# Patient Record
Sex: MALE | Race: WHITE | NOT HISPANIC OR LATINO | Employment: OTHER | URBAN - METROPOLITAN AREA
[De-identification: names, ages, dates, MRNs, and addresses within clinical notes are randomized per-mention and may not be internally consistent; named-entity substitution may affect disease eponyms.]

---

## 2017-11-06 ENCOUNTER — APPOINTMENT (OUTPATIENT)
Dept: GENERAL RADIOLOGY | Facility: HOSPITAL | Age: 67
End: 2017-11-06

## 2017-11-06 ENCOUNTER — HOSPITAL ENCOUNTER (INPATIENT)
Facility: HOSPITAL | Age: 67
LOS: 10 days | Discharge: NURSING, REHAB OR LONG-TERM CARE | End: 2017-11-16
Attending: EMERGENCY MEDICINE | Admitting: HOSPITALIST

## 2017-11-06 ENCOUNTER — APPOINTMENT (OUTPATIENT)
Dept: ULTRASOUND IMAGING | Facility: HOSPITAL | Age: 67
End: 2017-11-06

## 2017-11-06 ENCOUNTER — APPOINTMENT (OUTPATIENT)
Dept: CT IMAGING | Facility: HOSPITAL | Age: 67
End: 2017-11-06

## 2017-11-06 DIAGNOSIS — R79.89 ELEVATED LFTS: ICD-10-CM

## 2017-11-06 DIAGNOSIS — N17.9 ACUTE KIDNEY INJURY (NONTRAUMATIC) (HCC): Primary | ICD-10-CM

## 2017-11-06 DIAGNOSIS — E87.6 HYPOKALEMIA: ICD-10-CM

## 2017-11-06 DIAGNOSIS — E86.0 DEHYDRATION: ICD-10-CM

## 2017-11-06 DIAGNOSIS — R53.1 WEAKNESS: ICD-10-CM

## 2017-11-06 LAB
ALBUMIN SERPL-MCNC: 3.2 G/DL (ref 3.5–5.2)
ALBUMIN/GLOB SERPL: 0.8 G/DL
ALP SERPL-CCNC: 317 U/L (ref 39–117)
ALT SERPL W P-5'-P-CCNC: 152 U/L (ref 1–41)
AMPHET+METHAMPHET UR QL: NEGATIVE
ANION GAP SERPL CALCULATED.3IONS-SCNC: 20.1 MMOL/L
AST SERPL-CCNC: 689 U/L (ref 1–40)
BACTERIA UR QL AUTO: ABNORMAL /HPF
BARBITURATES UR QL SCN: NEGATIVE
BASOPHILS # BLD AUTO: 0.04 10*3/MM3 (ref 0–0.2)
BASOPHILS NFR BLD AUTO: 0.3 % (ref 0–1.5)
BENZODIAZ UR QL SCN: NEGATIVE
BH CV VAS MEAS BASILIC ANTECUBITAL FOSSA LEFT: 0.45 CM
BH CV VAS MEAS BASILIC ANTECUBITAL FOSSA RIGHT: 0.39 CM
BH CV VAS MEAS BASILIC FOREARM LEFT - DIST: 0.12 CM
BH CV VAS MEAS BASILIC FOREARM LEFT - MID: 0.09 CM
BH CV VAS MEAS BASILIC FOREARM LEFT - PROX: 0.24 CM
BH CV VAS MEAS BASILIC FOREARM RIGHT - DIST: 0.19 CM
BH CV VAS MEAS BASILIC FOREARM RIGHT - MID: 0.14 CM
BH CV VAS MEAS BASILIC FOREARM RIGHT - PROX: 0.19 CM
BH CV VAS MEAS BASILIC UPPER ARM LEFT - DIST: 0.3 CM
BH CV VAS MEAS BASILIC UPPER ARM LEFT - MID: 0.36 CM
BH CV VAS MEAS BASILIC UPPER ARM LEFT - PROX: 0.39 CM
BH CV VAS MEAS BASILIC UPPER ARM RIGHT - DIST: 0.36 CM
BH CV VAS MEAS BASILIC UPPER ARM RIGHT - MID: 0.43 CM
BH CV VAS MEAS BASILIC UPPER ARM RIGHT - PROX: 0.62 CM
BH CV VAS MEAS CEPHALIC ANTECUBITAL FOSSA LEFT: 0.54 CM
BH CV VAS MEAS CEPHALIC ANTECUBITAL FOSSA RIGHT: 0.85 CM
BH CV VAS MEAS CEPHALIC FOREARM LEFT - DIST: 0.19 CM
BH CV VAS MEAS CEPHALIC FOREARM LEFT - MID: 0.21 CM
BH CV VAS MEAS CEPHALIC FOREARM LEFT - PROX: 0.27 CM
BH CV VAS MEAS CEPHALIC FOREARM RIGHT - DIST: 0.28 CM
BH CV VAS MEAS CEPHALIC FOREARM RIGHT - MID: 0.33 CM
BH CV VAS MEAS CEPHALIC FOREARM RIGHT - PROX: 0.38 CM
BH CV VAS MEAS CEPHALIC UPPER ARM LEFT - DIST: 0.33 CM
BH CV VAS MEAS CEPHALIC UPPER ARM LEFT - MID: 0.28 CM
BH CV VAS MEAS CEPHALIC UPPER ARM LEFT - PROX: 0.41 CM
BH CV VAS MEAS CEPHALIC UPPER ARM RIGHT - DIST: 0.58 CM
BH CV VAS MEAS CEPHALIC UPPER ARM RIGHT - MID: 0.54 CM
BH CV VAS MEAS CEPHALIC UPPER ARM RIGHT - PROX: 0.55 CM
BH CV VAS MEAS RADIAL UPPER ARM LEFT - DIST: 0.23 CM
BH CV VAS MEAS RADIAL UPPER ARM LEFT - MID: 0.25 CM
BH CV VAS MEAS RADIAL UPPER ARM LEFT - PROX: 0.25 CM
BH CV VAS MEAS RADIAL UPPER ARM RIGHT - DIST: 0.26 CM
BH CV VAS MEAS RADIAL UPPER ARM RIGHT - MID: 0.27 CM
BH CV VAS MEAS RADIAL UPPER ARM RIGHT - PROX: 0.21 CM
BILIRUB SERPL-MCNC: 2.5 MG/DL (ref 0.1–1.2)
BILIRUB UR QL STRIP: NEGATIVE
BUN BLD-MCNC: 51 MG/DL (ref 8–23)
BUN/CREAT SERPL: 6.9 (ref 7–25)
CALCIUM SPEC-SCNC: 8.9 MG/DL (ref 8.6–10.5)
CANNABINOIDS SERPL QL: NEGATIVE
CHLORIDE SERPL-SCNC: 96 MMOL/L (ref 98–107)
CLARITY UR: ABNORMAL
CO2 SERPL-SCNC: 22.9 MMOL/L (ref 22–29)
COCAINE UR QL: NEGATIVE
COLOR UR: ABNORMAL
CREAT BLD-MCNC: 7.39 MG/DL (ref 0.76–1.27)
DEPRECATED RDW RBC AUTO: 52 FL (ref 37–54)
EOSINOPHIL # BLD AUTO: 0.08 10*3/MM3 (ref 0–0.7)
EOSINOPHIL NFR BLD AUTO: 0.7 % (ref 0.3–6.2)
ERYTHROCYTE [DISTWIDTH] IN BLOOD BY AUTOMATED COUNT: 15.2 % (ref 11.5–14.5)
GFR SERPL CREATININE-BSD FRML MDRD: 7 ML/MIN/1.73
GLOBULIN UR ELPH-MCNC: 4.1 GM/DL
GLUCOSE BLD-MCNC: 114 MG/DL (ref 65–99)
GLUCOSE BLDC GLUCOMTR-MCNC: 109 MG/DL (ref 70–130)
GLUCOSE UR STRIP-MCNC: NEGATIVE MG/DL
HCT VFR BLD AUTO: 43.6 % (ref 40.4–52.2)
HGB BLD-MCNC: 15.3 G/DL (ref 13.7–17.6)
HGB UR QL STRIP.AUTO: ABNORMAL
HYALINE CASTS UR QL AUTO: ABNORMAL /LPF
IMM GRANULOCYTES # BLD: 0.02 10*3/MM3 (ref 0–0.03)
IMM GRANULOCYTES NFR BLD: 0.2 % (ref 0–0.5)
KETONES UR QL STRIP: ABNORMAL
LEUKOCYTE ESTERASE UR QL STRIP.AUTO: ABNORMAL
LYMPHOCYTES # BLD AUTO: 2.21 10*3/MM3 (ref 0.9–4.8)
LYMPHOCYTES NFR BLD AUTO: 18.1 % (ref 19.6–45.3)
MCH RBC QN AUTO: 33 PG (ref 27–32.7)
MCHC RBC AUTO-ENTMCNC: 35.1 G/DL (ref 32.6–36.4)
MCV RBC AUTO: 94.2 FL (ref 79.8–96.2)
METHADONE UR QL SCN: NEGATIVE
MONOCYTES # BLD AUTO: 1.47 10*3/MM3 (ref 0.2–1.2)
MONOCYTES NFR BLD AUTO: 12.1 % (ref 5–12)
MUCOUS THREADS URNS QL MICRO: ABNORMAL /HPF
NEUTROPHILS # BLD AUTO: 8.36 10*3/MM3 (ref 1.9–8.1)
NEUTROPHILS NFR BLD AUTO: 68.6 % (ref 42.7–76)
NITRITE UR QL STRIP: NEGATIVE
OPIATES UR QL: NEGATIVE
OXYCODONE UR QL SCN: NEGATIVE
PH UR STRIP.AUTO: 5.5 [PH] (ref 5–8)
PLATELET # BLD AUTO: 153 10*3/MM3 (ref 140–500)
PMV BLD AUTO: 12 FL (ref 6–12)
POTASSIUM BLD-SCNC: 2.8 MMOL/L (ref 3.5–5.2)
PROT SERPL-MCNC: 7.3 G/DL (ref 6–8.5)
PROT UR QL STRIP: ABNORMAL
RBC # BLD AUTO: 4.63 10*6/MM3 (ref 4.6–6)
RBC # UR: ABNORMAL /HPF
REF LAB TEST METHOD: ABNORMAL
SODIUM BLD-SCNC: 139 MMOL/L (ref 136–145)
SP GR UR STRIP: 1.02 (ref 1–1.03)
SQUAMOUS #/AREA URNS HPF: ABNORMAL /HPF
UPPER ARTERIAL LEFT ARM BRACHIAL LENGTH: 0.45 CM
UPPER ARTERIAL RIGHT ARM BRACHIAL LENGTH: 0.47 CM
UROBILINOGEN UR QL STRIP: ABNORMAL
WBC NRBC COR # BLD: 12.18 10*3/MM3 (ref 4.5–10.7)
WBC UR QL AUTO: ABNORMAL /HPF

## 2017-11-06 PROCEDURE — 86038 ANTINUCLEAR ANTIBODIES: CPT | Performed by: INTERNAL MEDICINE

## 2017-11-06 PROCEDURE — 85025 COMPLETE CBC W/AUTO DIFF WBC: CPT | Performed by: EMERGENCY MEDICINE

## 2017-11-06 PROCEDURE — 80053 COMPREHEN METABOLIC PANEL: CPT | Performed by: EMERGENCY MEDICINE

## 2017-11-06 PROCEDURE — 82962 GLUCOSE BLOOD TEST: CPT

## 2017-11-06 PROCEDURE — 80307 DRUG TEST PRSMV CHEM ANLYZR: CPT | Performed by: EMERGENCY MEDICINE

## 2017-11-06 PROCEDURE — 86592 SYPHILIS TEST NON-TREP QUAL: CPT | Performed by: INTERNAL MEDICINE

## 2017-11-06 PROCEDURE — 86256 FLUORESCENT ANTIBODY TITER: CPT | Performed by: INTERNAL MEDICINE

## 2017-11-06 PROCEDURE — 87899 AGENT NOS ASSAY W/OPTIC: CPT | Performed by: INTERNAL MEDICINE

## 2017-11-06 PROCEDURE — 83970 ASSAY OF PARATHORMONE: CPT | Performed by: INTERNAL MEDICINE

## 2017-11-06 PROCEDURE — 51798 US URINE CAPACITY MEASURE: CPT

## 2017-11-06 PROCEDURE — 83883 ASSAY NEPHELOMETRY NOT SPEC: CPT | Performed by: INTERNAL MEDICINE

## 2017-11-06 PROCEDURE — G0432 EIA HIV-1/HIV-2 SCREEN: HCPCS | Performed by: INTERNAL MEDICINE

## 2017-11-06 PROCEDURE — 74176 CT ABD & PELVIS W/O CONTRAST: CPT

## 2017-11-06 PROCEDURE — 83520 IMMUNOASSAY QUANT NOS NONAB: CPT | Performed by: INTERNAL MEDICINE

## 2017-11-06 PROCEDURE — 80074 ACUTE HEPATITIS PANEL: CPT | Performed by: INTERNAL MEDICINE

## 2017-11-06 PROCEDURE — 93005 ELECTROCARDIOGRAM TRACING: CPT | Performed by: EMERGENCY MEDICINE

## 2017-11-06 PROCEDURE — 25810000003 SODIUM CHLORIDE 0.9 % WITH KCL 40 MEQ/L 40-0.9 MEQ/L-% SOLUTION: Performed by: HOSPITALIST

## 2017-11-06 PROCEDURE — 71020 HC CHEST PA AND LATERAL: CPT

## 2017-11-06 PROCEDURE — 25010000003 POTASSIUM CHLORIDE 10 MEQ/100ML SOLUTION: Performed by: EMERGENCY MEDICINE

## 2017-11-06 PROCEDURE — 81001 URINALYSIS AUTO W/SCOPE: CPT | Performed by: EMERGENCY MEDICINE

## 2017-11-06 PROCEDURE — 86160 COMPLEMENT ANTIGEN: CPT | Performed by: INTERNAL MEDICINE

## 2017-11-06 PROCEDURE — 99285 EMERGENCY DEPT VISIT HI MDM: CPT

## 2017-11-06 PROCEDURE — 76705 ECHO EXAM OF ABDOMEN: CPT

## 2017-11-06 PROCEDURE — 93010 ELECTROCARDIOGRAM REPORT: CPT | Performed by: INTERNAL MEDICINE

## 2017-11-06 PROCEDURE — 76775 US EXAM ABDO BACK WALL LIM: CPT

## 2017-11-06 PROCEDURE — 86334 IMMUNOFIX E-PHORESIS SERUM: CPT | Performed by: INTERNAL MEDICINE

## 2017-11-06 PROCEDURE — 83516 IMMUNOASSAY NONANTIBODY: CPT | Performed by: INTERNAL MEDICINE

## 2017-11-06 PROCEDURE — 87086 URINE CULTURE/COLONY COUNT: CPT | Performed by: EMERGENCY MEDICINE

## 2017-11-06 RX ORDER — BISOPROLOL FUMARATE AND HYDROCHLOROTHIAZIDE 5; 6.25 MG/1; MG/1
1 TABLET ORAL DAILY
Status: ON HOLD | COMMUNITY
End: 2017-11-07

## 2017-11-06 RX ORDER — SODIUM CHLORIDE 9 MG/ML
125 INJECTION, SOLUTION INTRAVENOUS CONTINUOUS
Status: DISCONTINUED | OUTPATIENT
Start: 2017-11-06 | End: 2017-11-06

## 2017-11-06 RX ORDER — SODIUM CHLORIDE AND POTASSIUM CHLORIDE 300; 900 MG/100ML; MG/100ML
125 INJECTION, SOLUTION INTRAVENOUS CONTINUOUS
Status: DISCONTINUED | OUTPATIENT
Start: 2017-11-06 | End: 2017-11-07

## 2017-11-06 RX ORDER — POTASSIUM CHLORIDE 7.45 MG/ML
10 INJECTION INTRAVENOUS ONCE
Status: COMPLETED | OUTPATIENT
Start: 2017-11-06 | End: 2017-11-06

## 2017-11-06 RX ORDER — SODIUM CHLORIDE 0.9 % (FLUSH) 0.9 %
10 SYRINGE (ML) INJECTION AS NEEDED
Status: DISCONTINUED | OUTPATIENT
Start: 2017-11-06 | End: 2017-11-16 | Stop reason: HOSPADM

## 2017-11-06 RX ADMIN — POTASSIUM CHLORIDE AND SODIUM CHLORIDE 125 ML/HR: 900; 300 INJECTION, SOLUTION INTRAVENOUS at 22:53

## 2017-11-06 RX ADMIN — POTASSIUM CHLORIDE 10 MEQ: 10 INJECTION, SOLUTION INTRAVENOUS at 19:28

## 2017-11-06 RX ADMIN — SODIUM CHLORIDE 500 ML: 9 INJECTION, SOLUTION INTRAVENOUS at 17:23

## 2017-11-06 RX ADMIN — SODIUM CHLORIDE 125 ML/HR: 9 INJECTION, SOLUTION INTRAVENOUS at 20:35

## 2017-11-06 NOTE — ED PROVIDER NOTES
EMERGENCY DEPARTMENT ENCOUNTER    CHIEF COMPLAINT  Chief Complaint: Generalized weakness  History given by: Patient and family  History limited by: Nothing  Room Number: 13/13  PMD: Aldo Guajardo MD      HPI:  Pt is a 67 y.o. male who presents complaining of generalized weakness that began 5 days ago.  Patient reports that his legs feel heavy, increased urinary frequency, increased thirst, jittery feeling.  Pt had blood work done outpatient at his family physician wand was told that he is acute renal failure.  Pt denies abdominal pain and fever.    Duration:  5 days  Onset: Gradual  Timing: Constant  Intensity/Severity: Moderate  Progression: Unchanged  Associated Symptoms:  Patient reports that his legs feel heavy, increased urinary frequency, increased thirst, jittery feeling.  Aggravating Factors: None  Alleviating Factors: None  Previous Episodes: None  Treatment before arrival: None    PAST MEDICAL HISTORY  Active Ambulatory Problems     Diagnosis Date Noted   • Arteriosclerotic vascular disease 12/02/2016   • Hyperlipidemia 12/02/2016   • Hypertension 12/02/2016     Resolved Ambulatory Problems     Diagnosis Date Noted   • No Resolved Ambulatory Problems     Past Medical History:   Diagnosis Date   • Arteriosclerotic cardiovascular disease    • Hyperlipidemia    • Hypertension        PAST SURGICAL HISTORY  History reviewed. No pertinent surgical history.    FAMILY HISTORY  Family History   Problem Relation Age of Onset   • Cancer Father      laryngeal cancer       SOCIAL HISTORY  Social History     Social History   • Marital status:      Spouse name: N/A   • Number of children: N/A   • Years of education: N/A     Occupational History   • Not on file.     Social History Main Topics   • Smoking status: Current Every Day Smoker   • Smokeless tobacco: Not on file      Comment: caffeine use   • Alcohol use Yes      Comment: social drinker   • Drug use: Not on file   • Sexual activity: Not on file      Other Topics Concern   • Not on file     Social History Narrative       ALLERGIES  Review of patient's allergies indicates no known allergies.    REVIEW OF SYSTEMS  Review of Systems   Constitutional: Negative for activity change, appetite change and fever.   HENT: Negative for congestion and sore throat.    Eyes: Negative.    Respiratory: Negative for cough and shortness of breath.    Cardiovascular: Negative for chest pain and leg swelling.   Gastrointestinal: Negative for abdominal pain, diarrhea and vomiting.   Endocrine: Negative.    Genitourinary: Positive for frequency. Negative for decreased urine volume and dysuria.        Increased thirst   Musculoskeletal: Negative for neck pain.   Skin: Negative for rash and wound.   Allergic/Immunologic: Negative.    Neurological: Positive for weakness. Negative for numbness and headaches.   Hematological: Negative.    Psychiatric/Behavioral: Negative.    All other systems reviewed and are negative.      PHYSICAL EXAM  ED Triage Vitals   Temp Heart Rate Resp BP SpO2   11/06/17 1643 11/06/17 1643 11/06/17 1643 11/06/17 1656 11/06/17 1643   97.3 °F (36.3 °C) 52 16 134/62 96 %      Temp src Heart Rate Source Patient Position BP Location FiO2 (%)   11/06/17 1643 11/06/17 1643 11/06/17 1656 11/06/17 1656 --   Tympanic Monitor Lying Right arm        Physical Exam   Constitutional: He is oriented to person, place, and time and well-developed, well-nourished, and in no distress.   HENT:   Head: Normocephalic and atraumatic.   Mouth/Throat: Mucous membranes are dry.   Eyes: EOM are normal. Pupils are equal, round, and reactive to light.   Neck: Normal range of motion. Neck supple.   Cardiovascular: Regular rhythm and normal heart sounds.  Bradycardia present.    No murmur heard.  Pulmonary/Chest: Effort normal and breath sounds normal. No respiratory distress.   Abdominal: Soft. He exhibits no distension. There is no tenderness. There is no rebound and no guarding.    Musculoskeletal: Normal range of motion. He exhibits no edema.   Neurological: He is alert and oriented to person, place, and time. He has normal sensation and normal strength.   No focal weakness or facial droop. Slow mentation.   Skin: Skin is warm and dry.   Turgor is normal, cap refill 1-2 seconds.     Psychiatric: Mood and affect normal.   Nursing note and vitals reviewed.      LAB RESULTS  Lab Results (last 24 hours)     Procedure Component Value Units Date/Time    CBC & Differential [[id number]] Collected:  11/06/17 1722    Specimen:  Blood Updated:  11/06/17 1740    Narrative:       The following orders were created for panel order CBC & Differential.  Procedure                               Abnormality         Status                     ---------                               -----------         ------                     CBC Auto Differential[[id number]]         Abnormal            Final result                 Please view results for these tests on the individual orders.    Comprehensive Metabolic Panel [[id number]]  (Abnormal) Collected:  11/06/17 1722    Specimen:  Blood Updated:  11/06/17 1800     Glucose 114 (H) mg/dL      BUN 51 (H) mg/dL      Creatinine 7.39 (H) mg/dL      Sodium 139 mmol/L      Potassium 2.8 (L) mmol/L      Chloride 96 (L) mmol/L      CO2 22.9 mmol/L      Calcium 8.9 mg/dL      Total Protein 7.3 g/dL      Albumin 3.20 (L) g/dL      ALT (SGPT) 152 (H) U/L      AST (SGOT) 689 (H) U/L      Alkaline Phosphatase 317 (H) U/L      Total Bilirubin 2.5 (H) mg/dL      eGFR Non African Amer 7 (L) mL/min/1.73      Globulin 4.1 gm/dL      A/G Ratio 0.8 g/dL      BUN/Creatinine Ratio 6.9 (L)     Anion Gap 20.1 mmol/L     CBC Auto Differential [[id number]]  (Abnormal) Collected:  11/06/17 1722    Specimen:  Blood Updated:  11/06/17 1740     WBC 12.18 (H) 10*3/mm3      RBC 4.63 10*6/mm3      Hemoglobin 15.3 g/dL      Hematocrit 43.6 %      MCV 94.2 fL      MCH 33.0 (H) pg      MCHC 35.1 g/dL      RDW  15.2 (H) %      RDW-SD 52.0 fl      MPV 12.0 fL      Platelets 153 10*3/mm3      Neutrophil % 68.6 %      Lymphocyte % 18.1 (L) %      Monocyte % 12.1 (H) %      Eosinophil % 0.7 %      Basophil % 0.3 %      Immature Grans % 0.2 %      Neutrophils, Absolute 8.36 (H) 10*3/mm3      Lymphocytes, Absolute 2.21 10*3/mm3      Monocytes, Absolute 1.47 (H) 10*3/mm3      Eosinophils, Absolute 0.08 10*3/mm3      Basophils, Absolute 0.04 10*3/mm3      Immature Grans, Absolute 0.02 10*3/mm3     POC Glucose Fingerstick [[id number]]  (Normal) Collected:  11/06/17 1734    Specimen:  Blood Updated:  11/06/17 1740     Glucose 109 mg/dL     Narrative:       Meter: [id number] : [id number] Jeffery Robertson    Urinalysis With / Culture If Indicated - Urine, Clean Catch [[id number]]  (Abnormal) Collected:  11/06/17 1814    Specimen:  Urine from Urine, Clean Catch Updated:  11/06/17 1835     Color, UA Dark Yellow (A)     Appearance, UA Cloudy (A)     pH, UA 5.5     Specific Gravity, UA 1.016     Glucose, UA Negative     Ketones, UA Trace (A)     Bilirubin, UA Negative     Blood, UA Large (3+) (A)     Protein, UA >=300 mg/dL (3+) (A)     Leuk Esterase, UA Small (1+) (A)     Nitrite, UA Negative     Urobilinogen, UA 1.0 E.U./dL    Urinalysis, Microscopic Only - Urine, Clean Catch [[id number]]  (Abnormal) Collected:  11/06/17 1814    Specimen:  Urine from Urine, Clean Catch Updated:  11/06/17 1855     RBC, UA 0-2 /HPF      WBC, UA 6-12 (A) /HPF      Bacteria, UA 1+ (A) /HPF      Squamous Epithelial Cells, UA 0-2 /HPF      Hyaline Casts, UA 3-6 /LPF      Mucus, UA Moderate/2+ (A) /HPF      Methodology Manual Light Microscopy    Urine Culture - Urine, Urine, Clean Catch [[id number]] Collected:  11/06/17 1814    Specimen:  Urine from Urine, Clean Catch Updated:  11/06/17 1831    Urine Drug Screen - Urine, Clean Catch [[id number]] Collected:  11/06/17 1814    Specimen:  Urine from Urine, Clean Catch Updated:  11/06/17 1950          I ordered the above  labs and reviewed the results    RADIOLOGY  US Gallbladder   Final Result       Sludge in the gallbladder. No evidence for acute cholecystitis. No   discrete liver lesion or biliary ductal dilatation identified. With   persistent clinical indication, enhanced liver imaging could be   considered for further evaluation.       This report was finalized on 11/6/2017 7:33 PM by Dr. Gokul Real MD.          CT Abdomen Pelvis Without Contrast    (Results Pending)   XR Chest 2 View    (Results Pending)        I ordered the above noted radiological studies.  Reviewed by me in PACS.       PROCEDURES  Procedures      EKG           EKG time: 1743  Rhythm/Rate: Sinus bradycardia 48  P waves and DC: Normal  QRS, axis: IVCD   ST and T waves: Biphasic T-wave and Inverted T-waves inferiorly      Interpreted Contemporaneously by me, independently viewed  PROGRESS AND CONSULTS  ED Course     1720  Ordered sodium chloride bolus.    1737  Ordered bladder scan for further evaluation.     1835  Ordered US gallbladder for further evaluation.    1922  Ordered CT abdomen pelvis without contrast for further evaluation. Ordered LIPPS consult.      1936  Requested consult with  General     1939  Consulted with Dr. Varela who agrees to admit to telemetry bed.    1948  Ordered Drug screen for further evaluation.    1951  Discussed with Dr. Harper who will see the patient in consult.     2000  Rechecked the patient and he is resting comfortably. Discussed pertinent lab and imaging results. Discussed the plan to admit to treat kidney function. Patient agrees with plan and all questions were addressed.       MEDICAL DECISION MAKING  Results were reviewed/discussed with the patient and they were also made aware of online access. Pt also made aware that some labs, such as cultures, will not be resulted during ER visit and follow up with PMD is necessary.     MDM  Number of Diagnoses or Management Options     Amount and/or Complexity of Data  Reviewed  Clinical lab tests: ordered and reviewed  Tests in the radiology section of CPT®: ordered and reviewed (US gallbladder shows sludge in gallbladder. )  Tests in the medicine section of CPT®: ordered and reviewed (Bladder scan shows 26cc)           DIAGNOSIS  Final diagnoses:   Acute kidney injury (nontraumatic)   Elevated LFTs   Hypokalemia   Dehydration       DISPOSITION  ADMISSION    Discussed treatment plan and reason for admission with pt/family and admitting physician.  Pt/family voiced understanding of the plan for admission for further testing/treatment as needed.         Latest Documented Vital Signs:  As of 7:54 PM  BP- 147/97 HR- 55 Temp- 97.3 °F (36.3 °C) (Tympanic) O2 sat- 95%    --  Documentation assistance provided by wilson Sainz for Dr. Simmons.  Information recorded by the scribe was done at my direction and has been verified and validated by me.            Anabelle Sainz  11/06/17 2006       Adan Simmons MD  11/06/17 6915

## 2017-11-07 ENCOUNTER — APPOINTMENT (OUTPATIENT)
Dept: ULTRASOUND IMAGING | Facility: HOSPITAL | Age: 67
End: 2017-11-07

## 2017-11-07 ENCOUNTER — APPOINTMENT (OUTPATIENT)
Dept: CARDIOLOGY | Facility: HOSPITAL | Age: 67
End: 2017-11-07
Attending: HOSPITALIST

## 2017-11-07 LAB
ALBUMIN SERPL-MCNC: 2.8 G/DL (ref 3.5–5.2)
ALP SERPL-CCNC: 259 U/L (ref 39–117)
ALT SERPL W P-5'-P-CCNC: 149 U/L (ref 1–41)
ANION GAP SERPL CALCULATED.3IONS-SCNC: 18.8 MMOL/L
AST SERPL-CCNC: 829 U/L (ref 1–40)
BH CV ECHO MEAS - ACS: 1.7 CM
BH CV ECHO MEAS - AO MAX PG: 11 MMHG
BH CV ECHO MEAS - AO MEAN PG (FULL): 2 MMHG
BH CV ECHO MEAS - AO MEAN PG: 5 MMHG
BH CV ECHO MEAS - AO ROOT AREA (BSA CORRECTED): 1.7
BH CV ECHO MEAS - AO ROOT AREA: 11.3 CM^2
BH CV ECHO MEAS - AO ROOT DIAM: 3.8 CM
BH CV ECHO MEAS - AO V2 MAX: 165 CM/SEC
BH CV ECHO MEAS - AO V2 MEAN: 103 CM/SEC
BH CV ECHO MEAS - AO V2 VTI: 40.6 CM
BH CV ECHO MEAS - AVA(I,A): 2.8 CM^2
BH CV ECHO MEAS - AVA(I,D): 2.8 CM^2
BH CV ECHO MEAS - BSA(HAYCOCK): 2.3 M^2
BH CV ECHO MEAS - BSA: 2.2 M^2
BH CV ECHO MEAS - BZI_BMI: 28.9 KILOGRAMS/M^2
BH CV ECHO MEAS - BZI_METRIC_HEIGHT: 185.4 CM
BH CV ECHO MEAS - BZI_METRIC_WEIGHT: 99.3 KG
BH CV ECHO MEAS - CONTRAST EF (2CH): 59.9 ML/M^2
BH CV ECHO MEAS - CONTRAST EF 4CH: 57.5 ML/M^2
BH CV ECHO MEAS - EDV(CUBED): 274.6 ML
BH CV ECHO MEAS - EDV(MOD-SP2): 142 ML
BH CV ECHO MEAS - EDV(MOD-SP4): 146 ML
BH CV ECHO MEAS - EDV(TEICH): 216 ML
BH CV ECHO MEAS - EF(CUBED): 71 %
BH CV ECHO MEAS - EF(MOD-SP2): 59.9 %
BH CV ECHO MEAS - EF(MOD-SP4): 57.5 %
BH CV ECHO MEAS - EF(TEICH): 61.5 %
BH CV ECHO MEAS - ESV(CUBED): 79.5 ML
BH CV ECHO MEAS - ESV(MOD-SP2): 57 ML
BH CV ECHO MEAS - ESV(MOD-SP4): 62 ML
BH CV ECHO MEAS - ESV(TEICH): 83.1 ML
BH CV ECHO MEAS - FS: 33.8 %
BH CV ECHO MEAS - IVS/LVPW: 1
BH CV ECHO MEAS - IVSD: 0.8 CM
BH CV ECHO MEAS - LA DIMENSION: 4.2 CM
BH CV ECHO MEAS - LA/AO: 1.1
BH CV ECHO MEAS - LAT PEAK E' VEL: 11 CM/SEC
BH CV ECHO MEAS - LV DIASTOLIC VOL/BSA (35-75): 65.3 ML/M^2
BH CV ECHO MEAS - LV MASS(C)D: 214.3 GRAMS
BH CV ECHO MEAS - LV MASS(C)DI: 95.8 GRAMS/M^2
BH CV ECHO MEAS - LV MEAN PG: 3 MMHG
BH CV ECHO MEAS - LV SYSTOLIC VOL/BSA (12-30): 27.7 ML/M^2
BH CV ECHO MEAS - LV V1 MAX: 114 CM/SEC
BH CV ECHO MEAS - LV V1 MEAN: 74.2 CM/SEC
BH CV ECHO MEAS - LV V1 VTI: 27.8 CM
BH CV ECHO MEAS - LVIDD: 6.5 CM
BH CV ECHO MEAS - LVIDS: 4.3 CM
BH CV ECHO MEAS - LVLD AP2: 9.1 CM
BH CV ECHO MEAS - LVLD AP4: 8.5 CM
BH CV ECHO MEAS - LVLS AP2: 7.5 CM
BH CV ECHO MEAS - LVLS AP4: 7.3 CM
BH CV ECHO MEAS - LVOT AREA (M): 4.2 CM^2
BH CV ECHO MEAS - LVOT AREA: 4.2 CM^2
BH CV ECHO MEAS - LVOT DIAM: 2.3 CM
BH CV ECHO MEAS - LVPWD: 0.8 CM
BH CV ECHO MEAS - MED PEAK E' VEL: 9 CM/SEC
BH CV ECHO MEAS - MV A DUR: 0.21 SEC
BH CV ECHO MEAS - MV A MAX VEL: 66.8 CM/SEC
BH CV ECHO MEAS - MV DEC SLOPE: 162 CM/SEC^2
BH CV ECHO MEAS - MV DEC TIME: 0.24 SEC
BH CV ECHO MEAS - MV E MAX VEL: 77.3 CM/SEC
BH CV ECHO MEAS - MV E/A: 1.2
BH CV ECHO MEAS - MV MEAN PG: 1 MMHG
BH CV ECHO MEAS - MV P1/2T MAX VEL: 75.8 CM/SEC
BH CV ECHO MEAS - MV P1/2T: 137 MSEC
BH CV ECHO MEAS - MV V2 MEAN: 42.9 CM/SEC
BH CV ECHO MEAS - MV V2 VTI: 36.1 CM
BH CV ECHO MEAS - MVA P1/2T LCG: 2.9 CM^2
BH CV ECHO MEAS - MVA(P1/2T): 1.6 CM^2
BH CV ECHO MEAS - MVA(VTI): 3.2 CM^2
BH CV ECHO MEAS - PA ACC SLOPE: 745 CM/SEC^2
BH CV ECHO MEAS - PA ACC TIME: 0.15 SEC
BH CV ECHO MEAS - PA MAX PG: 5.3 MMHG
BH CV ECHO MEAS - PA PR(ACCEL): 12.4 MMHG
BH CV ECHO MEAS - PA V2 MAX: 115 CM/SEC
BH CV ECHO MEAS - PULM A REVS DUR: 0.18 SEC
BH CV ECHO MEAS - PULM A REVS VEL: 29.5 CM/SEC
BH CV ECHO MEAS - PULM DIAS VEL: 54.2 CM/SEC
BH CV ECHO MEAS - PULM S/D: 1.1
BH CV ECHO MEAS - PULM SYS VEL: 58.3 CM/SEC
BH CV ECHO MEAS - QP/QS: 0.89
BH CV ECHO MEAS - RAP SYSTOLE: 3 MMHG
BH CV ECHO MEAS - RV MEAN PG: 1 MMHG
BH CV ECHO MEAS - RV V1 MEAN: 48.6 CM/SEC
BH CV ECHO MEAS - RV V1 VTI: 19.4 CM
BH CV ECHO MEAS - RVOT AREA: 5.3 CM^2
BH CV ECHO MEAS - RVOT DIAM: 2.6 CM
BH CV ECHO MEAS - RVSP: 26.6 MMHG
BH CV ECHO MEAS - SI(AO): 205.9 ML/M^2
BH CV ECHO MEAS - SI(CUBED): 87.2 ML/M^2
BH CV ECHO MEAS - SI(LVOT): 51.6 ML/M^2
BH CV ECHO MEAS - SI(MOD-SP2): 38 ML/M^2
BH CV ECHO MEAS - SI(MOD-SP4): 37.6 ML/M^2
BH CV ECHO MEAS - SI(TEICH): 59.4 ML/M^2
BH CV ECHO MEAS - SV(AO): 460.5 ML
BH CV ECHO MEAS - SV(CUBED): 195.1 ML
BH CV ECHO MEAS - SV(LVOT): 115.5 ML
BH CV ECHO MEAS - SV(MOD-SP2): 85 ML
BH CV ECHO MEAS - SV(MOD-SP4): 84 ML
BH CV ECHO MEAS - SV(RVOT): 103 ML
BH CV ECHO MEAS - SV(TEICH): 132.9 ML
BH CV ECHO MEAS - TAPSE (>1.6): 2.6 CM2
BH CV ECHO MEAS - TR MAX VEL: 243 CM/SEC
BH CV VAS BP RIGHT ARM: NORMAL MMHG
BH CV XLRA - RV BASE: 4.2 CM
BH CV XLRA - RV LENGTH: 8.1 CM
BH CV XLRA - RV MID: 2.7 CM
BH CV XLRA - TDI S': 19 CM/SEC
BILIRUB CONJ SERPL-MCNC: 1 MG/DL (ref 0–0.3)
BILIRUB INDIRECT SERPL-MCNC: 1 MG/DL
BILIRUB SERPL-MCNC: 2 MG/DL (ref 0.1–1.2)
BUN BLD-MCNC: 57 MG/DL (ref 8–23)
BUN/CREAT SERPL: 7.5 (ref 7–25)
CALCIUM SPEC-SCNC: 8 MG/DL (ref 8.6–10.5)
CHLORIDE SERPL-SCNC: 101 MMOL/L (ref 98–107)
CK SERPL-CCNC: ABNORMAL U/L (ref 20–200)
CO2 SERPL-SCNC: 21.2 MMOL/L (ref 22–29)
CREAT BLD-MCNC: 7.63 MG/DL (ref 0.76–1.27)
CREAT UR-MCNC: 70.4 MG/DL
DEPRECATED RDW RBC AUTO: 52.5 FL (ref 37–54)
E/E' RATIO: 8
ERYTHROCYTE [DISTWIDTH] IN BLOOD BY AUTOMATED COUNT: 15.3 % (ref 11.5–14.5)
GFR SERPL CREATININE-BSD FRML MDRD: 7 ML/MIN/1.73
GLUCOSE BLD-MCNC: 106 MG/DL (ref 65–99)
HAV IGM SERPL QL IA: NORMAL
HAV IGM SERPL QL IA: NORMAL
HBV CORE IGM SERPL QL IA: NORMAL
HBV CORE IGM SERPL QL IA: NORMAL
HBV SURFACE AG SERPL QL IA: NORMAL
HBV SURFACE AG SERPL QL IA: NORMAL
HCT VFR BLD AUTO: 40.6 % (ref 40.4–52.2)
HCV AB SER DONR QL: NORMAL
HCV AB SER DONR QL: NORMAL
HGB BLD-MCNC: 14.1 G/DL (ref 13.7–17.6)
HIV1 P24 AG SER QL: NORMAL
HIV1+2 AB SER QL: NORMAL
LEFT ATRIUM VOLUME INDEX: 36 ML/M2
LEFT ATRIUM VOLUME: 73 CM3
LV EF 2D ECHO EST: 58 %
MAXIMAL PREDICTED HEART RATE: 151 BPM
MCH RBC QN AUTO: 33.1 PG (ref 27–32.7)
MCHC RBC AUTO-ENTMCNC: 34.7 G/DL (ref 32.6–36.4)
MCV RBC AUTO: 95.3 FL (ref 79.8–96.2)
PLATELET # BLD AUTO: 140 10*3/MM3 (ref 140–500)
PMV BLD AUTO: 11.5 FL (ref 6–12)
POTASSIUM BLD-SCNC: 3.1 MMOL/L (ref 3.5–5.2)
PROT SERPL-MCNC: 6.2 G/DL (ref 6–8.5)
PTH-INTACT SERPL-MCNC: 209.3 PG/ML (ref 15–65)
RBC # BLD AUTO: 4.26 10*6/MM3 (ref 4.6–6)
RPR SER QL: NORMAL
SODIUM BLD-SCNC: 141 MMOL/L (ref 136–145)
STRESS TARGET HR: 128 BPM
WBC NRBC COR # BLD: 12 10*3/MM3 (ref 4.5–10.7)

## 2017-11-07 PROCEDURE — 86665 EPSTEIN-BARR CAPSID VCA: CPT | Performed by: INTERNAL MEDICINE

## 2017-11-07 PROCEDURE — 84155 ASSAY OF PROTEIN SERUM: CPT | Performed by: INTERNAL MEDICINE

## 2017-11-07 PROCEDURE — 93306 TTE W/DOPPLER COMPLETE: CPT

## 2017-11-07 PROCEDURE — 93306 TTE W/DOPPLER COMPLETE: CPT | Performed by: INTERNAL MEDICINE

## 2017-11-07 PROCEDURE — 83516 IMMUNOASSAY NONANTIBODY: CPT | Performed by: INTERNAL MEDICINE

## 2017-11-07 PROCEDURE — 80048 BASIC METABOLIC PNL TOTAL CA: CPT | Performed by: HOSPITALIST

## 2017-11-07 PROCEDURE — 80076 HEPATIC FUNCTION PANEL: CPT | Performed by: NURSE PRACTITIONER

## 2017-11-07 PROCEDURE — 84165 PROTEIN E-PHORESIS SERUM: CPT | Performed by: INTERNAL MEDICINE

## 2017-11-07 PROCEDURE — 25810000003 SODIUM CHLORIDE 0.9 % WITH KCL 40 MEQ/L 40-0.9 MEQ/L-% SOLUTION: Performed by: HOSPITALIST

## 2017-11-07 PROCEDURE — 82550 ASSAY OF CK (CPK): CPT | Performed by: INTERNAL MEDICINE

## 2017-11-07 PROCEDURE — 99221 1ST HOSP IP/OBS SF/LOW 40: CPT | Performed by: INTERNAL MEDICINE

## 2017-11-07 PROCEDURE — 85027 COMPLETE CBC AUTOMATED: CPT | Performed by: HOSPITALIST

## 2017-11-07 PROCEDURE — 86063 ANTISTREPTOLYSIN O SCREEN: CPT | Performed by: INTERNAL MEDICINE

## 2017-11-07 PROCEDURE — 82570 ASSAY OF URINE CREATININE: CPT | Performed by: INTERNAL MEDICINE

## 2017-11-07 PROCEDURE — 84165 PROTEIN E-PHORESIS SERUM: CPT | Performed by: NURSE PRACTITIONER

## 2017-11-07 PROCEDURE — 80074 ACUTE HEPATITIS PANEL: CPT | Performed by: INTERNAL MEDICINE

## 2017-11-07 PROCEDURE — 84155 ASSAY OF PROTEIN SERUM: CPT | Performed by: NURSE PRACTITIONER

## 2017-11-07 RX ORDER — ROSUVASTATIN CALCIUM 10 MG/1
10 TABLET, COATED ORAL DAILY
Status: DISCONTINUED | OUTPATIENT
Start: 2017-11-07 | End: 2017-11-07

## 2017-11-07 RX ORDER — BISOPROLOL FUMARATE 5 MG/1
2.5 TABLET, FILM COATED ORAL
Status: DISCONTINUED | OUTPATIENT
Start: 2017-11-07 | End: 2017-11-10

## 2017-11-07 RX ORDER — BISOPROLOL FUMARATE 5 MG/1
10 TABLET, FILM COATED ORAL
Status: DISCONTINUED | OUTPATIENT
Start: 2017-11-07 | End: 2017-11-07

## 2017-11-07 RX ORDER — ASPIRIN 325 MG
325 TABLET ORAL DAILY
Status: DISCONTINUED | OUTPATIENT
Start: 2017-11-07 | End: 2017-11-07

## 2017-11-07 RX ORDER — POTASSIUM CHLORIDE 750 MG/1
40 CAPSULE, EXTENDED RELEASE ORAL ONCE
Status: COMPLETED | OUTPATIENT
Start: 2017-11-07 | End: 2017-11-07

## 2017-11-07 RX ORDER — SODIUM CHLORIDE 9 MG/ML
100 INJECTION, SOLUTION INTRAVENOUS CONTINUOUS
Status: DISCONTINUED | OUTPATIENT
Start: 2017-11-07 | End: 2017-11-08

## 2017-11-07 RX ORDER — POTASSIUM CHLORIDE 7.45 MG/ML
10 INJECTION INTRAVENOUS ONCE
Status: DISCONTINUED | OUTPATIENT
Start: 2017-11-07 | End: 2017-11-07

## 2017-11-07 RX ORDER — PANTOPRAZOLE SODIUM 40 MG/1
40 TABLET, DELAYED RELEASE ORAL
Status: DISCONTINUED | OUTPATIENT
Start: 2017-11-08 | End: 2017-11-11

## 2017-11-07 RX ADMIN — SODIUM CHLORIDE 100 ML/HR: 9 INJECTION, SOLUTION INTRAVENOUS at 16:41

## 2017-11-07 RX ADMIN — POTASSIUM CHLORIDE 40 MEQ: 750 CAPSULE, EXTENDED RELEASE ORAL at 16:42

## 2017-11-07 RX ADMIN — POTASSIUM CHLORIDE AND SODIUM CHLORIDE 125 ML/HR: 900; 300 INJECTION, SOLUTION INTRAVENOUS at 07:05

## 2017-11-07 RX ADMIN — POTASSIUM CHLORIDE AND SODIUM CHLORIDE 125 ML/HR: 900; 300 INJECTION, SOLUTION INTRAVENOUS at 14:43

## 2017-11-07 RX ADMIN — BISOPROLOL FUMARATE 2.5 MG: 5 TABLET, COATED ORAL at 16:42

## 2017-11-07 NOTE — CONSULTS
"[facility]  Initial Inpatient Consult Note    Referring Provider: Dr. Varela    Reason for Consultation: Elevated LFT's    Subjective     History of present illness:    67 y.o. male previously unknown to our practice, admitted through the ER on November 6 when he presented with complaints of generalized weakness and his legs felt heavy for 5 days prior to admission.  He had noticed increased thirst, decreased urine output but frequent need to void. He has had no appetite x 10 days.  He denied any associated abdominal pain, nausea, vomiting,diarrhea, fever or chills. He did fall at home this past Saturday. Patient was evaluated by his PCP, Dr. Guajardo, and out pt labs were obtained during his office visit.  Patient was notified by his PCP office of findings of acute renal insufficiency and directed to go to the ER for further evaluation.  Upon arrival to the ER patient had stable vital signs, BUN of 51, creatinine of 7.3, white count 12.1, hemoglobin of 15.3, ALT of 152, AST of 689, alkaline phosphatase of 317 and elevated total bilirubin at 2.5.  An ultrasound was obtained that showed sludge in the gallbladder but no evidence of acute cholecystitis, ductal dilation and no liver lesions.  CT of the abd/pelvis w/o contrast suggestive of cirrhosis.  No mention of ascites. Hepatitis panel has been obtained which is negative.  Patient was subsequently admitted for further evaluation.    Past medical history is pertinent for hyperlipidemia and hypertension.  He reports that in December of last year he was evaluated by his PCP and told that his liver numbers were \"slightly elevated\".  An ultrasound was done per the patient's report that was \"normal\" and he was supposed to follow-up in June of this year for repeat labs.  Unfortunately, he did not follow-up as directed and did not see his PCP again until the day prior to this hospital admission. Per chart review prior US gallbladder in 12/2015 with no " liver abnormality documented per radiology report.    GI has been consulted due to elevated liver tests.  Patient with no known liver disease.  He denies any alcohol use.  There is no family history of any liver disease or GI malignancies.  He denies any yellowing of the skin, his wife did notice within the last couple days some mild scleral icterus.  His urine has been dark but he has had no filemon-colored stools.  In general he has had a decreased appetite and feeling of fatigue but denies any pain, nausea, vomiting or diarrhea. No rectal bleeding or melena. No diarrhea or constipation.  He reports a prior c-scope approximately 7 years ago per Dr. Samuel for CRC screening that was negative and was told to f/u in 10 years.    At the time of the consult pt denies any abdominal pain, nausea or vomiting.  He has been ordered a regular diet but has not eaten much. He lives at home with his wife and one daughter. He has 4 kids. He has been a nonsmoker x one year but prior to that he smoked for 50 yrs. He has been on a cholesterol medicine for 10-15 yrs. He has never had any kidney troubles.    Past Medical History:  Past Medical History:   Diagnosis Date   • Arteriosclerotic cardiovascular disease    • History of transfusion    • Hyperlipidemia    • Hypertension      Past Surgical History:  History reviewed. No pertinent surgical history.   Social History:   Social History   Substance Use Topics   • Smoking status: Former Smoker     Quit date: 11/6/2016   • Smokeless tobacco: Not on file      Comment: caffeine use   • Alcohol use 0.6 oz/week     1 Cans of beer per week      Comment: social drinker      Family History:  Family History   Problem Relation Age of Onset   • Cancer Father      laryngeal cancer       Home Meds:  Prescriptions Prior to Admission   Medication Sig Dispense Refill Last Dose   • aspirin 325 MG tablet Take 1 tablet by mouth Daily.   11/6/2017 at Unknown time   • rosuvastatin (CRESTOR) 40 MG tablet  Take 1 tablet by mouth Daily.   11/6/2017 at Unknown time     Current Meds:     aspirin 325 mg Oral Daily   bisoprolol 2.5 mg Oral Q24H   [START ON 11/8/2017] pantoprazole 40 mg Oral Q AM     Allergies:  No Known Allergies  Review of Systems  The following systems were reviewed and negative;  respiratory, cardiovascular, musculoskeletal and neurological     Objective     Vital Signs  Temp:  [97.3 °F (36.3 °C)-98.6 °F (37 °C)] 98 °F (36.7 °C)  Heart Rate:  [46-82] 54  Resp:  [16-18] 16  BP: (134-150)/(62-97) 137/74  Physical Exam:  General Appearance:    Alert, cooperative, in no acute distress   Head:    Normocephalic, without obvious abnormality, atraumatic   Eyes:            Lids and lashes normal, conjunctivae and sclerae normal, no   icterus   Throat:   No oral lesions, no thrush, oral mucosa moist   Neck:   No adenopathy, supple, trachea midline, no thyromegaly, no   carotid bruit, no JVD   Lungs:     Clear to auscultation,respirations regular, even and                   unlabored    Heart:    Regular rhythm and normal rate, normal S1 and S2, no            murmur, no gallop, no rub, no click   Chest Wall:    No abnormalities observed   Abdomen:     Normal bowel sounds, no masses, no organomegaly, soft        non-tender, non-distended, no guarding, no rebound                 tenderness   Rectal:     Deferred   Extremities:   no edema, no cyanosis, no redness   Skin:   No bleeding, bruising or rash   Lymph nodes:   No palpable adenopathy   Psychiatric:  Judgement and insight: normal   Orientation to person place and time: normal   Mood and affect: normal   Results Review:   I reviewed the patient's new clinical results.      Results from last 7 days  Lab Units 11/07/17  0709 11/06/17  1722   WBC 10*3/mm3 12.00* 12.18*   HEMOGLOBIN g/dL 14.1 15.3   HEMATOCRIT % 40.6 43.6   PLATELETS 10*3/mm3 140 153       Results from last 7 days  Lab Units 11/07/17  0709 11/06/17  1722   SODIUM mmol/L 141 139   POTASSIUM mmol/L  3.1* 2.8*   CHLORIDE mmol/L 101 96*   CO2 mmol/L 21.2* 22.9   BUN mg/dL 57* 51*   CREATININE mg/dL 7.63* 7.39*   CALCIUM mg/dL 8.0* 8.9   BILIRUBIN mg/dL 2.0* 2.5*   ALK PHOS U/L 259* 317*   ALT (SGPT) U/L 149* 152*   AST (SGOT) U/L 829* 689*   GLUCOSE mg/dL 106* 114*         No results found for: LIPASE    Radiology:  US Renal Bilateral   Preliminary Result   No acute findings involving both kidneys.           XR Chest 2 View   Final Result   No evidence for acute pulmonary process. Follow-up as   clinical indications persist.       This report was finalized on 11/6/2017 8:43 PM by Dr. Gokul Real MD.          CT Abdomen Pelvis Without Contrast   Final Result   1. Sludge within the gallbladder without other evidence for   cholecystitis. Mild undulating hepatic margins consistent with hepatic   cirrhosis.   2. Atherosclerotic disease. 2.4 cm infrarenal abdominal aorta.       Radiation dose reduction techniques were utilized, including automated   exposure control and exposure modulation based on body size.       This report was finalized on 11/6/2017 8:14 PM by Dr. Marcos Sainz MD.          US Gallbladder   Final Result       Sludge in the gallbladder. No evidence for acute cholecystitis. No   discrete liver lesion or biliary ductal dilatation identified. With   persistent clinical indication, enhanced liver imaging could be   considered for further evaluation.       This report was finalized on 11/6/2017 7:33 PM by Dr. Gokul Real MD.              Assessment/Plan   Patient Active Problem List   Diagnosis   • Arteriosclerotic vascular disease   • Hyperlipidemia   • Hypertension   • Acute kidney injury (nontraumatic)       I discussed the patients findings and my recommendations with patient and family.    ssessment:  1) Elevated LFT's- prior hx of elevation per pt in 12/2016 per out pt PCP labs (not available for review/comparison).  Admission US with gallbladder sludge, no liver abnormality  reported. CT abd/pelvis with cirrhotic appearing liver. Hep panel negative. Crestor could cause hepatotoxicity and renal toxicity though he has been on Crestor for many years.  2) STEVEN- nephrology consult pending.  Diuretics held  3) Hyperlipidemia- by hx, on Crestor as out pt, currently on hold  4) HTN- by hx, out pt diuretics currently on hold    - add hepatic function labs to blood drawn earlier today to trend LFT's  - follow LFT's daily  - additional liver serology with am labs for elevated LFT's, cirrhotic appearing liver on CT  - consider EGD for variceal screening with cirrhotic liver on CT  - I will order a Doppler US of the hepatic vessels to r/o portal vein thrombosis or hepatic vein thrombosis?    Raymond Zabala MD

## 2017-11-07 NOTE — PLAN OF CARE
Problem: Patient Care Overview (Adult)  Goal: Plan of Care Review  Outcome: Ongoing (interventions implemented as appropriate)    11/07/17 8697   Coping/Psychosocial Response Interventions   Plan Of Care Reviewed With patient   Patient Care Overview   Progress no change   Outcome Evaluation   Outcome Summary/Follow up Plan VSS, consulted GI & nephro, 24h urine in progress started at 0600, ECHO today, plan for biopsy of kidney tmrw, duplex potral hepatic to RO clots tmrw as well, continue to monitor kidney fx may need HD       Goal: Adult Individualization and Mutuality  Outcome: Ongoing (interventions implemented as appropriate)  Goal: Discharge Needs Assessment  Outcome: Ongoing (interventions implemented as appropriate)    Problem: Fall Risk (Adult)  Goal: Identify Related Risk Factors and Signs and Symptoms  Outcome: Ongoing (interventions implemented as appropriate)  Goal: Absence of Falls  Outcome: Ongoing (interventions implemented as appropriate)    Problem: Renal Failure/Kidney Injury, Acute (Adult)  Goal: Signs and Symptoms of Listed Potential Problems Will be Absent or Manageable (Renal Failure/Kidney Injury, Acute)  Outcome: Ongoing (interventions implemented as appropriate)    Problem: Fluid Volume Deficit (Adult)  Goal: Identify Related Risk Factors and Signs and Symptoms  Outcome: Ongoing (interventions implemented as appropriate)  Goal: Fluid/Electrolyte Balance  Outcome: Ongoing (interventions implemented as appropriate)  Goal: Comfort/Well Being  Outcome: Ongoing (interventions implemented as appropriate)

## 2017-11-07 NOTE — CONSULTS
"Adult Nutrition  Assessment/PES    Patient Name:  Hu Burgess  YOB: 1950  MRN: [id number]  Admit Date:  11/6/2017    Assessment Date:  11/7/2017    Comments:  Consulted d/t MST score of 4 per nurse admission screen.  Patient with generalized weakness and poor appetite over the past week or so.  Patient reports possible 5-6# weight loss during this time period d/t not being able to eat very much of anything.  Reports he was able to eat some oranges this am and drink some orange juice.  No PO data available in chart at this time.  Will continue to follow for PO intake.          Reason for Assessment       11/07/17 1330    Reason for Assessment    Reason For Assessment/Visit admission assessment;identified at risk by screening criteria;nurse/nurse practitioner consult    Identified At Risk By Screening Criteria MST SCORE 2+    Diagnosis Diagnosis   generalized weakness over the past week, STEVEN    Cardiac HTN    Renal STEVEN              Nutrition/Diet History       11/07/17 1331    Nutrition/Diet History    Typical Food/Fluid Intake reports poor appetite over the past week or so            Anthropometrics       11/07/17 1331    Anthropometrics    Height 185.4 cm (72.99\")    Weight 99.2 kg (218 lb 11.1 oz)    RD Documented Current Weight  99.2 kg (218 lb 11.1 oz)    Ideal Body Weight (IBW)    Ideal Body Weight (IBW), Male (kg) 84.84    % Ideal Body Weight 117.17    Usual Body Weight (UBW)    Weight Loss Time Frame reports possible 5-6# weight loss over past week or so    Body Mass Index (BMI)    BMI (kg/m2) 28.92    BMI Grade 25 - 29.9 - overweight            Labs/Tests/Procedures/Meds       11/07/17 1332    Labs/Tests/Procedures/Meds    Diagnostic Test/Procedure Review reviewed, pertinent    Labs/Tests Review Reviewed;Glucose;K+;CO2;Creat;BUN;GFR    Medication Review Reviewed, pertinent;Antacid    Significant Vitals reviewed, pertinent            Physical Findings       11/07/17 1334    Physical " Findings/Assessment    Additional Documentation Physical Appearance (Group)    Physical Appearance    Overall Physical Appearance overweight    Skin --   B=20, intact              Nutrition Prescription Ordered       11/07/17 1334    Nutrition Prescription PO    Current PO Diet Regular            Evaluation of Received Nutrient/Fluid Intake       11/07/17 1334    PO Evaluation    Number of Days PO Intake Evaluated Insufficient Data            Problem/Interventions:        Problem 1       11/07/17 1334    Nutrition Diagnoses Problem 1    Problem 1 Predicted Suboptimal Intake    Etiology (related to) Medical Diagnosis;Factors Affecting Nutrition    Renal STEVEN    Appetite Poor    Signs/Symptoms (evidenced by) Report/Observation    Reported/Observed By Patient                    Intervention Goal       11/07/17 1335    Intervention Goal    General Maintain nutrition;Disease management/therapy;Reduce/improve symptoms    PO Establish PO;PO intake (%)    PO Intake % 75 %    Weight No significant weight loss            Nutrition Intervention       11/07/17 1335    Nutrition Intervention    RD/Tech Action Follow Tx progress;Care plan reviewd;Encourage intake              Education/Evaluation       11/07/17 1335    Education    Education Will Instruct as appropriate    Monitor/Evaluation    Monitor Per protocol;PO intake;Pertinent labs;Weight;Symptoms        Electronically signed by:  Brittany Morel RD  11/07/17 1:36 PM

## 2017-11-07 NOTE — PROGRESS NOTES
Discharge Planning Assessment  [facility]     Patient Name: Hu Burgess  MRN: [id number]  Today's Date: 11/7/2017    Admit Date: 11/6/2017          Discharge Needs Assessment       11/07/17 1437    Living Environment    Lives With child(brenda), dependent;spouse    Living Arrangements house    Home Accessibility no concerns    Transportation Available car;family or friend will provide    Living Environment    Quality Of Family Relationships supportive;helpful;involved    Able to Return to Prior Living Arrangements yes    Discharge Needs Assessment    Concerns To Be Addressed basic needs concerns    Readmission Within The Last 30 Days no previous admission in last 30 days    Equipment Currently Used at Home none    Equipment Needed After Discharge none            Discharge Plan       11/07/17 1437    Case Management/Social Work Plan    Plan Home with spouse     Patient/Family In Agreement With Plan yes    Additional Comments Spoke with pt at bedside. Facesheet info verified. Pharmacy verified. Role of CCP explained. CCP consult noted. Pt has never used HH or been to [facility]. Pt plans home at discharge and denies any discharge planning needs at this time. CCP to follow.      Madhuri Batres RN

## 2017-11-07 NOTE — CONSULTS
[facility]                                                                                             Nephrology Initial Consult Note    Patient Identification:  Name: Hu Burgess MRN: [id number]  Age: 67 y.o. : 1950  Sex: male  Date:2017    Requesting Physician: As per consult order.  Reason for Consultation: Acute renal failure  Information from:patient/ family/ chart      History of Present Illness: This is a 67 y.o. year old male  with no prior known history of chronic kidney disease or renal failure, no prior history of abnormal urine sediment.  He states he's been feeling out of the ordinary for the last 2 weeks with increasing generalized weakness, heaviness in his legs but no lower extremity edema.  No rashes or any skin breakdown.  He's noticed decreased urine output, poor appetite but no nausea, vomiting, abdominal pain.  He does not take any NSAIDs, also antibiotic in September but can't remember which drug otherwise for most part in am good overall health.  He had some labs done in his primary care physician's office and was told to go the emergency department, finding significant for severe renal failure with a creatinine of 7, BUN of 51 and elevated liver function tests that were also new for him.  Ultrasound of the gallbladder showed no cholecystitis.  Renal ultrasound showed no obvious lesions.  CT of the abdomen showed no sign of cirrhosis and hepatitis profile was negative.  He does not take any diuretics, NSAIDs.  Only regular home medications include Crestor for hyperlipidemia and aspirin for cardiovascular prophylaxis.  No recent hypotensive episodes or IV contrast.      The following medical history and medications personally reviewed by me:    Problem List:   Patient Active Problem List    Diagnosis   • Acute kidney injury (nontraumatic) [N17.9]   • Arteriosclerotic  vascular disease [I70.90]   • Hyperlipidemia [E78.5]   • Hypertension [I10]       Past Medical History:  Past Medical History:   Diagnosis Date   • Arteriosclerotic cardiovascular disease    • History of transfusion    • Hyperlipidemia    • Hypertension        Past Surgical History:  History reviewed. No pertinent surgical history.     Home Meds:   Prescriptions Prior to Admission   Medication Sig Dispense Refill Last Dose   • aspirin 325 MG tablet Take 1 tablet by mouth Daily.   11/6/2017 at Unknown time   • rosuvastatin (CRESTOR) 40 MG tablet Take 1 tablet by mouth Daily.   11/6/2017 at Unknown time       Current Meds:   Current Facility-Administered Medications   Medication Dose Route Frequency Provider Last Rate Last Dose   • bisoprolol (ZEBeta) tablet 2.5 mg  2.5 mg Oral Q24H Memo Varela MD       • [START ON 11/8/2017] pantoprazole (PROTONIX) EC tablet 40 mg  40 mg Oral Q AM Memo Varela MD       • potassium chloride (MICRO-K) CR capsule 40 mEq  40 mEq Oral Once Franky Moreau MD       • sodium chloride 0.9 % flush 10 mL  10 mL Intravenous PRN Adan Simmons MD       • sodium chloride 0.9 % infusion  100 mL/hr Intravenous Continuous Franky Moreau MD           Allergies:  No Known Allergies    Social History:   Social History     Social History   • Marital status:      Spouse name: N/A   • Number of children: N/A   • Years of education: N/A     Social History Main Topics   • Smoking status: Former Smoker     Quit date: 11/6/2016   • Smokeless tobacco: None      Comment: caffeine use   • Alcohol use 0.6 oz/week     1 Cans of beer per week      Comment: social drinker   • Drug use: No   • Sexual activity: Defer     Other Topics Concern   • None     Social History Narrative        Family History:  Family History   Problem Relation Age of Onset   • Cancer Father      laryngeal cancer        Review of Systems: as per HPI, in addition:    General:       + weakness / fatigue,                       " No fevers / chills                       no weight loss  HEENT:       no dysphagia / odynophagia  Neck:           normal range of motion, no swelling  Respiratory: no cough / congestion                      No shortness of air                       No wheezing  CV:              No chest pain                       No palpitations  Abdomen/GI: + nausea / no vomiting                      No diarrhea / constipation                      No abdominal pain  :             no dysuria / urinary frequency                       No urgency, normal output  Endocrine:   no polyuria / polydipsia,                      No heat or cold intolerance  Skin:           no rashes or skin breakdown   Vascular:   No edema                     No claudication  Psych:        no depression/ anxiety  Neuro:        no focal weakness, no seizures  Musculoskeletal: no joint pain or deformities      Physical Exam:  Vitals:   Temp (24hrs), Av.1 °F (36.7 °C), Min:97.3 °F (36.3 °C), Max:98.6 °F (37 °C)    /74 (BP Location: Left arm, Patient Position: Lying)  Pulse 54  Temp 98 °F (36.7 °C) (Oral)   Resp 16  Ht 72.99\" (185.4 cm)  Wt 218 lb 11.1 oz (99.2 kg)  SpO2 92%  BMI 28.86 kg/m2  Intake/Output:     Intake/Output Summary (Last 24 hours) at 17 1553  Last data filed at 17 1443   Gross per 24 hour   Intake             2811 ml   Output               60 ml   Net             2751 ml        Wt Readings from Last 1 Encounters:   17 1331 218 lb 11.1 oz (99.2 kg)   17 2118 218 lb 12.8 oz (99.2 kg)   17 1643 215 lb (97.5 kg)     Exam:    General Appearance:  Awake, alert, oriented x3, no acute distress  Well-appearing   Head and Face:  Normocephalic, atraumatic, mucus membranes moist, oropharynx clear   Eyes:  No icterus, pupils equal round and reactive to light, extraocular movements intact    ENMT: Moist mucosa, tongue symmetric    Neck: Supple  no jugular venous distention  no thyromegaly   Pulmonary:  " Respiratory effort: Normal  Auscultation of lungs: Clear bilaterally  No wheezes  No rhonchi  Good air movement, good expansion   Chest wall:  No tenderness or deformity   Cardiovascular:  Auscultation of the heart: Normal rhythm, no murmurs  No edema of extremities    Abdomen:  Abdomen: soft, non-tender, normal bowel sounds all four quadrants, no masses   Liver and spleen: no hepatosplenomegaly   Musculoskeletal: Digits and nails: normal  Normal range of motion  No joint swelling or gross deformities    Skin: Skin inspection: color normal, no visible rashes or lesions  Skin palpation: texture, turgor normal, no palpable lesions   Lymphatic:  no cervical lymphadenopathy    Psychiatric: Judgement and insight: normal  Orientation to person place and time: normal  Mood and affect: normal       DATA:  Radiology and Labs:  I have personally reviewed pertinent old records, labs, meds and pertinent data from the patient chart.  We discussed the risks associated with kidney biopsy, he agrees to proceed  The following labs personally reviewed by me  Old record request sent    Labs:   Recent Results (from the past 24 hour(s))   Comprehensive Metabolic Panel    Collection Time: 11/06/17  5:22 PM   Result Value Ref Range    Glucose 114 (H) 65 - 99 mg/dL    BUN 51 (H) 8 - 23 mg/dL    Creatinine 7.39 (H) 0.76 - 1.27 mg/dL    Sodium 139 136 - 145 mmol/L    Potassium 2.8 (L) 3.5 - 5.2 mmol/L    Chloride 96 (L) 98 - 107 mmol/L    CO2 22.9 22.0 - 29.0 mmol/L    Calcium 8.9 8.6 - 10.5 mg/dL    Total Protein 7.3 6.0 - 8.5 g/dL    Albumin 3.20 (L) 3.50 - 5.20 g/dL    ALT (SGPT) 152 (H) 1 - 41 U/L    AST (SGOT) 689 (H) 1 - 40 U/L    Alkaline Phosphatase 317 (H) 39 - 117 U/L    Total Bilirubin 2.5 (H) 0.1 - 1.2 mg/dL    eGFR Non African Amer 7 (L) >60 mL/min/1.73    Globulin 4.1 gm/dL    A/G Ratio 0.8 g/dL    BUN/Creatinine Ratio 6.9 (L) 7.0 - 25.0    Anion Gap 20.1 mmol/L   CBC Auto Differential    Collection Time: 11/06/17  5:22 PM    Result Value Ref Range    WBC 12.18 (H) 4.50 - 10.70 10*3/mm3    RBC 4.63 4.60 - 6.00 10*6/mm3    Hemoglobin 15.3 13.7 - 17.6 g/dL    Hematocrit 43.6 40.4 - 52.2 %    MCV 94.2 79.8 - 96.2 fL    MCH 33.0 (H) 27.0 - 32.7 pg    MCHC 35.1 32.6 - 36.4 g/dL    RDW 15.2 (H) 11.5 - 14.5 %    RDW-SD 52.0 37.0 - 54.0 fl    MPV 12.0 6.0 - 12.0 fL    Platelets 153 140 - 500 10*3/mm3    Neutrophil % 68.6 42.7 - 76.0 %    Lymphocyte % 18.1 (L) 19.6 - 45.3 %    Monocyte % 12.1 (H) 5.0 - 12.0 %    Eosinophil % 0.7 0.3 - 6.2 %    Basophil % 0.3 0.0 - 1.5 %    Immature Grans % 0.2 0.0 - 0.5 %    Neutrophils, Absolute 8.36 (H) 1.90 - 8.10 10*3/mm3    Lymphocytes, Absolute 2.21 0.90 - 4.80 10*3/mm3    Monocytes, Absolute 1.47 (H) 0.20 - 1.20 10*3/mm3    Eosinophils, Absolute 0.08 0.00 - 0.70 10*3/mm3    Basophils, Absolute 0.04 0.00 - 0.20 10*3/mm3    Immature Grans, Absolute 0.02 0.00 - 0.03 10*3/mm3   POC Glucose Fingerstick    Collection Time: 11/06/17  5:34 PM   Result Value Ref Range    Glucose 109 70 - 130 mg/dL   Urinalysis With / Culture If Indicated - Urine, Clean Catch    Collection Time: 11/06/17  6:14 PM   Result Value Ref Range    Color, UA Dark Yellow (A) Yellow, Straw    Appearance, UA Cloudy (A) Clear    pH, UA 5.5 5.0 - 8.0    Specific Gravity, UA 1.016 1.005 - 1.030    Glucose, UA Negative Negative    Ketones, UA Trace (A) Negative    Bilirubin, UA Negative Negative    Blood, UA Large (3+) (A) Negative    Protein, UA >=300 mg/dL (3+) (A) Negative    Leuk Esterase, UA Small (1+) (A) Negative    Nitrite, UA Negative Negative    Urobilinogen, UA 1.0 E.U./dL 0.2 - 1.0 E.U./dL   Urinalysis, Microscopic Only - Urine, Clean Catch    Collection Time: 11/06/17  6:14 PM   Result Value Ref Range    RBC, UA 0-2 None Seen, 0-2 /HPF    WBC, UA 6-12 (A) None Seen, 0-2 /HPF    Bacteria, UA 1+ (A) None Seen /HPF    Squamous Epithelial Cells, UA 0-2 None Seen, 0-2 /HPF    Hyaline Casts, UA 3-6 None Seen /LPF    Mucus, UA Moderate/2+  (A) None Seen, Trace /HPF    Methodology Manual Light Microscopy    Urine Culture - Urine, Urine, Clean Catch    Collection Time: 11/06/17  6:14 PM   Result Value Ref Range    Urine Culture Culture in progress    Urine Drug Screen - Urine, Clean Catch    Collection Time: 11/06/17  6:14 PM   Result Value Ref Range    Amphet/Methamphet, Screen Negative Negative    Barbiturates Screen, Urine Negative Negative    Benzodiazepine Screen, Urine Negative Negative    Cocaine Screen, Urine Negative Negative    Opiate Screen Negative Negative    THC, Screen, Urine Negative Negative    Methadone Screen, Urine Negative Negative    Oxycodone Screen, Urine Negative Negative   Hepatitis Panel, Acute    Collection Time: 11/06/17 11:13 PM   Result Value Ref Range    Hepatitis B Surface Ag Non-Reactive Non-Reactive    Hep A IgM Non-Reactive Non-Reactive    Hep B C IgM Non-Reactive Non-Reactive    Hepatitis C Ab Non-Reactive Non-Reactive   PTH, Intact    Collection Time: 11/06/17 11:13 PM   Result Value Ref Range    PTH, Intact 209.3 (H) 15.0 - 65.0 pg/mL   RPR    Collection Time: 11/06/17 11:13 PM   Result Value Ref Range    RPR Non-Reactive Non-Reactive   HIV-1 / O / 2 Ag / Antibody 4th Generation    Collection Time: 11/06/17 11:13 PM   Result Value Ref Range    HIV-1/ HIV-2 Non-Reactive Non-Reactive    HIV-1 P24 Ag Screen Non-Reactive Non-Reactive   CBC (No Diff)    Collection Time: 11/07/17  7:09 AM   Result Value Ref Range    WBC 12.00 (H) 4.50 - 10.70 10*3/mm3    RBC 4.26 (L) 4.60 - 6.00 10*6/mm3    Hemoglobin 14.1 13.7 - 17.6 g/dL    Hematocrit 40.6 40.4 - 52.2 %    MCV 95.3 79.8 - 96.2 fL    MCH 33.1 (H) 27.0 - 32.7 pg    MCHC 34.7 32.6 - 36.4 g/dL    RDW 15.3 (H) 11.5 - 14.5 %    RDW-SD 52.5 37.0 - 54.0 fl    MPV 11.5 6.0 - 12.0 fL    Platelets 140 140 - 500 10*3/mm3   Basic Metabolic Panel    Collection Time: 11/07/17  7:09 AM   Result Value Ref Range    Glucose 106 (H) 65 - 99 mg/dL    BUN 57 (H) 8 - 23 mg/dL    Creatinine  7.63 (H) 0.76 - 1.27 mg/dL    Sodium 141 136 - 145 mmol/L    Potassium 3.1 (L) 3.5 - 5.2 mmol/L    Chloride 101 98 - 107 mmol/L    CO2 21.2 (L) 22.0 - 29.0 mmol/L    Calcium 8.0 (L) 8.6 - 10.5 mg/dL    eGFR Non African Amer 7 (L) >60 mL/min/1.73    BUN/Creatinine Ratio 7.5 7.0 - 25.0    Anion Gap 18.8 mmol/L   Hepatic Function Panel    Collection Time: 11/07/17  7:09 AM   Result Value Ref Range    Total Protein 6.2 6.0 - 8.5 g/dL    Albumin 2.80 (L) 3.50 - 5.20 g/dL    ALT (SGPT) 149 (H) 1 - 41 U/L    AST (SGOT) 829 (H) 1 - 40 U/L    Alkaline Phosphatase 259 (H) 39 - 117 U/L    Total Bilirubin 2.0 (H) 0.1 - 1.2 mg/dL    Bilirubin, Direct 1.0 (H) 0.0 - 0.3 mg/dL    Bilirubin, Indirect 1.0 mg/dL   Creatinine, Urine, Random -    Collection Time: 11/07/17 11:32 AM   Result Value Ref Range    Creatinine, Urine 70.4 mg/dL   Adult Transthoracic Echo Complete W/ Cont if Necessary Per Protocol    Collection Time: 11/07/17  3:50 PM   Result Value Ref Range    BSA 2.2 m^2    IVSd 0.8 cm    LVIDd 6.5 cm    LVIDs 4.3 cm    LVPWd 0.8 cm    IVS/LVPW 1.0     FS 33.8 %    EDV(Teich) 216.0 ml    ESV(Teich) 83.1 ml    EF(Teich) 61.5 %    EDV(cubed) 274.6 ml    ESV(cubed) 79.5 ml    EF(cubed) 71.0 %    LV mass(C)d 214.3 grams    LV mass(C)dI 95.8 grams/m^2    SV(Teich) 132.9 ml    SI(Teich) 59.4 ml/m^2    SV(cubed) 195.1 ml    SI(cubed) 87.2 ml/m^2    Ao root diam 3.8 cm    Ao root area 11.3 cm^2    ACS 1.7 cm    LA dimension 4.2 cm    LA/Ao 1.1     LVOT diam 2.3 cm    LVOT area 4.2 cm^2    LVOT area(traced) 4.2 cm^2    RVOT diam 2.6 cm    RVOT area 5.3 cm^2    LVLd ap4 8.5 cm    EDV(MOD-sp4) 146.0 ml    LVLs ap4 7.3 cm    ESV(MOD-sp4) 62.0 ml    EF(MOD-sp4) 57.5 %    LVLd ap2 9.1 cm    EDV(MOD-sp2) 142.0 ml    LVLs ap2 7.5 cm    ESV(MOD-sp2) 57.0 ml    EF(MOD-sp2) 59.9 %    SV(MOD-sp4) 84.0 ml    SI(MOD-sp4) 37.6 ml/m^2    SV(MOD-sp2) 85.0 ml    SI(MOD-sp2) 38.0 ml/m^2    Ao root area (BSA corrected) 1.7     Ao root area (BSA  corrected) 59.9 ml/m^2    CONTRAST EF 4CH 57.5 ml/m^2    LV Diastolic corrected for BSA 65.3 ml/m^2    LV Systolic corrected for BSA 27.7 ml/m^2    MV A dur 0.21 sec    MV E max joel 77.3 cm/sec    MV A max joel 66.8 cm/sec    MV E/A 1.2     MV V2 mean 42.9 cm/sec    MV mean PG 1.0 mmHg    MV V2 VTI 36.1 cm    MVA(VTI) 3.2 cm^2    MV P1/2t max joel 75.8 cm/sec    MV P1/2t 137.0 msec    MVA(P1/2t) 1.6 cm^2    MV dec slope 162.0 cm/sec^2    MV dec time 0.24 sec    Ao V2 mean 103.0 cm/sec    Ao mean PG 5.0 mmHg    Ao mean PG (full) 2.0 mmHg    Ao V2 VTI 40.6 cm    DAWIT(I,A) 2.8 cm^2    DAWIT(I,D) 2.8 cm^2    LV V1 mean PG 3.0 mmHg    LV V1 mean 74.2 cm/sec    LV V1 VTI 27.8 cm    SV(Ao) 460.5 ml    SI(Ao) 205.9 ml/m^2    SV(LVOT) 115.5 ml    SV(RVOT) 103.0 ml    SI(LVOT) 51.6 ml/m^2    PA V2 max 115.0 cm/sec    PA max PG 5.3 mmHg    PA acc slope 745.0 cm/sec^2    PA acc time 0.15 sec    RV V1 mean PG 1.0 mmHg    RV V1 mean 48.6 cm/sec    RV V1 VTI 19.4 cm    TR max joel 243.0 cm/sec    RVSP(TR) 26.6 mmHg    RAP systole 3.0 mmHg    PA pr(Accel) 12.4 mmHg    Pulm Sys Joel 58.3 cm/sec    Pulm Paredes Joel 54.2 cm/sec    Pulm S/D 1.1     Qp/Qs 0.89     Pulm A Revs Dur 0.18 sec    Pulm A Revs Joel 29.5 cm/sec    MVA P1/2T LCG 2.9 cm^2     CV ECHO MIGUELITO - BZI_BMI 28.9 kilograms/m^2    BH CV ECHO MIGUELITO - BSA(HAYCOCK) 2.3 m^2    BH CV ECHO MIGUELITO - BZI_METRIC_WEIGHT 99.3 kg     CV ECHO MIGUELITO - BZI_METRIC_HEIGHT 185.4 cm    Target HR (85%) 128 bpm    Max. Pred. HR (100%) 151 bpm     CV VAS BP RIGHT /74 mmHg    TDI S' 19.00 cm/sec    RV Base 4.20 cm    RV Length 8.10 cm    RV Mid 2.70 cm    LA volume 73.0 cm3    E/E' ratio 8.0     LA Volume Index 36.0 mL/m2    Ao pk joel 165.0 cm/sec    Lat Peak E' Joel 11.0 cm/sec    LV V1 max 114.0 cm/sec    Med Peak E' Joel 9.00 cm/sec    Ao max PG 11.0 mmHg    TAPSE (>1.6) 2.60 cm2       Radiology:  Imaging Results (last 24 hours)     Procedure Component Value Units Date/Time    US Gallbladder  [[id number]] Collected:  11/06/17 1931     Updated:  11/06/17 1937    Narrative:       US GALLBLADDER-        INDICATIONS: Elevated liver function tests     TECHNIQUE: Ultrasound of the right upper quadrant     COMPARISON: None available     FINDINGS:     The gallbladder is nontender, with sludge but no shadowing stones  demonstrated. No gallbladder wall thickening or pericholecystic fluid.     No intrahepatic or extrahepatic biliary ductal dilatation is  demonstrated. The common biliary duct caliber is measured at 4.0 mm.     No liver lesion is identified. Diffusely, the echogenicity of the liver  is otherwise in the range of normal relative to that of the right  kidney.     The pancreas is mostly obscured. The right kidney, 10.8 cm, and the  pancreas otherwise appear unremarkable.                Impression:          Sludge in the gallbladder. No evidence for acute cholecystitis. No  discrete liver lesion or biliary ductal dilatation identified. With  persistent clinical indication, enhanced liver imaging could be  considered for further evaluation.     This report was finalized on 11/6/2017 7:33 PM by Dr. Gokul Real MD.       CT Abdomen Pelvis Without Contrast [[id number]] Collected:  11/06/17 2007     Updated:  11/06/17 2017    Narrative:       CT ABDOMEN AND PELVIS WITHOUT CONTRAST     HISTORY: Elevated liver function test. Gallbladder sludge.      TECHNIQUE: CT abdomen and pelvis without IV or oral contrast.     COMPARISON: Right upper quadrant ultrasound 11/06/2017.     FINDINGS: Lung bases appear clear and there is no pleural effusion.  Liver exhibits slight undelayed margins consistent with hepatic  cirrhosis. There Is no evidence for intra or extrahepatic biliary duct  dilatation. There is density within the gallbladder consistent with  sludge as demonstrated sonographically. Adrenal glands, pancreas,  spleen, kidneys appear within normal limits. There is no hydronephrosis  or hydroureter. Urinary  bladder is mostly decompressed. There is no  bowel dilatation or evidence for bowel obstruction. No ben enlargement  is evident within the abdomen or pelvis. Atherosclerotic calcifications  are present involving the abdominal aorta and the abdominal aorta  measures up to 2.4 cm diameter. Mural calcifications are present.       Impression:       1. Sludge within the gallbladder without other evidence for  cholecystitis. Mild undulating hepatic margins consistent with hepatic  cirrhosis.  2. Atherosclerotic disease. 2.4 cm infrarenal abdominal aorta.     Radiation dose reduction techniques were utilized, including automated  exposure control and exposure modulation based on body size.     This report was finalized on 11/6/2017 8:14 PM by Dr. Marcos Sainz MD.       XR Chest 2 View [[id number]] Collected:  11/06/17 2041     Updated:  11/06/17 2046    Narrative:       XR CHEST 2 VW-     HISTORY: Male who is 67 years-old,  acute renal failure     TECHNIQUE: Frontal and lateral views of the chest     COMPARISON: None available     FINDINGS: Heart, mediastinum and pulmonary vasculature are unremarkable.  No focal pulmonary consolidation, pleural effusion, or pneumothorax. Old  granulomatous disease is apparent. No acute osseous process.       Impression:       No evidence for acute pulmonary process. Follow-up as  clinical indications persist.     This report was finalized on 11/6/2017 8:43 PM by Dr. Gokul Real MD.       US Renal Bilateral [[id number]] Collected:  11/07/17 0145     Updated:  11/07/17 0145    Narrative:       ULTRASOUND RENAL BILATERAL.     TECHNIQUE: Grayscale and color images of the kidneys were obtained.     HISTORY: Acute kidney injury.     COMPARISON: No prior studies for comparison.     FINDINGS:  Right kidney measures 11.6 x 4.8 x 5.4 cm. Left kidney measures 12.5 x  5.9 x 5.7 cm. There is no hydronephrosis, stone or mass.     Urinary bladder could not be well visualized. No calculus,  sludge or  mass.       Impression:       No acute findings involving both kidneys.                   Assessment:   Problem List:   New acute renal failure.  No obvious etiology.  Very active urine sediment highly suspicious for glomerulonephritis, will need kidney biopsy  New proteinuria, possibly nephrotic range  Nausea, likely some early uremic symptoms  Hypokalemia  Elevated LFTs  Mild metabolic acidosis  Hypoalbuminemia  Hyperparathyroidism, likely secondary to renal failure  Mild leukocytosis        Plan:   Agree with continued IV fluids  Given the severity of his renal failure and active urine sediment, he requires a kidney biopsy  We'll make nothing by mouth after midnight and plan CT-guided biopsy tomorrow  Check glomerulonephritis serologies  24 hour urine for protein and creatinine clearance in progress  Empiric IV steroids pending biopsy results  Likely will require hemodialysis if no improvement in renal function  Check CK level to rule out rhabdomyolysis given AST elevation and muscle weakness    Continue to monitor electrolytes and volume closely   Avoid IV contrast and nephrotoxic medications     Thank you very much for the referral.    I appreciate the opportunity to participate in this patient's care.  Please call with any questions or concerns.     Total time spent 45 minutes critical care time exclusive of any separate procedures/time        Franky Moreau M.D  [facility]  [phone]  11/7/2017        Dictation via Dragon dictation software

## 2017-11-07 NOTE — H&P
History and physical    Primary care physician  Dr. Pedro Guajardo    Chief complaint  Generalized weakness  Nausea  Not feeling well  Decreased urine output    History of present illness  67-year-old white male with history of atherosclerotic heart disease hypertension hyperlipidemia otherwise in good health and no history of kidney disease present it to [facility] emergency room for last 5-7 days weakness nausea not feeling well and decreased urine output.  Patient evaluated found to be in acute renal failure with low potassium admitted for management.  Patient denies any diarrhea fever cough chest pain shortness of breath.    PAST MEDICAL HISTORY    • Arteriosclerotic cardiovascular disease     • Hyperlipidemia     • Hypertension           PAST SURGICAL HISTORY   Surgical History    History reviewed. No pertinent surgical history.        FAMILY HISTORY         Family History   Problem Relation Age of Onset   • Cancer Father         laryngeal cancer         SOCIAL HISTORY   Social History    Social History            Social History   • Marital status:        Spouse name: N/A   • Number of children: N/A   • Years of education: N/A          Occupational History   • Not on file.              Social History Main Topics    • Smoking status: Current Every Day Smoker    • Smokeless tobacco: Not on file          Comment: caffeine use    • Alcohol use Yes         Comment: social drinker    • Drug use: Not on file    • Sexual activity: Not on file            Other Topics Concern   • Not on file      Social History Narrative            ALLERGIES  Review of patient's allergies indicates no known allergies.    Home medications reviewed     REVIEW OF SYSTEMS  Review of Systems   Constitutional: Negative for activity change, appetite change and fever.   HENT: Negative for congestion and sore throat.    Eyes: Negative.    Respiratory: Negative for cough and shortness of breath.    Cardiovascular: Negative for chest pain  "and leg swelling.   Gastrointestinal: Negative for abdominal pain, diarrhea and vomiting.   Endocrine: Negative.    Genitourinary: Positive for frequency. Negative for decreased urine volume and dysuria.        Increased thirst   Musculoskeletal: Negative for neck pain.   Skin: Negative for rash and wound.   Allergic/Immunologic: Negative.    Neurological: Positive for weakness. Negative for numbness and headaches.   Hematological: Negative.    Psychiatric/Behavioral: Negative.    All other systems reviewed and are negative.        PHYSICAL EXAM  Blood pressure 137/74, pulse 54, temperature 98 °F (36.7 °C), temperature source Oral, resp. rate 16, height 73\" (185.4 cm), weight 218 lb 12.8 oz (99.2 kg), SpO2 92 %.    Constitutional: He is oriented to person, place, and time and well-developed, well-nourished, and in no distress.   Head: Normocephalic and atraumatic.   Mouth/Throat: Mucous membranes are dry.   Eyes: EOM are normal. Pupils are equal, round, and reactive to light.   Neck: Normal range of motion. Neck supple.   Cardiovascular: Regular rhythm and normal heart sounds.  Bradycardia present.    No murmur heard.  Pulmonary/Chest: Effort normal and breath sounds normal. No respiratory distress.   Abdominal: Soft. He exhibits no distension. There is no tenderness. There is no rebound and no guarding.   Musculoskeletal: Normal range of motion. He exhibits no edema.   Neurological: He is alert and oriented to person, place, and time. He has normal sensation and normal strength.   No focal weakness or facial droop. Slow mentation.   Skin: Skin is warm and dry.   Turgor is normal, cap refill 1-2 seconds.     Psychiatric: Mood and affect normal.     LAB RESULTS  Lab Results (last 24 hours)     Procedure Component Value Units Date/Time    POC Glucose Fingerstick [[id number]]  (Normal) Collected:  11/06/17 1734    Specimen:  Blood Updated:  11/06/17 1740     Glucose 109 mg/dL     Narrative:       Meter: [id number] " : [id number] Jeffery Robertson    CBC & Differential [[id number]] Collected:  11/06/17 1722    Specimen:  Blood Updated:  11/06/17 1740    Narrative:       The following orders were created for panel order CBC & Differential.  Procedure                               Abnormality         Status                     ---------                               -----------         ------                     CBC Auto Differential[[id number]]         Abnormal            Final result                 Please view results for these tests on the individual orders.    CBC Auto Differential [[id number]]  (Abnormal) Collected:  11/06/17 1722    Specimen:  Blood Updated:  11/06/17 1740     WBC 12.18 (H) 10*3/mm3      RBC 4.63 10*6/mm3      Hemoglobin 15.3 g/dL      Hematocrit 43.6 %      MCV 94.2 fL      MCH 33.0 (H) pg      MCHC 35.1 g/dL      RDW 15.2 (H) %      RDW-SD 52.0 fl      MPV 12.0 fL      Platelets 153 10*3/mm3      Neutrophil % 68.6 %      Lymphocyte % 18.1 (L) %      Monocyte % 12.1 (H) %      Eosinophil % 0.7 %      Basophil % 0.3 %      Immature Grans % 0.2 %      Neutrophils, Absolute 8.36 (H) 10*3/mm3      Lymphocytes, Absolute 2.21 10*3/mm3      Monocytes, Absolute 1.47 (H) 10*3/mm3      Eosinophils, Absolute 0.08 10*3/mm3      Basophils, Absolute 0.04 10*3/mm3      Immature Grans, Absolute 0.02 10*3/mm3     Comprehensive Metabolic Panel [[id number]]  (Abnormal) Collected:  11/06/17 1722    Specimen:  Blood Updated:  11/06/17 1800     Glucose 114 (H) mg/dL      BUN 51 (H) mg/dL      Creatinine 7.39 (H) mg/dL      Sodium 139 mmol/L      Potassium 2.8 (L) mmol/L      Chloride 96 (L) mmol/L      CO2 22.9 mmol/L      Calcium 8.9 mg/dL      Total Protein 7.3 g/dL      Albumin 3.20 (L) g/dL      ALT (SGPT) 152 (H) U/L      AST (SGOT) 689 (H) U/L      Alkaline Phosphatase 317 (H) U/L      Total Bilirubin 2.5 (H) mg/dL      eGFR Non African Amer 7 (L) mL/min/1.73      Globulin 4.1 gm/dL      A/G Ratio 0.8 g/dL      BUN/Creatinine  Ratio 6.9 (L)     Anion Gap 20.1 mmol/L     Urinalysis With / Culture If Indicated - Urine, Clean Catch [[id number]]  (Abnormal) Collected:  11/06/17 1814    Specimen:  Urine from Urine, Clean Catch Updated:  11/06/17 1835     Color, UA Dark Yellow (A)     Appearance, UA Cloudy (A)     pH, UA 5.5     Specific Gravity, UA 1.016     Glucose, UA Negative     Ketones, UA Trace (A)     Bilirubin, UA Negative     Blood, UA Large (3+) (A)     Protein, UA >=300 mg/dL (3+) (A)     Leuk Esterase, UA Small (1+) (A)     Nitrite, UA Negative     Urobilinogen, UA 1.0 E.U./dL    Urinalysis, Microscopic Only - Urine, Clean Catch [[id number]]  (Abnormal) Collected:  11/06/17 1814    Specimen:  Urine from Urine, Clean Catch Updated:  11/06/17 1855     RBC, UA 0-2 /HPF      WBC, UA 6-12 (A) /HPF      Bacteria, UA 1+ (A) /HPF      Squamous Epithelial Cells, UA 0-2 /HPF      Hyaline Casts, UA 3-6 /LPF      Mucus, UA Moderate/2+ (A) /HPF      Methodology Manual Light Microscopy    Urine Drug Screen - Urine, Clean Catch [[id number]]  (Normal) Collected:  11/06/17 1814    Specimen:  Urine from Urine, Clean Catch Updated:  11/06/17 2033     Amphet/Methamphet, Screen Negative     Barbiturates Screen, Urine Negative     Benzodiazepine Screen, Urine Negative     Cocaine Screen, Urine Negative     Opiate Screen Negative     THC, Screen, Urine Negative     Methadone Screen, Urine Negative     Oxycodone Screen, Urine Negative    Narrative:       Negative Thresholds For Drugs Screened:     Amphetamines               500 ng/ml   Barbiturates               200 ng/ml   Benzodiazepines            100 ng/ml   Cocaine                    300 ng/ml   Methadone                  300 ng/ml   Opiates                    300 ng/ml   Oxycodone                  100 ng/ml   THC                        50 ng/ml    The Normal Value for all drugs tested is negative. This report includes final unconfirmed screening results to be used for medical treatment purposes only.  Unconfirmed results must not be used for non-medical purposes such as employment or legal testing. Clinical consideration should be applied to any drug of abuse test, particulary when unconfirmed results are used.    Antinuclear Antibody With Reflex Cascade [[id number]] Collected:  11/06/17 2313    Specimen:  Blood Updated:  11/06/17 2319    C4 Complement [[id number]] Collected:  11/06/17 2313    Specimen:  Blood Updated:  11/06/17 2319    Immunofixation, Serum [[id number]] Collected:  11/06/17 2313    Specimen:  Blood Updated:  11/06/17 2319    C3 Complement [[id number]] Collected:  11/06/17 2313    Specimen:  Blood Updated:  11/06/17 2320    Glomerular Basement Membrane Antibodies [[id number]] Collected:  11/06/17 2313    Specimen:  Blood Updated:  11/06/17 2320    ANCA Panel [[id number]] Collected:  11/06/17 2313    Specimen:  Blood Updated:  11/06/17 2320    Immunoglobulin Free LT Chains Blood [[id number]] Collected:  11/06/17 2313    Specimen:  Blood Updated:  11/06/17 2320    Hepatitis Panel, Acute [[id number]]  (Normal) Collected:  11/06/17 2313    Specimen:  Blood Updated:  11/07/17 0000     Hepatitis B Surface Ag Non-Reactive     Hep A IgM Non-Reactive     Hep B C IgM Non-Reactive     Hepatitis C Ab Non-Reactive    PTH, Intact [[id number]]  (Abnormal) Collected:  11/06/17 2313    Specimen:  Blood Updated:  11/07/17 0001     PTH, Intact 209.3 (H) pg/mL     HIV-1 & HIV-2 Antibodies [[id number]] Collected:  11/06/17 2313    Specimen:  Blood Updated:  11/07/17 0057    Narrative:       The following orders were created for panel order HIV-1 & HIV-2 Antibodies.  Procedure                               Abnormality         Status                     ---------                               -----------         ------                     HIV-1 / O / 2 Ag / Antib...[[id number]]  Normal              Final result                 Please view results for these tests on the individual orders.    HIV-1 / O / 2 Ag / Antibody 4th  Generation [[id number]]  (Normal) Collected:  11/06/17 2313    Specimen:  Blood Updated:  11/07/17 0057     HIV-1/ HIV-2 Non-Reactive     HIV-1 P24 Ag Screen Non-Reactive    Urine Culture - Urine, Urine, Clean Catch [[id number]]  (Normal) Collected:  11/06/17 1814    Specimen:  Urine from Urine, Clean Catch Updated:  11/07/17 0634     Urine Culture Culture in progress    CBC (No Diff) [[id number]]  (Abnormal) Collected:  11/07/17 0709    Specimen:  Blood Updated:  11/07/17 0721     WBC 12.00 (H) 10*3/mm3      RBC 4.26 (L) 10*6/mm3      Hemoglobin 14.1 g/dL      Hematocrit 40.6 %      MCV 95.3 fL      MCH 33.1 (H) pg      MCHC 34.7 g/dL      RDW 15.3 (H) %      RDW-SD 52.5 fl      MPV 11.5 fL      Platelets 140 10*3/mm3     Basic Metabolic Panel [[id number]]  (Abnormal) Collected:  11/07/17 0709    Specimen:  Blood Updated:  11/07/17 0743     Glucose 106 (H) mg/dL      BUN 57 (H) mg/dL      Creatinine 7.63 (H) mg/dL      Sodium 141 mmol/L      Potassium 3.1 (L) mmol/L      Chloride 101 mmol/L      CO2 21.2 (L) mmol/L      Calcium 8.0 (L) mg/dL      eGFR Non African Amer 7 (L) mL/min/1.73      BUN/Creatinine Ratio 7.5     Anion Gap 18.8 mmol/L     Narrative:       GFR Normal >60  Chronic Kidney Disease <60  Kidney Failure <15    RPR [[id number]]  (Normal) Collected:  11/06/17 2313    Specimen:  Blood Updated:  11/07/17 0923     RPR Non-Reactive    Creatinine, Urine, Random - [[id number]] Collected:  11/07/17 1132    Specimen:  Urine Updated:  11/07/17 1140        Imaging Results (last 72 hours)     Procedure Component Value Units Date/Time    US Gallbladder [[id number]] Collected:  11/06/17 1931     Updated:  11/06/17 1937    Narrative:       US GALLBLADDER-        INDICATIONS: Elevated liver function tests     TECHNIQUE: Ultrasound of the right upper quadrant     COMPARISON: None available     FINDINGS:     The gallbladder is nontender, with sludge but no shadowing stones  demonstrated. No gallbladder wall thickening or  pericholecystic fluid.     No intrahepatic or extrahepatic biliary ductal dilatation is  demonstrated. The common biliary duct caliber is measured at 4.0 mm.     No liver lesion is identified. Diffusely, the echogenicity of the liver  is otherwise in the range of normal relative to that of the right  kidney.     The pancreas is mostly obscured. The right kidney, 10.8 cm, and the  pancreas otherwise appear unremarkable.                Impression:          Sludge in the gallbladder. No evidence for acute cholecystitis. No  discrete liver lesion or biliary ductal dilatation identified. With  persistent clinical indication, enhanced liver imaging could be  considered for further evaluation.     This report was finalized on 11/6/2017 7:33 PM by Dr. Gokul Real MD.       CT Abdomen Pelvis Without Contrast [[id number]] Collected:  11/06/17 2007     Updated:  11/06/17 2017    Narrative:       CT ABDOMEN AND PELVIS WITHOUT CONTRAST     HISTORY: Elevated liver function test. Gallbladder sludge.      TECHNIQUE: CT abdomen and pelvis without IV or oral contrast.     COMPARISON: Right upper quadrant ultrasound 11/06/2017.     FINDINGS: Lung bases appear clear and there is no pleural effusion.  Liver exhibits slight undelayed margins consistent with hepatic  cirrhosis. There Is no evidence for intra or extrahepatic biliary duct  dilatation. There is density within the gallbladder consistent with  sludge as demonstrated sonographically. Adrenal glands, pancreas,  spleen, kidneys appear within normal limits. There is no hydronephrosis  or hydroureter. Urinary bladder is mostly decompressed. There is no  bowel dilatation or evidence for bowel obstruction. No ben enlargement  is evident within the abdomen or pelvis. Atherosclerotic calcifications  are present involving the abdominal aorta and the abdominal aorta  measures up to 2.4 cm diameter. Mural calcifications are present.       Impression:       1. Sludge within the  gallbladder without other evidence for  cholecystitis. Mild undulating hepatic margins consistent with hepatic  cirrhosis.  2. Atherosclerotic disease. 2.4 cm infrarenal abdominal aorta.     Radiation dose reduction techniques were utilized, including automated  exposure control and exposure modulation based on body size.     This report was finalized on 11/6/2017 8:14 PM by Dr. Marcos Sainz MD.       XR Chest 2 View [[id number]] Collected:  11/06/17 2041     Updated:  11/06/17 2046    Narrative:       XR CHEST 2 VW-     HISTORY: Male who is 67 years-old,  acute renal failure     TECHNIQUE: Frontal and lateral views of the chest     COMPARISON: None available     FINDINGS: Heart, mediastinum and pulmonary vasculature are unremarkable.  No focal pulmonary consolidation, pleural effusion, or pneumothorax. Old  granulomatous disease is apparent. No acute osseous process.       Impression:       No evidence for acute pulmonary process. Follow-up as  clinical indications persist.     This report was finalized on 11/6/2017 8:43 PM by Dr. Gokul Real MD.       US Renal Bilateral [[id number]] Collected:  11/07/17 0145     Updated:  11/07/17 0145    Narrative:       ULTRASOUND RENAL BILATERAL.     TECHNIQUE: Grayscale and color images of the kidneys were obtained.     HISTORY: Acute kidney injury.     COMPARISON: No prior studies for comparison.     FINDINGS:  Right kidney measures 11.6 x 4.8 x 5.4 cm. Left kidney measures 12.5 x  5.9 x 5.7 cm. There is no hydronephrosis, stone or mass.     Urinary bladder could not be well visualized. No calculus, sludge or  mass.       Impression:       No acute findings involving both kidneys.             EKG                                                  Rhythm/Rate: Sinus bradycardia 48  P waves and VT: Normal  QRS, axis: IVCD   ST and T waves: Biphasic T-wave and Inverted T-waves inferiorly        Current Facility-Administered Medications:   •  aspirin tablet 325 mg, 325  mg, Oral, Daily, Keith Varela MD  •  bisoprolol (ZEBeta) tablet 2.5 mg, 2.5 mg, Oral, Q24H, Keith Varela MD  •  [START ON 11/8/2017] pantoprazole (PROTONIX) EC tablet 40 mg, 40 mg, Oral, Q AM, Keith Varela MD  •  rosuvastatin (CRESTOR) tablet 10 mg, 10 mg, Oral, Daily, Keith Varela MD  •  Insert peripheral IV, , , Once **AND** sodium chloride 0.9 % flush 10 mL, 10 mL, Intravenous, PRN, Adan Simmons MD  •  sodium chloride 0.9 % with KCl 40 mEq/L infusion, 125 mL/hr, Intravenous, Continuous, Keith Varela MD, Last Rate: 125 mL/hr at 11/07/17 0705, 125 mL/hr at 11/07/17 0705     ASSESSMENT  Acute renal failure  Elevated liver enzymes  Hypertension  Hyperlipidemia  Arthroscopic heart disease    PLAN  Admit  IV fluid  Replace potassium  Nephrology consult  Gastroenterology consult  Check 2-D echo  Stop Crestor  Stop hydrochlorothiazide  Stress ulcer DVT prophylaxis  Follow closely further recommendation according to hospital course    KEITH VARELA MD     .

## 2017-11-07 NOTE — PLAN OF CARE
Problem: Patient Care Overview (Adult)  Goal: Plan of Care Review  Outcome: Ongoing (interventions implemented as appropriate)    11/07/17 0402   Coping/Psychosocial Response Interventions   Plan Of Care Reviewed With patient   Patient Care Overview   Progress no change   Outcome Evaluation   Outcome Summary/Follow up Plan Pt is alert and oriented but still shows signs of weekness in his speech and body movements, Iv fluids with potassium, nephrology consulted, altered labs called to Dr Varela upon admission to the unit, 24 hour urine in progress, Renal US completed,        Goal: Adult Individualization and Mutuality  Outcome: Ongoing (interventions implemented as appropriate)    Problem: Fall Risk (Adult)  Goal: Identify Related Risk Factors and Signs and Symptoms  Outcome: Ongoing (interventions implemented as appropriate)  Goal: Absence of Falls  Outcome: Ongoing (interventions implemented as appropriate)    Problem: Renal Failure/Kidney Injury, Acute (Adult)  Goal: Signs and Symptoms of Listed Potential Problems Will be Absent or Manageable (Renal Failure/Kidney Injury, Acute)  Outcome: Ongoing (interventions implemented as appropriate)    Problem: Fluid Volume Deficit (Adult)  Goal: Identify Related Risk Factors and Signs and Symptoms  Outcome: Ongoing (interventions implemented as appropriate)  Goal: Fluid/Electrolyte Balance  Outcome: Ongoing (interventions implemented as appropriate)  Goal: Comfort/Well Being  Outcome: Ongoing (interventions implemented as appropriate)

## 2017-11-08 ENCOUNTER — APPOINTMENT (OUTPATIENT)
Dept: CT IMAGING | Facility: HOSPITAL | Age: 67
End: 2017-11-08
Attending: INTERNAL MEDICINE

## 2017-11-08 ENCOUNTER — APPOINTMENT (OUTPATIENT)
Dept: CARDIOLOGY | Facility: HOSPITAL | Age: 67
End: 2017-11-08
Attending: INTERNAL MEDICINE

## 2017-11-08 ENCOUNTER — APPOINTMENT (OUTPATIENT)
Dept: GENERAL RADIOLOGY | Facility: HOSPITAL | Age: 67
End: 2017-11-08
Attending: SURGERY

## 2017-11-08 LAB
ALBUMIN SERPL-MCNC: 2.3 G/DL (ref 2.9–4.4)
ALBUMIN SERPL-MCNC: 2.5 G/DL (ref 3.5–5.2)
ALBUMIN/GLOB SERPL: 0.7 G/DL
ALBUMIN/GLOB SERPL: 0.7 {RATIO} (ref 0.7–1.7)
ALP SERPL-CCNC: 260 U/L (ref 39–117)
ALPHA1 GLOB FLD ELPH-MCNC: 0.4 G/DL (ref 0–0.4)
ALPHA2 GLOB SERPL ELPH-MCNC: 0.6 G/DL (ref 0.4–1)
ALT SERPL W P-5'-P-CCNC: 170 U/L (ref 1–41)
ANA SER QL: NEGATIVE
ANION GAP SERPL CALCULATED.3IONS-SCNC: 19.1 MMOL/L
ASO AB SERPL-ACNC: NEGATIVE [IU]/ML
AST SERPL-CCNC: 859 U/L (ref 1–40)
B-GLOBULIN SERPL ELPH-MCNC: 1 G/DL (ref 0.7–1.3)
BACTERIA SPEC AEROBE CULT: NO GROWTH
BASOPHILS # BLD AUTO: 0.04 10*3/MM3 (ref 0–0.2)
BASOPHILS NFR BLD AUTO: 0.3 % (ref 0–1.5)
BH CV VAS SMA AORTA PSV: 69 CM/S
BH CV VAS SMA HEPATIC EDV: 25 CM/S
BH CV VAS SMA HEPATIC PSV: 134 CM/S
BH CV VAS SMA SPLENIC EDV: 51 CM/S
BH CV VAS SMA SPLENIC PSV: 156 CM/S
BILIRUB SERPL-MCNC: 1.9 MG/DL (ref 0.1–1.2)
BUN BLD-MCNC: 74 MG/DL (ref 8–23)
BUN/CREAT SERPL: 7.1 (ref 7–25)
C3 SERPL-MCNC: 154 MG/DL (ref 82–167)
C4 SERPL-MCNC: 24 MG/DL (ref 14–44)
CALCIUM SPEC-SCNC: 8.1 MG/DL (ref 8.6–10.5)
CHLORIDE SERPL-SCNC: 104 MMOL/L (ref 98–107)
CHOLEST SERPL-MCNC: 93 MG/DL (ref 0–200)
CK SERPL-CCNC: ABNORMAL U/L (ref 20–200)
CO2 SERPL-SCNC: 17.9 MMOL/L (ref 22–29)
CREAT BLD-MCNC: 10.46 MG/DL (ref 0.76–1.27)
DEPRECATED RDW RBC AUTO: 55.5 FL (ref 37–54)
EOSINOPHIL # BLD AUTO: 0.24 10*3/MM3 (ref 0–0.7)
EOSINOPHIL NFR BLD AUTO: 2 % (ref 0.3–6.2)
ERYTHROCYTE [DISTWIDTH] IN BLOOD BY AUTOMATED COUNT: 15.5 % (ref 11.5–14.5)
FERRITIN SERPL-MCNC: 771.4 NG/ML (ref 30–400)
GAMMA GLOB SERPL ELPH-MCNC: 1.4 G/DL (ref 0.4–1.8)
GFR SERPL CREATININE-BSD FRML MDRD: 5 ML/MIN/1.73
GLOBULIN SER CALC-MCNC: 3.4 G/DL (ref 2.2–3.9)
GLOBULIN UR ELPH-MCNC: 3.8 GM/DL
GLUCOSE BLD-MCNC: 91 MG/DL (ref 65–99)
GLUCOSE BLDC GLUCOMTR-MCNC: 101 MG/DL (ref 70–130)
GLUCOSE BLDC GLUCOMTR-MCNC: 103 MG/DL (ref 70–130)
GLUCOSE BLDC GLUCOMTR-MCNC: 103 MG/DL (ref 70–130)
GLUCOSE BLDC GLUCOMTR-MCNC: 116 MG/DL (ref 70–130)
HBA1C MFR BLD: 5.66 % (ref 4.8–5.6)
HCT VFR BLD AUTO: 42.1 % (ref 40.4–52.2)
HDLC SERPL-MCNC: 9 MG/DL (ref 40–60)
HGB BLD-MCNC: 14.1 G/DL (ref 13.7–17.6)
IGA SERPL-MCNC: 229 MG/DL (ref 61–437)
IGG SERPL-MCNC: 1402 MG/DL (ref 700–1600)
IGM SERPL-MCNC: 183 MG/DL (ref 20–172)
IMM GRANULOCYTES # BLD: 0.05 10*3/MM3 (ref 0–0.03)
IMM GRANULOCYTES NFR BLD: 0.4 % (ref 0–0.5)
INR PPP: 1.22 (ref 0.9–1.1)
IRON 24H UR-MRATE: 91 MCG/DL (ref 59–158)
IRON SATN MFR SERPL: 44 % (ref 20–50)
KAPPA LC SERPL-MCNC: 180.2 MG/L (ref 3.3–19.4)
KAPPA LC/LAMBDA SER: 0.92 {RATIO} (ref 0.26–1.65)
LAMBDA LC FREE SERPL-MCNC: 196 MG/L (ref 5.7–26.3)
LDLC SERPL CALC-MCNC: 48 MG/DL (ref 0–100)
LDLC/HDLC SERPL: 5.36 {RATIO}
LYMPHOCYTES # BLD AUTO: 1.55 10*3/MM3 (ref 0.9–4.8)
LYMPHOCYTES NFR BLD AUTO: 13.2 % (ref 19.6–45.3)
Lab: ABNORMAL
Lab: NORMAL
M-SPIKE: ABNORMAL G/DL
MCH RBC QN AUTO: 33 PG (ref 27–32.7)
MCHC RBC AUTO-ENTMCNC: 33.5 G/DL (ref 32.6–36.4)
MCV RBC AUTO: 98.6 FL (ref 79.8–96.2)
MONOCYTES # BLD AUTO: 1.75 10*3/MM3 (ref 0.2–1.2)
MONOCYTES NFR BLD AUTO: 14.9 % (ref 5–12)
NEUTROPHILS # BLD AUTO: 8.09 10*3/MM3 (ref 1.9–8.1)
NEUTROPHILS NFR BLD AUTO: 69.2 % (ref 42.7–76)
NT-PROBNP SERPL-MCNC: 2642 PG/ML (ref 5–900)
PLATELET # BLD AUTO: 146 10*3/MM3 (ref 140–500)
PMV BLD AUTO: 11.9 FL (ref 6–12)
POTASSIUM BLD-SCNC: 3.8 MMOL/L (ref 3.5–5.2)
PROT PATTERN SERPL ELPH-IMP: ABNORMAL
PROT PATTERN SERPL IFE-IMP: ABNORMAL
PROT SERPL-MCNC: 5.7 G/DL (ref 6–8.5)
PROT SERPL-MCNC: 6.3 G/DL (ref 6–8.5)
PROTHROMBIN TIME: 14.9 SECONDS (ref 11.7–14.2)
RBC # BLD AUTO: 4.27 10*6/MM3 (ref 4.6–6)
SODIUM BLD-SCNC: 141 MMOL/L (ref 136–145)
TIBC SERPL-MCNC: 209 MCG/DL
TRANSFERRIN SERPL-MCNC: 140 MG/DL (ref 200–360)
TRIGL SERPL-MCNC: 179 MG/DL (ref 0–150)
TSH SERPL DL<=0.05 MIU/L-ACNC: 1.09 MIU/ML (ref 0.27–4.2)
URATE SERPL-MCNC: 10.3 MG/DL (ref 3.4–7)
VLDLC SERPL-MCNC: 35.8 MG/DL (ref 5–40)
WBC NRBC COR # BLD: 11.72 10*3/MM3 (ref 4.5–10.7)

## 2017-11-08 PROCEDURE — 86235 NUCLEAR ANTIGEN ANTIBODY: CPT | Performed by: NURSE PRACTITIONER

## 2017-11-08 PROCEDURE — 02PYX3Z REMOVAL OF INFUSION DEVICE FROM GREAT VESSEL, EXTERNAL APPROACH: ICD-10-PCS | Performed by: SURGERY

## 2017-11-08 PROCEDURE — 93975 VASCULAR STUDY: CPT

## 2017-11-08 PROCEDURE — 82105 ALPHA-FETOPROTEIN SERUM: CPT | Performed by: INTERNAL MEDICINE

## 2017-11-08 PROCEDURE — 25010000003 HYDROCORTISONE SOD SUCCINATE PF 250 MG RECONSTITUTED SOLUTION 1 EACH VIAL: Performed by: INTERNAL MEDICINE

## 2017-11-08 PROCEDURE — 83880 ASSAY OF NATRIURETIC PEPTIDE: CPT | Performed by: HOSPITALIST

## 2017-11-08 PROCEDURE — B244ZZZ ULTRASONOGRAPHY OF RIGHT HEART: ICD-10-PCS | Performed by: SURGERY

## 2017-11-08 PROCEDURE — 80061 LIPID PANEL: CPT | Performed by: HOSPITALIST

## 2017-11-08 PROCEDURE — 83540 ASSAY OF IRON: CPT | Performed by: INTERNAL MEDICINE

## 2017-11-08 PROCEDURE — 85610 PROTHROMBIN TIME: CPT | Performed by: NURSE PRACTITIONER

## 2017-11-08 PROCEDURE — 82962 GLUCOSE BLOOD TEST: CPT

## 2017-11-08 PROCEDURE — 71010 HC CHEST PA OR AP: CPT

## 2017-11-08 PROCEDURE — 82550 ASSAY OF CK (CPK): CPT | Performed by: INTERNAL MEDICINE

## 2017-11-08 PROCEDURE — 85025 COMPLETE CBC W/AUTO DIFF WBC: CPT | Performed by: HOSPITALIST

## 2017-11-08 PROCEDURE — 88300 SURGICAL PATH GROSS: CPT | Performed by: HOSPITALIST

## 2017-11-08 PROCEDURE — 5A1D70Z PERFORMANCE OF URINARY FILTRATION, INTERMITTENT, LESS THAN 6 HOURS PER DAY: ICD-10-PCS | Performed by: INTERNAL MEDICINE

## 2017-11-08 PROCEDURE — 84443 ASSAY THYROID STIM HORMONE: CPT | Performed by: HOSPITALIST

## 2017-11-08 PROCEDURE — 80053 COMPREHEN METABOLIC PANEL: CPT | Performed by: HOSPITALIST

## 2017-11-08 PROCEDURE — 86256 FLUORESCENT ANTIBODY TITER: CPT | Performed by: INTERNAL MEDICINE

## 2017-11-08 PROCEDURE — 84550 ASSAY OF BLOOD/URIC ACID: CPT | Performed by: INTERNAL MEDICINE

## 2017-11-08 PROCEDURE — 77012 CT SCAN FOR NEEDLE BIOPSY: CPT

## 2017-11-08 PROCEDURE — 83516 IMMUNOASSAY NONANTIBODY: CPT | Performed by: NURSE PRACTITIONER

## 2017-11-08 PROCEDURE — 82728 ASSAY OF FERRITIN: CPT | Performed by: INTERNAL MEDICINE

## 2017-11-08 PROCEDURE — 83036 HEMOGLOBIN GLYCOSYLATED A1C: CPT | Performed by: HOSPITALIST

## 2017-11-08 PROCEDURE — 02HV33Z INSERTION OF INFUSION DEVICE INTO SUPERIOR VENA CAVA, PERCUTANEOUS APPROACH: ICD-10-PCS | Performed by: SURGERY

## 2017-11-08 PROCEDURE — 86160 COMPLEMENT ANTIGEN: CPT | Performed by: INTERNAL MEDICINE

## 2017-11-08 PROCEDURE — 84466 ASSAY OF TRANSFERRIN: CPT | Performed by: INTERNAL MEDICINE

## 2017-11-08 PROCEDURE — 25010000002 HEPARIN (PORCINE) PER 1000 UNITS: Performed by: INTERNAL MEDICINE

## 2017-11-08 PROCEDURE — 86225 DNA ANTIBODY NATIVE: CPT | Performed by: NURSE PRACTITIONER

## 2017-11-08 PROCEDURE — 82103 ALPHA-1-ANTITRYPSIN TOTAL: CPT | Performed by: NURSE PRACTITIONER

## 2017-11-08 PROCEDURE — 82390 ASSAY OF CERULOPLASMIN: CPT | Performed by: INTERNAL MEDICINE

## 2017-11-08 PROCEDURE — 25010000002 HEPARIN (PORCINE) PER 1000 UNITS: Performed by: SURGERY

## 2017-11-08 PROCEDURE — 99232 SBSQ HOSP IP/OBS MODERATE 35: CPT | Performed by: INTERNAL MEDICINE

## 2017-11-08 PROCEDURE — 0TB03ZX EXCISION OF RIGHT KIDNEY, PERCUTANEOUS APPROACH, DIAGNOSTIC: ICD-10-PCS | Performed by: RADIOLOGY

## 2017-11-08 RX ORDER — SODIUM BICARBONATE 650 MG/1
1300 TABLET ORAL 3 TIMES DAILY
Status: DISCONTINUED | OUTPATIENT
Start: 2017-11-08 | End: 2017-11-13

## 2017-11-08 RX ORDER — POTASSIUM CHLORIDE 750 MG/1
40 CAPSULE, EXTENDED RELEASE ORAL ONCE
Status: DISCONTINUED | OUTPATIENT
Start: 2017-11-08 | End: 2017-11-08

## 2017-11-08 RX ORDER — ALPRAZOLAM 0.25 MG/1
0.25 TABLET ORAL 4 TIMES DAILY PRN
Status: DISCONTINUED | OUTPATIENT
Start: 2017-11-08 | End: 2017-11-16

## 2017-11-08 RX ORDER — HEPARIN SODIUM 1000 [USP'U]/ML
3000 INJECTION, SOLUTION INTRAVENOUS; SUBCUTANEOUS ONCE
Status: COMPLETED | OUTPATIENT
Start: 2017-11-08 | End: 2017-11-08

## 2017-11-08 RX ORDER — ALPRAZOLAM 0.5 MG/1
0.5 TABLET ORAL ONCE
Status: COMPLETED | OUTPATIENT
Start: 2017-11-08 | End: 2017-11-08

## 2017-11-08 RX ORDER — HEPARIN SODIUM 5000 [USP'U]/ML
3000 INJECTION, SOLUTION INTRAVENOUS; SUBCUTANEOUS ONCE
Status: COMPLETED | OUTPATIENT
Start: 2017-11-08 | End: 2017-11-08

## 2017-11-08 RX ORDER — SODIUM CHLORIDE 0.9 % (FLUSH) 0.9 %
1-10 SYRINGE (ML) INJECTION AS NEEDED
Status: DISCONTINUED | OUTPATIENT
Start: 2017-11-08 | End: 2017-11-14 | Stop reason: HOSPADM

## 2017-11-08 RX ORDER — LIDOCAINE HYDROCHLORIDE 10 MG/ML
20 INJECTION, SOLUTION INFILTRATION; PERINEURAL ONCE
Status: COMPLETED | OUTPATIENT
Start: 2017-11-08 | End: 2017-11-08

## 2017-11-08 RX ADMIN — SODIUM CHLORIDE 100 ML/HR: 9 INJECTION, SOLUTION INTRAVENOUS at 05:43

## 2017-11-08 RX ADMIN — SODIUM BICARBONATE 1300 MG: 650 TABLET ORAL at 21:53

## 2017-11-08 RX ADMIN — PANTOPRAZOLE SODIUM 40 MG: 40 TABLET, DELAYED RELEASE ORAL at 15:09

## 2017-11-08 RX ADMIN — ALPRAZOLAM 0.5 MG: 0.5 TABLET ORAL at 13:30

## 2017-11-08 RX ADMIN — SODIUM BICARBONATE 1300 MG: 650 TABLET ORAL at 15:09

## 2017-11-08 RX ADMIN — HEPARIN SODIUM 3000 UNITS: 5000 INJECTION, SOLUTION INTRAVENOUS; SUBCUTANEOUS at 16:05

## 2017-11-08 RX ADMIN — SODIUM CHLORIDE 250 MG: 9 INJECTION, SOLUTION INTRAVENOUS at 15:09

## 2017-11-08 RX ADMIN — LIDOCAINE HYDROCHLORIDE 20 ML: 10 INJECTION, SOLUTION INFILTRATION; PERINEURAL at 14:30

## 2017-11-08 RX ADMIN — BISOPROLOL FUMARATE 2.5 MG: 5 TABLET, COATED ORAL at 15:09

## 2017-11-08 RX ADMIN — HEPARIN SODIUM 3000 UNITS: 1000 INJECTION, SOLUTION INTRAVENOUS; SUBCUTANEOUS at 18:58

## 2017-11-08 RX ADMIN — SODIUM CHLORIDE 250 MG: 9 INJECTION, SOLUTION INTRAVENOUS at 21:53

## 2017-11-08 NOTE — PROGRESS NOTES
B[facility]/GI Progress Note   Chief Complaint:  Elevated LFT's    Subjective     Interval History: No abd pain, no n/v.  Just returned from US.  Plans for renal bx as well as placement of dialysis cath and initiation of dialysis.    History taken from: patient chart    Review of Systems:    The following systems were reviewed and negative;  gastrointestinal    Objective     Vital Signs  Temp:  [97.7 °F (36.5 °C)-98 °F (36.7 °C)] 98 °F (36.7 °C)  Heart Rate:  [50-55] 54  Resp:  [16] 16  BP: (123-147)/(64-81) 127/68  Body mass index is 28.86 kg/(m^2).    Intake/Output Summary (Last 24 hours) at 11/08/17 0953  Last data filed at 11/08/17 0600   Gross per 24 hour   Intake             2670 ml   Output              180 ml   Net             2490 ml          Physical Exam:   General: patient awake, alert and cooperative.  Just returned from radiology department, resting in bed, no distress   Eyes: Normal lids and lashes, scleral icterus   Neck: supple, normal ROM, no tracheal deviation   Skin: warm and dry, not jaundiced   Cardiovascular: regular rhythm and rate, no murmurs auscultated   Pulm: clear to auscultation bilaterally, regular and unlabored   Abdomen: soft, nontender, nondistended; normal bowel sounds   Rectal: deferred   Extremities: no rash or edema   Neurologic: Normal mood and behavior    All Medications Have Been Reviewed     Results Review:       Results from last 7 days  Lab Units 11/08/17  0541 11/07/17  0709 11/06/17  1722   WBC 10*3/mm3 11.72* 12.00* 12.18*   HEMOGLOBIN g/dL 14.1 14.1 15.3   HEMATOCRIT % 42.1 40.6 43.6   PLATELETS 10*3/mm3 146 140 153         Results from last 7 days  Lab Units 11/08/17  0541 11/07/17  0709 11/06/17  1722   SODIUM mmol/L 141 141 139   POTASSIUM mmol/L 3.8 3.1* 2.8*   CHLORIDE mmol/L 104 101 96*   CO2 mmol/L 17.9* 21.2* 22.9   BUN mg/dL 74* 57* 51*   CREATININE mg/dL 10.46* 7.63* 7.39*   CALCIUM mg/dL 8.1* 8.0* 8.9   BILIRUBIN mg/dL 1.9* 2.0* 2.5*   ALK PHOS U/L 260* 259*  317*   ALT (SGPT) U/L 170* 149* 152*   AST (SGOT) U/L 859* 829* 689*   GLUCOSE mg/dL 91 106* 114*         Results from last 7 days  Lab Units 11/08/17  0541   INR  1.22*       RADIOLOGY:    Imaging Results (last 72 hours)     Procedure Component Value Units Date/Time    US Gallbladder [[id number]] Collected:  11/06/17 1931     Updated:  11/06/17 1937    Narrative:       US GALLBLADDER-        INDICATIONS: Elevated liver function tests     TECHNIQUE: Ultrasound of the right upper quadrant     COMPARISON: None available     FINDINGS:     The gallbladder is nontender, with sludge but no shadowing stones  demonstrated. No gallbladder wall thickening or pericholecystic fluid.     No intrahepatic or extrahepatic biliary ductal dilatation is  demonstrated. The common biliary duct caliber is measured at 4.0 mm.     No liver lesion is identified. Diffusely, the echogenicity of the liver  is otherwise in the range of normal relative to that of the right  kidney.     The pancreas is mostly obscured. The right kidney, 10.8 cm, and the  pancreas otherwise appear unremarkable.                Impression:          Sludge in the gallbladder. No evidence for acute cholecystitis. No  discrete liver lesion or biliary ductal dilatation identified. With  persistent clinical indication, enhanced liver imaging could be  considered for further evaluation.     This report was finalized on 11/6/2017 7:33 PM by Dr. Gokul Real MD.       CT Abdomen Pelvis Without Contrast [[id number]] Collected:  11/06/17 2007     Updated:  11/06/17 2017    Narrative:       CT ABDOMEN AND PELVIS WITHOUT CONTRAST     HISTORY: Elevated liver function test. Gallbladder sludge.      TECHNIQUE: CT abdomen and pelvis without IV or oral contrast.     COMPARISON: Right upper quadrant ultrasound 11/06/2017.     FINDINGS: Lung bases appear clear and there is no pleural effusion.  Liver exhibits slight undelayed margins consistent with hepatic  cirrhosis. There Is no  evidence for intra or extrahepatic biliary duct  dilatation. There is density within the gallbladder consistent with  sludge as demonstrated sonographically. Adrenal glands, pancreas,  spleen, kidneys appear within normal limits. There is no hydronephrosis  or hydroureter. Urinary bladder is mostly decompressed. There is no  bowel dilatation or evidence for bowel obstruction. No ben enlargement  is evident within the abdomen or pelvis. Atherosclerotic calcifications  are present involving the abdominal aorta and the abdominal aorta  measures up to 2.4 cm diameter. Mural calcifications are present.       Impression:       1. Sludge within the gallbladder without other evidence for  cholecystitis. Mild undulating hepatic margins consistent with hepatic  cirrhosis.  2. Atherosclerotic disease. 2.4 cm infrarenal abdominal aorta.     Radiation dose reduction techniques were utilized, including automated  exposure control and exposure modulation based on body size.     This report was finalized on 11/6/2017 8:14 PM by Dr. Marcos Sainz MD.       XR Chest 2 View [[id number]] Collected:  11/06/17 2041     Updated:  11/06/17 2046    Narrative:       XR CHEST 2 VW-     HISTORY: Male who is 67 years-old,  acute renal failure     TECHNIQUE: Frontal and lateral views of the chest     COMPARISON: None available     FINDINGS: Heart, mediastinum and pulmonary vasculature are unremarkable.  No focal pulmonary consolidation, pleural effusion, or pneumothorax. Old  granulomatous disease is apparent. No acute osseous process.       Impression:       No evidence for acute pulmonary process. Follow-up as  clinical indications persist.     This report was finalized on 11/6/2017 8:43 PM by Dr. Gokul Real MD.       US Renal Bilateral [[id number]] Collected:  11/07/17 0145     Updated:  11/07/17 0145    Narrative:       ULTRASOUND RENAL BILATERAL.     TECHNIQUE: Grayscale and color images of the kidneys were obtained.      HISTORY: Acute kidney injury.     COMPARISON: No prior studies for comparison.     FINDINGS:  Right kidney measures 11.6 x 4.8 x 5.4 cm. Left kidney measures 12.5 x  5.9 x 5.7 cm. There is no hydronephrosis, stone or mass.     Urinary bladder could not be well visualized. No calculus, sludge or  mass.       Impression:       No acute findings involving both kidneys.              Assessment/Plan     Patient Active Problem List   Diagnosis Code   • Arteriosclerotic vascular disease I70.90   • Hyperlipidemia E78.5   • Hypertension I10   • Acute kidney injury (nontraumatic) N17.9     Dia FABRICIO Montana, APRN  11/08/17  9:53 AM    PE - VS seen  Lungs -  CTA  CV - RRR  ABD - NT, soft  EXT - No CCE    Assessment:  1) Elevated LFT's- continued rise in labs.  Prior hx of elevation per pt in 12/2016 per out pt PCP labs (not available for review/comparison).  Admission US with gallbladder sludge, no liver abnormality reported. CT abd/pelvis with cirrhotic appearing liver. Hep panel negative. Doppler US with no portal vein thrombosis. Could Crestor have caused hepatotoxicity, STEVEN and rhabdo? Case discussed the Dr. ELIZABETH Moreau.  2) STEVEN- renal function worse this am, plans for dialysis, renal bx, nephrology following  3) Hyperlipidemia- by hx, on Crestor as out pt, currently on hold  4) HTN- by hx, out pt diuretics currently on hold    - follow LFT's daily  - f/u liver serology   - consider EGD for variceal screening with cirrhotic liver on CT once acute issues resolved.    Raymond Zabala M.D.

## 2017-11-08 NOTE — PLAN OF CARE
Problem: Patient Care Overview (Adult)  Goal: Plan of Care Review  Outcome: Ongoing (interventions implemented as appropriate)    11/08/17 4684   Coping/Psychosocial Response Interventions   Plan Of Care Reviewed With patient   Patient Care Overview   Progress improving   Outcome Evaluation   Outcome Summary/Follow up Plan pt went for kidney biopsy on left side. incision looks dry and dressin is intact. no irritation. shiley palced at bedside. xray verified placement. pt currently down at dialysis.        Goal: Adult Individualization and Mutuality  Outcome: Ongoing (interventions implemented as appropriate)  Goal: Discharge Needs Assessment  Outcome: Ongoing (interventions implemented as appropriate)    Problem: Fall Risk (Adult)  Goal: Absence of Falls  Outcome: Ongoing (interventions implemented as appropriate)    Problem: Renal Failure/Kidney Injury, Acute (Adult)  Goal: Signs and Symptoms of Listed Potential Problems Will be Absent or Manageable (Renal Failure/Kidney Injury, Acute)  Outcome: Ongoing (interventions implemented as appropriate)    Problem: Fluid Volume Deficit (Adult)  Goal: Identify Related Risk Factors and Signs and Symptoms  Outcome: Outcome(s) achieved Date Met:  11/08/17  Goal: Fluid/Electrolyte Balance  Outcome: Ongoing (interventions implemented as appropriate)  Goal: Comfort/Well Being  Outcome: Ongoing (interventions implemented as appropriate)

## 2017-11-08 NOTE — PROGRESS NOTES
Vascular Lab    Hepato-Portal doppler completed    Preliminary exam is Negative for Portal vein thrombosis    Results to JEREMY Fernandez RVT

## 2017-11-08 NOTE — CONSULTS
Name: Hu Burgess ADMIT: 2017   : 1950  PCP: Aldo Guajardo MD    MRN: [id number] LOS: 2 days   AGE/SEX: 67 y.o. male  ROOM: 5/     Inpatient Consult to Vascular Surgery  Consult performed by: NIDHI KC  Consult ordered by: MILLA MCFARLANE MD     LOS: 2 days   Patient Care Team:  Aldo Guajardo MD as PCP - General  Aldo Guajardo MD as PCP - Family Medicine    Subjective     History of Present Illness  67 y.o. male asked to see for large for dialysis catheter placement urgently to begin dialysis.  Patient with acute renal failure associated with elevated liver enzymes and generalized nausea and fatique.  Patient also with cirrhotic liver on CT scan.  There was mild dilation of the aorta but no true aneurysm noted on CT scan.  Nephrology and gastroenterology as well as internal medicine are evaluating and treating the patient.  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  He has mild alcohol use by history.  He has never had any surgeries and only hyperlipidemia with use of statin treatment as a potential contributing factor as well per gastroenterology.  Patient understands risks of urgent temporary dialysis catheter placement and will proceed with that today.  Patient understands need for tunnel dialysis catheter and/or fistula forearm graft access and future if long-term dialysis team necessary by nephrology        Review of Systems   Constitutional: Positive for appetite change, fatigue and fever. Negative for activity change, chills, diaphoresis and unexpected weight change.   HENT: Negative.    Eyes: Negative.    Respiratory: Negative.    Cardiovascular: Negative.    Gastrointestinal: Positive for nausea. Negative for abdominal distention, abdominal pain, anal bleeding, blood in stool, constipation, diarrhea, rectal pain and vomiting.   Endocrine: Negative.    Genitourinary: Positive for decreased urine  volume. Negative for difficulty urinating, dysuria, enuresis, flank pain, frequency, hematuria and urgency.   Musculoskeletal: Negative.    Skin: Negative.    Neurological: Negative.    Hematological: Negative.    Psychiatric/Behavioral: Negative.        Past Medical History:   Diagnosis Date   • Arteriosclerotic cardiovascular disease    • History of transfusion    • Hyperlipidemia    • Hypertension        History reviewed. No pertinent surgical history.    Family History   Problem Relation Age of Onset   • Cancer Father      laryngeal cancer       Social History   Substance Use Topics   • Smoking status: Former Smoker     Quit date: 11/6/2016   • Smokeless tobacco: None      Comment: caffeine use   • Alcohol use 0.6 oz/week     1 Cans of beer per week      Comment: social drinker       Allergies: Review of patient's allergies indicates no known allergies.    Prescriptions Prior to Admission   Medication Sig Dispense Refill Last Dose   • aspirin 325 MG tablet Take 1 tablet by mouth Daily.   11/6/2017 at Unknown time   • rosuvastatin (CRESTOR) 40 MG tablet Take 1 tablet by mouth Daily.   11/6/2017 at Unknown time       bisoprolol 2.5 mg Oral Q24H   heparin (porcine) 3,000 Units Intravenous Once   hydrocortisone sodium succinate 250 mg Intravenous Q6H   pantoprazole 40 mg Oral Q AM   sodium bicarbonate 1,300 mg Oral TID        •  ALPRAZolam  •  sodium chloride  •  Insert peripheral IV **AND** sodium chloride      Objective     Physical Exam   Constitutional: He is oriented to person, place, and time. He appears well-developed and well-nourished.   HENT:   Head: Normocephalic and atraumatic.   Nose: Nose normal.   Eyes: Conjunctivae and EOM are normal. Pupils are equal, round, and reactive to light.   Neck: Normal range of motion. Neck supple.   Cardiovascular: Normal rate, normal heart sounds and intact distal pulses.    Pulmonary/Chest: Effort normal and breath sounds normal.   Abdominal: Soft. Bowel sounds are  normal.   Musculoskeletal: Normal range of motion.   Neurological: He is alert and oriented to person, place, and time. He has normal reflexes.   Skin: Skin is warm and dry.   Psychiatric: He has a normal mood and affect. His behavior is normal. Judgment and thought content normal.   Vitals reviewed.        Results from last 7 days  Lab Units 11/08/17  0541 11/07/17  0709 11/06/17  1722   WBC 10*3/mm3 11.72* 12.00* 12.18*   HEMOGLOBIN g/dL 14.1 14.1 15.3   PLATELETS 10*3/mm3 146 140 153     Results from last 7 days  Lab Units 11/08/17  0541 11/07/17  0709 11/06/17  1722   SODIUM mmol/L 141 141 139   POTASSIUM mmol/L 3.8 3.1* 2.8*   CHLORIDE mmol/L 104 101 96*   CO2 mmol/L 17.9* 21.2* 22.9   BUN mg/dL 74* 57* 51*   CREATININE mg/dL 10.46* 7.63* 7.39*   GLUCOSE mg/dL 91 106* 114*   Estimated Creatinine Clearance: 8.3 mL/min (by C-G formula based on Cr of 10.46).  Results from last 7 days  Lab Units 11/08/17  0541   PROTIME Seconds 14.9*   INR  1.22*       Imaging Studies:    Coding  Active Hospital Problems (** Indicates Principal Problem)    Diagnosis Date Noted   • Acute kidney injury (nontraumatic) [N17.9] 11/06/2017      Resolved Hospital Problems    Diagnosis Date Noted Date Resolved   No resolved problems to display.     Problem Points:  4:  Patient has a new problem, with additional work-up planned  Total problem points:4 or more    Data Points:  1:  I have reviewed or order clinical lab test  1:  I have reviewed or order radiology test (except heart catheterization or echo)  2:  I have reviewed and summation of old records and/or discussed the patients care with another health care provider  Total data points:4 or more    Risk Points:  High:  Any illness that poses threat to life or body funciton    MDM Prob point Data point Risk   SF 1 1 Minimal   Low 2 2 Low   Mod 3 3 Moderate   High 4 4 High     Code MDM History Exam Time   19188 SF/Low Detailed Detailed 30   19987 Mod Comprehensive Comprehensive 50   82430  High Comprehensive Comprehensive 70     Detailed history:  4 elements HPI or status of 3 chronic problems; 2-9 system ROS  Comprehensive:  4 elements HPI or status of 3 chronic problems;  10 system ROS    Detailed Exam:  12 findings from any organ system  Comprehensive Exam:  2 findings from each of 9 systems.     [place]    Assessment/Plan     Active Problems:    Acute kidney injury (nontraumatic)        67 y.o. male with elevated liver functions and cirrhotic liver with acute renal failure for initiation of dialysis with urgent placement temporary large-bore dialysis catheter.    I discussed the patients findings and my recommendations with patient and nursing staff.  Please call my office with any question: [phone]    Kishan Murray MD  17  4:04 PM

## 2017-11-08 NOTE — NURSING NOTE
Status post kidney biopsy on the right side, band aid dressing to right mid side of back dry and intact.

## 2017-11-08 NOTE — PROGRESS NOTES
"   LOS: 2 days   Patient Care Team:  Aldo Guajardo MD as PCP - General  Aldo Guajardo MD as PCP - Family Medicine    Chief Complaint/ Reason for encounter: Acute renal failure, kidney biopsy        Subjective     History of Present Illness    Subjective:  Symptoms:  Worsening.  He reports weakness.  No shortness of breath or chest pain.  (Increasing weakness, malaise, fatigue  No edema or shortness of breath).    Diet:  Poor intake.  No nausea.    Activity level: Impaired due to weakness.    Pain:  He reports no pain.          History taken from: Patient and chart    Objective     Vital Signs  Temp:  [97.7 °F (36.5 °C)-98 °F (36.7 °C)] 98 °F (36.7 °C)  Heart Rate:  [50-55] 54  Resp:  [16] 16  BP: (123-147)/(64-81) 127/68       Wt Readings from Last 1 Encounters:   11/07/17 1331 218 lb 11.1 oz (99.2 kg)   11/06/17 2118 218 lb 12.8 oz (99.2 kg)   11/06/17 1643 215 lb (97.5 kg)       Objective:  General Appearance:  Comfortable, ill-appearing, in no acute distress and not in pain.    Vital signs: (most recent): Blood pressure 127/68, pulse 54, temperature 98 °F (36.7 °C), temperature source Oral, resp. rate 16, height 72.99\" (185.4 cm), weight 218 lb 11.1 oz (99.2 kg), SpO2 94 %.  Vital signs are normal.  No fever.    Output: Minimal urine output.    HEENT: Normal HEENT exam.    Lungs:  Normal respiratory rate and normal effort.  He is not in respiratory distress.  Breath sounds clear to auscultation.  No wheezes or decreased breath sounds.    Heart: Normal rate.  Regular rhythm.  S1 normal.  No murmur.   Abdomen: Abdomen is soft and non-distended.  Bowel sounds are normal.   There is no epigastric area or no suprapubic area tenderness.  There is no rebound tenderness.     Extremities: Normal range of motion.  There is no deformity or dependent edema.    Pulses: Distal pulses are intact.    Neurological: Patient is alert and oriented to person, place and time.    Skin:  Warm and dry.  No rash or " cyanosis.             Results Review:    Past Medical History: reviewed and updated  Past Medical History:   Diagnosis Date   • Arteriosclerotic cardiovascular disease    • History of transfusion    • Hyperlipidemia    • Hypertension          Allergies:  No Known Allergies    Intake/Output:     Intake/Output Summary (Last 24 hours) at 11/08/17 0917  Last data filed at 11/08/17 0600   Gross per 24 hour   Intake             2670 ml   Output              180 ml   Net             2490 ml         DATA:  Interval chart, labs and notes reviewed.  The following labs independently reviewed by me  We discussed the risks and benefits of kidney biopsy, risks mainly include bleeding, damage to internal organs, hypotension.  We also discussed risk and benefits of dialysis catheter placement and initiation of hemodialysis.      Labs:   Recent Results (from the past 24 hour(s))   Creatinine, Urine, Random -    Collection Time: 11/07/17 11:32 AM   Result Value Ref Range    Creatinine, Urine 70.4 mg/dL   Adult Transthoracic Echo Complete W/ Cont if Necessary Per Protocol    Collection Time: 11/07/17  3:50 PM   Result Value Ref Range    BSA 2.2 m^2    IVSd 0.8 cm    LVIDd 6.5 cm    LVIDs 4.3 cm    LVPWd 0.8 cm    IVS/LVPW 1.0     FS 33.8 %    EDV(Teich) 216.0 ml    ESV(Teich) 83.1 ml    EF(Teich) 61.5 %    EDV(cubed) 274.6 ml    ESV(cubed) 79.5 ml    EF(cubed) 71.0 %    LV mass(C)d 214.3 grams    LV mass(C)dI 95.8 grams/m^2    SV(Teich) 132.9 ml    SI(Teich) 59.4 ml/m^2    SV(cubed) 195.1 ml    SI(cubed) 87.2 ml/m^2    Ao root diam 3.8 cm    Ao root area 11.3 cm^2    ACS 1.7 cm    LA dimension 4.2 cm    LA/Ao 1.1     LVOT diam 2.3 cm    LVOT area 4.2 cm^2    LVOT area(traced) 4.2 cm^2    RVOT diam 2.6 cm    RVOT area 5.3 cm^2    LVLd ap4 8.5 cm    EDV(MOD-sp4) 146.0 ml    LVLs ap4 7.3 cm    ESV(MOD-sp4) 62.0 ml    EF(MOD-sp4) 57.5 %    LVLd ap2 9.1 cm    EDV(MOD-sp2) 142.0 ml    LVLs ap2 7.5 cm    ESV(MOD-sp2) 57.0 ml    EF(MOD-sp2)  59.9 %    SV(MOD-sp4) 84.0 ml    SI(MOD-sp4) 37.6 ml/m^2    SV(MOD-sp2) 85.0 ml    SI(MOD-sp2) 38.0 ml/m^2    Ao root area (BSA corrected) 1.7     Ao root area (BSA corrected) 59.9 ml/m^2    CONTRAST EF 4CH 57.5 ml/m^2    LV Diastolic corrected for BSA 65.3 ml/m^2    LV Systolic corrected for BSA 27.7 ml/m^2    MV A dur 0.21 sec    MV E max joel 77.3 cm/sec    MV A max joel 66.8 cm/sec    MV E/A 1.2     MV V2 mean 42.9 cm/sec    MV mean PG 1.0 mmHg    MV V2 VTI 36.1 cm    MVA(VTI) 3.2 cm^2    MV P1/2t max joel 75.8 cm/sec    MV P1/2t 137.0 msec    MVA(P1/2t) 1.6 cm^2    MV dec slope 162.0 cm/sec^2    MV dec time 0.24 sec    Ao V2 mean 103.0 cm/sec    Ao mean PG 5.0 mmHg    Ao mean PG (full) 2.0 mmHg    Ao V2 VTI 40.6 cm    DAWIT(I,A) 2.8 cm^2    DAWIT(I,D) 2.8 cm^2    LV V1 mean PG 3.0 mmHg    LV V1 mean 74.2 cm/sec    LV V1 VTI 27.8 cm    SV(Ao) 460.5 ml    SI(Ao) 205.9 ml/m^2    SV(LVOT) 115.5 ml    SV(RVOT) 103.0 ml    SI(LVOT) 51.6 ml/m^2    PA V2 max 115.0 cm/sec    PA max PG 5.3 mmHg    PA acc slope 745.0 cm/sec^2    PA acc time 0.15 sec    RV V1 mean PG 1.0 mmHg    RV V1 mean 48.6 cm/sec    RV V1 VTI 19.4 cm    TR max joel 243.0 cm/sec    RVSP(TR) 26.6 mmHg    RAP systole 3.0 mmHg    PA pr(Accel) 12.4 mmHg    Pulm Sys Joel 58.3 cm/sec    Pulm Paredes Joel 54.2 cm/sec    Pulm S/D 1.1     Qp/Qs 0.89     Pulm A Revs Dur 0.18 sec    Pulm A Revs Joel 29.5 cm/sec    MVA P1/2T LCG 2.9 cm^2     CV ECHO MIGUELITO - BZI_BMI 28.9 kilograms/m^2     CV ECHO MIGUELITO - BSA(HAYCOCK) 2.3 m^2     CV ECHO MIGUELITO - BZI_METRIC_WEIGHT 99.3 kg     CV ECHO MIGUELITO - BZI_METRIC_HEIGHT 185.4 cm    Target HR (85%) 128 bpm    Max. Pred. HR (100%) 151 bpm     CV VAS BP RIGHT /74 mmHg    TDI S' 19.00 cm/sec    RV Base 4.20 cm    RV Length 8.10 cm    RV Mid 2.70 cm    LA volume 73.0 cm3    E/E' ratio 8.0     LA Volume Index 36.0 mL/m2    Ao pk joel 165.0 cm/sec    Lat Peak E' Joel 11.0 cm/sec    LV V1 max 114.0 cm/sec    Med Peak E' Joel 9.00 cm/sec     Ao max PG 11.0 mmHg    TAPSE (>1.6) 2.60 cm2    Echo EF Estimated 58 %   Antistreptolysin O Screen    Collection Time: 11/07/17  5:05 PM   Result Value Ref Range    ASO Negative Negative   Hepatitis Panel, Acute    Collection Time: 11/07/17  5:05 PM   Result Value Ref Range    Hepatitis B Surface Ag Non-Reactive Non-Reactive    Hep A IgM Non-Reactive Non-Reactive    Hep B C IgM Non-Reactive Non-Reactive    Hepatitis C Ab Non-Reactive Non-Reactive   CK    Collection Time: 11/07/17  5:05 PM   Result Value Ref Range    Creatine Kinase >75050 (H) 20 - 200 U/L   Comprehensive Metabolic Panel    Collection Time: 11/08/17  5:41 AM   Result Value Ref Range    Glucose 91 65 - 99 mg/dL    BUN 74 (H) 8 - 23 mg/dL    Creatinine 10.46 (H) 0.76 - 1.27 mg/dL    Sodium 141 136 - 145 mmol/L    Potassium 3.8 3.5 - 5.2 mmol/L    Chloride 104 98 - 107 mmol/L    CO2 17.9 (L) 22.0 - 29.0 mmol/L    Calcium 8.1 (L) 8.6 - 10.5 mg/dL    Total Protein 6.3 6.0 - 8.5 g/dL    Albumin 2.50 (L) 3.50 - 5.20 g/dL    ALT (SGPT) 170 (H) 1 - 41 U/L    AST (SGOT) 859 (H) 1 - 40 U/L    Alkaline Phosphatase 260 (H) 39 - 117 U/L    Total Bilirubin 1.9 (H) 0.1 - 1.2 mg/dL    eGFR Non African Amer 5 (L) >60 mL/min/1.73    Globulin 3.8 gm/dL    A/G Ratio 0.7 g/dL    BUN/Creatinine Ratio 7.1 7.0 - 25.0    Anion Gap 19.1 mmol/L   BNP    Collection Time: 11/08/17  5:41 AM   Result Value Ref Range    proBNP 2642.0 (H) 5.0 - 900.0 pg/mL   TSH    Collection Time: 11/08/17  5:41 AM   Result Value Ref Range    TSH 1.090 0.270 - 4.200 mIU/mL   Lipid Panel    Collection Time: 11/08/17  5:41 AM   Result Value Ref Range    Total Cholesterol 93 0 - 200 mg/dL    Triglycerides 179 (H) 0 - 150 mg/dL    HDL Cholesterol 9 (L) 40 - 60 mg/dL    LDL Cholesterol  48 0 - 100 mg/dL    VLDL Cholesterol 35.8 5 - 40 mg/dL    LDL/HDL Ratio 5.36    Hemoglobin A1c    Collection Time: 11/08/17  5:41 AM   Result Value Ref Range    Hemoglobin A1C 5.66 (H) 4.80 - 5.60 %   Protime-INR     Collection Time: 11/08/17  5:41 AM   Result Value Ref Range    Protime 14.9 (H) 11.7 - 14.2 Seconds    INR 1.22 (H) 0.90 - 1.10   Ferritin    Collection Time: 11/08/17  5:41 AM   Result Value Ref Range    Ferritin 771.40 (H) 30.00 - 400.00 ng/mL   Iron Profile    Collection Time: 11/08/17  5:41 AM   Result Value Ref Range    Iron 91 59 - 158 mcg/dL    Iron Saturation 44 20 - 50 %    Transferrin 140 (L) 200 - 360 mg/dL    TIBC 209 mcg/dL   CK    Collection Time: 11/08/17  5:41 AM   Result Value Ref Range    Creatine Kinase >30002 (H) 20 - 200 U/L   Uric Acid    Collection Time: 11/08/17  5:41 AM   Result Value Ref Range    Uric Acid 10.3 (H) 3.4 - 7.0 mg/dL   CBC Auto Differential    Collection Time: 11/08/17  5:41 AM   Result Value Ref Range    WBC 11.72 (H) 4.50 - 10.70 10*3/mm3    RBC 4.27 (L) 4.60 - 6.00 10*6/mm3    Hemoglobin 14.1 13.7 - 17.6 g/dL    Hematocrit 42.1 40.4 - 52.2 %    MCV 98.6 (H) 79.8 - 96.2 fL    MCH 33.0 (H) 27.0 - 32.7 pg    MCHC 33.5 32.6 - 36.4 g/dL    RDW 15.5 (H) 11.5 - 14.5 %    RDW-SD 55.5 (H) 37.0 - 54.0 fl    MPV 11.9 6.0 - 12.0 fL    Platelets 146 140 - 500 10*3/mm3    Neutrophil % 69.2 42.7 - 76.0 %    Lymphocyte % 13.2 (L) 19.6 - 45.3 %    Monocyte % 14.9 (H) 5.0 - 12.0 %    Eosinophil % 2.0 0.3 - 6.2 %    Basophil % 0.3 0.0 - 1.5 %    Immature Grans % 0.4 0.0 - 0.5 %    Neutrophils, Absolute 8.09 1.90 - 8.10 10*3/mm3    Lymphocytes, Absolute 1.55 0.90 - 4.80 10*3/mm3    Monocytes, Absolute 1.75 (H) 0.20 - 1.20 10*3/mm3    Eosinophils, Absolute 0.24 0.00 - 0.70 10*3/mm3    Basophils, Absolute 0.04 0.00 - 0.20 10*3/mm3    Immature Grans, Absolute 0.05 (H) 0.00 - 0.03 10*3/mm3   POC Glucose Fingerstick    Collection Time: 11/08/17  7:35 AM   Result Value Ref Range    Glucose 101 70 - 130 mg/dL   Duplex Portal Hepatic Complete CAR    Collection Time: 11/08/17  9:15 AM   Result Value Ref Range    SMA Aorta PSV 69 cm/s    SMA Hepatic  cm/s    SMA Hepatic EDV 25 cm/s    SMA Splenic   cm/s    SMA Splenic EDV 51 cm/s       Radiology:  Imaging Results (last 24 hours)     ** No results found for the last 24 hours. **             Medications have been reviewed:  Current Facility-Administered Medications   Medication Dose Route Frequency Provider Last Rate Last Dose   • bisoprolol (ZEBeta) tablet 2.5 mg  2.5 mg Oral Q24H Memo Varela MD   2.5 mg at 11/07/17 1642   • hydrocortisone sodium succinate (Solu-CORTEF) injection 250 mg  250 mg Intravenous Q6H Franky Moreau MD       • pantoprazole (PROTONIX) EC tablet 40 mg  40 mg Oral Q AM Memo Varela MD       • sodium chloride 0.9 % flush 10 mL  10 mL Intravenous PRN Adan Simmons MD           Assessment/Plan     Active Problems:    Acute kidney injury (nontraumatic)      Assessment:  (Acute renal failure.  No obvious etiology.  Very active urine sediment highly suspicious for glomerulonephritis, will need kidney biopsy today.  Renal failure worse again today  proteinuria, possibly nephrotic range  Nausea, likely some early uremic symptoms, but worse today  Hypokalemia, replaced  Elevated LFTs  Mild metabolic acidosis  Hypoalbuminemia  Hyperparathyroidism, likely secondary to renal failure  Mild leukocytosis   new rhabdomyolysis, ? From Crestor + dehydration combo, d/w Dr. Zabala        Plan:   Renal function continues to worsen, creatinine up to 10 today and seems to be a developing uremic symptoms  Renal biopsy scheduled for this morning, await glomerulonephritis serologies  Start empiric IV steroids for likely glomerulonephritis  Discontinue IV fluids   CT-guided kidney biopsy today  24 hour urine for protein and creatinine clearance in progress  Consult vascular for Shiley catheter  Dialysis to follow Shiley catheter placement  Serial CK levels, check urine myoglobin  Add po bicarb to help with urinary alkalinization with new rhabdo findings).             Continue to monitor renal function, electroytes and volume closely   Please call  me with any questions or concerns      Franky Moreau MD   [facility]   [phone]    11/08/17  9:17 AM      Dictation performed using Dragon dictation software

## 2017-11-08 NOTE — PROGRESS NOTES
"Daily progress note    Chief complaint  Doing same  No new complaints    History of present illness  67-year-old white male with history of atherosclerotic heart disease hypertension hyperlipidemia otherwise in good health and no history of kidney disease present it to [facility] emergency room for last 5-7 days weakness nausea not feeling well and decreased urine output.  Patient evaluated found to be in acute renal failure with low potassium admitted for management.  Patient denies any diarrhea fever cough chest pain shortness of breath.     REVIEW OF SYSTEMS  Review of Systems   Constitutional: Negative for activity change, appetite change and fever.   HENT: Negative for congestion and sore throat.    Eyes: Negative.    Respiratory: Negative for cough and shortness of breath.    Cardiovascular: Negative for chest pain and leg swelling.   Gastrointestinal: Negative for abdominal pain, diarrhea and vomiting.   Endocrine: Negative.    Genitourinary: Positive for frequency. Negative for decreased urine volume and dysuria.        Increased thirst   Musculoskeletal: Negative for neck pain.   Skin: Negative for rash and wound.   Allergic/Immunologic: Negative.    Neurological: Positive for weakness. Negative for numbness and headaches.   Hematological: Negative.    Psychiatric/Behavioral: Negative.    All other systems reviewed and are negative.     PHYSICAL EXAM  Blood pressure 127/69, pulse 57, temperature 98 °F (36.7 °C), temperature source Oral, resp. rate 18, height 72.99\" (185.4 cm), weight 218 lb 11.1 oz (99.2 kg), SpO2 98 %.    Constitutional: He is oriented to person, place, and time and well-developed, well-nourished, and in no distress.   Head: Normocephalic and atraumatic.   Mouth/Throat: Mucous membranes are dry.   Eyes: EOM are normal. Pupils are equal, round, and reactive to light.   Neck: Normal range of motion. Neck supple.   Cardiovascular: Regular rhythm and normal heart sounds.  Bradycardia " present.    No murmur heard.  Pulmonary/Chest: Effort normal and breath sounds normal. No respiratory distress.   Abdominal: Soft. He exhibits no distension. There is no tenderness. There is no rebound and no guarding.   Musculoskeletal: Normal range of motion. He exhibits no edema.   Neurological: He is alert and oriented to person, place, and time. He has normal sensation and normal strength.   No focal weakness or facial droop. Slow mentation.   Skin: Skin is warm and dry.   Turgor is normal, cap refill 1-2 seconds.     Psychiatric: Mood and affect normal.     LAB RESULTS  Lab Results (last 24 hours)     Procedure Component Value Units Date/Time    Hepatic Function Panel [[id number]]  (Abnormal) Collected:  11/07/17 0709    Specimen:  Blood Updated:  11/07/17 1346     Total Protein 6.2 g/dL      Albumin 2.80 (L) g/dL      ALT (SGPT) 149 (H) U/L      AST (SGOT) 829 (H) U/L      Alkaline Phosphatase 259 (H) U/L      Total Bilirubin 2.0 (H) mg/dL      Bilirubin, Direct 1.0 (H) mg/dL      Bilirubin, Indirect 1.0 mg/dL     Protein Elec + Interp, Serum [[id number]] Collected:  11/07/17 1353    Specimen:  Blood Updated:  11/07/17 1418    Protein Electrophoresis, Total [[id number]] Collected:  11/07/17 1705    Specimen:  Blood Updated:  11/07/17 1716    EBVCA(IgG / M) [[id number]] Collected:  11/07/17 1705    Specimen:  Blood Updated:  11/07/17 1716    Glomerular Basement Membrane Antibodies [[id number]] Collected:  11/07/17 1705    Specimen:  Blood Updated:  11/07/17 1716    SANJU With / DsDNA, RNP, Sjogrens A / B, [place] [[id number]] Collected:  11/07/17 1704    Specimen:  Blood Updated:  11/07/17 1717    C4 Complement [[id number]] Collected:  11/07/17 1704    Specimen:  Blood Updated:  11/07/17 1717    Celiac Comprehensive Panel [[id number]] Collected:  11/07/17 1704    Specimen:  Blood Updated:  11/07/17 1717    Hepatitis Panel, Acute [[id number]]  (Normal) Collected:  11/07/17 1705    Specimen:  Blood Updated:  11/07/17 180      Hepatitis B Surface Ag Non-Reactive     Hep A IgM Non-Reactive     Hep B C IgM Non-Reactive     Hepatitis C Ab Non-Reactive    CK [[id number]]  (Abnormal) Collected:  11/07/17 1705    Specimen:  Blood Updated:  11/07/17 1818     Creatine Kinase >89883 (H) U/L     Anti-Smooth Muscle Antibody Titer [[id number]] Collected:  11/08/17 0541    Specimen:  Blood Updated:  11/08/17 0645    Alpha - 1 - Antitrypsin [[id number]] Collected:  11/08/17 0541    Specimen:  Blood Updated:  11/08/17 0645    CBC & Differential [[id number]] Collected:  11/08/17 0541    Specimen:  Blood Updated:  11/08/17 0706    Narrative:       The following orders were created for panel order CBC & Differential.  Procedure                               Abnormality         Status                     ---------                               -----------         ------                     CBC Auto Differential[[id number]]        Abnormal            Final result                 Please view results for these tests on the individual orders.    CBC Auto Differential [[id number]]  (Abnormal) Collected:  11/08/17 0541    Specimen:  Blood Updated:  11/08/17 0706     WBC 11.72 (H) 10*3/mm3      RBC 4.27 (L) 10*6/mm3      Hemoglobin 14.1 g/dL      Hematocrit 42.1 %      MCV 98.6 (H) fL      MCH 33.0 (H) pg      MCHC 33.5 g/dL      RDW 15.5 (H) %      RDW-SD 55.5 (H) fl      MPV 11.9 fL      Platelets 146 10*3/mm3      Neutrophil % 69.2 %      Lymphocyte % 13.2 (L) %      Monocyte % 14.9 (H) %      Eosinophil % 2.0 %      Basophil % 0.3 %      Immature Grans % 0.4 %      Neutrophils, Absolute 8.09 10*3/mm3      Lymphocytes, Absolute 1.55 10*3/mm3      Monocytes, Absolute 1.75 (H) 10*3/mm3      Eosinophils, Absolute 0.24 10*3/mm3      Basophils, Absolute 0.04 10*3/mm3      Immature Grans, Absolute 0.05 (H) 10*3/mm3     Hemoglobin A1c [[id number]]  (Abnormal) Collected:  11/08/17 0541    Specimen:  Blood Updated:  11/08/17 0708     Hemoglobin A1C 5.66 (H) %     Narrative:        Hemoglobin A1C Ranges:    Increased Risk for Diabetes  5.7% to 6.4%  Diabetes                     >= 6.5%  Diabetic Goal                < 7.0%    Protime-INR [[id number]]  (Abnormal) Collected:  11/08/17 0541    Specimen:  Blood Updated:  11/08/17 0711     Protime 14.9 (H) Seconds      INR 1.22 (H)    C3 Complement [[id number]] Collected:  11/06/17 2313    Specimen:  Blood Updated:  11/08/17 0716     C3 Complement 154 mg/dL     Narrative:       Performed at:  [facility] [address]  : Todd Harper PhD, Phone:  [phone]    C4 Complement [[id number]] Collected:  11/06/17 2313    Specimen:  Blood Updated:  11/08/17 0716     C4 Complement 24 mg/dL     Narrative:       Performed at:  [address]  [id number]  : Todd Harper PhD, Phone:  [phone]    Lipid Panel [[id number]]  (Abnormal) Collected:  11/08/17 0541    Specimen:  Blood Updated:  11/08/17 0722     Total Cholesterol 93 mg/dL      Triglycerides 179 (H) mg/dL      HDL Cholesterol 9 (L) mg/dL      LDL Cholesterol  48 mg/dL      VLDL Cholesterol 35.8 mg/dL      LDL/HDL Ratio 5.36    Narrative:       Cholesterol Reference Ranges  (U.S. Department of Health and Human Services ATP III Classifications)    Desirable          <200 mg/dL  Borderline High    200-239 mg/dL  High Risk          >240 mg/dL      Triglyceride Reference Ranges  (U.S. Department of Health and Human Services ATP III Classifications)    Normal           <150 mg/dL  Borderline High  150-199 mg/dL  High             200-499 mg/dL  Very High        >500 mg/dL    HDL Reference Ranges  (U.S. Department of Health and Human Services ATP III Classifcations)    Low     <40 mg/dl (major risk factor for CHD)  High    >60 mg/dl ('negative' risk factor for CHD)        LDL Reference Ranges  (U.S. Department of Health and Human Services ATP III Classifcations)    Optimal          <100 mg/dL  Near Optimal      100-129 mg/dL  Borderline High  130-159 mg/dL  High             160-189 mg/dL  Very High        >189 mg/dL    Iron Profile [[id number]]  (Abnormal) Collected:  11/08/17 0541    Specimen:  Blood Updated:  11/08/17 0722     Iron 91 mcg/dL      Iron Saturation 44 %      Transferrin 140 (L) mg/dL      TIBC 209 mcg/dL     Uric Acid [[id number]]  (Abnormal) Collected:  11/08/17 0541    Specimen:  Blood Updated:  11/08/17 0722     Uric Acid 10.3 (H) mg/dL     BNP [[id number]]  (Abnormal) Collected:  11/08/17 0541    Specimen:  Blood Updated:  11/08/17 0733     proBNP 2642.0 (H) pg/mL     Narrative:       Among patients with dyspnea, NT-proBNP is highly sensitive for the detection of acute congestive heart failure. In addition NT-proBNP of <300 pg/ml effectively rules out acute congestive heart failure with 99% negative predictive value.    TSH [[id number]]  (Normal) Collected:  11/08/17 0541    Specimen:  Blood Updated:  11/08/17 0733     TSH 1.090 mIU/mL     Ferritin [[id number]]  (Abnormal) Collected:  11/08/17 0541    Specimen:  Blood Updated:  11/08/17 0733     Ferritin 771.40 (H) ng/mL     Comprehensive Metabolic Panel [[id number]]  (Abnormal) Collected:  11/08/17 0541    Specimen:  Blood Updated:  11/08/17 0736     Glucose 91 mg/dL      BUN 74 (H) mg/dL      Creatinine 10.46 (H) mg/dL      Sodium 141 mmol/L      Potassium 3.8 mmol/L      Chloride 104 mmol/L      CO2 17.9 (L) mmol/L      Calcium 8.1 (L) mg/dL      Total Protein 6.3 g/dL      Albumin 2.50 (L) g/dL      ALT (SGPT) 170 (H) U/L      AST (SGOT) 859 (H) U/L      Alkaline Phosphatase 260 (H) U/L      Total Bilirubin 1.9 (H) mg/dL      eGFR Non African Amer 5 (L) mL/min/1.73      Globulin 3.8 gm/dL      A/G Ratio 0.7 g/dL      BUN/Creatinine Ratio 7.1     Anion Gap 19.1 mmol/L     POC Glucose Fingerstick [[id number]]  (Normal) Collected:  11/08/17 0735    Specimen:  Blood Updated:  11/08/17 0737     Glucose 101 mg/dL     Narrative:       Meter: [id number]  : [id number] Louise PEREZ    Antistreptolysin O Screen [[id number]]  (Normal) Collected:  17 1705    Specimen:  Blood Updated:  17 0739     ASO Negative    CK [[id number]]  (Abnormal) Collected:  17 0541    Specimen:  Blood Updated:  17 0840     Creatine Kinase >27374 (H) U/L     Urine Culture - Urine, Urine, Clean Catch [[id number]]  (Normal) Collected:  17 1814    Specimen:  Urine from Urine, Clean Catch Updated:  17 0918     Urine Culture No growth    POC Glucose Fingerstick [[id number]]  (Normal) Collected:  17 1004    Specimen:  Blood Updated:  17 1006     Glucose 103 mg/dL     Narrative:       Meter: [id number] : [id number] Louise PEREZ    Antinuclear Antibody With Reflex Cascade [[id number]] Collected:  17 2313    Specimen:  Blood Updated:  17 1015     See below: Comment      Autoantibody                       Disease Association  ____________________________________________________________                          Condition                  Frequency  _____________________   ________________________   _________  Antinuclear Antibody,    SLE, mixed connective  Direct (SANJU-D)           tissue diseases  _____________________   ________________________   _________  dsDNA                    SLE                        40 - 60%  _____________________   ________________________   _________  Chromatin                Drug induced SLE                90%                           SLE                        48 - 97%  _____________________   ________________________   _________  [facility])                 SLE                        25 - 35%                           Sjogren's Syndrome         40 - 70%                            Lupus                 100%  _____________________   ________________________   _________  SSB ([place])                 SLE                             10%                           Sjogren's Syndrome               30%  _____________________   _______________________    _________  Sm (anti-Smith)          SLE                        15 - 30%  _____________________   _______________________    _________  RNP                      Mixed Connective Tissue                           Disease                         95%  (U1 nRNP,                SLE                        30 - 50%  anti-ribonucleoprotein)  Polymyositis and/or                           Dermatomyositis                 20%  _____________________   ________________________   _________  Scl-70 (antiDNA          Scleroderma (diffuse)      20 - 35%  topoisomerase)           Crest                           13%  _____________________   ________________________   _________  Luly-1                     Polymyositis and/or                           Dermatomyositis            20 - 40%  _____________________   ________________________   _________  Centromere B             Scleroderma - Crest                           variant                         80%  _____________________   ________________________   _________  Ribosomal P              SLE                        10 - 20%        SANJU Direct Negative    Narrative:       Performed at:  [address] - [address]  [id number]  : Todd Harper PhD, Phone:  [phone]    SANJU Comprehensive Panel [[id number]] Collected:  11/08/17 1041    Specimen:  Blood Updated:  11/08/17 1105    AFP Tumor Marker [[id number]] Collected:  11/08/17 1041    Specimen:  Blood Updated:  11/08/17 1105    Anti-neutrophilic Cytoplasmic Antibody [[id number]] Collected:  11/08/17 1041    Specimen:  Blood Updated:  11/08/17 1105    Ceruloplasmin [[id number]] Updated:  11/08/17 1203    Specimen:  Blood     C3 Complement [[id number]] Updated:  11/08/17 1203    Specimen:  Blood     Mitochondrial Antibodies, M2 [[id number]] Updated:  11/08/17 1203    Specimen:  Blood         Imaging Results (last 72 hours)     Procedure Component Value Units  Date/Time    US Gallbladder [[id number]] Collected:  11/06/17 1931     Updated:  11/06/17 1937    Narrative:       US GALLBLADDER-        INDICATIONS: Elevated liver function tests     TECHNIQUE: Ultrasound of the right upper quadrant     COMPARISON: None available     FINDINGS:     The gallbladder is nontender, with sludge but no shadowing stones  demonstrated. No gallbladder wall thickening or pericholecystic fluid.     No intrahepatic or extrahepatic biliary ductal dilatation is  demonstrated. The common biliary duct caliber is measured at 4.0 mm.     No liver lesion is identified. Diffusely, the echogenicity of the liver  is otherwise in the range of normal relative to that of the right  kidney.     The pancreas is mostly obscured. The right kidney, 10.8 cm, and the  pancreas otherwise appear unremarkable.                Impression:          Sludge in the gallbladder. No evidence for acute cholecystitis. No  discrete liver lesion or biliary ductal dilatation identified. With  persistent clinical indication, enhanced liver imaging could be  considered for further evaluation.     This report was finalized on 11/6/2017 7:33 PM by Dr. Gokul Real MD.       CT Abdomen Pelvis Without Contrast [[id number]] Collected:  11/06/17 2007     Updated:  11/06/17 2017    Narrative:       CT ABDOMEN AND PELVIS WITHOUT CONTRAST     HISTORY: Elevated liver function test. Gallbladder sludge.      TECHNIQUE: CT abdomen and pelvis without IV or oral contrast.     COMPARISON: Right upper quadrant ultrasound 11/06/2017.     FINDINGS: Lung bases appear clear and there is no pleural effusion.  Liver exhibits slight undelayed margins consistent with hepatic  cirrhosis. There Is no evidence for intra or extrahepatic biliary duct  dilatation. There is density within the gallbladder consistent with  sludge as demonstrated sonographically. Adrenal glands, pancreas,  spleen, kidneys appear within normal limits. There is no  hydronephrosis  or hydroureter. Urinary bladder is mostly decompressed. There is no  bowel dilatation or evidence for bowel obstruction. No ben enlargement  is evident within the abdomen or pelvis. Atherosclerotic calcifications  are present involving the abdominal aorta and the abdominal aorta  measures up to 2.4 cm diameter. Mural calcifications are present.       Impression:       1. Sludge within the gallbladder without other evidence for  cholecystitis. Mild undulating hepatic margins consistent with hepatic  cirrhosis.  2. Atherosclerotic disease. 2.4 cm infrarenal abdominal aorta.     Radiation dose reduction techniques were utilized, including automated  exposure control and exposure modulation based on body size.     This report was finalized on 11/6/2017 8:14 PM by Dr. Marcos Sainz MD.       XR Chest 2 View [[id number]] Collected:  11/06/17 2041     Updated:  11/06/17 2046    Narrative:       XR CHEST 2 VW-     HISTORY: Male who is 67 years-old,  acute renal failure     TECHNIQUE: Frontal and lateral views of the chest     COMPARISON: None available     FINDINGS: Heart, mediastinum and pulmonary vasculature are unremarkable.  No focal pulmonary consolidation, pleural effusion, or pneumothorax. Old  granulomatous disease is apparent. No acute osseous process.       Impression:       No evidence for acute pulmonary process. Follow-up as  clinical indications persist.     This report was finalized on 11/6/2017 8:43 PM by Dr. Gokul Real MD.       US Renal Bilateral [[id number]] Collected:  11/07/17 0145     Updated:  11/07/17 0145    Narrative:       ULTRASOUND RENAL BILATERAL.     TECHNIQUE: Grayscale and color images of the kidneys were obtained.     HISTORY: Acute kidney injury.     COMPARISON: No prior studies for comparison.     FINDINGS:  Right kidney measures 11.6 x 4.8 x 5.4 cm. Left kidney measures 12.5 x  5.9 x 5.7 cm. There is no hydronephrosis, stone or mass.     Urinary bladder could  not be well visualized. No calculus, sludge or  mass.       Impression:       No acute findings involving both kidneys.             EKG                                                  Rhythm/Rate: Sinus bradycardia 48  P waves and VT: Normal  QRS, axis: IVCD   ST and T waves: Biphasic T-wave and Inverted T-waves inferiorly        Current Facility-Administered Medications:   •  bisoprolol (ZEBeta) tablet 2.5 mg, 2.5 mg, Oral, Q24H, Keith Bejarano MD, 2.5 mg at 11/07/17 1642  •  heparin (porcine) 5000 UNIT/ML injection 3,000 Units, 3,000 Units, Intravenous, Once, Kishan Murray MD  •  hydrocortisone sodium succinate (Solu-CORTEF) 250 mg in sodium chloride 0.9 % 50 mL IVPB, 250 mg, Intravenous, Q6H, Franky Moreau MD  •  pantoprazole (PROTONIX) EC tablet 40 mg, 40 mg, Oral, Q AM, Keith Bejarano MD  •  sodium bicarbonate tablet 1,300 mg, 1,300 mg, Oral, TID, Franky Moreau MD  •  sodium chloride 0.9 % flush 1-10 mL, 1-10 mL, Intravenous, PRN, Keith Bejarano MD  •  Insert peripheral IV, , , Once **AND** sodium chloride 0.9 % flush 10 mL, 10 mL, Intravenous, PRN, Adan Simmons MD     ASSESSMENT  Acute renal failure Needing hemodialysis  Elevated liver enzymes Questionable etiology  Hypertension  Hyperlipidemia  Atherosclerotic heart disease    PLAN  CPM  IV fluid  Renal biopsy today  Hemodialysis after hemodialysis catheter placement  OFF Crestor  OFF hydrochlorothiazide  Stress ulcer DVT prophylaxis  Follow closely further recommendation according to hospital course    KEITH BEJARANO MD     .

## 2017-11-08 NOTE — PLAN OF CARE
Problem: Patient Care Overview (Adult)  Goal: Plan of Care Review  Outcome: Outcome(s) achieved Date Met:  11/08/17 11/08/17 0610   Coping/Psychosocial Response Interventions   Plan Of Care Reviewed With patient   Patient Care Overview   Progress improving   Outcome Evaluation   Outcome Summary/Follow up Plan VSS. NPO for procedures in am. 24 hour urine completed. No c/o pain. Will continue to monitor.       Goal: Adult Individualization and Mutuality  Outcome: Ongoing (interventions implemented as appropriate)  Goal: Discharge Needs Assessment  Outcome: Ongoing (interventions implemented as appropriate)    Problem: Fall Risk (Adult)  Goal: Identify Related Risk Factors and Signs and Symptoms  Outcome: Outcome(s) achieved Date Met:  11/08/17  Goal: Absence of Falls  Outcome: Ongoing (interventions implemented as appropriate)    Problem: Renal Failure/Kidney Injury, Acute (Adult)  Goal: Signs and Symptoms of Listed Potential Problems Will be Absent or Manageable (Renal Failure/Kidney Injury, Acute)  Outcome: Ongoing (interventions implemented as appropriate)    Problem: Fluid Volume Deficit (Adult)  Goal: Identify Related Risk Factors and Signs and Symptoms  Outcome: Ongoing (interventions implemented as appropriate)  Goal: Fluid/Electrolyte Balance  Outcome: Ongoing (interventions implemented as appropriate)  Goal: Comfort/Well Being  Outcome: Ongoing (interventions implemented as appropriate)

## 2017-11-08 NOTE — PROCEDURES
Preoperative diagnosis: Acute renal failure    Postoperative diagnosis: Same.    Procedure performed: Ultrasound-guided access right internal jugular vein with 16 cm large bore Mahurkar dialysis catheter placement    Surgeon: Dr. Kishan Murray    Indications: Pleasant 67-year-old gentleman with acute renal failure associated with elevated liver functions and cirrhosis by CT scan.  We have been asked to see for urgent dialysis catheter placement to initiate dialysis today.  Plans and risks of procedure discussed with patient and nurse present and agrees to proceed.    Procedure note: Right neck prepped and draped in usual fashion.  1% Xylocaine was used for local anesthesia.  Ultrasound-guided access right internal jugular vein performed without difficulty.  Wire advanced without problems.  Dilator placed and removed over wires.  Mahurkar dialysis catheter placed without difficulty and wire was removed.  All 3 ports flushed easily with saline.  The 2 large bore ports were each flushed with 2500 units of heparin.  The central small pigtail port was flushed with saline only.  All 3 ports were capped.  The catheter was secured to the skin with nylon suture.  Biopatch and central line dressing applied.  Stat portable chest x-ray ordered.  Patient tolerated procedure well without problems.

## 2017-11-09 LAB
A1AT SERPL-MCNC: 200 MG/DL (ref 90–200)
ACTIN IGG SERPL-ACNC: 33 UNITS (ref 0–19)
AFP-TM SERPL-MCNC: 9.4 NG/ML (ref 0–8.3)
ALBUMIN SERPL-MCNC: 2.6 G/DL (ref 2.9–4.4)
ALBUMIN SERPL-MCNC: 2.7 G/DL (ref 3.5–5.2)
ALBUMIN/GLOB SERPL: 0.7 G/DL
ALBUMIN/GLOB SERPL: 0.7 {RATIO} (ref 0.7–1.7)
ALP SERPL-CCNC: 265 U/L (ref 39–117)
ALPHA1 GLOB FLD ELPH-MCNC: 0.4 G/DL (ref 0–0.4)
ALPHA2 GLOB SERPL ELPH-MCNC: 0.6 G/DL (ref 0.4–1)
ALT SERPL W P-5'-P-CCNC: 192 U/L (ref 1–41)
ANION GAP SERPL CALCULATED.3IONS-SCNC: 18.2 MMOL/L
AST SERPL-CCNC: 750 U/L (ref 1–40)
B-GLOBULIN SERPL ELPH-MCNC: 1 G/DL (ref 0.7–1.3)
BASOPHILS # BLD AUTO: 0.01 10*3/MM3 (ref 0–0.2)
BASOPHILS NFR BLD AUTO: 0.1 % (ref 0–1.5)
BILIRUB SERPL-MCNC: 1.9 MG/DL (ref 0.1–1.2)
BUN BLD-MCNC: 78 MG/DL (ref 8–23)
BUN/CREAT SERPL: 8.6 (ref 7–25)
C-ANCA TITR SER IF: ABNORMAL TITER
C3 SERPL-MCNC: 130 MG/DL (ref 82–167)
CALCIUM SPEC-SCNC: 8.5 MG/DL (ref 8.6–10.5)
CENTROMERE B AB SER-ACNC: <0.2 AI (ref 0–0.9)
CERULOPLASMIN SERPL-MCNC: 24.6 MG/DL (ref 16–31)
CHLORIDE SERPL-SCNC: 100 MMOL/L (ref 98–107)
CHROMATIN AB SERPL-ACNC: <0.2 AI (ref 0–0.9)
CK SERPL-CCNC: ABNORMAL U/L (ref 20–200)
CO2 SERPL-SCNC: 19.8 MMOL/L (ref 22–29)
CREAT BLD-MCNC: 9.08 MG/DL (ref 0.76–1.27)
DEPRECATED MITOCHONDRIA M2 IGG SER-ACNC: <20 UNITS (ref 0–20)
DEPRECATED RDW RBC AUTO: 52.5 FL (ref 37–54)
DSDNA AB SER-ACNC: 1 IU/ML (ref 0–9)
EBV VCA IGG SER-ACNC: 277 U/ML (ref 0–17.9)
EBV VCA IGM SER-ACNC: <36 U/ML (ref 0–35.9)
ENA JO1 AB SER-ACNC: <0.2 AI (ref 0–0.9)
ENA RNP AB SER-ACNC: 0.2 AI (ref 0–0.9)
ENA SCL70 AB SER-ACNC: <0.2 AI (ref 0–0.9)
ENA SM AB SER-ACNC: <0.2 AI (ref 0–0.9)
ENA SS-A AB SER-ACNC: <0.2 AI (ref 0–0.9)
ENA SS-B AB SER-ACNC: <0.2 AI (ref 0–0.9)
ENDOMYSIUM IGA SER QL: NEGATIVE
EOSINOPHIL # BLD AUTO: 0 10*3/MM3 (ref 0–0.7)
EOSINOPHIL NFR BLD AUTO: 0 % (ref 0.3–6.2)
ERYTHROCYTE [DISTWIDTH] IN BLOOD BY AUTOMATED COUNT: 14.8 % (ref 11.5–14.5)
GAMMA GLOB SERPL ELPH-MCNC: 1.6 G/DL (ref 0.4–1.8)
GFR SERPL CREATININE-BSD FRML MDRD: 6 ML/MIN/1.73
GLIADIN PEPTIDE IGA SER-ACNC: 4 UNITS (ref 0–19)
GLIADIN PEPTIDE IGG SER-ACNC: 4 UNITS (ref 0–19)
GLOBULIN SER CALC-MCNC: 3.5 G/DL (ref 2.2–3.9)
GLOBULIN UR ELPH-MCNC: 4 GM/DL
GLUCOSE BLD-MCNC: 174 MG/DL (ref 65–99)
GLUCOSE BLDC GLUCOMTR-MCNC: 132 MG/DL (ref 70–130)
GLUCOSE BLDC GLUCOMTR-MCNC: 154 MG/DL (ref 70–130)
GLUCOSE BLDC GLUCOMTR-MCNC: 193 MG/DL (ref 70–130)
GLUCOSE BLDC GLUCOMTR-MCNC: 213 MG/DL (ref 70–130)
HCT VFR BLD AUTO: 42.5 % (ref 40.4–52.2)
HGB BLD-MCNC: 14.7 G/DL (ref 13.7–17.6)
IGA SERPL-MCNC: 237 MG/DL (ref 61–437)
IMM GRANULOCYTES # BLD: 0.04 10*3/MM3 (ref 0–0.03)
IMM GRANULOCYTES NFR BLD: 0.2 % (ref 0–0.5)
LYMPHOCYTES # BLD AUTO: 1.02 10*3/MM3 (ref 0.9–4.8)
LYMPHOCYTES NFR BLD AUTO: 6.1 % (ref 19.6–45.3)
Lab: ABNORMAL
Lab: NORMAL
M-SPIKE: ABNORMAL G/DL
MCH RBC QN AUTO: 33.7 PG (ref 27–32.7)
MCHC RBC AUTO-ENTMCNC: 34.6 G/DL (ref 32.6–36.4)
MCV RBC AUTO: 97.5 FL (ref 79.8–96.2)
MONOCYTES # BLD AUTO: 0.71 10*3/MM3 (ref 0.2–1.2)
MONOCYTES NFR BLD AUTO: 4.2 % (ref 5–12)
MYELOPEROXIDASE AB SER-ACNC: <9 U/ML (ref 0–9)
NEUTROPHILS # BLD AUTO: 15 10*3/MM3 (ref 1.9–8.1)
NEUTROPHILS NFR BLD AUTO: 89.4 % (ref 42.7–76)
P-ANCA ATYPICAL TITR SER IF: ABNORMAL TITER
P-ANCA TITR SER IF: ABNORMAL TITER
PLATELET # BLD AUTO: 157 10*3/MM3 (ref 140–500)
PMV BLD AUTO: 11.7 FL (ref 6–12)
POTASSIUM BLD-SCNC: 4.3 MMOL/L (ref 3.5–5.2)
PROT SERPL-MCNC: 6.1 G/DL (ref 6–8.5)
PROT SERPL-MCNC: 6.7 G/DL (ref 6–8.5)
PROTEINASE3 AB SER IA-ACNC: <3.5 U/ML (ref 0–3.5)
RBC # BLD AUTO: 4.36 10*6/MM3 (ref 4.6–6)
SODIUM BLD-SCNC: 138 MMOL/L (ref 136–145)
TTG IGA SER-ACNC: <2 U/ML (ref 0–3)
TTG IGG SER-ACNC: 2 U/ML (ref 0–5)
WBC NRBC COR # BLD: 16.78 10*3/MM3 (ref 4.5–10.7)

## 2017-11-09 PROCEDURE — 80053 COMPREHEN METABOLIC PANEL: CPT | Performed by: HOSPITALIST

## 2017-11-09 PROCEDURE — 82962 GLUCOSE BLOOD TEST: CPT

## 2017-11-09 PROCEDURE — 99232 SBSQ HOSP IP/OBS MODERATE 35: CPT | Performed by: INTERNAL MEDICINE

## 2017-11-09 PROCEDURE — 82550 ASSAY OF CK (CPK): CPT | Performed by: INTERNAL MEDICINE

## 2017-11-09 PROCEDURE — 85025 COMPLETE CBC W/AUTO DIFF WBC: CPT | Performed by: HOSPITALIST

## 2017-11-09 PROCEDURE — 25010000003 HYDROCORTISONE SOD SUCCINATE PF 250 MG RECONSTITUTED SOLUTION 1 EACH VIAL: Performed by: INTERNAL MEDICINE

## 2017-11-09 PROCEDURE — 63710000001 INSULIN ASPART PER 5 UNITS: Performed by: HOSPITALIST

## 2017-11-09 PROCEDURE — 25010000002 HEPARIN (PORCINE) PER 1000 UNITS: Performed by: INTERNAL MEDICINE

## 2017-11-09 RX ORDER — HEPARIN SODIUM 1000 [USP'U]/ML
3000 INJECTION, SOLUTION INTRAVENOUS; SUBCUTANEOUS ONCE
Status: COMPLETED | OUTPATIENT
Start: 2017-11-09 | End: 2017-11-09

## 2017-11-09 RX ORDER — HEPARIN SODIUM 1000 [USP'U]/ML
3000 INJECTION, SOLUTION INTRAVENOUS; SUBCUTANEOUS ONCE
Status: DISCONTINUED | OUTPATIENT
Start: 2017-11-09 | End: 2017-11-10

## 2017-11-09 RX ADMIN — INSULIN ASPART 3 UNITS: 100 INJECTION, SOLUTION INTRAVENOUS; SUBCUTANEOUS at 20:58

## 2017-11-09 RX ADMIN — SODIUM CHLORIDE 250 MG: 9 INJECTION, SOLUTION INTRAVENOUS at 04:18

## 2017-11-09 RX ADMIN — PANTOPRAZOLE SODIUM 40 MG: 40 TABLET, DELAYED RELEASE ORAL at 06:51

## 2017-11-09 RX ADMIN — SODIUM CHLORIDE 250 MG: 9 INJECTION, SOLUTION INTRAVENOUS at 09:40

## 2017-11-09 RX ADMIN — SODIUM BICARBONATE 1300 MG: 650 TABLET ORAL at 08:09

## 2017-11-09 RX ADMIN — SODIUM BICARBONATE 1300 MG: 650 TABLET ORAL at 20:58

## 2017-11-09 RX ADMIN — HEPARIN SODIUM 3000 UNITS: 1000 INJECTION, SOLUTION INTRAVENOUS; SUBCUTANEOUS at 18:00

## 2017-11-09 NOTE — PLAN OF CARE
Problem: Patient Care Overview (Adult)  Goal: Plan of Care Review  Outcome: Ongoing (interventions implemented as appropriate)    11/09/17 1806   Coping/Psychosocial Response Interventions   Plan Of Care Reviewed With patient   Patient Care Overview   Progress improving   Outcome Evaluation   Outcome Summary/Follow up Plan No falls. In HD at this time. Shiley with pigtail intact to RIJ. No c/o pain. BUN and CR only slightly improved since yesterday. Cont to monitor.       Goal: Adult Individualization and Mutuality  Outcome: Ongoing (interventions implemented as appropriate)  Goal: Discharge Needs Assessment  Outcome: Ongoing (interventions implemented as appropriate)    Problem: Fall Risk (Adult)  Goal: Absence of Falls  Outcome: Ongoing (interventions implemented as appropriate)    Problem: Renal Failure/Kidney Injury, Acute (Adult)  Goal: Signs and Symptoms of Listed Potential Problems Will be Absent or Manageable (Renal Failure/Kidney Injury, Acute)  Outcome: Ongoing (interventions implemented as appropriate)    Problem: Fluid Volume Deficit (Adult)  Goal: Fluid/Electrolyte Balance  Outcome: Ongoing (interventions implemented as appropriate)  Goal: Comfort/Well Being  Outcome: Ongoing (interventions implemented as appropriate)

## 2017-11-09 NOTE — PROGRESS NOTES
Continued Stay Note  [facility]     Patient Name: Hu Burgess  MRN: [id number]  Today's Date: 11/9/2017    Admit Date: 11/6/2017          Discharge Plan       11/09/17 1210    Case Management/Social Work Plan    Plan Home with spouse     Additional Comments Spoke with pt at bedside, pt had a shiley cath placed yesterday and his first dialysis yesterday 11/8/2017. Pt stated he is unsure at this time as to whether or not he will need dialysis after dc. CCP to follow.               Discharge Codes     None            Madhuri Batres RN

## 2017-11-09 NOTE — PLAN OF CARE
Problem: Patient Care Overview (Adult)  Goal: Plan of Care Review  Outcome: Ongoing (interventions implemented as appropriate)    11/09/17 0431   Coping/Psychosocial Response Interventions   Plan Of Care Reviewed With patient   Patient Care Overview   Progress improving   Outcome Evaluation   Outcome Summary/Follow up Plan Has slept long intervals. Denies pain. Michelle 1st dialysis tonight w/o diff. VSS. No changes. No distress       Goal: Adult Individualization and Mutuality  Outcome: Ongoing (interventions implemented as appropriate)  Goal: Discharge Needs Assessment  Outcome: Ongoing (interventions implemented as appropriate)    Problem: Fall Risk (Adult)  Goal: Absence of Falls  Outcome: Ongoing (interventions implemented as appropriate)    Problem: Renal Failure/Kidney Injury, Acute (Adult)  Goal: Signs and Symptoms of Listed Potential Problems Will be Absent or Manageable (Renal Failure/Kidney Injury, Acute)  Outcome: Ongoing (interventions implemented as appropriate)    Problem: Fluid Volume Deficit (Adult)  Goal: Fluid/Electrolyte Balance  Outcome: Ongoing (interventions implemented as appropriate)  Goal: Comfort/Well Being  Outcome: Ongoing (interventions implemented as appropriate)

## 2017-11-09 NOTE — PROGRESS NOTES
BGA/GI Progress Note   Chief Complaint:  Elevated LFT's    Subjective     Interval History: No abd pain, no n/v, eating well.  BM's daily, brown in color.  Reports urine very dark this am.  Dialysis cath placed yesterday and had first dialysis session.  Kidney bx yesterday.    History taken from: patient chart    Review of Systems:    The following systems were reviewed and negative;  gastrointestinal    Objective     Vital Signs  Temp:  [97.3 °F (36.3 °C)-98.4 °F (36.9 °C)] 97.3 °F (36.3 °C)  Heart Rate:  [50-62] 58  Resp:  [16-20] 20  BP: (116-157)/(67-89) 154/74  Body mass index is 30.95 kg/(m^2).    Intake/Output Summary (Last 24 hours) at 11/09/17 0859  Last data filed at 11/09/17 0834   Gross per 24 hour   Intake              210 ml   Output               50 ml   Net              160 ml     I/O this shift:  In: 210 [P.O.:210]  Out: -     Physical Exam:   General: patient awake, alert and cooperative.  Sitting up in bed, drinking coffee and reading the paper   Eyes: Normal lids and lashes, mild scleral icterus, no conjunctival pallor   Neck: supple, normal ROM, no tracheal deviation, dialysis cath to right jugular region   Skin: warm and dry, not jaundiced   Cardiovascular: regular rhythm and rate, no murmurs auscultated   Pulm: clear to auscultation bilaterally, regular and unlabored   Abdomen: soft, nontender, nondistended; normal bowel sounds   Rectal: deferred   Extremities: no rash or edema   Neurologic: Normal mood and behavior    All Medications Have Been Reviewed     Results Review:       Results from last 7 days  Lab Units 11/09/17  0836 11/08/17  0541 11/07/17  0709   WBC 10*3/mm3 16.78* 11.72* 12.00*   HEMOGLOBIN g/dL 14.7 14.1 14.1   HEMATOCRIT % 42.5 42.1 40.6   PLATELETS 10*3/mm3 157 146 140         Results from last 7 days  Lab Units 11/08/17  0541 11/07/17  0709 11/06/17  1722   SODIUM mmol/L 141 141 139   POTASSIUM mmol/L 3.8 3.1* 2.8*   CHLORIDE mmol/L 104 101 96*   CO2 mmol/L 17.9*  21.2* 22.9   BUN mg/dL 74* 57* 51*   CREATININE mg/dL 10.46* 7.63* 7.39*   CALCIUM mg/dL 8.1* 8.0* 8.9   BILIRUBIN mg/dL 1.9* 2.0* 2.5*   ALK PHOS U/L 260* 259* 317*   ALT (SGPT) U/L 170* 149* 152*   AST (SGOT) U/L 859* 829* 689*   GLUCOSE mg/dL 91 106* 114*         Results from last 7 days  Lab Units 11/08/17  0541   INR  1.22*       RADIOLOGY:    Imaging Results (last 72 hours)     Procedure Component Value Units Date/Time    US Gallbladder [[id number]] Collected:  11/06/17 1931     Updated:  11/06/17 1937    Narrative:       US GALLBLADDER-        INDICATIONS: Elevated liver function tests     TECHNIQUE: Ultrasound of the right upper quadrant     COMPARISON: None available     FINDINGS:     The gallbladder is nontender, with sludge but no shadowing stones  demonstrated. No gallbladder wall thickening or pericholecystic fluid.     No intrahepatic or extrahepatic biliary ductal dilatation is  demonstrated. The common biliary duct caliber is measured at 4.0 mm.     No liver lesion is identified. Diffusely, the echogenicity of the liver  is otherwise in the range of normal relative to that of the right  kidney.     The pancreas is mostly obscured. The right kidney, 10.8 cm, and the  pancreas otherwise appear unremarkable.                Impression:          Sludge in the gallbladder. No evidence for acute cholecystitis. No  discrete liver lesion or biliary ductal dilatation identified. With  persistent clinical indication, enhanced liver imaging could be  considered for further evaluation.     This report was finalized on 11/6/2017 7:33 PM by Dr. Gokul Real MD.       CT Abdomen Pelvis Without Contrast [[id number]] Collected:  11/06/17 2007     Updated:  11/06/17 2017    Narrative:       CT ABDOMEN AND PELVIS WITHOUT CONTRAST     HISTORY: Elevated liver function test. Gallbladder sludge.      TECHNIQUE: CT abdomen and pelvis without IV or oral contrast.     COMPARISON: Right upper quadrant ultrasound  11/06/2017.     FINDINGS: Lung bases appear clear and there is no pleural effusion.  Liver exhibits slight undelayed margins consistent with hepatic  cirrhosis. There Is no evidence for intra or extrahepatic biliary duct  dilatation. There is density within the gallbladder consistent with  sludge as demonstrated sonographically. Adrenal glands, pancreas,  spleen, kidneys appear within normal limits. There is no hydronephrosis  or hydroureter. Urinary bladder is mostly decompressed. There is no  bowel dilatation or evidence for bowel obstruction. No ben enlargement  is evident within the abdomen or pelvis. Atherosclerotic calcifications  are present involving the abdominal aorta and the abdominal aorta  measures up to 2.4 cm diameter. Mural calcifications are present.       Impression:       1. Sludge within the gallbladder without other evidence for  cholecystitis. Mild undulating hepatic margins consistent with hepatic  cirrhosis.  2. Atherosclerotic disease. 2.4 cm infrarenal abdominal aorta.     Radiation dose reduction techniques were utilized, including automated  exposure control and exposure modulation based on body size.     This report was finalized on 11/6/2017 8:14 PM by Dr. Marcos Sainz MD.       XR Chest 2 View [[id number]] Collected:  11/06/17 2041     Updated:  11/06/17 2046    Narrative:       XR CHEST 2 VW-     HISTORY: Male who is 67 years-old,  acute renal failure     TECHNIQUE: Frontal and lateral views of the chest     COMPARISON: None available     FINDINGS: Heart, mediastinum and pulmonary vasculature are unremarkable.  No focal pulmonary consolidation, pleural effusion, or pneumothorax. Old  granulomatous disease is apparent. No acute osseous process.       Impression:       No evidence for acute pulmonary process. Follow-up as  clinical indications persist.     This report was finalized on 11/6/2017 8:43 PM by Dr. Gokul Real MD.       US Renal Bilateral [[id number]] Collected:   11/07/17 0145     Updated:  11/07/17 0145    Narrative:       ULTRASOUND RENAL BILATERAL.     TECHNIQUE: Grayscale and color images of the kidneys were obtained.     HISTORY: Acute kidney injury.     COMPARISON: No prior studies for comparison.     FINDINGS:  Right kidney measures 11.6 x 4.8 x 5.4 cm. Left kidney measures 12.5 x  5.9 x 5.7 cm. There is no hydronephrosis, stone or mass.     Urinary bladder could not be well visualized. No calculus, sludge or  mass.       Impression:       No acute findings involving both kidneys.        CT Kidney Biopsy [[id number]] Collected:  11/08/17 1609     Updated:  11/08/17 1615    Narrative:       CT-GUIDED BIOPSY OF RIGHT KIDNEY FOR MEDICAL RENAL DISEASE and  POSTPROCEDURE PERCUTANEOUS NEEDLE TRACT EMBOLIZATION     HISTORY: Male who is 67 years-old, with a history of acute nontraumatic  kidney injury, hypertension and atherosclerotic vascular disease.     The procedure, including but not limited to the risk of hemorrhage,  infection, diagnostic failure was discussed with the patient prior to  examination and witnessed, informed consent was obtained.     PROCEDURE: Multiple axial images were obtained for localization  purposes.      Radiation dose reduction techniques were utilized, including automated  exposure control and exposure modulation based on body size.     Skin marker was placed.     Patient was prepped and draped in the usual fashion. Following adequate  local anesthesia with 1% lidocaine, dermatotomy was placed and needle  placement performed. Serial 18-gauge core biopsies were obtained and  placed in formalin for analysis.     Embolization was performed with a slurry of Gelfoam which was advanced  percutaneously through guiding needle during retraction to aid in  hemostasis.     Needle was removed and follow-up imaging demonstrates no complicating  feature.       Impression:       1. Satisfactory CT-guided biopsy of right kidney as described above.  2.  Satisfactory postprocedure percutaneous embolization for hemostasis.     This report was finalized on 11/8/2017 4:11 PM by Dr. Good Caba MD.       XR Chest 1 View [[id number]] Collected:  11/08/17 1626     Updated:  11/08/17 1633    Narrative:       XR CHEST 1 VW-     HISTORY: Male who is 67 years-old,  line placement     TECHNIQUE: Frontal view of the chest     COMPARISON: 11/06/2017     FINDINGS: Right IJ catheter extends to the superior vena cava. Heart  size is borderline. Slight prominence of vascular and interstitial  markings . No focal pulmonary consolidation, pleural effusion, or  pneumothorax. No acute osseous process.       Impression:       Right IJ catheter extends to the superior vena cava. No  pneumothorax.     This report was finalized on 11/8/2017 4:30 PM by Dr. Gokul Real MD.             Assessment/Plan     Patient Active Problem List   Diagnosis Code   • Arteriosclerotic vascular disease I70.90   • Hyperlipidemia E78.5   • Hypertension I10   • Acute kidney injury (nontraumatic) N17.9     Dia Montana, APRN  11/09/17  8:59 AM    Feeling better.  Tolerating diet.  No leg pain though still with heaviness.  Normal BMs.  No RUQ pain.    Exam:  Genl: alert, no distress, appears stated age  HEENT: normal external eyes, ears, and nose, no icterus  Resp: normal effort, no wheezing, on room air  Cards: regular rate and rhythm, no murmurs, no LE edema  Abd: soft, non distended, non tender to palpation      Impression  1. Elevated transaminases: given CK >12339, most likely elevation is not from liver origin but from muscle origin  2. Abnormal CT imaging: suggestive of cirrhosis though his RUQ US showed a normal liver  3. Rhabdomyolysis: leading to liver failure.  The primary question to be determined is what caused this  4. STEVEN: dialysis with renal, due to rhabdo    Plan  -follow up liver serologies  -would consider further evaluation for the question of cirrhosis until after his rhabdo  resolves  -anticipate improvement in transaminases with treatment of rhabdo  -avoid crestor    Brunilda Celestin MD  [facility]

## 2017-11-09 NOTE — PROGRESS NOTES
"   LOS: 3 days   Patient Care Team:  Aldo Guajardo MD as PCP - General  Aldo Guajardo MD as PCP - Family Medicine    Chief Complaint/ Reason for encounter: Acute renal failure, dialysis management        Subjective     Weakness - Generalized   Associated symptoms include weakness. Pertinent negatives include no chest pain, fever or nausea.   Abnormal Lab   Associated symptoms include weakness. Pertinent negatives include no chest pain, fever or nausea.       Subjective:  Symptoms:  Stable.  He reports weakness.  No shortness of breath or chest pain.  (Still complains of weakness, leg weakness and soreness  Patient was seen on hemodialysis  Treatment orders discussed with the dialysis RNThuy  Blood pressure: 119/70, pulse 59  Blood flow rate/Dialysate flow rate: 200/400  Potassium bath/Fluid removal goal:  3K/no UF   Arterial pressure/ Venous pressure: 200/400).    Diet:  Poor intake.  No nausea.    Activity level: Impaired due to weakness.    Pain:  He reports no pain.          History taken from: Patient and chart    Objective     Vital Signs  Temp:  [96.8 °F (36 °C)-98.4 °F (36.9 °C)] 97.8 °F (36.6 °C)  Heart Rate:  [52-62] 55  Resp:  [16-20] 18  BP: (122-157)/(65-77) 123/71       Wt Readings from Last 1 Encounters:   11/09/17 0413 234 lb 8 oz (106 kg)   11/07/17 1331 218 lb 11.1 oz (99.2 kg)   11/06/17 2118 218 lb 12.8 oz (99.2 kg)   11/06/17 1643 215 lb (97.5 kg)       Objective:  General Appearance:  Comfortable, ill-appearing, in no acute distress and not in pain.    Vital signs: (most recent): Blood pressure 123/71, pulse 55, temperature 97.8 °F (36.6 °C), resp. rate 18, height 72.99\" (185.4 cm), weight 234 lb 8 oz (106 kg), SpO2 91 %.  Vital signs are normal.  No fever.    Output: Minimal urine output.    HEENT: Normal HEENT exam.    Lungs:  Normal respiratory rate and normal effort.  He is not in respiratory distress.  Breath sounds clear to auscultation.  No wheezes or decreased breath " sounds.    Heart: Normal rate.  Regular rhythm.  S1 normal.  No murmur.   Abdomen: Abdomen is soft and non-distended.  Bowel sounds are normal.   There is no epigastric area or no suprapubic area tenderness.  There is no rebound tenderness.     Extremities: Normal range of motion.  There is no deformity or dependent edema.    Pulses: Distal pulses are intact.    Neurological: Patient is alert and oriented to person, place and time.    Skin:  Warm and dry.  No rash or cyanosis.             Results Review:    Past Medical History: reviewed and updated  Past Medical History:   Diagnosis Date   • Arteriosclerotic cardiovascular disease    • History of transfusion    • Hyperlipidemia    • Hypertension          Allergies:  No Known Allergies    Intake/Output:     Intake/Output Summary (Last 24 hours) at 11/09/17 1603  Last data filed at 11/09/17 0834   Gross per 24 hour   Intake              210 ml   Output               50 ml   Net              160 ml         DATA:  Interval chart, labs and notes reviewed.  The following labs independently reviewed by me    Labs:   Recent Results (from the past 24 hour(s))   POC Glucose Fingerstick    Collection Time: 11/08/17  4:27 PM   Result Value Ref Range    Glucose 103 70 - 130 mg/dL   POC Glucose Fingerstick    Collection Time: 11/08/17  8:49 PM   Result Value Ref Range    Glucose 116 70 - 130 mg/dL   POC Glucose Fingerstick    Collection Time: 11/09/17  7:13 AM   Result Value Ref Range    Glucose 132 (H) 70 - 130 mg/dL   CK    Collection Time: 11/09/17  8:36 AM   Result Value Ref Range    Creatine Kinase >50970 (H) 20 - 200 U/L   Comprehensive Metabolic Panel    Collection Time: 11/09/17  8:36 AM   Result Value Ref Range    Glucose 174 (H) 65 - 99 mg/dL    BUN 78 (H) 8 - 23 mg/dL    Creatinine 9.08 (H) 0.76 - 1.27 mg/dL    Sodium 138 136 - 145 mmol/L    Potassium 4.3 3.5 - 5.2 mmol/L    Chloride 100 98 - 107 mmol/L    CO2 19.8 (L) 22.0 - 29.0 mmol/L    Calcium 8.5 (L) 8.6 - 10.5  mg/dL    Total Protein 6.7 6.0 - 8.5 g/dL    Albumin 2.70 (L) 3.50 - 5.20 g/dL    ALT (SGPT) 192 (H) 1 - 41 U/L    AST (SGOT) 750 (H) 1 - 40 U/L    Alkaline Phosphatase 265 (H) 39 - 117 U/L    Total Bilirubin 1.9 (H) 0.1 - 1.2 mg/dL    eGFR Non African Amer 6 (L) >60 mL/min/1.73    Globulin 4.0 gm/dL    A/G Ratio 0.7 g/dL    BUN/Creatinine Ratio 8.6 7.0 - 25.0    Anion Gap 18.2 mmol/L   CBC Auto Differential    Collection Time: 11/09/17  8:36 AM   Result Value Ref Range    WBC 16.78 (H) 4.50 - 10.70 10*3/mm3    RBC 4.36 (L) 4.60 - 6.00 10*6/mm3    Hemoglobin 14.7 13.7 - 17.6 g/dL    Hematocrit 42.5 40.4 - 52.2 %    MCV 97.5 (H) 79.8 - 96.2 fL    MCH 33.7 (H) 27.0 - 32.7 pg    MCHC 34.6 32.6 - 36.4 g/dL    RDW 14.8 (H) 11.5 - 14.5 %    RDW-SD 52.5 37.0 - 54.0 fl    MPV 11.7 6.0 - 12.0 fL    Platelets 157 140 - 500 10*3/mm3    Neutrophil % 89.4 (H) 42.7 - 76.0 %    Lymphocyte % 6.1 (L) 19.6 - 45.3 %    Monocyte % 4.2 (L) 5.0 - 12.0 %    Eosinophil % 0.0 (L) 0.3 - 6.2 %    Basophil % 0.1 0.0 - 1.5 %    Immature Grans % 0.2 0.0 - 0.5 %    Neutrophils, Absolute 15.00 (H) 1.90 - 8.10 10*3/mm3    Lymphocytes, Absolute 1.02 0.90 - 4.80 10*3/mm3    Monocytes, Absolute 0.71 0.20 - 1.20 10*3/mm3    Eosinophils, Absolute 0.00 0.00 - 0.70 10*3/mm3    Basophils, Absolute 0.01 0.00 - 0.20 10*3/mm3    Immature Grans, Absolute 0.04 (H) 0.00 - 0.03 10*3/mm3   POC Glucose Fingerstick    Collection Time: 11/09/17 11:36 AM   Result Value Ref Range    Glucose 193 (H) 70 - 130 mg/dL       Radiology:  Imaging Results (last 24 hours)     Procedure Component Value Units Date/Time    CT Kidney Biopsy [[id number]] Collected:  11/08/17 1609     Updated:  11/08/17 1615    Narrative:       CT-GUIDED BIOPSY OF RIGHT KIDNEY FOR MEDICAL RENAL DISEASE and  POSTPROCEDURE PERCUTANEOUS NEEDLE TRACT EMBOLIZATION     HISTORY: Male who is 67 years-old, with a history of acute nontraumatic  kidney injury, hypertension and atherosclerotic vascular disease.      The procedure, including but not limited to the risk of hemorrhage,  infection, diagnostic failure was discussed with the patient prior to  examination and witnessed, informed consent was obtained.     PROCEDURE: Multiple axial images were obtained for localization  purposes.      Radiation dose reduction techniques were utilized, including automated  exposure control and exposure modulation based on body size.     Skin marker was placed.     Patient was prepped and draped in the usual fashion. Following adequate  local anesthesia with 1% lidocaine, dermatotomy was placed and needle  placement performed. Serial 18-gauge core biopsies were obtained and  placed in formalin for analysis.     Embolization was performed with a slurry of Gelfoam which was advanced  percutaneously through guiding needle during retraction to aid in  hemostasis.     Needle was removed and follow-up imaging demonstrates no complicating  feature.       Impression:       1. Satisfactory CT-guided biopsy of right kidney as described above.  2. Satisfactory postprocedure percutaneous embolization for hemostasis.     This report was finalized on 11/8/2017 4:11 PM by Dr. Good Caba MD.       XR Chest 1 View [[id number]] Collected:  11/08/17 1626     Updated:  11/08/17 1633    Narrative:       XR CHEST 1 VW-     HISTORY: Male who is 67 years-old,  line placement     TECHNIQUE: Frontal view of the chest     COMPARISON: 11/06/2017     FINDINGS: Right IJ catheter extends to the superior vena cava. Heart  size is borderline. Slight prominence of vascular and interstitial  markings . No focal pulmonary consolidation, pleural effusion, or  pneumothorax. No acute osseous process.       Impression:       Right IJ catheter extends to the superior vena cava. No  pneumothorax.     This report was finalized on 11/8/2017 4:30 PM by Dr. Gokul Real MD.                Medications have been reviewed:  Current Facility-Administered Medications    Medication Dose Route Frequency Provider Last Rate Last Dose   • ALPRAZolam (XANAX) tablet 0.25 mg  0.25 mg Oral 4x Daily PRN Memo Varela MD       • bisoprolol (ZEBeta) tablet 2.5 mg  2.5 mg Oral Q24H Memo Varela MD   Stopped at 11/09/17 0816   • heparin (porcine) injection 3,000 Units  3,000 Units Intracatheter Once Franky Moreau MD       • insulin aspart (novoLOG) injection 0-7 Units  0-7 Units Subcutaneous 4x Daily With Meals & Nightly Memo Varela MD       • pantoprazole (PROTONIX) EC tablet 40 mg  40 mg Oral Q AM Memo Varela MD   40 mg at 11/09/17 0651   • sodium bicarbonate tablet 1,300 mg  1,300 mg Oral TID Franky Moreau MD   1,300 mg at 11/09/17 0809   • sodium chloride 0.9 % flush 1-10 mL  1-10 mL Intravenous PRN Memo Varela MD       • sodium chloride 0.9 % flush 10 mL  10 mL Intravenous PRN Adan Simmons MD           Assessment/Plan     Active Problems:    Acute kidney injury (nontraumatic)      Assessment:  (Acute renal failure.  With active urine sediment, likely secondary to rhabdomyolysis, await renal biopsy  Nausea, from uremia, better today  Severe rhabdomyolysis, ?  From Crestor plus dehydration combination  Hypokalemia, replaced  Elevated LFTs, possibly related to Crestor  Mild metabolic acidosis  Hypoalbuminemia  Hyperparathyroidism, likely secondary to renal failure  Mild leukocytosis        Plan:   No evidence of renal recovery so far  Continue sodium bicarbonate for urinary alkalinization  Serial CK levels  Await Renal biopsy and glomerulonephritis serologies  Discontinue IV steroids as GM less likely at this point  24 hour urine for protein and creatinine clearance in progress  May need a tunneled dialysis line next week if he remains dialysis dependent  Check urine and serum myoglobin  Check phosphorus and magnesium levels in a.m.).             Continue to monitor renal function, electroytes and volume closely   Please call me with any questions or concerns      Franky  WENDI Moreau MD   [facility]   [phone]    11/09/17  4:03 PM      Dictation performed using Dragon dictation software

## 2017-11-09 NOTE — PROGRESS NOTES
"Daily progress note    Chief complaint  Doing better  No new complaints  Status post hemodialysis and renal biopsy    History of present illness  67-year-old white male with history of atherosclerotic heart disease hypertension hyperlipidemia otherwise in good health and no history of kidney disease present it to [facility] emergency room for last 5-7 days weakness nausea not feeling well and decreased urine output.  Patient evaluated found to be in acute renal failure with low potassium admitted for management.  Patient denies any diarrhea fever cough chest pain shortness of breath.     REVIEW OF SYSTEMS  Review of Systems   Constitutional: Negative for activity change, appetite change and fever.   HENT: Negative for congestion and sore throat.    Eyes: Negative.    Respiratory: Negative for cough and shortness of breath.    Cardiovascular: Negative for chest pain and leg swelling.   Gastrointestinal: Negative for abdominal pain, diarrhea and vomiting.   Endocrine: Negative.    Genitourinary: Positive for frequency. Negative for decreased urine volume and dysuria.        Increased thirst   Musculoskeletal: Negative for neck pain.   Skin: Negative for rash and wound.   Allergic/Immunologic: Negative.    Neurological: Positive for weakness. Negative for numbness and headaches.   Hematological: Negative.    Psychiatric/Behavioral: Negative.    All other systems reviewed and are negative.     PHYSICAL EXAM  Blood pressure 122/65, pulse 55, temperature 96.8 °F (36 °C), temperature source Oral, resp. rate 20, height 72.99\" (185.4 cm), weight 234 lb 8 oz (106 kg), SpO2 91 %.    Constitutional: He is oriented to person, place, and time and well-developed, well-nourished, and in no distress.   Head: Normocephalic and atraumatic.   Mouth/Throat: Mucous membranes are dry.   Eyes: EOM are normal. Pupils are equal, round, and reactive to light.   Neck: Normal range of motion. Neck supple.   Cardiovascular: Regular rhythm " and normal heart sounds.  Bradycardia present.    No murmur heard.  Pulmonary/Chest: Effort normal and breath sounds normal. No respiratory distress.   Abdominal: Soft. He exhibits no distension. There is no tenderness. There is no rebound and no guarding.   Musculoskeletal: Normal range of motion. He exhibits no edema.   Neurological: He is alert and oriented to person, place, and time. He has normal sensation and normal strength.   No focal weakness or facial droop. Slow mentation.   Skin: Skin is warm and dry.   Turgor is normal, cap refill 1-2 seconds.     Psychiatric: Mood and affect normal.     LAB RESULTS  Lab Results (last 24 hours)     Procedure Component Value Units Date/Time    Mitochondrial Antibodies, M2 [[id number]] Collected:  11/08/17 1504    Specimen:  Blood Updated:  11/08/17 1512    Immunofixation, Serum [[id number]]  (Abnormal) Collected:  11/06/17 2313    Specimen:  Blood Updated:  11/08/17 1518     Immunofixation Result, Serum Comment:      PRESENCE OF MONOCLONAL PROTEIN IS UNCLEAR AT THIS TIME.  SUGGEST REPEAT IN 3-6 MONTHS IF CLINICALLY INDICATED.        IgG 1402 mg/dL      IgA 229 mg/dL      IgM 183 (H) mg/dL     Narrative:       Performed at:  [address]  [id number]  : Todd Harper PhD, Phone:  [phone]    POC Glucose Fingerstick [[id number]]  (Normal) Collected:  11/08/17 1627    Specimen:  Blood Updated:  11/08/17 1631     Glucose 103 mg/dL     Narrative:       Meter: [id number] : [id number] [facility]    Immunoglobulin Free LT Chains Blood [[id number]]  (Abnormal) Collected:  11/06/17 2313    Specimen:  Blood Updated:  11/08/17 1711     Free Light Chain, Kappa 180.2 (H) mg/L      Free Lambda Light Chains 196.0 (H) mg/L      Kappa/Lambda Ratio 0.92    Narrative:       Performed at:  [facility]  [id number]  : Todd Harper PhD, Phone:  [phone]    Protein Elec +  Inter, Serum [[id number]]  (Abnormal) Collected:  11/07/17 1353    Specimen:  Blood Updated:  11/08/17 1711     Total Protein 5.7 (L) g/dL      Albumin 2.3 (L) g/dL      Alpha-1-Globulin 0.4 g/dL      Alpha-2-Globulin 0.6 g/dL      Beta Globulin 1.0 g/dL      Gamma Globulin 1.4 g/dL      M-John Not Observed g/dL      Globulin 3.4 g/dL      A/G Ratio 0.7     Please note Comment      Protein electrophoresis scan will follow via computer, mail, or   delivery.        SPE Interpretation Comment      The SPE pattern reflects non-selective protein loss. Protein losing  syndromes, in which this pattern has been observed include:  malnutrition, exudative dermatopathies, burns, exudative pulmonary  disease, essential hypoproteinemia, protein losing gastroentero-  pathies, blood loss, and plasmaphoresis. Monoclonal protein is not  apparent.       Narrative:       Performed at:  [address] [place] [address]  [id number]  : Todd Harper PhD, Phone:  [phone]    POC Glucose Fingerstick [[id number]]  (Normal) Collected:  11/08/17 2049    Specimen:  Blood Updated:  11/08/17 2055     Glucose 116 mg/dL     Narrative:       Meter: [id number] : [id number] Aldo ROWE    Alpha - 1 - Antitrypsin [[id number]] Collected:  11/08/17 0541    Specimen:  Blood Updated:  11/09/17 0617     A-1 Antitrypsin 200 mg/dL     Narrative:       Performed at:  [address] [place] [address]  [id number]  : Todd Harper PhD, Phone:  [phone]    POC Glucose Fingerstick [[id number]]  (Abnormal) Collected:  11/09/17 0713    Specimen:  Blood Updated:  11/09/17 0715     Glucose 132 (H) mg/dL     Narrative:       Meter: [id number] : [id number] Sommer WHITE    Ceruloplasmin [[id number]] Collected:  11/08/17 1504    Specimen:  Blood Updated:  11/09/17 0716     Ceruloplasmin 24.6 mg/dL     Narrative:       Performed at:  [address] [place], [place] [address] [place], [place]   [id number]  : Todd Harper PhD, Phone:  [phone]    C3 Complement [[id number]] Collected:  11/08/17 1504    Specimen:  Blood Updated:  11/09/17 0716     C3 Complement 130 mg/dL     Narrative:       Performed at:  [address] [place] [address]  [id number]  : Todd Harper PhD, Phone:  [phone]    CBC & Differential [[id number]] Collected:  11/09/17 0836    Specimen:  Blood Updated:  11/09/17 0857    Narrative:       The following orders were created for panel order CBC & Differential.  Procedure                               Abnormality         Status                     ---------                               -----------         ------                     CBC Auto Differential[[id number]]        Abnormal            Final result                 Please view results for these tests on the individual orders.    CBC Auto Differential [[id number]]  (Abnormal) Collected:  11/09/17 0836    Specimen:  Blood Updated:  11/09/17 0857     WBC 16.78 (H) 10*3/mm3      RBC 4.36 (L) 10*6/mm3      Hemoglobin 14.7 g/dL      Hematocrit 42.5 %      MCV 97.5 (H) fL      MCH 33.7 (H) pg      MCHC 34.6 g/dL      RDW 14.8 (H) %      RDW-SD 52.5 fl      MPV 11.7 fL      Platelets 157 10*3/mm3      Neutrophil % 89.4 (H) %      Lymphocyte % 6.1 (L) %      Monocyte % 4.2 (L) %      Eosinophil % 0.0 (L) %      Basophil % 0.1 %      Immature Grans % 0.2 %      Neutrophils, Absolute 15.00 (H) 10*3/mm3      Lymphocytes, Absolute 1.02 10*3/mm3      Monocytes, Absolute 0.71 10*3/mm3      Eosinophils, Absolute 0.00 10*3/mm3      Basophils, Absolute 0.01 10*3/mm3      Immature Grans, Absolute 0.04 (H) 10*3/mm3     AFP Tumor Marker [[id number]]  (Abnormal) Collected:  11/08/17 1041    Specimen:  Blood Updated:  11/09/17 0923     AFP Tumor Marker 9.4 (H) ng/mL       Roche ECLIA methodology       Narrative:       Performed at:  [address] [place], [place] [address], [place]  [id number]  :  Todd Harper PhD, Phone:  [phone]    Comprehensive Metabolic Panel [[id number]]  (Abnormal) Collected:  11/09/17 0836    Specimen:  Blood Updated:  11/09/17 0936     Glucose 174 (H) mg/dL      BUN 78 (H) mg/dL      Creatinine 9.08 (H) mg/dL      Sodium 138 mmol/L      Potassium 4.3 mmol/L      Chloride 100 mmol/L      CO2 19.8 (L) mmol/L      Calcium 8.5 (L) mg/dL      Total Protein 6.7 g/dL      Albumin 2.70 (L) g/dL      ALT (SGPT) 192 (H) U/L      AST (SGOT) 750 (H) U/L      Alkaline Phosphatase 265 (H) U/L      Total Bilirubin 1.9 (H) mg/dL      eGFR Non African Amer 6 (L) mL/min/1.73      Globulin 4.0 gm/dL      A/G Ratio 0.7 g/dL      BUN/Creatinine Ratio 8.6     Anion Gap 18.2 mmol/L     CK [[id number]]  (Abnormal) Collected:  11/09/17 0836    Specimen:  Blood Updated:  11/09/17 0956     Creatine Kinase >75078 (H) U/L     Narrative:       Dilute result 58,320 U/L    POC Glucose Fingerstick [[id number]]  (Abnormal) Collected:  11/09/17 1136    Specimen:  Blood Updated:  11/09/17 1140     Glucose 193 (H) mg/dL     Narrative:       Meter: [id number] : [id number] Sommer WHITE    Tissue Pathology Exam - Tissue, Kidney, Right [[id number]] Collected:  11/08/17 1410    Specimen:  Tissue from Kidney, Right Updated:  11/09/17 1305     Case Report --     Surgical Pathology Report                         Case: [id number]                                  Authorizing Provider:  Memo Varela MD            Collected:           11/08/2017 02:10 PM          Ordering Location:     [facility]  Received:            11/08/2017 02:37 PM                                 [address]                                                                      Pathologist:           Jan Coto MD                                                          Specimen:    Kidney, Right, ct guided R renal bx                                                         Clinical Information --     Acute renal failure       Final  "Diagnosis --     KIDNEY, RIGHT, NEEDLE BIOPSY:            TISSUE FORWARDED TO [facility] FOR PROCESSING AND DIAGNOSIS.    COMMENT: Only a gross examination was performed at [facility].  The final diagnosis from [facility] will be reported in an addendum.    TDJ/    CPT CODE:  89326       Gross Description --     1.  Received in 3 separate vials, each labeled \"right renal biopsy\" are multiple tan needle core biopsies.  These 3 vials contains Kain's media, formaldehyde, and glutaraldehyde.  They are submitted for gross examination only and forwarded to [facility] for special studies.  CC/USO/SOFI/brb         Embedded Images --    SANJU Comprehensive Panel [[id number]] Collected:  11/08/17 1041    Specimen:  Blood Updated:  11/09/17 1312     Anti-DNA (DS) Ab Qn 1 IU/mL                                          Negative      <5                                     Equivocal  5 - 9                                     Positive      >9        RNP Antibodies 0.2 AI      Voss Antibodies <0.2 AI      Antiscleroderma-70 Antibodies <0.2 AI      ANAYA SSA (RO) Ab <0.2 AI      ANAYA SSB (LA) Ab <0.2 AI      Antichromatin Antibodies <0.2 AI      KATHIE-1 IgG <0.2 AI      Anti-Centromere B Antibodies <0.2 AI      See below: Comment      Autoantibody                       Disease Association  ------------------------------------------------------------                          Condition                  Frequency  ---------------------   ------------------------   ---------  Antinuclear Antibody,    SLE, mixed connective  Direct (SANJU-D)           tissue diseases  ---------------------   ------------------------   ---------  dsDNA                    SLE                        40 - 60%  ---------------------   ------------------------   ---------  Chromatin                Drug induced SLE                90%                           SLE                        48 - " 97%  ---------------------   ------------------------   ---------  SSA (Ro)                 SLE                        25 - 35%                           Sjogren's Syndrome         40 - 70%                            Lupus                 100%  ---------------------   ------------------------   ---------  SSB (La)                 SLE                             10%                           Sjogren's Syndrome              30%  ---------------------   -----------------------    ---------  Sm (anti-Smith)          SLE                        15 - 30%  ---------------------   -----------------------    ---------  RNP                      Mixed Connective Tissue                           Disease                         95%  (U1 nRNP,                SLE                        30 - 50%  anti-ribonucleoprotein)  Polymyositis and/or                           Dermatomyositis                 20%  ---------------------   ------------------------   ---------  Scl-70 (antiDNA          Scleroderma (diffuse)      20 - 35%  topoisomerase)           Crest                           13%  ---------------------   ------------------------   ---------  Luly-1                     Polymyositis and/or                           Dermatomyositis            20 - 40%  ---------------------   ------------------------   ---------  Centromere B             Scleroderma - Crest                           variant                         80%       Narrative:       Performed at:   [address]  [id number]  : Todd Harper PhD, Phone:  [phone]        Imaging Results (last 72 hours)     Procedure Component Value Units Date/Time    US Gallbladder [[id number]] Collected:  17     Updated:  17    Narrative:       US GALLBLADDER-        INDICATIONS: Elevated liver function tests     TECHNIQUE: Ultrasound of the right upper quadrant     COMPARISON: None available     FINDINGS:     The  gallbladder is nontender, with sludge but no shadowing stones  demonstrated. No gallbladder wall thickening or pericholecystic fluid.     No intrahepatic or extrahepatic biliary ductal dilatation is  demonstrated. The common biliary duct caliber is measured at 4.0 mm.     No liver lesion is identified. Diffusely, the echogenicity of the liver  is otherwise in the range of normal relative to that of the right  kidney.     The pancreas is mostly obscured. The right kidney, 10.8 cm, and the  pancreas otherwise appear unremarkable.                Impression:          Sludge in the gallbladder. No evidence for acute cholecystitis. No  discrete liver lesion or biliary ductal dilatation identified. With  persistent clinical indication, enhanced liver imaging could be  considered for further evaluation.     This report was finalized on 11/6/2017 7:33 PM by Dr. Gokul Real MD.       CT Abdomen Pelvis Without Contrast [[id number]] Collected:  11/06/17 2007     Updated:  11/06/17 2017    Narrative:       CT ABDOMEN AND PELVIS WITHOUT CONTRAST     HISTORY: Elevated liver function test. Gallbladder sludge.      TECHNIQUE: CT abdomen and pelvis without IV or oral contrast.     COMPARISON: Right upper quadrant ultrasound 11/06/2017.     FINDINGS: Lung bases appear clear and there is no pleural effusion.  Liver exhibits slight undelayed margins consistent with hepatic  cirrhosis. There Is no evidence for intra or extrahepatic biliary duct  dilatation. There is density within the gallbladder consistent with  sludge as demonstrated sonographically. Adrenal glands, pancreas,  spleen, kidneys appear within normal limits. There is no hydronephrosis  or hydroureter. Urinary bladder is mostly decompressed. There is no  bowel dilatation or evidence for bowel obstruction. No ben enlargement  is evident within the abdomen or pelvis. Atherosclerotic calcifications  are present involving the abdominal aorta and the abdominal  aorta  measures up to 2.4 cm diameter. Mural calcifications are present.       Impression:       1. Sludge within the gallbladder without other evidence for  cholecystitis. Mild undulating hepatic margins consistent with hepatic  cirrhosis.  2. Atherosclerotic disease. 2.4 cm infrarenal abdominal aorta.     Radiation dose reduction techniques were utilized, including automated  exposure control and exposure modulation based on body size.     This report was finalized on 11/6/2017 8:14 PM by Dr. Marcos Sainz MD.       XR Chest 2 View [[id number]] Collected:  11/06/17 2041     Updated:  11/06/17 2046    Narrative:       XR CHEST 2 VW-     HISTORY: Male who is 67 years-old,  acute renal failure     TECHNIQUE: Frontal and lateral views of the chest     COMPARISON: None available     FINDINGS: Heart, mediastinum and pulmonary vasculature are unremarkable.  No focal pulmonary consolidation, pleural effusion, or pneumothorax. Old  granulomatous disease is apparent. No acute osseous process.       Impression:       No evidence for acute pulmonary process. Follow-up as  clinical indications persist.     This report was finalized on 11/6/2017 8:43 PM by Dr. Gokul Real MD.       US Renal Bilateral [[id number]] Collected:  11/07/17 0145     Updated:  11/07/17 0145    Narrative:       ULTRASOUND RENAL BILATERAL.     TECHNIQUE: Grayscale and color images of the kidneys were obtained.     HISTORY: Acute kidney injury.     COMPARISON: No prior studies for comparison.     FINDINGS:  Right kidney measures 11.6 x 4.8 x 5.4 cm. Left kidney measures 12.5 x  5.9 x 5.7 cm. There is no hydronephrosis, stone or mass.     Urinary bladder could not be well visualized. No calculus, sludge or  mass.       Impression:       No acute findings involving both kidneys.             EKG                                                  Rhythm/Rate: Sinus bradycardia 48  P waves and FL: Normal  QRS, axis: IVCD   ST and T waves: Biphasic  T-wave and Inverted T-waves inferiorly        Current Facility-Administered Medications:   •  ALPRAZolam (XANAX) tablet 0.25 mg, 0.25 mg, Oral, 4x Daily PRN, Keith Varela MD  •  bisoprolol (ZEBeta) tablet 2.5 mg, 2.5 mg, Oral, Q24H, Keith Varela MD, Stopped at 11/09/17 0816  •  hydrocortisone sodium succinate (Solu-CORTEF) 250 mg in sodium chloride 0.9 % 50 mL IVPB, 250 mg, Intravenous, Q6H, Franky Moreau MD, 250 mg at 11/09/17 0940  •  pantoprazole (PROTONIX) EC tablet 40 mg, 40 mg, Oral, Q AM, Keith Varela MD, 40 mg at 11/09/17 0651  •  sodium bicarbonate tablet 1,300 mg, 1,300 mg, Oral, TID, Franky Moreau MD, 1,300 mg at 11/09/17 0809  •  sodium chloride 0.9 % flush 1-10 mL, 1-10 mL, Intravenous, PRN, Keith Varela MD  •  Insert peripheral IV, , , Once **AND** sodium chloride 0.9 % flush 10 mL, 10 mL, Intravenous, PRN, Adan Simmons MD     ASSESSMENT  Acute renal failure on hemodialysis  Elevated liver enzymes Questionable etiology  Hypertension  Hyperlipidemia  Atherosclerotic heart disease    PLAN  CPM  Steroids  Hemodialysis TIW  Check biopsy results  OFF Crestor  OFF hydrochlorothiazide  Stress ulcer DVT prophylaxis  Discussed with patient and family  Follow closely     KEITH VARELA MD     .

## 2017-11-10 LAB
ALBUMIN SERPL-MCNC: 2.3 G/DL (ref 3.5–5.2)
ALBUMIN/GLOB SERPL: 0.6 G/DL
ALP SERPL-CCNC: 268 U/L (ref 39–117)
ALT SERPL W P-5'-P-CCNC: 179 U/L (ref 1–41)
ANION GAP SERPL CALCULATED.3IONS-SCNC: 19.2 MMOL/L
AST SERPL-CCNC: 474 U/L (ref 1–40)
BASOPHILS # BLD AUTO: 0.02 10*3/MM3 (ref 0–0.2)
BASOPHILS NFR BLD AUTO: 0.1 % (ref 0–1.5)
BILIRUB SERPL-MCNC: 1.5 MG/DL (ref 0.1–1.2)
BUN BLD-MCNC: 68 MG/DL (ref 8–23)
BUN/CREAT SERPL: 8.4 (ref 7–25)
CALCIUM SPEC-SCNC: 8.6 MG/DL (ref 8.6–10.5)
CHLORIDE SERPL-SCNC: 99 MMOL/L (ref 98–107)
CK SERPL-CCNC: ABNORMAL U/L (ref 20–200)
CO2 SERPL-SCNC: 20.8 MMOL/L (ref 22–29)
CREAT BLD-MCNC: 8.09 MG/DL (ref 0.76–1.27)
DEPRECATED RDW RBC AUTO: 52.2 FL (ref 37–54)
EOSINOPHIL # BLD AUTO: 0 10*3/MM3 (ref 0–0.7)
EOSINOPHIL NFR BLD AUTO: 0 % (ref 0.3–6.2)
ERYTHROCYTE [DISTWIDTH] IN BLOOD BY AUTOMATED COUNT: 15.1 % (ref 11.5–14.5)
GBM IGG SER-ACNC: 10 UNITS (ref 0–20)
GBM IGG SER-ACNC: 9 UNITS (ref 0–20)
GFR SERPL CREATININE-BSD FRML MDRD: 7 ML/MIN/1.73
GLOBULIN UR ELPH-MCNC: 3.9 GM/DL
GLUCOSE BLD-MCNC: 145 MG/DL (ref 65–99)
GLUCOSE BLDC GLUCOMTR-MCNC: 116 MG/DL (ref 70–130)
GLUCOSE BLDC GLUCOMTR-MCNC: 127 MG/DL (ref 70–130)
GLUCOSE BLDC GLUCOMTR-MCNC: 137 MG/DL (ref 70–130)
GLUCOSE BLDC GLUCOMTR-MCNC: 143 MG/DL (ref 70–130)
HCT VFR BLD AUTO: 40.3 % (ref 40.4–52.2)
HGB BLD-MCNC: 13.9 G/DL (ref 13.7–17.6)
IMM GRANULOCYTES # BLD: 0.16 10*3/MM3 (ref 0–0.03)
IMM GRANULOCYTES NFR BLD: 0.7 % (ref 0–0.5)
LYMPHOCYTES # BLD AUTO: 1.5 10*3/MM3 (ref 0.9–4.8)
LYMPHOCYTES NFR BLD AUTO: 6.8 % (ref 19.6–45.3)
MAGNESIUM SERPL-MCNC: 2.9 MG/DL (ref 1.6–2.4)
MCH RBC QN AUTO: 32.9 PG (ref 27–32.7)
MCHC RBC AUTO-ENTMCNC: 34.5 G/DL (ref 32.6–36.4)
MCV RBC AUTO: 95.5 FL (ref 79.8–96.2)
MONOCYTES # BLD AUTO: 2.45 10*3/MM3 (ref 0.2–1.2)
MONOCYTES NFR BLD AUTO: 11.1 % (ref 5–12)
MYOGLOBIN SERPL-MCNC: ABNORMAL NG/ML (ref 28–72)
NEUTROPHILS # BLD AUTO: 17.96 10*3/MM3 (ref 1.9–8.1)
NEUTROPHILS NFR BLD AUTO: 81.3 % (ref 42.7–76)
PHOSPHATE SERPL-MCNC: 3.4 MG/DL (ref 2.5–4.5)
PLATELET # BLD AUTO: 163 10*3/MM3 (ref 140–500)
PMV BLD AUTO: 11.6 FL (ref 6–12)
POTASSIUM BLD-SCNC: 4.2 MMOL/L (ref 3.5–5.2)
PROT SERPL-MCNC: 6.2 G/DL (ref 6–8.5)
RBC # BLD AUTO: 4.22 10*6/MM3 (ref 4.6–6)
SODIUM BLD-SCNC: 139 MMOL/L (ref 136–145)
URINE MYOGLOBIN, QUALITATIVE: POSITIVE
WBC NRBC COR # BLD: 22.09 10*3/MM3 (ref 4.5–10.7)

## 2017-11-10 PROCEDURE — 82550 ASSAY OF CK (CPK): CPT | Performed by: INTERNAL MEDICINE

## 2017-11-10 PROCEDURE — 83874 ASSAY OF MYOGLOBIN: CPT | Performed by: INTERNAL MEDICINE

## 2017-11-10 PROCEDURE — 83735 ASSAY OF MAGNESIUM: CPT | Performed by: INTERNAL MEDICINE

## 2017-11-10 PROCEDURE — 82962 GLUCOSE BLOOD TEST: CPT

## 2017-11-10 PROCEDURE — 80053 COMPREHEN METABOLIC PANEL: CPT | Performed by: NURSE PRACTITIONER

## 2017-11-10 PROCEDURE — 84100 ASSAY OF PHOSPHORUS: CPT | Performed by: INTERNAL MEDICINE

## 2017-11-10 PROCEDURE — 85025 COMPLETE CBC W/AUTO DIFF WBC: CPT | Performed by: HOSPITALIST

## 2017-11-10 PROCEDURE — 99232 SBSQ HOSP IP/OBS MODERATE 35: CPT | Performed by: INTERNAL MEDICINE

## 2017-11-10 PROCEDURE — 87040 BLOOD CULTURE FOR BACTERIA: CPT | Performed by: HOSPITALIST

## 2017-11-10 RX ORDER — HEPARIN SODIUM 1000 [USP'U]/ML
3000 INJECTION, SOLUTION INTRAVENOUS; SUBCUTANEOUS ONCE
Status: DISCONTINUED | OUTPATIENT
Start: 2017-11-10 | End: 2017-11-10

## 2017-11-10 RX ORDER — NITROGLYCERIN 0.4 MG/1
0.4 TABLET SUBLINGUAL
Status: DISCONTINUED | OUTPATIENT
Start: 2017-11-10 | End: 2017-11-14

## 2017-11-10 RX ORDER — ASPIRIN 81 MG/1
81 TABLET ORAL DAILY
Status: DISCONTINUED | OUTPATIENT
Start: 2017-11-10 | End: 2017-11-16 | Stop reason: HOSPADM

## 2017-11-10 RX ORDER — AMLODIPINE BESYLATE 5 MG/1
5 TABLET ORAL
Status: DISCONTINUED | OUTPATIENT
Start: 2017-11-10 | End: 2017-11-14

## 2017-11-10 RX ADMIN — ASPIRIN 81 MG: 81 TABLET ORAL at 16:06

## 2017-11-10 RX ADMIN — PANTOPRAZOLE SODIUM 40 MG: 40 TABLET, DELAYED RELEASE ORAL at 07:24

## 2017-11-10 RX ADMIN — SODIUM BICARBONATE 1300 MG: 650 TABLET ORAL at 16:06

## 2017-11-10 RX ADMIN — Medication 10 ML: at 20:52

## 2017-11-10 RX ADMIN — ALPRAZOLAM 0.25 MG: 0.25 TABLET ORAL at 00:46

## 2017-11-10 RX ADMIN — BISOPROLOL FUMARATE 2.5 MG: 5 TABLET, COATED ORAL at 08:25

## 2017-11-10 RX ADMIN — SODIUM BICARBONATE 1300 MG: 650 TABLET ORAL at 20:52

## 2017-11-10 RX ADMIN — SODIUM BICARBONATE 1300 MG: 650 TABLET ORAL at 08:25

## 2017-11-10 RX ADMIN — AMLODIPINE BESYLATE 5 MG: 5 TABLET ORAL at 16:07

## 2017-11-10 NOTE — PROGRESS NOTES
"        BGA/GI Progress Note   Chief Complaint:  Elevated LFT\"s    Subjective     Interval History: No abd pain, no n/v.  Eating well and bowel moving normally.    History taken from: patient chart    Review of Systems:    The following systems were reviewed and negative;  gastrointestinal    Objective     Vital Signs  Temp:  [96.8 °F (36 °C)-98.6 °F (37 °C)] 97.2 °F (36.2 °C)  Heart Rate:  [54-65] 54  Resp:  [18-20] 18  BP: (122-161)/(64-89) 141/89  Body mass index is 30.69 kg/(m^2).    Intake/Output Summary (Last 24 hours) at 11/10/17 1008  Last data filed at 11/09/17 2300   Gross per 24 hour   Intake              120 ml   Output                0 ml   Net              120 ml          Physical Exam:   General: patient awake, alert and cooperative   Eyes: Normal lids and lashes, mild scleral icterus   Neck: supple, normal ROM, no tracheal deviation, dialysis cath right neck   Skin: warm and dry, not jaundiced   Cardiovascular: regular rhythm and rate, no murmurs auscultated   Pulm: clear to auscultation bilaterally, regular and unlabored   Abdomen: soft, nontender, nondistended; normal bowel sounds   Rectal: deferred   Extremities: no rash or edema   Neurologic: Normal mood and behavior    All Medications Have Been Reviewed     Results Review:       Results from last 7 days  Lab Units 11/10/17  0633 11/09/17  0836 11/08/17  0541   WBC 10*3/mm3 22.09* 16.78* 11.72*   HEMOGLOBIN g/dL 13.9 14.7 14.1   HEMATOCRIT % 40.3* 42.5 42.1   PLATELETS 10*3/mm3 163 157 146         Results from last 7 days  Lab Units 11/10/17  0633 11/09/17  0836 11/08/17  0541   SODIUM mmol/L 139 138 141   POTASSIUM mmol/L 4.2 4.3 3.8   CHLORIDE mmol/L 99 100 104   CO2 mmol/L 20.8* 19.8* 17.9*   BUN mg/dL 68* 78* 74*   CREATININE mg/dL 8.09* 9.08* 10.46*   CALCIUM mg/dL 8.6 8.5* 8.1*   BILIRUBIN mg/dL 1.5* 1.9* 1.9*   ALK PHOS U/L 268* 265* 260*   ALT (SGPT) U/L 179* 192* 170*   AST (SGOT) U/L 474* 750* 859*   GLUCOSE mg/dL 145* 174* 91 "         Results from last 7 days  Lab Units 11/08/17  0541   INR  1.22*       RADIOLOGY: no current studies      Assessment/Plan     Patient Active Problem List   Diagnosis Code   • Arteriosclerotic vascular disease I70.90   • Hyperlipidemia E78.5   • Hypertension I10   • Acute kidney injury (nontraumatic) N17.9     Dia Montana, APRN  11/10/17  10:08 AM    Complains of feeling weakness in his legs.  They are not painful however.  Denies any abdominal pain.  He is eating and drinking well.  There are plans for dialysis tomorrow.  His transaminases have improved.  His creatine phosphokinase continues to decline.    Exam:  Genl: alert, no distress, appears stated age  HEENT: normal external eyes, ears, and nose, no icterus  Resp: normal effort, no wheezing, on room air  Cards: regular rate and rhythm, no murmurs, no LE edema  Abd: soft, non distended, non tender to palpation    Impression  1. Elevated transaminases: improving, setting of rhabdo--elevation is of predominantly muscle origin.  Liver sero workup notable for + ASMA and AFP; the significance of these results in the current setting is unclear  2. Abnormal CT imaging: suggestive of cirrhosis though his RUQ US showed a normal liver this year and in 2015  3. Rhabdomyolysis: leading to renal failure.  ? Medication induced  4. STEVEN: dialysis with renal, due to rhabdo     Plan  -daily CMP  -if transaminases remain abnormal following the resolution of the rhabdmyolysis, then further workup for liver disease, such as a liver biopsy, should be considered    Brunilda Celestin MD  [facility]

## 2017-11-10 NOTE — PROGRESS NOTES
"Daily progress note    Chief complaint  Doing same  No new complaints      History of present illness  67-year-old white male with history of atherosclerotic heart disease hypertension hyperlipidemia otherwise in good health and no history of kidney disease present it to [facility] emergency room for last 5-7 days weakness nausea not feeling well and decreased urine output.  Patient evaluated found to be in acute renal failure with low potassium admitted for management.  Patient denies any diarrhea fever cough chest pain shortness of breath.     REVIEW OF SYSTEMS  Review of Systems   Constitutional: Negative for activity change, appetite change and fever.   HENT: Negative for congestion and sore throat.    Eyes: Negative.    Respiratory: Negative for cough and shortness of breath.    Cardiovascular: Negative for chest pain and leg swelling.   Gastrointestinal: Negative for abdominal pain, diarrhea and vomiting.   Endocrine: Negative.    Genitourinary: Positive for frequency. Negative for decreased urine volume and dysuria.        Increased thirst   Musculoskeletal: Negative for neck pain.   Skin: Negative for rash and wound.   Allergic/Immunologic: Negative.    Neurological: Positive for weakness. Negative for numbness and headaches.   Hematological: Negative.    Psychiatric/Behavioral: Negative.    All other systems reviewed and are negative.     PHYSICAL EXAM  Blood pressure 141/89, pulse 54, temperature 97.2 °F (36.2 °C), temperature source Oral, resp. rate 18, height 72.99\" (185.4 cm), weight 232 lb 9.6 oz (106 kg), SpO2 92 %.    Constitutional: He is oriented to person, place, and time and well-developed, well-nourished, and in no distress.   Head: Normocephalic and atraumatic.   Mouth/Throat: Mucous membranes are dry.   Eyes: EOM are normal. Pupils are equal, round, and reactive to light.   Neck: Normal range of motion. Neck supple.   Cardiovascular: Regular rhythm and normal heart sounds.  Bradycardia " present.    No murmur heard.  Pulmonary/Chest: Effort normal and breath sounds normal. No respiratory distress.   Abdominal: Soft. He exhibits no distension. There is no tenderness. There is no rebound and no guarding.   Musculoskeletal: Normal range of motion. He exhibits no edema.   Neurological: He is alert and oriented to person, place, and time. He has normal sensation and normal strength.   No focal weakness or facial droop. Slow mentation.   Skin: Skin is warm and dry.   Turgor is normal, cap refill 1-2 seconds.     Psychiatric: Mood and affect normal.     LAB RESULTS  Lab Results (last 24 hours)     Procedure Component Value Units Date/Time    SANJU Comprehensive Panel [[id number]] Collected:  11/08/17 1041    Specimen:  Blood Updated:  11/09/17 1312     Anti-DNA (DS) Ab Qn 1 IU/mL                                          Negative      <5                                     Equivocal  5 - 9                                     Positive      >9        RNP Antibodies 0.2 AI      Voss Antibodies <0.2 AI      Antiscleroderma-70 Antibodies <0.2 AI      ANAYA SSA (RO) Ab <0.2 AI      ANAYA SSB (LA) Ab <0.2 AI      Antichromatin Antibodies <0.2 AI      KATHIE-1 IgG <0.2 AI      Anti-Centromere B Antibodies <0.2 AI      See below: Comment      Autoantibody                       Disease Association  ------------------------------------------------------------                          Condition                  Frequency  ---------------------   ------------------------   ---------  Antinuclear Antibody,    SLE, mixed connective  Direct (SANJU-D)           tissue diseases  ---------------------   ------------------------   ---------  dsDNA                    SLE                        40 - 60%  ---------------------   ------------------------   ---------  Chromatin                Drug induced SLE                90%                           SLE                        48 - 97%  ---------------------   ------------------------    ---------  SSA (Ro)                 SLE                        25 - 35%                           Sjogren's Syndrome         40 - 70%                            Lupus                 100%  ---------------------   ------------------------   ---------  SSB (La)                 SLE                             10%                           Sjogren's Syndrome              30%  ---------------------   -----------------------    ---------  Sm (anti-Smith)          SLE                        15 - 30%  ---------------------   -----------------------    ---------  RNP                      Mixed Connective Tissue                           Disease                         95%  (U1 nRNP,                SLE                        30 - 50%  anti-ribonucleoprotein)  Polymyositis and/or                           Dermatomyositis                 20%  ---------------------   ------------------------   ---------  Scl-70 (antiDNA          Scleroderma (diffuse)      20 - 35%  topoisomerase)           Crest                           13%  ---------------------   ------------------------   ---------  Luly-1                     Polymyositis and/or                           Dermatomyositis            20 - 40%  ---------------------   ------------------------   ---------  Centromere B             Scleroderma - Crest                           variant                         80%       Narrative:       Performed at:  [address]  [id number]  : Todd Harper PhD, Phone:  [phone]    EBVCA(IgG / M) [[id number]]  (Abnormal) Collected:  17 1705    Specimen:  Blood Updated:  17 4467     EBV VCA IgM <36.0 U/mL                                        Negative        <36.0                                   Equivocal 36.0 - 43.9                                   Positive        >43.9        EBV VCA IgG 277.0 (H) U/mL                                        Negative        <18.0                                    Equivocal 18.0 - 21.9                                   Positive        >21.9       Narrative:       Performed at:  [address]  [id number]  : Todd Harper PhD, Phone:  [phone]    Anti-Smooth Muscle Antibody Titer [[id number]]  (Abnormal) Collected:  11/08/17 0541    Specimen:  Blood Updated:  11/09/17 1517     Smooth Muscle Ab 33 (H) Units                        Negative                     0 - 19                   Weak positive               20 - 30                   Moderate to strong positive     >30   Actin Antibodies are found in 52-85% of patients with   autoimmune hepatitis or chronic active hepatitis and   in 22% of patients with primary biliary cirrhosis.       Narrative:       Performed at:  [address] - [address]  [id number]  : Todd Harper PhD, Phone:  [phone]    Mitochondrial Antibodies, M2 [[id number]] Collected:  11/08/17 1504    Specimen:  Blood Updated:  11/09/17 1517     Mitochondrial Ab <20.0 Units                                       Negative    0.0 - 20.0                                  Equivocal  20.1 - 24.9                                  Positive         >24.9  Mitochondrial (M2) Antibodies are found in 90-96% of  patients with primary biliary cirrhosis.       Narrative:       Performed at:  [address]  [id number]  : Todd Harper PhD, Phone:  [phone]    ANCA Panel [[id number]]  (Abnormal) Collected:  11/06/17 2313    Specimen:  Blood Updated:  11/09/17 1714     Myeloperoxidase Ab <9.0 U/mL      Antiproteinase 3 (AZ-3) <3.5 U/mL      C-ANCA 1:40 (H) titer      P-ANCA <1:20 titer       The presence of positive fluorescence exhibiting P-ANCA or C-ANCA  patterns alone is not specific for the diagnosis of Wegener's  Granulomatosis (WG) or microscopic polyangiitis. Decisions about  treatment should not be based  solely on ANCA IFA results.  The  International ANCA Group Consensus recommends follow up testing of  positive sera with both IL-3 and MPO-ANCA enzyme immunoassays. As  many as 5% serum samples are positive only by EIA.  Ref. AM J Clin Pathol 1999;111:507-513.        Atypical pANCA <1:20 titer       The atypical pANCA pattern has been observed in a significant  percentage of patients with ulcerative colitis, primary sclerosing  cholangitis and autoimmune hepatitis.       Narrative:       Performed at:  [address] - [address]  [id number]  : Jovanni Leiva MD, Phone:  [phone]  Performed at:  02 - [facility]  [id number]  : Todd Harper PhD, Phone:  [phone]    Celiac Comprehensive Panel [[id number]] Collected:  11/07/17 1704    Specimen:  Blood Updated:  11/09/17 1714     Gliadin Deamidated Peptide Ab, IgA 4 units                          Negative                   0 - 19                     Weak Positive             20 - 30                     Moderate to Strong Positive   >30        Deaminated Gliadin Ab IgG 4 units                          Negative                   0 - 19                     Weak Positive             20 - 30                     Moderate to Strong Positive   >30        Tissue Transglutaminase IgA <2 U/mL                                     Negative        0 -  3                                Weak Positive   4 - 10                                Positive           >10   Tissue Transglutaminase (tTG) has been identified   as the endomysial antigen.  Studies have demonstr-   ated that endomysial IgA antibodies have over 99%   specificity for gluten sensitive enteropathy.        Tissue Transglutaminase IgG 2 U/mL                                     Negative        0 - 5                                Weak Positive   6 - 9                                Positive           >9        Endomysial IgA  Negative     IgA 237 mg/dL     Narrative:       Performed at:  [address]  [id number]  : Todd Harper PhD, Phone:  [phone]    Protein Electrophoresis, Total [[id number]]  (Abnormal) Collected:  11/07/17 1705    Specimen:  Blood Updated:  11/09/17 1714     Total Protein 6.1 g/dL      Albumin 2.6 (L) g/dL      Alpha-1-Globulin 0.4 g/dL      Alpha-2-Globulin 0.6 g/dL      Beta Globulin 1.0 g/dL      Gamma Globulin 1.6 g/dL      M-John Not Observed g/dL      Globulin 3.5 g/dL      A/G Ratio 0.7     Please note Comment      Protein electrophoresis scan will follow via computer, mail, or   delivery.       Narrative:       Performed at:  [address] [place] [address]  [id number]  : Todd Harper PhD, Phone:  [phone]    POC Glucose Fingerstick [[id number]]  (Abnormal) Collected:  11/09/17 1835    Specimen:  Blood Updated:  11/09/17 1836     Glucose 154 (H) mg/dL     Narrative:       Meter: [id number] : [id number] Sommer Hogue G    POC Glucose Fingerstick [[id number]]  (Abnormal) Collected:  11/09/17 2037    Specimen:  Blood Updated:  11/09/17 2041     Glucose 213 (H) mg/dL     Narrative:       Meter: [id number] : [id number] Aldo ROWE    POC Glucose Fingerstick [[id number]]  (Abnormal) Collected:  11/10/17 0712    Specimen:  Blood Updated:  11/10/17 0713     Glucose 137 (H) mg/dL     Narrative:       Meter: [id number] : [id number] Amna Escamilla    CBC & Differential [[id number]] Collected:  11/10/17 0633    Specimen:  Blood Updated:  11/10/17 0731    Narrative:       The following orders were created for panel order CBC & Differential.  Procedure                               Abnormality         Status                     ---------                               -----------         ------                     CBC Auto Differential[[id number]]        Abnormal            Final result                 Please view  results for these tests on the individual orders.    CBC Auto Differential [[id number]]  (Abnormal) Collected:  11/10/17 0633    Specimen:  Blood Updated:  11/10/17 0731     WBC 22.09 (H) 10*3/mm3      RBC 4.22 (L) 10*6/mm3      Hemoglobin 13.9 g/dL      Hematocrit 40.3 (L) %      MCV 95.5 fL      MCH 32.9 (H) pg      MCHC 34.5 g/dL      RDW 15.1 (H) %      RDW-SD 52.2 fl      MPV 11.6 fL      Platelets 163 10*3/mm3      Neutrophil % 81.3 (H) %      Lymphocyte % 6.8 (L) %      Monocyte % 11.1 %      Eosinophil % 0.0 (L) %      Basophil % 0.1 %      Immature Grans % 0.7 (H) %      Neutrophils, Absolute 17.96 (H) 10*3/mm3      Lymphocytes, Absolute 1.50 10*3/mm3      Monocytes, Absolute 2.45 (H) 10*3/mm3      Eosinophils, Absolute 0.00 10*3/mm3      Basophils, Absolute 0.02 10*3/mm3      Immature Grans, Absolute 0.16 (H) 10*3/mm3     Myoglobin Screen, Urine - Urine, Clean Catch [[id number]]  (Abnormal) Collected:  11/10/17 0723    Specimen:  Urine from Urine, Clean Catch Updated:  11/10/17 0750     Myoglobin, Qualitative Positive (A)    Narrative:       Due to the similarities in the chemical properties of Hemoglobin and and Myoglobin, the presence of either will yield a positive Myoglobin Screen.    Phosphorus [[id number]]  (Normal) Collected:  11/10/17 0633    Specimen:  Blood Updated:  11/10/17 0751     Phosphorus 3.4 mg/dL     Comprehensive Metabolic Panel [[id number]]  (Abnormal) Collected:  11/10/17 0633    Specimen:  Blood Updated:  11/10/17 0751     Glucose 145 (H) mg/dL      BUN 68 (H) mg/dL      Creatinine 8.09 (H) mg/dL      Sodium 139 mmol/L      Potassium 4.2 mmol/L      Chloride 99 mmol/L      CO2 20.8 (L) mmol/L      Calcium 8.6 mg/dL      Total Protein 6.2 g/dL      Albumin 2.30 (L) g/dL      ALT (SGPT) 179 (H) U/L      AST (SGOT) 474 (H) U/L      Alkaline Phosphatase 268 (H) U/L      Total Bilirubin 1.5 (H) mg/dL      eGFR Non African Amer 7 (L) mL/min/1.73      Globulin 3.9 gm/dL      A/G Ratio 0.6 g/dL       BUN/Creatinine Ratio 8.4     Anion Gap 19.2 mmol/L     Magnesium [[id number]]  (Abnormal) Collected:  11/10/17 0633    Specimen:  Blood Updated:  11/10/17 0751     Magnesium 2.9 (H) mg/dL     CK [[id number]]  (Abnormal) Collected:  11/10/17 0633    Specimen:  Blood Updated:  11/10/17 0914     Creatine Kinase >44355 (H) U/L       The actual diluted value is 67081 U/L       POC Glucose Fingerstick [[id number]]  (Abnormal) Collected:  11/10/17 1108    Specimen:  Blood Updated:  11/10/17 1114     Glucose 143 (H) mg/dL     Narrative:       Meter: [id number] : [id number] Woo Francine    Glomerular Basement Membrane Antibodies [[id number]] Collected:  11/06/17 2313    Specimen:  Blood Updated:  11/10/17 1114     GBM Ab 9 units                          Negative                   0 - 20                     Weak Positive             21 - 30                     Moderate to Strong Positive   >30       Narrative:       Performed at:  [address] [place] [address]  [id number]  : Jovanni Leiva MD, Phone:  [phone]    Glomerular Basement Membrane Antibodies [[id number]] Collected:  11/07/17 1705    Specimen:  Blood Updated:  11/10/17 1114     GBM Ab 10 units                          Negative                   0 - 20                     Weak Positive             21 - 30                     Moderate to Strong Positive   >30       Narrative:       Performed at:  [facility]  [id number]  : Jovanni Leiva MD, Phone:  [phone]    Myoglobin, Serum [[id number]]  (Abnormal) Collected:  11/10/17 0633    Specimen:  Blood Updated:  11/10/17 1137     Myoglobin 30329.0 (H) ng/mL         Imaging Results (last 72 hours)     Procedure Component Value Units Date/Time    US Gallbladder [[id number]] Collected:  11/06/17 1931     Updated:  11/06/17 1937    Narrative:       US GALLBLADDER-        INDICATIONS: Elevated liver function tests      TECHNIQUE: Ultrasound of the right upper quadrant     COMPARISON: None available     FINDINGS:     The gallbladder is nontender, with sludge but no shadowing stones  demonstrated. No gallbladder wall thickening or pericholecystic fluid.     No intrahepatic or extrahepatic biliary ductal dilatation is  demonstrated. The common biliary duct caliber is measured at 4.0 mm.     No liver lesion is identified. Diffusely, the echogenicity of the liver  is otherwise in the range of normal relative to that of the right  kidney.     The pancreas is mostly obscured. The right kidney, 10.8 cm, and the  pancreas otherwise appear unremarkable.                Impression:          Sludge in the gallbladder. No evidence for acute cholecystitis. No  discrete liver lesion or biliary ductal dilatation identified. With  persistent clinical indication, enhanced liver imaging could be  considered for further evaluation.     This report was finalized on 11/6/2017 7:33 PM by Dr. Gokul Real MD.       CT Abdomen Pelvis Without Contrast [[id number]] Collected:  11/06/17 2007     Updated:  11/06/17 2017    Narrative:       CT ABDOMEN AND PELVIS WITHOUT CONTRAST     HISTORY: Elevated liver function test. Gallbladder sludge.      TECHNIQUE: CT abdomen and pelvis without IV or oral contrast.     COMPARISON: Right upper quadrant ultrasound 11/06/2017.     FINDINGS: Lung bases appear clear and there is no pleural effusion.  Liver exhibits slight undelayed margins consistent with hepatic  cirrhosis. There Is no evidence for intra or extrahepatic biliary duct  dilatation. There is density within the gallbladder consistent with  sludge as demonstrated sonographically. Adrenal glands, pancreas,  spleen, kidneys appear within normal limits. There is no hydronephrosis  or hydroureter. Urinary bladder is mostly decompressed. There is no  bowel dilatation or evidence for bowel obstruction. No ben enlargement  is evident within the abdomen or  pelvis. Atherosclerotic calcifications  are present involving the abdominal aorta and the abdominal aorta  measures up to 2.4 cm diameter. Mural calcifications are present.       Impression:       1. Sludge within the gallbladder without other evidence for  cholecystitis. Mild undulating hepatic margins consistent with hepatic  cirrhosis.  2. Atherosclerotic disease. 2.4 cm infrarenal abdominal aorta.     Radiation dose reduction techniques were utilized, including automated  exposure control and exposure modulation based on body size.     This report was finalized on 11/6/2017 8:14 PM by Dr. Marcos Sainz MD.       XR Chest 2 View [[id number]] Collected:  11/06/17 2041     Updated:  11/06/17 2046    Narrative:       XR CHEST 2 VW-     HISTORY: Male who is 67 years-old,  acute renal failure     TECHNIQUE: Frontal and lateral views of the chest     COMPARISON: None available     FINDINGS: Heart, mediastinum and pulmonary vasculature are unremarkable.  No focal pulmonary consolidation, pleural effusion, or pneumothorax. Old  granulomatous disease is apparent. No acute osseous process.       Impression:       No evidence for acute pulmonary process. Follow-up as  clinical indications persist.     This report was finalized on 11/6/2017 8:43 PM by Dr. Gokul Real MD.       US Renal Bilateral [[id number]] Collected:  11/07/17 0145     Updated:  11/07/17 0145    Narrative:       ULTRASOUND RENAL BILATERAL.     TECHNIQUE: Grayscale and color images of the kidneys were obtained.     HISTORY: Acute kidney injury.     COMPARISON: No prior studies for comparison.     FINDINGS:  Right kidney measures 11.6 x 4.8 x 5.4 cm. Left kidney measures 12.5 x  5.9 x 5.7 cm. There is no hydronephrosis, stone or mass.     Urinary bladder could not be well visualized. No calculus, sludge or  mass.       Impression:       No acute findings involving both kidneys.             EKG                                                   Rhythm/Rate: Sinus bradycardia 48  P waves and [place]: Normal  QRS, axis: IVCD   ST and T waves: Biphasic T-wave and Inverted T-waves inferiorly        Current Facility-Administered Medications:   •  ALPRAZolam (XANAX) tablet 0.25 mg, 0.25 mg, Oral, 4x Daily PRN, Keith Varela MD, 0.25 mg at 11/10/17 0046  •  bisoprolol (ZEBeta) tablet 2.5 mg, 2.5 mg, Oral, Q24H, Keith Varela MD, 2.5 mg at 11/10/17 0825  •  heparin (porcine) injection 3,000 Units, 3,000 Units, Intracatheter, Once, Franky Moreau MD  •  insulin aspart (novoLOG) injection 0-7 Units, 0-7 Units, Subcutaneous, 4x Daily With Meals & Nightly, Keith Varela MD, 3 Units at 11/09/17 2058  •  nitroglycerin (NITROSTAT) SL tablet 0.4 mg, 0.4 mg, Sublingual, Q5 Min PRN, Keith Varela MD  •  pantoprazole (PROTONIX) EC tablet 40 mg, 40 mg, Oral, Q AM, Keith Varela MD, 40 mg at 11/10/17 0724  •  sodium bicarbonate tablet 1,300 mg, 1,300 mg, Oral, TID, Franky Moreau MD, 1,300 mg at 11/10/17 0825  •  sodium chloride 0.9 % flush 1-10 mL, 1-10 mL, Intravenous, PRN, Keith Varela MD  •  Insert peripheral IV, , , Once **AND** sodium chloride 0.9 % flush 10 mL, 10 mL, Intravenous, PRN, Adan Simmons MD     ASSESSMENT  Acute renal failure on hemodialysis  Rhabdomyolysis  Elevated liver enzymes Questionable etiology probably secondary to Crestor improving  Hypertension  Hyperlipidemia  Atherosclerotic heart disease  Leukocytosis with no fever ? source    PLAN  CPM  Blood cultures ×2  Hold onto antibiotics  Hemodialysis TIW  Check biopsy results  OFF Crestor  OFF hydrochlorothiazide  Stress ulcer DVT prophylaxis  Discussed with patient and family  Discussed with Dr. Moreau  Follow closely     KEITH VARELA MD     .

## 2017-11-10 NOTE — PLAN OF CARE
Problem: Patient Care Overview (Adult)  Goal: Plan of Care Review  Outcome: Ongoing (interventions implemented as appropriate)    11/10/17 0434   Coping/Psychosocial Response Interventions   Plan Of Care Reviewed With patient   Patient Care Overview   Progress no change   Outcome Evaluation   Outcome Summary/Follow up Plan Has slept long intervals after xanax given for r chest discomfort at [facility]. No redness or edema at site. Has no urinated this shift. VSS. No other changes. No distress       Goal: Adult Individualization and Mutuality  Outcome: Ongoing (interventions implemented as appropriate)  Goal: Discharge Needs Assessment  Outcome: Ongoing (interventions implemented as appropriate)    Problem: Fall Risk (Adult)  Goal: Absence of Falls  Outcome: Outcome(s) achieved Date Met:  11/10/17    Problem: Renal Failure/Kidney Injury, Acute (Adult)  Goal: Signs and Symptoms of Listed Potential Problems Will be Absent or Manageable (Renal Failure/Kidney Injury, Acute)  Outcome: Ongoing (interventions implemented as appropriate)    Problem: Fluid Volume Deficit (Adult)  Goal: Fluid/Electrolyte Balance  Outcome: Ongoing (interventions implemented as appropriate)  Goal: Comfort/Well Being  Outcome: Ongoing (interventions implemented as appropriate)

## 2017-11-10 NOTE — PLAN OF CARE
Problem: Patient Care Overview (Adult)  Goal: Plan of Care Review  Outcome: Ongoing (interventions implemented as appropriate)    11/10/17 4026   Coping/Psychosocial Response Interventions   Plan Of Care Reviewed With patient   Patient Care Overview   Progress improving   Outcome Evaluation   Outcome Summary/Follow up Plan Pt fell today while ambulating in halls. No injuries. Renal function slow to improve, For HD tomorrow. Shiley to RIJ intact. Pigtail flushes easily ,good blood return. Liver enzymes slow to improve also. Cont to monitor.       Goal: Adult Individualization and Mutuality  Outcome: Ongoing (interventions implemented as appropriate)    Problem: Fall Risk (Adult)  Goal: Identify Related Risk Factors and Signs and Symptoms  Outcome: Ongoing (interventions implemented as appropriate)  Goal: Absence of Falls  Outcome: Ongoing (interventions implemented as appropriate)    Problem: Renal Failure/Kidney Injury, Acute (Adult)  Goal: Signs and Symptoms of Listed Potential Problems Will be Absent or Manageable (Renal Failure/Kidney Injury, Acute)  Outcome: Ongoing (interventions implemented as appropriate)    Problem: Fluid Volume Deficit (Adult)  Goal: Fluid/Electrolyte Balance  Outcome: Ongoing (interventions implemented as appropriate)  Goal: Comfort/Well Being  Outcome: Ongoing (interventions implemented as appropriate)

## 2017-11-10 NOTE — PROGRESS NOTES
"   LOS: 4 days   Patient Care Team:  Aldo Guajardo MD as PCP - General  Aldo Guajardo MD as PCP - Family Medicine    Chief Complaint/ Reason for encounter: Acute renal failure/dialysis management  Chief Complaint   Patient presents with   • Weakness - Generalized     X1 WEEK   • Abnormal Lab     LABS DRAWN TODAY, CREATININE 7.0         Subjective     History of Present Illness    Subjective:  Symptoms:  Stable.  He reports weakness.  No shortness of breath or chest pain.  (Still complains of weakness, leg weakness and soreness  He tolerated dialysis well).    Diet:  Poor intake.  No nausea.    Activity level: Impaired due to weakness.    Pain:  He reports no pain.          History taken from: Patient and chart    Objective     Vital Signs  Temp:  [97.2 °F (36.2 °C)-98.6 °F (37 °C)] 97.2 °F (36.2 °C)  Heart Rate:  [54-65] 54  Resp:  [18] 18  BP: (123-161)/(64-89) 141/89       Wt Readings from Last 1 Encounters:   11/10/17 0407 232 lb 9.6 oz (106 kg)   11/09/17 0413 234 lb 8 oz (106 kg)   11/07/17 1331 218 lb 11.1 oz (99.2 kg)   11/06/17 2118 218 lb 12.8 oz (99.2 kg)   11/06/17 1643 215 lb (97.5 kg)       Objective:  General Appearance:  Comfortable, ill-appearing, in no acute distress and not in pain.    Vital signs: (most recent): Blood pressure 141/89, pulse 54, temperature 97.2 °F (36.2 °C), temperature source Oral, resp. rate 18, height 72.99\" (185.4 cm), weight 232 lb 9.6 oz (106 kg), SpO2 92 %.  Vital signs are normal.  No fever.    Output: Minimal urine output.    HEENT: Normal HEENT exam.    Lungs:  Normal respiratory rate and normal effort.  He is not in respiratory distress.  Breath sounds clear to auscultation.  No wheezes or decreased breath sounds.    Heart: Normal rate.  Regular rhythm.  S1 normal.  No murmur.   Abdomen: Abdomen is soft and non-distended.  Bowel sounds are normal.   There is no epigastric area or no suprapubic area tenderness.  There is no rebound tenderness.   "   Extremities: Normal range of motion.  There is no deformity or dependent edema.    Pulses: Distal pulses are intact.    Neurological: Patient is alert and oriented to person, place and time.    Skin:  Warm and dry.  No rash or cyanosis.             Results Review:    Past Medical History: reviewed and updated  Past Medical History:   Diagnosis Date   • Arteriosclerotic cardiovascular disease    • History of transfusion    • Hyperlipidemia    • Hypertension          Allergies:  No Known Allergies    Intake/Output:     Intake/Output Summary (Last 24 hours) at 11/10/17 1218  Last data filed at 11/09/17 2300   Gross per 24 hour   Intake              120 ml   Output                0 ml   Net              120 ml         DATA:  Interval chart, labs and notes reviewed.    Labs:   Recent Results (from the past 24 hour(s))   POC Glucose Fingerstick    Collection Time: 11/09/17  6:35 PM   Result Value Ref Range    Glucose 154 (H) 70 - 130 mg/dL   POC Glucose Fingerstick    Collection Time: 11/09/17  8:37 PM   Result Value Ref Range    Glucose 213 (H) 70 - 130 mg/dL   Phosphorus    Collection Time: 11/10/17  6:33 AM   Result Value Ref Range    Phosphorus 3.4 2.5 - 4.5 mg/dL   CK    Collection Time: 11/10/17  6:33 AM   Result Value Ref Range    Creatine Kinase >11919 (H) 20 - 200 U/L   Comprehensive Metabolic Panel    Collection Time: 11/10/17  6:33 AM   Result Value Ref Range    Glucose 145 (H) 65 - 99 mg/dL    BUN 68 (H) 8 - 23 mg/dL    Creatinine 8.09 (H) 0.76 - 1.27 mg/dL    Sodium 139 136 - 145 mmol/L    Potassium 4.2 3.5 - 5.2 mmol/L    Chloride 99 98 - 107 mmol/L    CO2 20.8 (L) 22.0 - 29.0 mmol/L    Calcium 8.6 8.6 - 10.5 mg/dL    Total Protein 6.2 6.0 - 8.5 g/dL    Albumin 2.30 (L) 3.50 - 5.20 g/dL    ALT (SGPT) 179 (H) 1 - 41 U/L    AST (SGOT) 474 (H) 1 - 40 U/L    Alkaline Phosphatase 268 (H) 39 - 117 U/L    Total Bilirubin 1.5 (H) 0.1 - 1.2 mg/dL    eGFR Non African Amer 7 (L) >60 mL/min/1.73    Globulin 3.9 gm/dL     A/G Ratio 0.6 g/dL    BUN/Creatinine Ratio 8.4 7.0 - 25.0    Anion Gap 19.2 mmol/L   Magnesium    Collection Time: 11/10/17  6:33 AM   Result Value Ref Range    Magnesium 2.9 (H) 1.6 - 2.4 mg/dL   Myoglobin, Serum    Collection Time: 11/10/17  6:33 AM   Result Value Ref Range    Myoglobin 58964.0 (H) 28.0 - 72.0 ng/mL   CBC Auto Differential    Collection Time: 11/10/17  6:33 AM   Result Value Ref Range    WBC 22.09 (H) 4.50 - 10.70 10*3/mm3    RBC 4.22 (L) 4.60 - 6.00 10*6/mm3    Hemoglobin 13.9 13.7 - 17.6 g/dL    Hematocrit 40.3 (L) 40.4 - 52.2 %    MCV 95.5 79.8 - 96.2 fL    MCH 32.9 (H) 27.0 - 32.7 pg    MCHC 34.5 32.6 - 36.4 g/dL    RDW 15.1 (H) 11.5 - 14.5 %    RDW-SD 52.2 37.0 - 54.0 fl    MPV 11.6 6.0 - 12.0 fL    Platelets 163 140 - 500 10*3/mm3    Neutrophil % 81.3 (H) 42.7 - 76.0 %    Lymphocyte % 6.8 (L) 19.6 - 45.3 %    Monocyte % 11.1 5.0 - 12.0 %    Eosinophil % 0.0 (L) 0.3 - 6.2 %    Basophil % 0.1 0.0 - 1.5 %    Immature Grans % 0.7 (H) 0.0 - 0.5 %    Neutrophils, Absolute 17.96 (H) 1.90 - 8.10 10*3/mm3    Lymphocytes, Absolute 1.50 0.90 - 4.80 10*3/mm3    Monocytes, Absolute 2.45 (H) 0.20 - 1.20 10*3/mm3    Eosinophils, Absolute 0.00 0.00 - 0.70 10*3/mm3    Basophils, Absolute 0.02 0.00 - 0.20 10*3/mm3    Immature Grans, Absolute 0.16 (H) 0.00 - 0.03 10*3/mm3   POC Glucose Fingerstick    Collection Time: 11/10/17  7:12 AM   Result Value Ref Range    Glucose 137 (H) 70 - 130 mg/dL   Myoglobin Screen, Urine - Urine, Clean Catch    Collection Time: 11/10/17  7:23 AM   Result Value Ref Range    Myoglobin, Qualitative Positive (A) Negative   POC Glucose Fingerstick    Collection Time: 11/10/17 11:08 AM   Result Value Ref Range    Glucose 143 (H) 70 - 130 mg/dL       Radiology:  Imaging Results (last 24 hours)     Procedure Component Value Units Date/Time    US Renal Bilateral [[id number]] Collected:  11/07/17 0145     Updated:  11/09/17 9177    Narrative:       ULTRASOUND RENAL BILATERAL.      TECHNIQUE: Grayscale and color images of the kidneys were obtained.     HISTORY: Acute kidney injury.     COMPARISON: No prior studies for comparison.     FINDINGS:  Right kidney measures 11.6 x 4.8 x 5.4 cm. Left kidney measures 12.5 x  5.9 x 5.7 cm. There is no hydronephrosis, stone or mass.     Urinary bladder could not be well visualized. No calculus, sludge or  mass.       Impression:       No acute findings involving both kidneys.      This report was finalized on 11/9/2017 11:54 PM by Dr. Maria Eugenia Tariq MD.                Medications have been reviewed:  Current Facility-Administered Medications   Medication Dose Route Frequency Provider Last Rate Last Dose   • ALPRAZolam (XANAX) tablet 0.25 mg  0.25 mg Oral 4x Daily PRN Memo Varela MD   0.25 mg at 11/10/17 0046   • bisoprolol (ZEBeta) tablet 2.5 mg  2.5 mg Oral Q24H Memo Varela MD   2.5 mg at 11/10/17 0825   • heparin (porcine) injection 3,000 Units  3,000 Units Intracatheter Once Franky Moreau MD       • insulin aspart (novoLOG) injection 0-7 Units  0-7 Units Subcutaneous 4x Daily With Meals & Nightly Memo Varela MD   3 Units at 11/09/17 2058   • nitroglycerin (NITROSTAT) SL tablet 0.4 mg  0.4 mg Sublingual Q5 Min PRN Memo Varela MD       • pantoprazole (PROTONIX) EC tablet 40 mg  40 mg Oral Q AM Memo Varela MD   40 mg at 11/10/17 0724   • sodium bicarbonate tablet 1,300 mg  1,300 mg Oral TID Franky Moreau MD   1,300 mg at 11/10/17 0825   • sodium chloride 0.9 % flush 1-10 mL  1-10 mL Intravenous PRN Memo Varela MD       • sodium chloride 0.9 % flush 10 mL  10 mL Intravenous PRN Adan Simmons MD           Assessment/Plan     Active Problems:    Acute kidney injury (nontraumatic)      Assessment:  (Acute renal failure.  With active urine sediment, likely secondary to rhabdomyolysis, await renal biopsy. +ANCA  Nausea, from uremia, better today after dialysis  Severe rhabdomyolysis, ?  From Crestor plus dehydration  combination  Hypokalemia, replaced  Elevated LFTs, possibly related to Crestor  Mild metabolic acidosis  Hypoalbuminemia  Hyperparathyroidism, likely secondary to renal failure  Mild leukocytosis        Plan:   No evidence of renal recovery so far  Evidence of severe rhabdomyolysis per labs  With this degree of rhabdomyolysis the renal failure may in fact be permanent  Will likely need tunneled dialysis catheter next week in outpatient dialysis set up  Await renal biopsy results  ?  Significance of positive ANCA titer, await biopsy results to clarify  Continue sodium bicarbonate for urinary alkalinization: No IV fluids due to very low urine output  Serial CK levels  Discontinue IV steroids as GM less likely at this point  24 hour urine for protein and creatinine clearance canceled due to very low urine output  No obvious secondary causes for his rhabdomyolysis other than the Crestor/dehydration).             Continue to monitor renal function, electroytes and volume closely   Please call me with any questions or concerns      Franky Moreau MD   [facility]   [phone]    11/10/17  12:18 PM      Dictation performed using Dragon dictation software

## 2017-11-11 LAB
ALBUMIN SERPL-MCNC: 2.1 G/DL (ref 3.5–5.2)
ALBUMIN/GLOB SERPL: 0.6 G/DL
ALP SERPL-CCNC: 244 U/L (ref 39–117)
ALT SERPL W P-5'-P-CCNC: 164 U/L (ref 1–41)
ANION GAP SERPL CALCULATED.3IONS-SCNC: 19.2 MMOL/L
AST SERPL-CCNC: 303 U/L (ref 1–40)
BASOPHILS # BLD AUTO: 0.03 10*3/MM3 (ref 0–0.2)
BASOPHILS NFR BLD AUTO: 0.2 % (ref 0–1.5)
BILIRUB SERPL-MCNC: 1.6 MG/DL (ref 0.1–1.2)
BUN BLD-MCNC: 89 MG/DL (ref 8–23)
BUN/CREAT SERPL: 9 (ref 7–25)
CALCIUM SPEC-SCNC: 8.2 MG/DL (ref 8.6–10.5)
CHLORIDE SERPL-SCNC: 100 MMOL/L (ref 98–107)
CK SERPL-CCNC: 9171 U/L (ref 20–200)
CO2 SERPL-SCNC: 22.8 MMOL/L (ref 22–29)
CREAT BLD-MCNC: 9.93 MG/DL (ref 0.76–1.27)
DEPRECATED RDW RBC AUTO: 55.2 FL (ref 37–54)
EOSINOPHIL # BLD AUTO: 0.14 10*3/MM3 (ref 0–0.7)
EOSINOPHIL NFR BLD AUTO: 1.1 % (ref 0.3–6.2)
ERYTHROCYTE [DISTWIDTH] IN BLOOD BY AUTOMATED COUNT: 15.6 % (ref 11.5–14.5)
GFR SERPL CREATININE-BSD FRML MDRD: 5 ML/MIN/1.73
GLOBULIN UR ELPH-MCNC: 3.6 GM/DL
GLUCOSE BLD-MCNC: 84 MG/DL (ref 65–99)
GLUCOSE BLDC GLUCOMTR-MCNC: 75 MG/DL (ref 70–130)
GLUCOSE BLDC GLUCOMTR-MCNC: 81 MG/DL (ref 70–130)
GLUCOSE BLDC GLUCOMTR-MCNC: 95 MG/DL (ref 70–130)
HCT VFR BLD AUTO: 42.5 % (ref 40.4–52.2)
HGB BLD-MCNC: 14.4 G/DL (ref 13.7–17.6)
IMM GRANULOCYTES # BLD: 0.09 10*3/MM3 (ref 0–0.03)
IMM GRANULOCYTES NFR BLD: 0.7 % (ref 0–0.5)
LYMPHOCYTES # BLD AUTO: 2.23 10*3/MM3 (ref 0.9–4.8)
LYMPHOCYTES NFR BLD AUTO: 17.2 % (ref 19.6–45.3)
MCH RBC QN AUTO: 33.5 PG (ref 27–32.7)
MCHC RBC AUTO-ENTMCNC: 33.9 G/DL (ref 32.6–36.4)
MCV RBC AUTO: 98.8 FL (ref 79.8–96.2)
MONOCYTES # BLD AUTO: 1.96 10*3/MM3 (ref 0.2–1.2)
MONOCYTES NFR BLD AUTO: 15.1 % (ref 5–12)
NEUTROPHILS # BLD AUTO: 8.52 10*3/MM3 (ref 1.9–8.1)
NEUTROPHILS NFR BLD AUTO: 65.7 % (ref 42.7–76)
PLATELET # BLD AUTO: 140 10*3/MM3 (ref 140–500)
PMV BLD AUTO: 11.3 FL (ref 6–12)
POTASSIUM BLD-SCNC: 4.1 MMOL/L (ref 3.5–5.2)
PROT SERPL-MCNC: 5.7 G/DL (ref 6–8.5)
RBC # BLD AUTO: 4.3 10*6/MM3 (ref 4.6–6)
SODIUM BLD-SCNC: 142 MMOL/L (ref 136–145)
WBC NRBC COR # BLD: 12.97 10*3/MM3 (ref 4.5–10.7)

## 2017-11-11 PROCEDURE — 85025 COMPLETE CBC W/AUTO DIFF WBC: CPT | Performed by: HOSPITALIST

## 2017-11-11 PROCEDURE — 82550 ASSAY OF CK (CPK): CPT | Performed by: INTERNAL MEDICINE

## 2017-11-11 PROCEDURE — 82962 GLUCOSE BLOOD TEST: CPT

## 2017-11-11 PROCEDURE — 80053 COMPREHEN METABOLIC PANEL: CPT | Performed by: HOSPITALIST

## 2017-11-11 RX ORDER — HEPARIN SODIUM 1000 [USP'U]/ML
3000 INJECTION, SOLUTION INTRAVENOUS; SUBCUTANEOUS AS NEEDED
Status: DISCONTINUED | OUTPATIENT
Start: 2017-11-11 | End: 2017-11-11

## 2017-11-11 RX ORDER — HEPARIN SODIUM 1000 [USP'U]/ML
3000 INJECTION, SOLUTION INTRAVENOUS; SUBCUTANEOUS AS NEEDED
Status: DISCONTINUED | OUTPATIENT
Start: 2017-11-11 | End: 2017-11-15

## 2017-11-11 RX ADMIN — ASPIRIN 81 MG: 81 TABLET ORAL at 17:37

## 2017-11-11 RX ADMIN — PANTOPRAZOLE SODIUM 40 MG: 40 TABLET, DELAYED RELEASE ORAL at 05:02

## 2017-11-11 RX ADMIN — SODIUM BICARBONATE 1300 MG: 650 TABLET ORAL at 21:04

## 2017-11-11 RX ADMIN — SODIUM BICARBONATE 1300 MG: 650 TABLET ORAL at 17:37

## 2017-11-11 RX ADMIN — AMLODIPINE BESYLATE 5 MG: 5 TABLET ORAL at 17:37

## 2017-11-11 NOTE — PROGRESS NOTES
LOS: 5 days   Patient Care Team:  Aldo Guajardo MD as PCP - General  Aldo Guajardo MD as PCP - Family Medicine    Chief Complaint: acute renal failure    Subjective     Weakness - Generalized     Abnormal Lab         Subjective    Patient with acute renal failure secondary to rhabdo, now requiring hemodialysis    Objective     Vital Signs  Temp:  [97.2 °F (36.2 °C)-98.6 °F (37 °C)] 97.5 °F (36.4 °C)  Heart Rate:  [54-57] 56  Resp:  [16-20] 16  BP: (118-153)/(57-80) 118/66    Objective  General Appearance:  In no acute distress.    HEENT: Normal HEENT exam.    Lungs:  Normal respiratory rate and normal effort.  He is not in respiratory distress.  Breath sounds clear to auscultation.  No wheezes, rales or rhonchi.    Heart: Normal rate.  Regular rhythm.  S1 normal and S2 normal.  No murmur or gallop.   Chest: Asymmetric chest wall expansion.   Extremities: Normal range of motion.  There is no deformity, effusion or dependent edema.    Neurological: Patient is alert and oriented to person, place and time.    Skin:  Warm and dry.  No rash.   Abdomen: Abdomen is soft and non-distended.  There are no signs of ascites.  There is no abdominal tenderness.    Results Review:     I reviewed the patient's new clinical results.    Medication Review:   Scheduled Meds:  amLODIPine 5 mg Oral Q24H   aspirin 81 mg Oral Daily   insulin aspart 0-7 Units Subcutaneous 4x Daily With Meals & Nightly   sodium bicarbonate 1,300 mg Oral TID     Continuous Infusions:   PRN Meds:.•  ALPRAZolam  •  heparin (porcine)  •  nitroglycerin  •  sodium chloride  •  Insert peripheral IV **AND** sodium chloride    Assessment/Plan     Active Problems:    Acute kidney injury (nontraumatic)      Assessment & Plan  1. Acute renal failure  2. Rhabdomyolysis  3. +anca  4. Metabolic acidosis    Patient seen and examined on hemodialysis. Tolerating well. Catheter very positional. Still anuric. Labs reviewed. Will need tunnel cath this week.  Continue hd t/th/s    Aniya Warner MD  11/11/17  10:46 AM

## 2017-11-11 NOTE — PROGRESS NOTES
"Daily progress note    Chief complaint  Doing same  No new complaints      History of present illness  67-year-old white male with history of atherosclerotic heart disease hypertension hyperlipidemia otherwise in good health and no history of kidney disease present it to [facility] emergency room for last 5-7 days weakness nausea not feeling well and decreased urine output.  Patient evaluated found to be in acute renal failure with low potassium admitted for management.  Patient denies any diarrhea fever cough chest pain shortness of breath.     REVIEW OF SYSTEMS  Review of Systems   Constitutional: Negative for activity change, appetite change and fever.   HENT: Negative for congestion and sore throat.    Eyes: Negative.    Respiratory: Negative for cough and shortness of breath.    Cardiovascular: Negative for chest pain and leg swelling.   Gastrointestinal: Negative for abdominal pain, diarrhea and vomiting.   Endocrine: Negative.    Genitourinary: Positive for frequency. Negative for decreased urine volume and dysuria.        Increased thirst   Musculoskeletal: Negative for neck pain.   Skin: Negative for rash and wound.   Allergic/Immunologic: Negative.    Neurological: Positive for weakness. Negative for numbness and headaches.   Hematological: Negative.    Psychiatric/Behavioral: Negative.    All other systems reviewed and are negative.     PHYSICAL EXAM  Blood pressure 112/65, pulse 54, temperature 97.6 °F (36.4 °C), temperature source Oral, resp. rate 18, height 72.99\" (185.4 cm), weight 229 lb 11.5 oz (104 kg), SpO2 92 %.    Constitutional: He is oriented to person, place, and time and well-developed, well-nourished, and in no distress.   Head: Normocephalic and atraumatic.   Mouth/Throat: Mucous membranes are dry.   Eyes: EOM are normal. Pupils are equal, round, and reactive to light.   Neck: Normal range of motion. Neck supple.   Cardiovascular: Regular rhythm and normal heart sounds.  Bradycardia " present.    No murmur heard.  Pulmonary/Chest: Effort normal and breath sounds normal. No respiratory distress.   Abdominal: Soft. He exhibits no distension. There is no tenderness. There is no rebound and no guarding.   Musculoskeletal: Normal range of motion. He exhibits no edema.   Neurological: He is alert and oriented to person, place, and time. He has normal sensation and normal strength.   No focal weakness or facial droop. Slow mentation.   Skin: Skin is warm and dry.   Turgor is normal, cap refill 1-2 seconds.     Psychiatric: Mood and affect normal.     LAB RESULTS  Lab Results (last 24 hours)     Procedure Component Value Units Date/Time    POC Glucose Fingerstick [[id number]]  (Normal) Collected:  11/10/17 1634    Specimen:  Blood Updated:  11/10/17 1642     Glucose 116 mg/dL     Narrative:       Meter: [id number] : [id number] Amna Escamilla    POC Glucose Fingerstick [[id number]]  (Normal) Collected:  11/10/17 2119    Specimen:  Blood Updated:  11/10/17 2130     Glucose 127 mg/dL     Narrative:       Meter: [id number] : [id number] Aldo ROWE    Blood Culture - Blood, [[id number]]  (Normal) Collected:  11/10/17 1427    Specimen:  Blood from Arm, Left Updated:  11/11/17 0246     Blood Culture No growth at less than 24 hours    Blood Culture - Blood, [[id number]]  (Normal) Collected:  11/10/17 1537    Specimen:  Blood from Arm, Right Updated:  11/11/17 0401     Blood Culture No growth at less than 24 hours    CBC & Differential [[id number]] Collected:  11/11/17 0636    Specimen:  Blood Updated:  11/11/17 0657    Narrative:       The following orders were created for panel order CBC & Differential.  Procedure                               Abnormality         Status                     ---------                               -----------         ------                     CBC Auto Differential[[id number]]        Abnormal            Final result                 Please view results for these tests on  the individual orders.    CBC Auto Differential [[id number]]  (Abnormal) Collected:  11/11/17 0636    Specimen:  Blood Updated:  11/11/17 0657     WBC 12.97 (H) 10*3/mm3      RBC 4.30 (L) 10*6/mm3      Hemoglobin 14.4 g/dL      Hematocrit 42.5 %      MCV 98.8 (H) fL      MCH 33.5 (H) pg      MCHC 33.9 g/dL      RDW 15.6 (H) %      RDW-SD 55.2 (H) fl      MPV 11.3 fL      Platelets 140 10*3/mm3      Neutrophil % 65.7 %      Lymphocyte % 17.2 (L) %      Monocyte % 15.1 (H) %      Eosinophil % 1.1 %      Basophil % 0.2 %      Immature Grans % 0.7 (H) %      Neutrophils, Absolute 8.52 (H) 10*3/mm3      Lymphocytes, Absolute 2.23 10*3/mm3      Monocytes, Absolute 1.96 (H) 10*3/mm3      Eosinophils, Absolute 0.14 10*3/mm3      Basophils, Absolute 0.03 10*3/mm3      Immature Grans, Absolute 0.09 (H) 10*3/mm3     Comprehensive Metabolic Panel [[id number]]  (Abnormal) Collected:  11/11/17 0636    Specimen:  Blood Updated:  11/11/17 0721     Glucose 84 mg/dL      BUN 89 (H) mg/dL      Creatinine 9.93 (H) mg/dL      Sodium 142 mmol/L      Potassium 4.1 mmol/L      Chloride 100 mmol/L      CO2 22.8 mmol/L      Calcium 8.2 (L) mg/dL      Total Protein 5.7 (L) g/dL      Albumin 2.10 (L) g/dL      ALT (SGPT) 164 (H) U/L      AST (SGOT) 303 (H) U/L      Alkaline Phosphatase 244 (H) U/L      Total Bilirubin 1.6 (H) mg/dL      eGFR Non African Amer 5 (L) mL/min/1.73      Globulin 3.6 gm/dL      A/G Ratio 0.6 g/dL      BUN/Creatinine Ratio 9.0     Anion Gap 19.2 mmol/L     POC Glucose Fingerstick [[id number]]  (Normal) Collected:  11/11/17 0732    Specimen:  Blood Updated:  11/11/17 0734     Glucose 75 mg/dL     Narrative:       Meter: [id number] : [id number] Antwan Kirkland CNA    CK [[id number]]  (Abnormal) Collected:  11/11/17 0636    Specimen:  Blood Updated:  11/11/17 0753     Creatine Kinase 9171 (H) U/L         Imaging Results (last 72 hours)     Procedure Component Value Units Date/Time    US Gallbladder [[id number]] Collected:   11/06/17 1931     Updated:  11/06/17 1937    Narrative:       US GALLBLADDER-        INDICATIONS: Elevated liver function tests     TECHNIQUE: Ultrasound of the right upper quadrant     COMPARISON: None available     FINDINGS:     The gallbladder is nontender, with sludge but no shadowing stones  demonstrated. No gallbladder wall thickening or pericholecystic fluid.     No intrahepatic or extrahepatic biliary ductal dilatation is  demonstrated. The common biliary duct caliber is measured at 4.0 mm.     No liver lesion is identified. Diffusely, the echogenicity of the liver  is otherwise in the range of normal relative to that of the right  kidney.     The pancreas is mostly obscured. The right kidney, 10.8 cm, and the  pancreas otherwise appear unremarkable.                Impression:          Sludge in the gallbladder. No evidence for acute cholecystitis. No  discrete liver lesion or biliary ductal dilatation identified. With  persistent clinical indication, enhanced liver imaging could be  considered for further evaluation.     This report was finalized on 11/6/2017 7:33 PM by Dr. Gokul Real MD.       CT Abdomen Pelvis Without Contrast [[id number]] Collected:  11/06/17 2007     Updated:  11/06/17 2017    Narrative:       CT ABDOMEN AND PELVIS WITHOUT CONTRAST     HISTORY: Elevated liver function test. Gallbladder sludge.      TECHNIQUE: CT abdomen and pelvis without IV or oral contrast.     COMPARISON: Right upper quadrant ultrasound 11/06/2017.     FINDINGS: Lung bases appear clear and there is no pleural effusion.  Liver exhibits slight undelayed margins consistent with hepatic  cirrhosis. There Is no evidence for intra or extrahepatic biliary duct  dilatation. There is density within the gallbladder consistent with  sludge as demonstrated sonographically. Adrenal glands, pancreas,  spleen, kidneys appear within normal limits. There is no hydronephrosis  or hydroureter. Urinary bladder is mostly  decompressed. There is no  bowel dilatation or evidence for bowel obstruction. No ben enlargement  is evident within the abdomen or pelvis. Atherosclerotic calcifications  are present involving the abdominal aorta and the abdominal aorta  measures up to 2.4 cm diameter. Mural calcifications are present.       Impression:       1. Sludge within the gallbladder without other evidence for  cholecystitis. Mild undulating hepatic margins consistent with hepatic  cirrhosis.  2. Atherosclerotic disease. 2.4 cm infrarenal abdominal aorta.     Radiation dose reduction techniques were utilized, including automated  exposure control and exposure modulation based on body size.     This report was finalized on 11/6/2017 8:14 PM by Dr. Marcos Sainz MD.       XR Chest 2 View [[id number]] Collected:  11/06/17 2041     Updated:  11/06/17 2046    Narrative:       XR CHEST 2 VW-     HISTORY: Male who is 67 years-old,  acute renal failure     TECHNIQUE: Frontal and lateral views of the chest     COMPARISON: None available     FINDINGS: Heart, mediastinum and pulmonary vasculature are unremarkable.  No focal pulmonary consolidation, pleural effusion, or pneumothorax. Old  granulomatous disease is apparent. No acute osseous process.       Impression:       No evidence for acute pulmonary process. Follow-up as  clinical indications persist.     This report was finalized on 11/6/2017 8:43 PM by Dr. Gokul Real MD.       US Renal Bilateral [[id number]] Collected:  11/07/17 0145     Updated:  11/07/17 0145    Narrative:       ULTRASOUND RENAL BILATERAL.     TECHNIQUE: Grayscale and color images of the kidneys were obtained.     HISTORY: Acute kidney injury.     COMPARISON: No prior studies for comparison.     FINDINGS:  Right kidney measures 11.6 x 4.8 x 5.4 cm. Left kidney measures 12.5 x  5.9 x 5.7 cm. There is no hydronephrosis, stone or mass.     Urinary bladder could not be well visualized. No calculus, sludge or  mass.        Impression:       No acute findings involving both kidneys.             EKG                                                  Rhythm/Rate: Sinus bradycardia 48  P waves and TX: Normal  QRS, axis: IVCD   ST and T waves: Biphasic T-wave and Inverted T-waves inferiorly        Current Facility-Administered Medications:   •  ALPRAZolam (XANAX) tablet 0.25 mg, 0.25 mg, Oral, 4x Daily PRN, Keith Varela MD, 0.25 mg at 11/10/17 0046  •  amLODIPine (NORVASC) tablet 5 mg, 5 mg, Oral, Q24H, Keith Varela MD, 5 mg at 11/10/17 1607  •  aspirin EC tablet 81 mg, 81 mg, Oral, Daily, Keith Varela MD, 81 mg at 11/10/17 1606  •  heparin (porcine) injection 3,000 Units, 3,000 Units, Intracatheter, PRN, Franky Moreau MD  •  insulin aspart (novoLOG) injection 0-7 Units, 0-7 Units, Subcutaneous, 4x Daily With Meals & Nightly, Keith Varela MD, 3 Units at 11/09/17 2058  •  nitroglycerin (NITROSTAT) SL tablet 0.4 mg, 0.4 mg, Sublingual, Q5 Min PRN, Keith Varela MD  •  sodium bicarbonate tablet 1,300 mg, 1,300 mg, Oral, TID, Franky Moreua MD, 1,300 mg at 11/10/17 2052  •  sodium chloride 0.9 % flush 1-10 mL, 1-10 mL, Intravenous, PRN, Keith Varela MD  •  Insert peripheral IV, , , Once **AND** sodium chloride 0.9 % flush 10 mL, 10 mL, Intravenous, PRN, Adan Simmons MD, 10 mL at 11/10/17 2052     ASSESSMENT  Acute renal failure on hemodialysis  Rhabdomyolysis  Elevated liver enzymes Questionable etiology probably secondary to Crestor improving  Hypertension  Hyperlipidemia  Atherosclerotic heart disease  Leukocytosis with no fever ? source    PLAN  CPM  Hold onto antibiotics  Hemodialysis TIW  Check biopsy results  OFF Crestor  OFF hydrochlorothiazide  Stress ulcer DVT prophylaxis  Discussed with patient and family  Discussed with Dr. Moreau  Follow closely     KEITH VARELA MD     .

## 2017-11-11 NOTE — PLAN OF CARE
Problem: Patient Care Overview (Adult)  Goal: Plan of Care Review  Outcome: Ongoing (interventions implemented as appropriate)    11/11/17 0456   Coping/Psychosocial Response Interventions   Plan Of Care Reviewed With patient   Patient Care Overview   Progress no change   Outcome Evaluation   Outcome Summary/Follow up Plan patient fell on dayshift while ambulating with staff. reported no injuries. Renal BUN and creatinine continue to trend down however patient has no increase in urine output. Patient scheduled to have HD today through [facility] in right IJ. Pigtail continues to flush and have good blood return. Liver enzymes remain elevated. Will continue to monitor.         Problem: Fall Risk (Adult)  Goal: Absence of Falls  Outcome: Ongoing (interventions implemented as appropriate)    Problem: Renal Failure/Kidney Injury, Acute (Adult)  Goal: Signs and Symptoms of Listed Potential Problems Will be Absent or Manageable (Renal Failure/Kidney Injury, Acute)  Outcome: Ongoing (interventions implemented as appropriate)    Problem: Fluid Volume Deficit (Adult)  Goal: Fluid/Electrolyte Balance  Outcome: Ongoing (interventions implemented as appropriate)  Goal: Comfort/Well Being  Outcome: Ongoing (interventions implemented as appropriate)

## 2017-11-12 LAB
ALBUMIN SERPL-MCNC: 2.1 G/DL (ref 3.5–5.2)
ALBUMIN/GLOB SERPL: 0.7 G/DL
ALP SERPL-CCNC: 249 U/L (ref 39–117)
ALT SERPL W P-5'-P-CCNC: 148 U/L (ref 1–41)
ANION GAP SERPL CALCULATED.3IONS-SCNC: 13.8 MMOL/L
AST SERPL-CCNC: 222 U/L (ref 1–40)
BASOPHILS # BLD AUTO: 0.04 10*3/MM3 (ref 0–0.2)
BASOPHILS NFR BLD AUTO: 0.3 % (ref 0–1.5)
BILIRUB SERPL-MCNC: 2.4 MG/DL (ref 0.1–1.2)
BUN BLD-MCNC: 71 MG/DL (ref 8–23)
BUN/CREAT SERPL: 9 (ref 7–25)
CALCIUM SPEC-SCNC: 8.5 MG/DL (ref 8.6–10.5)
CHLORIDE SERPL-SCNC: 98 MMOL/L (ref 98–107)
CK SERPL-CCNC: 3017 U/L (ref 20–200)
CO2 SERPL-SCNC: 27.2 MMOL/L (ref 22–29)
CREAT BLD-MCNC: 7.89 MG/DL (ref 0.76–1.27)
DEPRECATED RDW RBC AUTO: 56.8 FL (ref 37–54)
EOSINOPHIL # BLD AUTO: 0.31 10*3/MM3 (ref 0–0.7)
EOSINOPHIL NFR BLD AUTO: 2.7 % (ref 0.3–6.2)
ERYTHROCYTE [DISTWIDTH] IN BLOOD BY AUTOMATED COUNT: 16.2 % (ref 11.5–14.5)
GFR SERPL CREATININE-BSD FRML MDRD: 7 ML/MIN/1.73
GLOBULIN UR ELPH-MCNC: 3.2 GM/DL
GLUCOSE BLD-MCNC: 88 MG/DL (ref 65–99)
GLUCOSE BLDC GLUCOMTR-MCNC: 102 MG/DL (ref 70–130)
GLUCOSE BLDC GLUCOMTR-MCNC: 85 MG/DL (ref 70–130)
HCT VFR BLD AUTO: 39.6 % (ref 40.4–52.2)
HGB BLD-MCNC: 13.2 G/DL (ref 13.7–17.6)
IMM GRANULOCYTES # BLD: 0.1 10*3/MM3 (ref 0–0.03)
IMM GRANULOCYTES NFR BLD: 0.9 % (ref 0–0.5)
LYMPHOCYTES # BLD AUTO: 2.3 10*3/MM3 (ref 0.9–4.8)
LYMPHOCYTES NFR BLD AUTO: 19.8 % (ref 19.6–45.3)
MCH RBC QN AUTO: 32.8 PG (ref 27–32.7)
MCHC RBC AUTO-ENTMCNC: 33.3 G/DL (ref 32.6–36.4)
MCV RBC AUTO: 98.3 FL (ref 79.8–96.2)
MONOCYTES # BLD AUTO: 1.83 10*3/MM3 (ref 0.2–1.2)
MONOCYTES NFR BLD AUTO: 15.7 % (ref 5–12)
NEUTROPHILS # BLD AUTO: 7.05 10*3/MM3 (ref 1.9–8.1)
NEUTROPHILS NFR BLD AUTO: 60.6 % (ref 42.7–76)
PLATELET # BLD AUTO: 136 10*3/MM3 (ref 140–500)
PMV BLD AUTO: 11.5 FL (ref 6–12)
POTASSIUM BLD-SCNC: 4.6 MMOL/L (ref 3.5–5.2)
PROT SERPL-MCNC: 5.3 G/DL (ref 6–8.5)
RBC # BLD AUTO: 4.03 10*6/MM3 (ref 4.6–6)
SODIUM BLD-SCNC: 139 MMOL/L (ref 136–145)
WBC NRBC COR # BLD: 11.63 10*3/MM3 (ref 4.5–10.7)

## 2017-11-12 PROCEDURE — 82550 ASSAY OF CK (CPK): CPT | Performed by: INTERNAL MEDICINE

## 2017-11-12 PROCEDURE — 82962 GLUCOSE BLOOD TEST: CPT

## 2017-11-12 PROCEDURE — 85025 COMPLETE CBC W/AUTO DIFF WBC: CPT | Performed by: HOSPITALIST

## 2017-11-12 PROCEDURE — 80053 COMPREHEN METABOLIC PANEL: CPT | Performed by: HOSPITALIST

## 2017-11-12 RX ORDER — PANTOPRAZOLE SODIUM 40 MG/1
40 TABLET, DELAYED RELEASE ORAL
Status: DISCONTINUED | OUTPATIENT
Start: 2017-11-13 | End: 2017-11-16 | Stop reason: HOSPADM

## 2017-11-12 RX ADMIN — SODIUM BICARBONATE 1300 MG: 650 TABLET ORAL at 08:50

## 2017-11-12 RX ADMIN — SODIUM BICARBONATE 1300 MG: 650 TABLET ORAL at 17:22

## 2017-11-12 RX ADMIN — SODIUM BICARBONATE 1300 MG: 650 TABLET ORAL at 20:46

## 2017-11-12 RX ADMIN — ASPIRIN 81 MG: 81 TABLET ORAL at 08:50

## 2017-11-12 NOTE — PROGRESS NOTES
LOS: 6 days   Patient Care Team:  Aldo Guajardo MD as PCP - General  Aldo Guajardo MD as PCP - Family Medicine    Chief Complaint: acute renal failure        Subjective     Weakness - Generalized     Abnormal Lab         Subjective    Patient with acute renal failure secondary to rhabdo, now requiring hemodialysis      Objective     Vital Signs  Temp:  [97.6 °F (36.4 °C)-98.6 °F (37 °C)] 97.8 °F (36.6 °C)  Heart Rate:  [54-60] 60  Resp:  [16-18] 16  BP: (107-120)/(49-81) 111/58    Objective  General Appearance:  In no acute distress.    HEENT: Normal HEENT exam.    Lungs:  Normal respiratory rate and normal effort.  He is not in respiratory distress.  Breath sounds clear to auscultation.  No wheezes, rales or rhonchi.    Heart: Normal rate.  Regular rhythm.  S1 normal and S2 normal.  No murmur or gallop.   Chest: Asymmetric chest wall expansion.   Extremities: Normal range of motion.  Trace edema.    Neurological: Patient is alert and oriented to person, place and time.    Skin:  Warm and dry.  No rash.   Abdomen: Abdomen is soft and non-distended.  There are no signs of ascites.  There is no abdominal tenderness.    Results Review:     I reviewed the patient's new clinical results.    Medication Review:   No current facility-administered medications on file prior to encounter.      Current Outpatient Prescriptions on File Prior to Encounter   Medication Sig Dispense Refill   • aspirin 325 MG tablet Take 1 tablet by mouth Daily.     • rosuvastatin (CRESTOR) 40 MG tablet Take 1 tablet by mouth Daily.         Assessment/Plan     Active Problems:    Acute kidney injury (nontraumatic)      Assessment & Plan  1. Acute renal failure  2. Rhabdomyolysis  3. +anca  4. Metabolic acidosis    Patient seen and examined. S/p hd yesterday. Feels like he is urinating more. bp at goal trace edema.  Will continue current. Will recheck labs in am. If he needs hd this week, will get tdc    Aniya Warner,  MD  11/12/17  11:04 AM

## 2017-11-12 NOTE — PROGRESS NOTES
"Daily progress note    Chief complaint  Doing same  No new complaints      History of present illness  67-year-old white male with history of atherosclerotic heart disease hypertension hyperlipidemia otherwise in good health and no history of kidney disease present it to [facility] emergency room for last 5-7 days weakness nausea not feeling well and decreased urine output.  Patient evaluated found to be in acute renal failure with low potassium admitted for management.  Patient denies any diarrhea fever cough chest pain shortness of breath.     REVIEW OF SYSTEMS  Review of Systems   Constitutional: Negative for activity change, appetite change and fever.   HENT: Negative for congestion and sore throat.    Eyes: Negative.    Respiratory: Negative for cough and shortness of breath.    Cardiovascular: Negative for chest pain and leg swelling.   Gastrointestinal: Negative for abdominal pain, diarrhea and vomiting.   Endocrine: Negative.    Genitourinary: Positive for frequency. Negative for decreased urine volume and dysuria.        Increased thirst   Musculoskeletal: Negative for neck pain.   Skin: Negative for rash and wound.   Allergic/Immunologic: Negative.    Neurological: Positive for weakness. Negative for numbness and headaches.   Hematological: Negative.    Psychiatric/Behavioral: Negative.    All other systems reviewed and are negative.     PHYSICAL EXAM  Blood pressure 122/63, pulse 57, temperature 97.8 °F (36.6 °C), temperature source Oral, resp. rate 16, height 72.99\" (185.4 cm), weight 229 lb 6.4 oz (104 kg), SpO2 95 %.    Constitutional: He is oriented to person, place, and time and well-developed, well-nourished, and in no distress.   Head: Normocephalic and atraumatic.   Mouth/Throat: Mucous membranes are dry.   Eyes: EOM are normal. Pupils are equal, round, and reactive to light.   Neck: Normal range of motion. Neck supple.   Cardiovascular: Regular rhythm and normal heart sounds.  Bradycardia " present.    No murmur heard.  Pulmonary/Chest: Effort normal and breath sounds normal. No respiratory distress.   Abdominal: Soft. He exhibits no distension. There is no tenderness. There is no rebound and no guarding.   Musculoskeletal: Normal range of motion. He exhibits no edema.   Neurological: He is alert and oriented to person, place, and time. He has normal sensation and normal strength.   No focal weakness or facial droop. Slow mentation.   Skin: Skin is warm and dry.   Turgor is normal, cap refill 1-2 seconds.     Psychiatric: Mood and affect normal.     LAB RESULTS  Lab Results (last 24 hours)     Procedure Component Value Units Date/Time    Blood Culture - Blood, [[id number]]  (Normal) Collected:  11/10/17 1427    Specimen:  Blood from Arm, Left Updated:  11/11/17 1446     Blood Culture No growth at 24 hours    Blood Culture - Blood, [[id number]]  (Normal) Collected:  11/10/17 1537    Specimen:  Blood from Arm, Right Updated:  11/11/17 1601     Blood Culture No growth at 24 hours    POC Glucose Fingerstick [[id number]]  (Normal) Collected:  11/11/17 1638    Specimen:  Blood Updated:  11/11/17 1639     Glucose 95 mg/dL     Narrative:       Meter: [id number] : [id number] Antwan Kirkland DI    POC Glucose Fingerstick [[id number]]  (Normal) Collected:  11/11/17 2021    Specimen:  Blood Updated:  11/11/17 2023     Glucose 81 mg/dL     Narrative:       Meter: [id number] : [id number] Aldo ROWE    POC Glucose Fingerstick [[id number]]  (Normal) Collected:  11/12/17 0732    Specimen:  Blood Updated:  11/12/17 0733     Glucose 85 mg/dL     Narrative:       Meter: [id number] : [id number] Louise PEREZ    CBC & Differential [[id number]] Collected:  11/12/17 0754    Specimen:  Blood Updated:  11/12/17 0822    Narrative:       The following orders were created for panel order CBC & Differential.  Procedure                               Abnormality         Status                     ---------                                -----------         ------                     CBC Auto Differential[[id number]]        Abnormal            Final result                 Please view results for these tests on the individual orders.    CBC Auto Differential [[id number]]  (Abnormal) Collected:  11/12/17 0754    Specimen:  Blood Updated:  11/12/17 0822     WBC 11.63 (H) 10*3/mm3      RBC 4.03 (L) 10*6/mm3      Hemoglobin 13.2 (L) g/dL      Hematocrit 39.6 (L) %      MCV 98.3 (H) fL      MCH 32.8 (H) pg      MCHC 33.3 g/dL      RDW 16.2 (H) %      RDW-SD 56.8 (H) fl      MPV 11.5 fL      Platelets 136 (L) 10*3/mm3      Neutrophil % 60.6 %      Lymphocyte % 19.8 %      Monocyte % 15.7 (H) %      Eosinophil % 2.7 %      Basophil % 0.3 %      Immature Grans % 0.9 (H) %      Neutrophils, Absolute 7.05 10*3/mm3      Lymphocytes, Absolute 2.30 10*3/mm3      Monocytes, Absolute 1.83 (H) 10*3/mm3      Eosinophils, Absolute 0.31 10*3/mm3      Basophils, Absolute 0.04 10*3/mm3      Immature Grans, Absolute 0.10 (H) 10*3/mm3     Comprehensive Metabolic Panel [[id number]]  (Abnormal) Collected:  11/12/17 0754    Specimen:  Blood Updated:  11/12/17 0907     Glucose 88 mg/dL      BUN 71 (H) mg/dL      Creatinine 7.89 (H) mg/dL      Sodium 139 mmol/L      Potassium 4.6 mmol/L      Chloride 98 mmol/L      CO2 27.2 mmol/L      Calcium 8.5 (L) mg/dL      Total Protein 5.3 (L) g/dL      Albumin 2.10 (L) g/dL      ALT (SGPT) 148 (H) U/L      AST (SGOT) 222 (H) U/L      Alkaline Phosphatase 249 (H) U/L      Total Bilirubin 2.4 (H) mg/dL      eGFR Non African Amer 7 (L) mL/min/1.73      Globulin 3.2 gm/dL      A/G Ratio 0.7 g/dL      BUN/Creatinine Ratio 9.0     Anion Gap 13.8 mmol/L     CK [[id number]]  (Abnormal) Collected:  11/12/17 0754    Specimen:  Blood Updated:  11/12/17 0920     Creatine Kinase 3017 (H) U/L     POC Glucose Fingerstick [[id number]]  (Normal) Collected:  11/12/17 1155    Specimen:  Blood Updated:  11/12/17 1157     Glucose 102 mg/dL      Narrative:       Meter: [id number] : [id number] Louise PEREZ        Imaging Results (last 72 hours)     Procedure Component Value Units Date/Time    US Gallbladder [[id number]] Collected:  11/06/17 1931     Updated:  11/06/17 1937    Narrative:       US GALLBLADDER-        INDICATIONS: Elevated liver function tests     TECHNIQUE: Ultrasound of the right upper quadrant     COMPARISON: None available     FINDINGS:     The gallbladder is nontender, with sludge but no shadowing stones  demonstrated. No gallbladder wall thickening or pericholecystic fluid.     No intrahepatic or extrahepatic biliary ductal dilatation is  demonstrated. The common biliary duct caliber is measured at 4.0 mm.     No liver lesion is identified. Diffusely, the echogenicity of the liver  is otherwise in the range of normal relative to that of the right  kidney.     The pancreas is mostly obscured. The right kidney, 10.8 cm, and the  pancreas otherwise appear unremarkable.                Impression:          Sludge in the gallbladder. No evidence for acute cholecystitis. No  discrete liver lesion or biliary ductal dilatation identified. With  persistent clinical indication, enhanced liver imaging could be  considered for further evaluation.     This report was finalized on 11/6/2017 7:33 PM by Dr. Gokul Real MD.       CT Abdomen Pelvis Without Contrast [[id number]] Collected:  11/06/17 2007     Updated:  11/06/17 2017    Narrative:       CT ABDOMEN AND PELVIS WITHOUT CONTRAST     HISTORY: Elevated liver function test. Gallbladder sludge.      TECHNIQUE: CT abdomen and pelvis without IV or oral contrast.     COMPARISON: Right upper quadrant ultrasound 11/06/2017.     FINDINGS: Lung bases appear clear and there is no pleural effusion.  Liver exhibits slight undelayed margins consistent with hepatic  cirrhosis. There Is no evidence for intra or extrahepatic biliary duct  dilatation. There is density within the gallbladder  consistent with  sludge as demonstrated sonographically. Adrenal glands, pancreas,  spleen, kidneys appear within normal limits. There is no hydronephrosis  or hydroureter. Urinary bladder is mostly decompressed. There is no  bowel dilatation or evidence for bowel obstruction. No ben enlargement  is evident within the abdomen or pelvis. Atherosclerotic calcifications  are present involving the abdominal aorta and the abdominal aorta  measures up to 2.4 cm diameter. Mural calcifications are present.       Impression:       1. Sludge within the gallbladder without other evidence for  cholecystitis. Mild undulating hepatic margins consistent with hepatic  cirrhosis.  2. Atherosclerotic disease. 2.4 cm infrarenal abdominal aorta.     Radiation dose reduction techniques were utilized, including automated  exposure control and exposure modulation based on body size.     This report was finalized on 11/6/2017 8:14 PM by Dr. Marcos Sainz MD.       XR Chest 2 View [[id number]] Collected:  11/06/17 2041     Updated:  11/06/17 2046    Narrative:       XR CHEST 2 VW-     HISTORY: Male who is 67 years-old,  acute renal failure     TECHNIQUE: Frontal and lateral views of the chest     COMPARISON: None available     FINDINGS: Heart, mediastinum and pulmonary vasculature are unremarkable.  No focal pulmonary consolidation, pleural effusion, or pneumothorax. Old  granulomatous disease is apparent. No acute osseous process.       Impression:       No evidence for acute pulmonary process. Follow-up as  clinical indications persist.     This report was finalized on 11/6/2017 8:43 PM by Dr. Gokul Real MD.       US Renal Bilateral [[id number]] Collected:  11/07/17 0145     Updated:  11/07/17 0145    Narrative:       ULTRASOUND RENAL BILATERAL.     TECHNIQUE: Grayscale and color images of the kidneys were obtained.     HISTORY: Acute kidney injury.     COMPARISON: No prior studies for comparison.     FINDINGS:  Right kidney  measures 11.6 x 4.8 x 5.4 cm. Left kidney measures 12.5 x  5.9 x 5.7 cm. There is no hydronephrosis, stone or mass.     Urinary bladder could not be well visualized. No calculus, sludge or  mass.       Impression:       No acute findings involving both kidneys.             EKG                                                  Rhythm/Rate: Sinus bradycardia 48  P waves and OH: Normal  QRS, axis: IVCD   ST and T waves: Biphasic T-wave and Inverted T-waves inferiorly        Current Facility-Administered Medications:   •  ALPRAZolam (XANAX) tablet 0.25 mg, 0.25 mg, Oral, 4x Daily PRN, Keith Varela MD, 0.25 mg at 11/10/17 0046  •  amLODIPine (NORVASC) tablet 5 mg, 5 mg, Oral, Q24H, Keith Varela MD, 5 mg at 11/11/17 1737  •  aspirin EC tablet 81 mg, 81 mg, Oral, Daily, Keith Varela MD, 81 mg at 11/12/17 0850  •  heparin (porcine) injection 3,000 Units, 3,000 Units, Intravenous, PRN, Franky Moreau MD  •  insulin aspart (novoLOG) injection 0-7 Units, 0-7 Units, Subcutaneous, 4x Daily With Meals & Nightly, Keith Varela MD, 3 Units at 11/09/17 2058  •  nitroglycerin (NITROSTAT) SL tablet 0.4 mg, 0.4 mg, Sublingual, Q5 Min PRN, Keith Varela MD  •  sodium bicarbonate tablet 1,300 mg, 1,300 mg, Oral, TID, Franky Moreau MD, 1,300 mg at 11/12/17 0850  •  sodium chloride 0.9 % flush 1-10 mL, 1-10 mL, Intravenous, PRN, Keith Varela MD  •  Insert peripheral IV, , , Once **AND** sodium chloride 0.9 % flush 10 mL, 10 mL, Intravenous, PRN, Adan Simmons MD, 10 mL at 11/10/17 2052     ASSESSMENT  Acute renal failure on hemodialysis  Rhabdomyolysis  Elevated liver enzymes Questionable etiology probably secondary to Crestor improving  Hypertension  Hyperlipidemia  Atherosclerotic heart disease  Leukocytosis with no fever ? source    PLAN  CPM  Hemodialysis TIW  Check biopsy results  OFF Crestor  OFF hydrochlorothiazide  Stress ulcer DVT prophylaxis  Follow closely     KEITH VARELA MD     .

## 2017-11-12 NOTE — PLAN OF CARE
Problem: Patient Care Overview (Adult)  Goal: Plan of Care Review  Outcome: Ongoing (interventions implemented as appropriate)    11/12/17 0406   Coping/Psychosocial Response Interventions   Plan Of Care Reviewed With patient   Outcome Evaluation   Outcome Summary/Follow up Plan Patient resting well. Not in any distress. Vitals as charted. .AM labs as ordered. Fall precautions enforced. Monitored.       Goal: Discharge Needs Assessment  Outcome: Ongoing (interventions implemented as appropriate)    Problem: Fall Risk (Adult)  Goal: Absence of Falls  Outcome: Ongoing (interventions implemented as appropriate)    Problem: Renal Failure/Kidney Injury, Acute (Adult)  Goal: Signs and Symptoms of Listed Potential Problems Will be Absent or Manageable (Renal Failure/Kidney Injury, Acute)  Outcome: Ongoing (interventions implemented as appropriate)    Problem: Fluid Volume Deficit (Adult)  Goal: Fluid/Electrolyte Balance  Outcome: Ongoing (interventions implemented as appropriate)  Goal: Comfort/Well Being  Outcome: Ongoing (interventions implemented as appropriate)

## 2017-11-12 NOTE — PLAN OF CARE
Problem: Patient Care Overview (Adult)  Goal: Plan of Care Review  Outcome: Ongoing (interventions implemented as appropriate)    11/12/17 1728   Coping/Psychosocial Response Interventions   Plan Of Care Reviewed With patient   Outcome Evaluation   Outcome Summary/Follow up Plan no complaints; resting in bed; cont to monitor       Goal: Adult Individualization and Mutuality  Outcome: Ongoing (interventions implemented as appropriate)  Goal: Discharge Needs Assessment  Outcome: Ongoing (interventions implemented as appropriate)    Problem: Fall Risk (Adult)  Goal: Identify Related Risk Factors and Signs and Symptoms  Outcome: Outcome(s) achieved Date Met:  11/12/17  Goal: Absence of Falls  Outcome: Ongoing (interventions implemented as appropriate)    Problem: Renal Failure/Kidney Injury, Acute (Adult)  Goal: Signs and Symptoms of Listed Potential Problems Will be Absent or Manageable (Renal Failure/Kidney Injury, Acute)  Outcome: Ongoing (interventions implemented as appropriate)    Problem: Fluid Volume Deficit (Adult)  Goal: Fluid/Electrolyte Balance  Outcome: Ongoing (interventions implemented as appropriate)  Goal: Comfort/Well Being  Outcome: Ongoing (interventions implemented as appropriate)

## 2017-11-13 LAB
ALBUMIN SERPL-MCNC: 2.2 G/DL (ref 3.5–5.2)
ALBUMIN/GLOB SERPL: 0.7 G/DL
ALP SERPL-CCNC: 263 U/L (ref 39–117)
ALT SERPL W P-5'-P-CCNC: 130 U/L (ref 1–41)
ANION GAP SERPL CALCULATED.3IONS-SCNC: 18.6 MMOL/L
AST SERPL-CCNC: 159 U/L (ref 1–40)
BASOPHILS # BLD AUTO: 0.03 10*3/MM3 (ref 0–0.2)
BASOPHILS NFR BLD AUTO: 0.2 % (ref 0–1.5)
BILIRUB SERPL-MCNC: 3.1 MG/DL (ref 0.1–1.2)
BUN BLD-MCNC: 91 MG/DL (ref 8–23)
BUN/CREAT SERPL: 9.8 (ref 7–25)
C-ANCA TITR SER IF: ABNORMAL TITER
CALCIUM SPEC-SCNC: 8.8 MG/DL (ref 8.6–10.5)
CHLORIDE SERPL-SCNC: 96 MMOL/L (ref 98–107)
CK SERPL-CCNC: 1209 U/L (ref 20–200)
CO2 SERPL-SCNC: 25.4 MMOL/L (ref 22–29)
CREAT BLD-MCNC: 9.25 MG/DL (ref 0.76–1.27)
DEPRECATED RDW RBC AUTO: 59.9 FL (ref 37–54)
EOSINOPHIL # BLD AUTO: 0.41 10*3/MM3 (ref 0–0.7)
EOSINOPHIL NFR BLD AUTO: 3.4 % (ref 0.3–6.2)
ERYTHROCYTE [DISTWIDTH] IN BLOOD BY AUTOMATED COUNT: 16.5 % (ref 11.5–14.5)
GFR SERPL CREATININE-BSD FRML MDRD: 6 ML/MIN/1.73
GLOBULIN UR ELPH-MCNC: 3.3 GM/DL
GLUCOSE BLD-MCNC: 103 MG/DL (ref 65–99)
HCT VFR BLD AUTO: 40.3 % (ref 40.4–52.2)
HGB BLD-MCNC: 13.3 G/DL (ref 13.7–17.6)
IMM GRANULOCYTES # BLD: 0.09 10*3/MM3 (ref 0–0.03)
IMM GRANULOCYTES NFR BLD: 0.7 % (ref 0–0.5)
LYMPHOCYTES # BLD AUTO: 1.91 10*3/MM3 (ref 0.9–4.8)
LYMPHOCYTES NFR BLD AUTO: 15.9 % (ref 19.6–45.3)
MCH RBC QN AUTO: 33.3 PG (ref 27–32.7)
MCHC RBC AUTO-ENTMCNC: 33 G/DL (ref 32.6–36.4)
MCV RBC AUTO: 100.8 FL (ref 79.8–96.2)
MONOCYTES # BLD AUTO: 1.75 10*3/MM3 (ref 0.2–1.2)
MONOCYTES NFR BLD AUTO: 14.6 % (ref 5–12)
NEUTROPHILS # BLD AUTO: 7.82 10*3/MM3 (ref 1.9–8.1)
NEUTROPHILS NFR BLD AUTO: 65.2 % (ref 42.7–76)
P-ANCA ATYPICAL TITR SER IF: ABNORMAL TITER
P-ANCA TITR SER IF: ABNORMAL TITER
PLATELET # BLD AUTO: 144 10*3/MM3 (ref 140–500)
PMV BLD AUTO: 11.7 FL (ref 6–12)
POTASSIUM BLD-SCNC: 4.9 MMOL/L (ref 3.5–5.2)
PROT SERPL-MCNC: 5.5 G/DL (ref 6–8.5)
RBC # BLD AUTO: 4 10*6/MM3 (ref 4.6–6)
SODIUM BLD-SCNC: 140 MMOL/L (ref 136–145)
WBC NRBC COR # BLD: 12.01 10*3/MM3 (ref 4.5–10.7)

## 2017-11-13 PROCEDURE — 82550 ASSAY OF CK (CPK): CPT | Performed by: INTERNAL MEDICINE

## 2017-11-13 PROCEDURE — 97162 PT EVAL MOD COMPLEX 30 MIN: CPT | Performed by: PHYSICAL THERAPIST

## 2017-11-13 PROCEDURE — 80053 COMPREHEN METABOLIC PANEL: CPT | Performed by: HOSPITALIST

## 2017-11-13 PROCEDURE — 97110 THERAPEUTIC EXERCISES: CPT | Performed by: PHYSICAL THERAPIST

## 2017-11-13 PROCEDURE — 85025 COMPLETE CBC W/AUTO DIFF WBC: CPT | Performed by: HOSPITALIST

## 2017-11-13 RX ADMIN — SODIUM BICARBONATE 1300 MG: 650 TABLET ORAL at 10:09

## 2017-11-13 RX ADMIN — ASPIRIN 81 MG: 81 TABLET ORAL at 10:09

## 2017-11-13 RX ADMIN — AMLODIPINE BESYLATE 5 MG: 5 TABLET ORAL at 10:09

## 2017-11-13 RX ADMIN — PANTOPRAZOLE SODIUM 40 MG: 40 TABLET, DELAYED RELEASE ORAL at 06:13

## 2017-11-13 NOTE — CONSULTS
The pt had placement of a temporary dialysis catheter in the R IJ vein on 11/8/17.  He has not had any renal recovery.    It is now desired to have a tunneled dialysis catheter placed.      Have put on the schedule for tomorrow.  Thanks.

## 2017-11-13 NOTE — PROGRESS NOTES
BGA/GI Progress Note   Chief Complaint:  Elevated LFT's    Subjective     Interval History: No abd pain, some nausea this am and not eating much.  Normal BM's per pt.    History taken from: patient chart    Review of Systems:    All systems were reviewed and negative except for:  Gastrointestinal: postitive for  nausea and scleral icterus    Objective     Vital Signs  Temp:  [98.3 °F (36.8 °C)-98.6 °F (37 °C)] 98.4 °F (36.9 °C)  Heart Rate:  [57-62] 60  Resp:  [14-16] 14  BP: (121-133)/(56-73) 127/72  Body mass index is 31.27 kg/(m^2).    Intake/Output Summary (Last 24 hours) at 11/13/17 0903  Last data filed at 11/12/17 1023   Gross per 24 hour   Intake                0 ml   Output               30 ml   Net              -30 ml          Physical Exam:   General: patient awake, alert and cooperative, resting in bed, no distress   Eyes: Normal lids and lashes, mild scleral icterus, no conjunctival pallor   Neck: supple, normal ROM, no tracheal deviation, dialysis access to right side of neck   Skin: warm and dry, not jaundiced   Cardiovascular: regular rhythm and rate, no murmurs auscultated   Pulm: clear to auscultation bilaterally, regular and unlabored   Abdomen: soft, nontender, nondistended; normal bowel sounds   Rectal: deferred   Extremities: no rash or edema   Neurologic: Normal mood and behavior    All Medications Have Been Reviewed     Results Review:       Results from last 7 days  Lab Units 11/13/17  0554 11/12/17 0754 11/11/17 0636   WBC 10*3/mm3 12.01* 11.63* 12.97*   HEMOGLOBIN g/dL 13.3* 13.2* 14.4   HEMATOCRIT % 40.3* 39.6* 42.5   PLATELETS 10*3/mm3 144 136* 140         Results from last 7 days  Lab Units 11/13/17  0554 11/12/17 0754 11/11/17  0636   SODIUM mmol/L 140 139 142   POTASSIUM mmol/L 4.9 4.6 4.1   CHLORIDE mmol/L 96* 98 100   CO2 mmol/L 25.4 27.2 22.8   BUN mg/dL 91* 71* 89*   CREATININE mg/dL 9.25* 7.89* 9.93*   CALCIUM mg/dL 8.8 8.5* 8.2*   BILIRUBIN mg/dL 3.1* 2.4* 1.6*   ALK  PHOS U/L 263* 249* 244*   ALT (SGPT) U/L 130* 148* 164*   AST (SGOT) U/L 159* 222* 303*   GLUCOSE mg/dL 103* 88 84         Results from last 7 days  Lab Units 11/08/17  0541   INR  1.22*       RADIOLOGY:    Imaging Results (last 72 hours)     ** No results found for the last 72 hours. **          Assessment/Plan     Patient Active Problem List   Diagnosis Code   • Arteriosclerotic vascular disease I70.90   • Hyperlipidemia E78.5   • Hypertension I10   • Acute kidney injury (nontraumatic) N17.9     Dia FABRICIO [place], APRN  11/13/17  9:03 AM            We are following for elevated liver tests    Patient was not in his room no physical exam could be performed    Plan below per  nurse practitioner    Assessment:  1) Elevated LFT's- Bilirubin continues to climb as does alk phos over last several days. Given significantly elevated CK most likely acute elevation is from muscle origin.    2) Abnormal CT- Admission US with gallbladder sludge, no liver abnormality reported although CT abd/pelvis with cirrhotic appearing liver. Hep panel negative. Doppler US with no portal vein thrombosis.  Serology notable for + ASMA and AFP, ? significance  3) STEVEN- renal following, started dialysis 11/8, renal bx pending  4) Rhabdomyolosis- resulting in liver failure, ? Cause.  CK remains elevated but improving    - follow LFT's daily  - if LFT's remain abnormal with resolution of rhabdo further w/u with liver bx should be considered      I will try to see him tomorrow

## 2017-11-13 NOTE — PLAN OF CARE
Problem: Patient Care Overview (Adult)  Goal: Plan of Care Review  Outcome: Ongoing (interventions implemented as appropriate)  Goal: Adult Individualization and Mutuality  Outcome: Ongoing (interventions implemented as appropriate)  Goal: Discharge Needs Assessment  Outcome: Ongoing (interventions implemented as appropriate)    Problem: Fall Risk (Adult)  Goal: Absence of Falls  Outcome: Ongoing (interventions implemented as appropriate)    Problem: Renal Failure/Kidney Injury, Acute (Adult)  Goal: Signs and Symptoms of Listed Potential Problems Will be Absent or Manageable (Renal Failure/Kidney Injury, Acute)  Outcome: Ongoing (interventions implemented as appropriate)    Problem: Fluid Volume Deficit (Adult)  Goal: Fluid/Electrolyte Balance  Outcome: Ongoing (interventions implemented as appropriate)  Goal: Comfort/Well Being  Outcome: Ongoing (interventions implemented as appropriate)

## 2017-11-13 NOTE — PROGRESS NOTES
LOS: 7 days   Patient Care Team:  Aldo Guajardo MD as PCP - General  Aldo Guajardo MD as PCP - Family Medicine    Chief Complaint/ Reason for encounter: Acute renal failure/dialysis management  Chief Complaint   Patient presents with   • Weakness - Generalized     X1 WEEK   • Abnormal Lab     LABS DRAWN TODAY, CREATININE 7.0         Subjective     Weakness - Generalized   Associated symptoms include weakness. Pertinent negatives include no chest pain, fever or nausea.   Abnormal Lab   Associated symptoms include weakness. Pertinent negatives include no chest pain, fever or nausea.       Subjective:  Symptoms:  Improved.  He reports weakness.  No shortness of breath or chest pain.  (Still complains of weakness, leg weakness and soreness but this seems to be improved  He tolerated dialysis well today  Patient was seen on hemodialysis  Treatment orders discussed with the dialysis RN, Scar  Blood pressure: 100/61, pulse 54  Blood flow rate/Dialysate flow rate: 200/500  Potassium bath/Fluid removal goal:  3K/no UF   Arterial pressure/ Venous pressure: 80/90).    Diet:  Adequate intake.  No nausea.    Activity level: Impaired due to weakness.    Pain:  He reports no pain.          History taken from: Patient and chart    Objective     Vital Signs  Temp:  [98.2 °F (36.8 °C)-98.6 °F (37 °C)] 98.2 °F (36.8 °C)  Heart Rate:  [60-63] 63  Resp:  [14-18] 18  BP: (120-149)/(56-73) 133/65       Wt Readings from Last 1 Encounters:   11/13/17 1607 229 lb 4.5 oz (104 kg)   11/13/17 1600 228 lb 6.3 oz (104 kg)   11/13/17 0300 236 lb 15.9 oz (107 kg)   11/12/17 0424 229 lb 6.4 oz (104 kg)   11/11/17 0412 229 lb 11.5 oz (104 kg)   11/10/17 0407 232 lb 9.6 oz (106 kg)   11/09/17 0413 234 lb 8 oz (106 kg)   11/07/17 1331 218 lb 11.1 oz (99.2 kg)   11/06/17 2118 218 lb 12.8 oz (99.2 kg)   11/06/17 1643 215 lb (97.5 kg)       Objective:  General Appearance:  Comfortable, ill-appearing, in no acute distress and not in  "pain.    Vital signs: (most recent): Blood pressure 133/65, pulse 63, temperature 98.2 °F (36.8 °C), temperature source Oral, resp. rate 18, height 72.99\" (185.4 cm), weight 229 lb 4.5 oz (104 kg), SpO2 93 %.  Vital signs are normal.  No fever.    Output: Minimal urine output.    HEENT: Normal HEENT exam.    Lungs:  Normal respiratory rate and normal effort.  He is not in respiratory distress.  Breath sounds clear to auscultation.  No wheezes or decreased breath sounds.    Heart: Normal rate.  Regular rhythm.  S1 normal.  No murmur.   Abdomen: Abdomen is soft and non-distended.  Bowel sounds are normal.   There is no epigastric area or no suprapubic area tenderness.  There is no rebound tenderness.     Extremities: Normal range of motion.  There is no deformity or dependent edema.    Pulses: Distal pulses are intact.    Neurological: Patient is alert and oriented to person, place and time.    Skin:  Warm and dry.  No rash or cyanosis.             Results Review:    Past Medical History: reviewed and updated  Past Medical History:   Diagnosis Date   • Arteriosclerotic cardiovascular disease    • History of transfusion    • Hyperlipidemia    • Hypertension          Allergies:  No Known Allergies    Intake/Output:     Intake/Output Summary (Last 24 hours) at 11/13/17 1701  Last data filed at 11/13/17 1600   Gross per 24 hour   Intake              360 ml   Output              120 ml   Net              240 ml         DATA:  Interval chart, labs and notes reviewed.    Labs:   Recent Results (from the past 24 hour(s))   CK    Collection Time: 11/13/17  5:54 AM   Result Value Ref Range    Creatine Kinase 1209 (H) 20 - 200 U/L   Comprehensive Metabolic Panel    Collection Time: 11/13/17  5:54 AM   Result Value Ref Range    Glucose 103 (H) 65 - 99 mg/dL    BUN 91 (H) 8 - 23 mg/dL    Creatinine 9.25 (H) 0.76 - 1.27 mg/dL    Sodium 140 136 - 145 mmol/L    Potassium 4.9 3.5 - 5.2 mmol/L    Chloride 96 (L) 98 - 107 mmol/L    CO2 " 25.4 22.0 - 29.0 mmol/L    Calcium 8.8 8.6 - 10.5 mg/dL    Total Protein 5.5 (L) 6.0 - 8.5 g/dL    Albumin 2.20 (L) 3.50 - 5.20 g/dL    ALT (SGPT) 130 (H) 1 - 41 U/L    AST (SGOT) 159 (H) 1 - 40 U/L    Alkaline Phosphatase 263 (H) 39 - 117 U/L    Total Bilirubin 3.1 (H) 0.1 - 1.2 mg/dL    eGFR Non African Amer 6 (L) >60 mL/min/1.73    Globulin 3.3 gm/dL    A/G Ratio 0.7 g/dL    BUN/Creatinine Ratio 9.8 7.0 - 25.0    Anion Gap 18.6 mmol/L   CBC Auto Differential    Collection Time: 11/13/17  5:54 AM   Result Value Ref Range    WBC 12.01 (H) 4.50 - 10.70 10*3/mm3    RBC 4.00 (L) 4.60 - 6.00 10*6/mm3    Hemoglobin 13.3 (L) 13.7 - 17.6 g/dL    Hematocrit 40.3 (L) 40.4 - 52.2 %    .8 (H) 79.8 - 96.2 fL    MCH 33.3 (H) 27.0 - 32.7 pg    MCHC 33.0 32.6 - 36.4 g/dL    RDW 16.5 (H) 11.5 - 14.5 %    RDW-SD 59.9 (H) 37.0 - 54.0 fl    MPV 11.7 6.0 - 12.0 fL    Platelets 144 140 - 500 10*3/mm3    Neutrophil % 65.2 42.7 - 76.0 %    Lymphocyte % 15.9 (L) 19.6 - 45.3 %    Monocyte % 14.6 (H) 5.0 - 12.0 %    Eosinophil % 3.4 0.3 - 6.2 %    Basophil % 0.2 0.0 - 1.5 %    Immature Grans % 0.7 (H) 0.0 - 0.5 %    Neutrophils, Absolute 7.82 1.90 - 8.10 10*3/mm3    Lymphocytes, Absolute 1.91 0.90 - 4.80 10*3/mm3    Monocytes, Absolute 1.75 (H) 0.20 - 1.20 10*3/mm3    Eosinophils, Absolute 0.41 0.00 - 0.70 10*3/mm3    Basophils, Absolute 0.03 0.00 - 0.20 10*3/mm3    Immature Grans, Absolute 0.09 (H) 0.00 - 0.03 10*3/mm3       Radiology:  Imaging Results (last 24 hours)     ** No results found for the last 24 hours. **             Medications have been reviewed:  Current Facility-Administered Medications   Medication Dose Route Frequency Provider Last Rate Last Dose   • ALPRAZolam (XANAX) tablet 0.25 mg  0.25 mg Oral 4x Daily PRN Memo Varela MD   0.25 mg at 11/10/17 0046   • amLODIPine (NORVASC) tablet 5 mg  5 mg Oral Q24H Memo Varela MD   5 mg at 11/13/17 1009   • aspirin EC tablet 81 mg  81 mg Oral Daily Memo Varela MD   81 mg at  11/13/17 1009   • heparin (porcine) injection 3,000 Units  3,000 Units Intravenous PRN Franky Moreau MD       • nitroglycerin (NITROSTAT) SL tablet 0.4 mg  0.4 mg Sublingual Q5 Min PRN Memo Varela MD       • pantoprazole (PROTONIX) EC tablet 40 mg  40 mg Oral Q AM Memo Varela MD   40 mg at 11/13/17 0613   • sodium chloride 0.9 % flush 1-10 mL  1-10 mL Intravenous PRN Memo Varela MD       • sodium chloride 0.9 % flush 10 mL  10 mL Intravenous PRN Adan Simmons MD   10 mL at 11/10/17 2052       Assessment/Plan     Active Problems:    Acute kidney injury (nontraumatic)      Assessment:  (Acute renal failure.   secondary to rhabdomyolysis, this was confirmed with renal biopsy, positive ANCA was a false positive, no sign of glomerulonephritis  Nausea, from uremia, better    Severe rhabdomyolysis,  likely From Crestor plus dehydration combination  Hypokalemia, replaced  Elevated LFTs, possibly related to Crestor, also likely muscle source  Mild metabolic acidosis  Hypoalbuminemia  Hyperparathyroidism, likely secondary to renal failure  Mild leukocytosis        Plan:   No evidence of renal recovery so far, continue dialysis 3 times a week  CK levels trending down so will discontinue sodium bicarbonate  Consult vascular for tunnel dialysis catheter  Discontinue Crestor indefinitely).             Continue to monitor renal function, electroytes and volume closely   Please call me with any questions or concerns      Franky Moreau MD   [facility]   [phone]    11/13/17  5:01 PM      Dictation performed using Dragon dictation software

## 2017-11-13 NOTE — PLAN OF CARE
Problem: Patient Care Overview (Adult)  Goal: Plan of Care Review    11/13/17 1053   Outcome Evaluation   Outcome Summary/Follow up Plan Pt admitted with acute kidney injury and is now receiving dialysis. Pt was independent prior to admission, but has limited mobility while admitted. Pt did fall when attempting to walk in the hallway the first time a few days ago. Pt presents with generalized weakness, decreased endurance, impaired balance and unsteady gait. Pt would benefit from PT to address these impairments.          Problem: Inpatient Physical Therapy  Goal: Bed Mobility Goal LTG- PT    11/13/17 1053   Bed Mobility PT LTG   Bed Mobility PT LTG, Date Established 11/13/17   Bed Mobility PT LTG, Time to Achieve 1 wk   Bed Mobility PT LTG, Activity Type all bed mobility   Bed Mobility PT LTG, Barry Level independent       Goal: Transfer Training Goal 1 LTG- PT    11/13/17 1053   Transfer Training PT LTG   Transfer Training PT LTG, Date Established 11/13/17   Transfer Training PT LTG, Time to Achieve 1 wk   Transfer Training PT LTG, Activity Type all transfers   Transfer Training PT LTG, Barry Level supervision required       Goal: Gait Training Goal LTG- PT    11/13/17 1053   Gait Training PT LTG   Gait Training Goal PT LTG, Date Established 11/13/17   Gait Training Goal PT LTG, Time to Achieve 1 wk   Gait Training Goal PT LTG, Barry Level supervision required   Gait Training Goal PT LTG, Distance to Achieve 200 ft       Goal: Stair Training Goal LTG- PT    11/13/17 1053   Stair Training PT LTG   Stair Training Goal PT LTG, Date Established 11/13/17   Stair Training Goal PT LTG, Time to Achieve 1 wk   Stair Training Goal PT LTG, Number of Steps 2   Stair Training Goal PT LTG, Barry Level contact guard assist       Goal: Patient Education Goal LTG- PT    11/13/17 1053   Patient Education PT LTG   Patient Education PT LTG, Date Established 11/13/17   Patient Education PT LTG, Time to  Achieve 1 wk   Patient Education PT LTG, Education Type HEP   Patient Education PT LTG, Education Understanding demonstrate adequately

## 2017-11-13 NOTE — PROGRESS NOTES
"Adult Nutrition  Assessment/PES    Patient Name:  Hu Burgess  YOB: 1950  MRN: [id number]  Admit Date:  11/6/2017    Assessment Date:  11/13/2017    Comments:  Follow up.  Attempted to visit patient x 2, off floor both times, at HD.  63% x 2 meals (50-75%) per chart PO data.  Will continue to follow.          Reason for Assessment       11/13/17 1607    Reason for Assessment    Reason For Assessment/Visit follow up protocol                Anthropometrics       11/13/17 1607    Anthropometrics    Height 185.4 cm (72.99\")    Weight 104 kg (229 lb 4.5 oz)    RD Documented Current Weight  104 kg (229 lb 4.5 oz)    Ideal Body Weight (IBW)    Ideal Body Weight (IBW), Male (kg) 84.84    % Ideal Body Weight 122.84    Body Mass Index (BMI)    BMI (kg/m2) 30.32    BMI Grade 30 - 34.9- obesity - grade I            Labs/Tests/Procedures/Meds       11/13/17 1607    Labs/Tests/Procedures/Meds    Diagnostic Test/Procedure Review reviewed, pertinent    Labs/Tests Review Reviewed;Glucose;Cl-;Creat;BUN;GFR;ALT;AST;AlkP;Alb;Hgb Hct    Procedure Review Other (comment)   in HD all day today; visited x 2    Medication Review Reviewed, pertinent;Antacid    Significant Vitals reviewed, pertinent            Physical Findings       11/13/17 1608    Physical Findings/Assessment    Additional Documentation Physical Appearance (Group)   attempted visit x 2, patient unavailable - in HD all day    Physical Appearance    Skin --   B=19, intact              Nutrition Prescription Ordered       11/13/17 1608    Nutrition Prescription PO    Current PO Diet Regular            Evaluation of Received Nutrient/Fluid Intake       11/13/17 1608    PO Evaluation    Number of Meals 2    % PO Intake 63   50-75%            Problem/Interventions:                  Intervention Goal       11/13/17 1609    Intervention Goal    General Maintain nutrition;Disease management/therapy;Reduce/improve symptoms    PO Increase intake;PO intake (%)    " PO Intake % 75 %    Weight No significant weight loss            Nutrition Intervention       11/13/17 1609    Nutrition Intervention    RD/Tech Action Follow Tx progress;Care plan reviewd              Education/Evaluation       11/13/17 1609    Education    Education Will Instruct as appropriate    Monitor/Evaluation    Monitor Per protocol;PO intake;Pertinent labs;Weight;Symptoms        Electronically signed by:  Brittany Morel RD  11/13/17 4:09 PM

## 2017-11-13 NOTE — PROGRESS NOTES
"Daily progress note    Chief complaint  Doing same  No new complaints    History of present illness  67-year-old white male with history of atherosclerotic heart disease hypertension hyperlipidemia otherwise in good health and no history of kidney disease present it to [facility] emergency room for last 5-7 days weakness nausea not feeling well and decreased urine output.  Patient evaluated found to be in acute renal failure with low potassium admitted for management.  Patient denies any diarrhea fever cough chest pain shortness of breath.     REVIEW OF SYSTEMS  Review of Systems   Constitutional: Negative for activity change, appetite change and fever.   HENT: Negative for congestion and sore throat.    Eyes: Negative.    Respiratory: Negative for cough and shortness of breath.    Cardiovascular: Negative for chest pain and leg swelling.   Gastrointestinal: Negative for abdominal pain, diarrhea and vomiting.   Endocrine: Negative.    Genitourinary: Positive for frequency. Negative for decreased urine volume and dysuria.        Increased thirst   Musculoskeletal: Negative for neck pain.   Skin: Negative for rash and wound.   Allergic/Immunologic: Negative.    Neurological: Positive for weakness. Negative for numbness and headaches.   Hematological: Negative.    Psychiatric/Behavioral: Negative.    All other systems reviewed and are negative.     PHYSICAL EXAM  Blood pressure 122/65, pulse 60, temperature 98.2 °F (36.8 °C), resp. rate 18, height 72.99\" (185.4 cm), weight 236 lb 15.9 oz (107 kg), SpO2 91 %.    Constitutional: He is oriented to person, place, and time and well-developed, well-nourished, and in no distress.   Head: Normocephalic and atraumatic.   Mouth/Throat: Mucous membranes are dry.   Eyes: EOM are normal. Pupils are equal, round, and reactive to light.   Neck: Normal range of motion. Neck supple.   Cardiovascular: Regular rhythm and normal heart sounds.  Bradycardia present.    No murmur " heard.  Pulmonary/Chest: Effort normal and breath sounds normal. No respiratory distress.   Abdominal: Soft. He exhibits no distension. There is no tenderness. There is no rebound and no guarding.   Musculoskeletal: Normal range of motion. He exhibits no edema.   Neurological: He is alert and oriented to person, place, and time. He has normal sensation and normal strength.   No focal weakness or facial droop. Slow mentation.   Skin: Skin is warm and dry.   Turgor is normal, cap refill 1-2 seconds.     Psychiatric: Mood and affect normal.     LAB RESULTS  Lab Results (last 24 hours)     Procedure Component Value Units Date/Time    Blood Culture - Blood, [[id number]]  (Normal) Collected:  11/10/17 1427    Specimen:  Blood from Arm, Left Updated:  11/12/17 1446     Blood Culture No growth at 2 days    Blood Culture - Blood, [[id number]]  (Normal) Collected:  11/10/17 1537    Specimen:  Blood from Arm, Right Updated:  11/12/17 1601     Blood Culture No growth at 2 days    CBC & Differential [[id number]] Collected:  11/13/17 0554    Specimen:  Blood Updated:  11/13/17 0618    Narrative:       The following orders were created for panel order CBC & Differential.  Procedure                               Abnormality         Status                     ---------                               -----------         ------                     CBC Auto Differential[[id number]]        Abnormal            Final result                 Please view results for these tests on the individual orders.    CBC Auto Differential [[id number]]  (Abnormal) Collected:  11/13/17 0554    Specimen:  Blood Updated:  11/13/17 0618     WBC 12.01 (H) 10*3/mm3      RBC 4.00 (L) 10*6/mm3      Hemoglobin 13.3 (L) g/dL      Hematocrit 40.3 (L) %      .8 (H) fL      MCH 33.3 (H) pg      MCHC 33.0 g/dL      RDW 16.5 (H) %      RDW-SD 59.9 (H) fl      MPV 11.7 fL      Platelets 144 10*3/mm3      Neutrophil % 65.2 %      Lymphocyte % 15.9 (L) %      Monocyte %  14.6 (H) %      Eosinophil % 3.4 %      Basophil % 0.2 %      Immature Grans % 0.7 (H) %      Neutrophils, Absolute 7.82 10*3/mm3      Lymphocytes, Absolute 1.91 10*3/mm3      Monocytes, Absolute 1.75 (H) 10*3/mm3      Eosinophils, Absolute 0.41 10*3/mm3      Basophils, Absolute 0.03 10*3/mm3      Immature Grans, Absolute 0.09 (H) 10*3/mm3     Comprehensive Metabolic Panel [[id number]]  (Abnormal) Collected:  11/13/17 0554    Specimen:  Blood Updated:  11/13/17 0643     Glucose 103 (H) mg/dL      BUN 91 (H) mg/dL      Creatinine 9.25 (H) mg/dL      Sodium 140 mmol/L      Potassium 4.9 mmol/L      Chloride 96 (L) mmol/L      CO2 25.4 mmol/L      Calcium 8.8 mg/dL      Total Protein 5.5 (L) g/dL      Albumin 2.20 (L) g/dL      ALT (SGPT) 130 (H) U/L      AST (SGOT) 159 (H) U/L      Alkaline Phosphatase 263 (H) U/L      Total Bilirubin 3.1 (H) mg/dL      eGFR Non African Amer 6 (L) mL/min/1.73      Globulin 3.3 gm/dL      A/G Ratio 0.7 g/dL      BUN/Creatinine Ratio 9.8     Anion Gap 18.6 mmol/L     CK [[id number]]  (Abnormal) Collected:  11/13/17 0554    Specimen:  Blood Updated:  11/13/17 0716     Creatine Kinase 1209 (H) U/L     Anti-neutrophilic Cytoplasmic Antibody [[id number]]  (Abnormal) Collected:  11/08/17 1041    Specimen:  Blood Updated:  11/13/17 1309     C-ANCA 1:40 (H) titer      P-ANCA 1:40 (H) titer       The presence of positive fluorescence exhibiting P-ANCA or C-ANCA  patterns alone is not specific for the diagnosis of Wegener's  Granulomatosis (WG) or microscopic polyangiitis. Decisions about  treatment should not be based solely on ANCA IFA results.  The  International ANCA Group Consensus recommends follow up testing of  positive sera with both MS-3 and MPO-ANCA enzyme immunoassays. As  many as 5% serum samples are positive only by EIA.  Ref. AM J Clin Pathol 1999;111:507-513.        Atypical pANCA <1:20 titer       The atypical pANCA pattern has been observed in a significant  percentage of patients  with ulcerative colitis, primary sclerosing  cholangitis and autoimmune hepatitis.       Narrative:       Performed at:  [address] - [facility]  [id number]  : Todd Harper PhD, Phone:  [phone]        Imaging Results (last 72 hours)     Procedure Component Value Units Date/Time    US Gallbladder [[id number]] Collected:  11/06/17 1931     Updated:  11/06/17 1937    Narrative:       US GALLBLADDER-        INDICATIONS: Elevated liver function tests     TECHNIQUE: Ultrasound of the right upper quadrant     COMPARISON: None available     FINDINGS:     The gallbladder is nontender, with sludge but no shadowing stones  demonstrated. No gallbladder wall thickening or pericholecystic fluid.     No intrahepatic or extrahepatic biliary ductal dilatation is  demonstrated. The common biliary duct caliber is measured at 4.0 mm.     No liver lesion is identified. Diffusely, the echogenicity of the liver  is otherwise in the range of normal relative to that of the right  kidney.     The pancreas is mostly obscured. The right kidney, 10.8 cm, and the  pancreas otherwise appear unremarkable.                Impression:          Sludge in the gallbladder. No evidence for acute cholecystitis. No  discrete liver lesion or biliary ductal dilatation identified. With  persistent clinical indication, enhanced liver imaging could be  considered for further evaluation.     This report was finalized on 11/6/2017 7:33 PM by Dr. Gokul Real MD.       CT Abdomen Pelvis Without Contrast [[id number]] Collected:  11/06/17 2007     Updated:  11/06/17 2017    Narrative:       CT ABDOMEN AND PELVIS WITHOUT CONTRAST     HISTORY: Elevated liver function test. Gallbladder sludge.      TECHNIQUE: CT abdomen and pelvis without IV or oral contrast.     COMPARISON: Right upper quadrant ultrasound 11/06/2017.     FINDINGS: Lung bases appear clear and there is no pleural effusion.  Liver exhibits slight undelayed  margins consistent with hepatic  cirrhosis. There Is no evidence for intra or extrahepatic biliary duct  dilatation. There is density within the gallbladder consistent with  sludge as demonstrated sonographically. Adrenal glands, pancreas,  spleen, kidneys appear within normal limits. There is no hydronephrosis  or hydroureter. Urinary bladder is mostly decompressed. There is no  bowel dilatation or evidence for bowel obstruction. No ben enlargement  is evident within the abdomen or pelvis. Atherosclerotic calcifications  are present involving the abdominal aorta and the abdominal aorta  measures up to 2.4 cm diameter. Mural calcifications are present.       Impression:       1. Sludge within the gallbladder without other evidence for  cholecystitis. Mild undulating hepatic margins consistent with hepatic  cirrhosis.  2. Atherosclerotic disease. 2.4 cm infrarenal abdominal aorta.     Radiation dose reduction techniques were utilized, including automated  exposure control and exposure modulation based on body size.     This report was finalized on 11/6/2017 8:14 PM by Dr. Marcos Sainz MD.       XR Chest 2 View [[id number]] Collected:  11/06/17 2041     Updated:  11/06/17 2046    Narrative:       XR CHEST 2 VW-     HISTORY: Male who is 67 years-old,  acute renal failure     TECHNIQUE: Frontal and lateral views of the chest     COMPARISON: None available     FINDINGS: Heart, mediastinum and pulmonary vasculature are unremarkable.  No focal pulmonary consolidation, pleural effusion, or pneumothorax. Old  granulomatous disease is apparent. No acute osseous process.       Impression:       No evidence for acute pulmonary process. Follow-up as  clinical indications persist.     This report was finalized on 11/6/2017 8:43 PM by Dr. Gokul Real MD.       US Renal Bilateral [[id number]] Collected:  11/07/17 0145     Updated:  11/07/17 0145    Narrative:       ULTRASOUND RENAL BILATERAL.     TECHNIQUE: Grayscale  and color images of the kidneys were obtained.     HISTORY: Acute kidney injury.     COMPARISON: No prior studies for comparison.     FINDINGS:  Right kidney measures 11.6 x 4.8 x 5.4 cm. Left kidney measures 12.5 x  5.9 x 5.7 cm. There is no hydronephrosis, stone or mass.     Urinary bladder could not be well visualized. No calculus, sludge or  mass.       Impression:       No acute findings involving both kidneys.             EKG                                                  Rhythm/Rate: Sinus bradycardia 48  P waves and TX: Normal  QRS, axis: IVCD   ST and T waves: Biphasic T-wave and Inverted T-waves inferiorly        Current Facility-Administered Medications:   •  ALPRAZolam (XANAX) tablet 0.25 mg, 0.25 mg, Oral, 4x Daily PRN, Memo Varela MD, 0.25 mg at 11/10/17 0046  •  amLODIPine (NORVASC) tablet 5 mg, 5 mg, Oral, Q24H, Memo Varela MD, 5 mg at 11/13/17 1009  •  aspirin EC tablet 81 mg, 81 mg, Oral, Daily, Memo aVrela MD, 81 mg at 11/13/17 1009  •  heparin (porcine) injection 3,000 Units, 3,000 Units, Intravenous, PRN, Franky Moreau MD  •  nitroglycerin (NITROSTAT) SL tablet 0.4 mg, 0.4 mg, Sublingual, Q5 Min PRN, Memo Varela MD  •  pantoprazole (PROTONIX) EC tablet 40 mg, 40 mg, Oral, Q AM, Memo Varela MD, 40 mg at 11/13/17 0613  •  sodium bicarbonate tablet 1,300 mg, 1,300 mg, Oral, TID, Franky Moreau MD, 1,300 mg at 11/13/17 1009  •  sodium chloride 0.9 % flush 1-10 mL, 1-10 mL, Intravenous, PRN, Memo Varela MD  •  Insert peripheral IV, , , Once **AND** sodium chloride 0.9 % flush 10 mL, 10 mL, Intravenous, PRN, Adan Simmons MD, 10 mL at 11/10/17 2052     ASSESSMENT  Acute renal failure on hemodialysis  Rhabdomyolysis  Elevated liver enzymes Questionable etiology probably secondary to Crestor improving  Hypertension  Hyperlipidemia  Atherosclerotic heart disease  Leukocytosis with no fever ? source    PLAN  CPM  Hemodialysis TIW  Check kidney biopsy results  May need liver biopsy  also  OFF Crestor  OFF hydrochlorothiazide  Stress ulcer DVT prophylaxis  Follow closely     KEITH BEJARANO MD     .

## 2017-11-13 NOTE — PROGRESS NOTES
Continued Stay Note  [facility]     Patient Name: Hu Burgess  MRN: [id number]  Today's Date: 11/13/2017    Admit Date: 11/6/2017          Discharge Plan       11/13/17 1724    Case Management/Social Work Plan    Plan Undetermined    Additional Comments Spoke with pt who states he is uncertain of d/c needs, but anticipates possible rehab stay. Pt requests CCP return to obtain facility choices. CCP to follow to assist with d/c needs pending treatment course. Delia Gordon LCSW              Discharge Codes     None            Anabelle Gordon LCSW

## 2017-11-13 NOTE — THERAPY EVALUATION
Acute Care - Physical Therapy Initial Evaluation  [facility]     Patient Name: Hu Burgess  : 1950  MRN: [id number]  Today's Date: 2017                Admit Date: 2017     Visit Dx:    ICD-10-CM ICD-9-CM   1. Acute kidney injury (nontraumatic) N17.9 584.9   2. Elevated LFTs R79.89 790.6   3. Hypokalemia E87.6 276.8   4. Dehydration E86.0 276.51   5. Weakness R53.1 780.79     Patient Active Problem List   Diagnosis   • Arteriosclerotic vascular disease   • Hyperlipidemia   • Hypertension   • Acute kidney injury (nontraumatic)     Past Medical History:   Diagnosis Date   • Arteriosclerotic cardiovascular disease    • History of transfusion    • Hyperlipidemia    • Hypertension      History reviewed. No pertinent surgical history.       PT ASSESSMENT (last 72 hours)      PT Evaluation       17 1036       Rehab Evaluation    Document Type evaluation  -     Subjective Information agree to therapy;complains of;weakness  -KH     Patient Effort, Rehab Treatment good  -KH     Symptoms Noted During/After Treatment none  -KH     General Information    General Observations in bed  -KH     Pertinent History Of Current Problem STEVEN  -KH     Precautions/Limitations fall precautions  -KH     Prior Level of Function independent:  -KH     Equipment Currently Used at Home none  -KH     Plans/Goals Discussed With patient and family  -     Living Environment    Lives With spouse  -     Living Arrangements house  -     Home Accessibility stairs to enter home  -KH     Number of Stairs to Enter Home 2  -KH     Clinical Impression    Patient/Family Goals Statement return home  -     Criteria for Skilled Therapeutic Interventions Met treatment indicated  -KH     Rehab Potential good, to achieve stated therapy goals  -     Pain Assessment    Pain Assessment No/denies pain  -     Cognitive Assessment/Intervention    Current Cognitive/Communication Assessment functional  -     Orientation Status  oriented x 4  -     Follows Commands/Answers Questions 100% of the time  -     Personal Safety WNL/WFL  -     Personal Safety Interventions fall prevention program maintained;gait belt;nonskid shoes/slippers when out of bed  -     ROM (Range of Motion)    General ROM Detail WFL  -     MMT (Manual Muscle Testing)    General MMT Assessment Detail Functionally 3/5  -     Bed Mobility, Assessment/Treatment    Bed Mobility, Assistive Device bed rails  -     Bed Mob, Supine to Sit, [place] conditional independence  -     Bed Mob, Sit to Supine, [place] not tested  -     Transfer Assessment/Treatment    Transfers, Sit-Stand [place] contact guard assist;hand held assist  -     Transfers, Stand-Sit [place] contact guard assist;hand held assist  -     Gait Assessment/Treatment    Gait, [place] Level contact guard assist;minimum assist (75% patient effort);hand held assist  -     Gait, Distance (Feet) 55  -     Gait, Gait Deviations sandhya decreased;forward flexed posture;step length decreased;toe-to-floor clearance decreased  -     Gait, Safety Issues step length decreased;balance decreased during turns  -     Gait, Impairments strength decreased;impaired balance  -     Gait, Comment very guarded, slow, unsteady with 1 slight LOB when turning, min A as pt fatigued  -     Therapy Exercises    Bilateral Lower Extremities AROM:;10 reps;ankle pumps/circles;hip flexion;LAQ  -     Positioning and Restraints    Pre-Treatment Position in bed  -     Post Treatment Position bathroom  -     Bathroom sitting;call light within reach;encouraged to call for assist;with family/caregiver;with nsg   RN and wife in room  -       User Key  (r) = Recorded By, (t) = Taken By, (c) = Cosigned By    Initials Name Provider Type     Swetha Petersen, PT Physical Therapist          Physical Therapy Education     Title: PT OT SLP Therapies (Active)     Topic: Physical Therapy  (Active)     Point: Mobility training (Done)    Learning Progress Summary    Learner Readiness Method Response Comment Documented by Status   Patient Acceptance E ASHLEY PEREIRA   11/13/17 1053 Done               Point: Home exercise program (Done)    Learning Progress Summary    Learner Readiness Method Response Comment Documented by Status   Patient Acceptance E ASHLEY PEREIRA   11/13/17 1053 Done               Point: Precautions (Done)    Learning Progress Summary    Learner Readiness Method Response Comment Documented by Status   Patient Acceptance E ASHLEY PEREIRA   11/13/17 1053 Done                      User Key     Initials Effective Dates Name Provider Type Discipline     05/18/15 -  Swetha Petersen, PT Physical Therapist PT                PT Recommendation and Plan  Anticipated Discharge Disposition: home with home health  Planned Therapy Interventions: balance training, bed mobility training, gait training, home exercise program, patient/family education, strengthening, stair training, transfer training  Plan of Care Review  Outcome Summary/Follow up Plan: Pt admitted with acute kidney injury and is now receiving dialysis. Pt was independent prior to admission, but has limited mobility while admitted. Pt did fall when attempting to walk in the hallway the first time a few days ago. Pt presents with generalized weakness, decreased endurance, impaired balance and unsteady gait. Pt would benefit from PT to address these impairments.           IP PT Goals       11/13/17 1053          Bed Mobility PT LTG    Bed Mobility PT LTG, Date Established 11/13/17  -      Bed Mobility PT LTG, Time to Achieve 1 wk  -KH      Bed Mobility PT LTG, Activity Type all bed mobility  -KH      Bed Mobility PT LTG, [place] Level independent  -KH      Transfer Training PT LTG    Transfer Training PT LTG, Date Established 11/13/17  -KH      Transfer Training PT LTG, Time to Achieve 1 wk  -KH      Transfer Training PT LTG, Activity Type  all transfers  -KH      Transfer Training PT LTG, [place] Level supervision required  -KH      Gait Training PT LTG    Gait Training Goal PT LTG, Date Established 11/13/17  -KH      Gait Training Goal PT LTG, Time to Achieve 1 wk  -KH      Gait Training Goal PT LTG, [place] Level supervision required  -KH      Gait Training Goal PT LTG, Distance to Achieve 200 ft  -KH      Stair Training PT LTG    Stair Training Goal PT LTG, Date Established 11/13/17  -KH      Stair Training Goal PT LTG, Time to Achieve 1 wk  -KH      Stair Training Goal PT LTG, Number of Steps 2  -KH      Stair Training Goal PT LTG, [place] Level contact guard assist  -KH      Patient Education PT LTG    Patient Education PT LTG, Date Established 11/13/17  -      Patient Education PT LTG, Time to Achieve 1 wk  -KH      Patient Education PT LTG, Education Type HEP  -      Patient Education PT LTG, Education Understanding demonstrate adequately  -        User Key  (r) = Recorded By, (t) = Taken By, (c) = Cosigned By    Initials Name Provider Type    ASHLEE Petersen, PT Physical Therapist                Outcome Measures       11/13/17 1056          How much help from another person do you currently need...    Turning from your back to your side while in flat bed without using bedrails? 4  -KH      Moving from lying on back to sitting on the side of a flat bed without bedrails? 4  -KH      Moving to and from a bed to a chair (including a wheelchair)? 3  -KH      Standing up from a chair using your arms (e.g., wheelchair, bedside chair)? 3  -KH      Climbing 3-5 steps with a railing? 2  -KH      To walk in hospital room? 3  -KH      AM-PAC 6 Clicks Score 19  -KH      Functional Assessment    Outcome Measure Options AM-PAC 6 Clicks Basic Mobility (PT)  -        User Key  (r) = Recorded By, (t) = Taken By, (c) = Cosigned By    Initials Name Provider Type    ASHLEE Petersen, PT Physical Therapist           Time  Calculation:         PT Charges       11/13/17 1057          Time Calculation    Start Time 1030  -      Stop Time 1052  -      Time Calculation (min) 22 min  -      PT Received On 11/13/17  -      PT - Next Appointment 11/14/17  -      PT Goal Re-Cert Due Date 11/20/17  -        User Key  (r) = Recorded By, (t) = Taken By, (c) = Cosigned By    Initials Name Provider Type     Swetha Petersen, PT Physical Therapist          Therapy Charges for Today     Code Description Service Date Service Provider Modifiers Qty    09244823693 HC PT EVAL MOD COMPLEXITY 2 11/13/2017 Swetha Petersen, PT GP 1    93685112810 HC PT THER PROC EA 15 MIN 11/13/2017 Swetha Petersen, PT GP 1          PT G-Codes  Outcome Measure Options: AM-PAC 6 Clicks Basic Mobility (PT)      Swetha Petersen, PT  11/13/2017

## 2017-11-14 ENCOUNTER — APPOINTMENT (OUTPATIENT)
Dept: GENERAL RADIOLOGY | Facility: HOSPITAL | Age: 67
End: 2017-11-14

## 2017-11-14 ENCOUNTER — ANESTHESIA (OUTPATIENT)
Dept: PERIOP | Facility: HOSPITAL | Age: 67
End: 2017-11-14

## 2017-11-14 ENCOUNTER — ANESTHESIA EVENT (OUTPATIENT)
Dept: PERIOP | Facility: HOSPITAL | Age: 67
End: 2017-11-14

## 2017-11-14 LAB
ALBUMIN SERPL-MCNC: 2 G/DL (ref 3.5–5.2)
ALBUMIN/GLOB SERPL: 0.5 G/DL
ALP SERPL-CCNC: 269 U/L (ref 39–117)
ALT SERPL W P-5'-P-CCNC: 109 U/L (ref 1–41)
ANION GAP SERPL CALCULATED.3IONS-SCNC: 15.5 MMOL/L
AST SERPL-CCNC: 119 U/L (ref 1–40)
BASOPHILS # BLD AUTO: 0.04 10*3/MM3 (ref 0–0.2)
BASOPHILS NFR BLD AUTO: 0.3 % (ref 0–1.5)
BILIRUB SERPL-MCNC: 2.9 MG/DL (ref 0.1–1.2)
BUN BLD-MCNC: 61 MG/DL (ref 8–23)
BUN/CREAT SERPL: 8.1 (ref 7–25)
CALCIUM SPEC-SCNC: 8.9 MG/DL (ref 8.6–10.5)
CHLORIDE SERPL-SCNC: 98 MMOL/L (ref 98–107)
CK SERPL-CCNC: 510 U/L (ref 20–200)
CO2 SERPL-SCNC: 25.5 MMOL/L (ref 22–29)
CREAT BLD-MCNC: 7.53 MG/DL (ref 0.76–1.27)
DEPRECATED RDW RBC AUTO: 60.5 FL (ref 37–54)
EOSINOPHIL # BLD AUTO: 0.5 10*3/MM3 (ref 0–0.7)
EOSINOPHIL NFR BLD AUTO: 4.1 % (ref 0.3–6.2)
ERYTHROCYTE [DISTWIDTH] IN BLOOD BY AUTOMATED COUNT: 16.8 % (ref 11.5–14.5)
GFR SERPL CREATININE-BSD FRML MDRD: 7 ML/MIN/1.73
GLOBULIN UR ELPH-MCNC: 3.7 GM/DL
GLUCOSE BLD-MCNC: 91 MG/DL (ref 65–99)
HCT VFR BLD AUTO: 40 % (ref 40.4–52.2)
HGB BLD-MCNC: 12.9 G/DL (ref 13.7–17.6)
IMM GRANULOCYTES # BLD: 0.1 10*3/MM3 (ref 0–0.03)
IMM GRANULOCYTES NFR BLD: 0.8 % (ref 0–0.5)
LYMPHOCYTES # BLD AUTO: 1.77 10*3/MM3 (ref 0.9–4.8)
LYMPHOCYTES NFR BLD AUTO: 14.7 % (ref 19.6–45.3)
MCH RBC QN AUTO: 32.7 PG (ref 27–32.7)
MCHC RBC AUTO-ENTMCNC: 32.3 G/DL (ref 32.6–36.4)
MCV RBC AUTO: 101.5 FL (ref 79.8–96.2)
MONOCYTES # BLD AUTO: 1.61 10*3/MM3 (ref 0.2–1.2)
MONOCYTES NFR BLD AUTO: 13.4 % (ref 5–12)
NEUTROPHILS # BLD AUTO: 8.03 10*3/MM3 (ref 1.9–8.1)
NEUTROPHILS NFR BLD AUTO: 66.7 % (ref 42.7–76)
PLATELET # BLD AUTO: 153 10*3/MM3 (ref 140–500)
PMV BLD AUTO: 11.2 FL (ref 6–12)
POTASSIUM BLD-SCNC: 4.6 MMOL/L (ref 3.5–5.2)
PROT SERPL-MCNC: 5.7 G/DL (ref 6–8.5)
RBC # BLD AUTO: 3.94 10*6/MM3 (ref 4.6–6)
SODIUM BLD-SCNC: 139 MMOL/L (ref 136–145)
WBC NRBC COR # BLD: 12.05 10*3/MM3 (ref 4.5–10.7)

## 2017-11-14 PROCEDURE — 25010000003 CEFAZOLIN IN DEXTROSE 2-4 GM/100ML-% SOLUTION: Performed by: SURGERY

## 2017-11-14 PROCEDURE — 94799 UNLISTED PULMONARY SVC/PX: CPT

## 2017-11-14 PROCEDURE — 97110 THERAPEUTIC EXERCISES: CPT

## 2017-11-14 PROCEDURE — 25010000002 HEPARIN (PORCINE) PER 1000 UNITS: Performed by: SURGERY

## 2017-11-14 PROCEDURE — 82550 ASSAY OF CK (CPK): CPT | Performed by: INTERNAL MEDICINE

## 2017-11-14 PROCEDURE — 25010000002 FENTANYL CITRATE (PF) 100 MCG/2ML SOLUTION: Performed by: ANESTHESIOLOGY

## 2017-11-14 PROCEDURE — 25010000002 MIDAZOLAM PER 1 MG: Performed by: ANESTHESIOLOGY

## 2017-11-14 PROCEDURE — 25010000002 PROPOFOL 10 MG/ML EMULSION: Performed by: ANESTHESIOLOGY

## 2017-11-14 PROCEDURE — 80053 COMPREHEN METABOLIC PANEL: CPT | Performed by: HOSPITALIST

## 2017-11-14 PROCEDURE — 25010000002 PROPOFOL 1000 MG/ML EMULSION: Performed by: ANESTHESIOLOGY

## 2017-11-14 PROCEDURE — 85025 COMPLETE CBC W/AUTO DIFF WBC: CPT | Performed by: HOSPITALIST

## 2017-11-14 PROCEDURE — B244ZZZ ULTRASONOGRAPHY OF RIGHT HEART: ICD-10-PCS | Performed by: SURGERY

## 2017-11-14 PROCEDURE — 02H633Z INSERTION OF INFUSION DEVICE INTO RIGHT ATRIUM, PERCUTANEOUS APPROACH: ICD-10-PCS | Performed by: SURGERY

## 2017-11-14 PROCEDURE — C1750 CATH, HEMODIALYSIS,LONG-TERM: HCPCS | Performed by: SURGERY

## 2017-11-14 PROCEDURE — 99232 SBSQ HOSP IP/OBS MODERATE 35: CPT | Performed by: INTERNAL MEDICINE

## 2017-11-14 PROCEDURE — 77001 FLUOROGUIDE FOR VEIN DEVICE: CPT

## 2017-11-14 RX ORDER — EPHEDRINE SULFATE 50 MG/ML
5 INJECTION, SOLUTION INTRAVENOUS ONCE AS NEEDED
Status: DISCONTINUED | OUTPATIENT
Start: 2017-11-14 | End: 2017-11-14 | Stop reason: HOSPADM

## 2017-11-14 RX ORDER — SODIUM CHLORIDE 0.9 % (FLUSH) 0.9 %
1-10 SYRINGE (ML) INJECTION AS NEEDED
Status: DISCONTINUED | OUTPATIENT
Start: 2017-11-14 | End: 2017-11-14 | Stop reason: HOSPADM

## 2017-11-14 RX ORDER — HYDRALAZINE HYDROCHLORIDE 20 MG/ML
5 INJECTION INTRAMUSCULAR; INTRAVENOUS
Status: DISCONTINUED | OUTPATIENT
Start: 2017-11-14 | End: 2017-11-14 | Stop reason: HOSPADM

## 2017-11-14 RX ORDER — HYDROCODONE BITARTRATE AND ACETAMINOPHEN 5; 325 MG/1; MG/1
1 TABLET ORAL EVERY 4 HOURS PRN
Status: DISCONTINUED | OUTPATIENT
Start: 2017-11-14 | End: 2017-11-16

## 2017-11-14 RX ORDER — PROMETHAZINE HYDROCHLORIDE 25 MG/ML
12.5 INJECTION, SOLUTION INTRAMUSCULAR; INTRAVENOUS ONCE AS NEEDED
Status: DISCONTINUED | OUTPATIENT
Start: 2017-11-14 | End: 2017-11-14 | Stop reason: HOSPADM

## 2017-11-14 RX ORDER — MIDAZOLAM HYDROCHLORIDE 1 MG/ML
1 INJECTION INTRAMUSCULAR; INTRAVENOUS
Status: DISCONTINUED | OUTPATIENT
Start: 2017-11-14 | End: 2017-11-14 | Stop reason: HOSPADM

## 2017-11-14 RX ORDER — PROPOFOL 10 MG/ML
VIAL (ML) INTRAVENOUS AS NEEDED
Status: DISCONTINUED | OUTPATIENT
Start: 2017-11-14 | End: 2017-11-14 | Stop reason: SURG

## 2017-11-14 RX ORDER — PROMETHAZINE HYDROCHLORIDE 25 MG/1
12.5 TABLET ORAL ONCE AS NEEDED
Status: DISCONTINUED | OUTPATIENT
Start: 2017-11-14 | End: 2017-11-14 | Stop reason: HOSPADM

## 2017-11-14 RX ORDER — PROMETHAZINE HYDROCHLORIDE 25 MG/1
25 SUPPOSITORY RECTAL ONCE AS NEEDED
Status: DISCONTINUED | OUTPATIENT
Start: 2017-11-14 | End: 2017-11-14 | Stop reason: HOSPADM

## 2017-11-14 RX ORDER — NALOXONE HCL 0.4 MG/ML
0.2 VIAL (ML) INJECTION AS NEEDED
Status: DISCONTINUED | OUTPATIENT
Start: 2017-11-14 | End: 2017-11-14 | Stop reason: HOSPADM

## 2017-11-14 RX ORDER — ONDANSETRON 2 MG/ML
4 INJECTION INTRAMUSCULAR; INTRAVENOUS EVERY 4 HOURS
Status: DISCONTINUED | OUTPATIENT
Start: 2017-11-14 | End: 2017-11-14

## 2017-11-14 RX ORDER — HEPARIN SODIUM 5000 [USP'U]/ML
5000 INJECTION, SOLUTION INTRAVENOUS; SUBCUTANEOUS EVERY 12 HOURS SCHEDULED
Status: DISCONTINUED | OUTPATIENT
Start: 2017-11-15 | End: 2017-11-16

## 2017-11-14 RX ORDER — CEFAZOLIN SODIUM 2 G/100ML
2 INJECTION, SOLUTION INTRAVENOUS ONCE
Status: COMPLETED | OUTPATIENT
Start: 2017-11-14 | End: 2017-11-14

## 2017-11-14 RX ORDER — NALOXONE HCL 0.4 MG/ML
0.4 VIAL (ML) INJECTION
Status: DISCONTINUED | OUTPATIENT
Start: 2017-11-14 | End: 2017-11-15

## 2017-11-14 RX ORDER — SODIUM CHLORIDE, SODIUM LACTATE, POTASSIUM CHLORIDE, CALCIUM CHLORIDE 600; 310; 30; 20 MG/100ML; MG/100ML; MG/100ML; MG/100ML
9 INJECTION, SOLUTION INTRAVENOUS CONTINUOUS
Status: DISCONTINUED | OUTPATIENT
Start: 2017-11-14 | End: 2017-11-14

## 2017-11-14 RX ORDER — MAGNESIUM HYDROXIDE 1200 MG/15ML
LIQUID ORAL AS NEEDED
Status: DISCONTINUED | OUTPATIENT
Start: 2017-11-14 | End: 2017-11-14 | Stop reason: HOSPADM

## 2017-11-14 RX ORDER — DIPHENHYDRAMINE HYDROCHLORIDE 50 MG/ML
12.5 INJECTION INTRAMUSCULAR; INTRAVENOUS
Status: DISCONTINUED | OUTPATIENT
Start: 2017-11-14 | End: 2017-11-14 | Stop reason: HOSPADM

## 2017-11-14 RX ORDER — FLUMAZENIL 0.1 MG/ML
0.2 INJECTION INTRAVENOUS AS NEEDED
Status: DISCONTINUED | OUTPATIENT
Start: 2017-11-14 | End: 2017-11-14 | Stop reason: HOSPADM

## 2017-11-14 RX ORDER — FAMOTIDINE 10 MG/ML
20 INJECTION, SOLUTION INTRAVENOUS ONCE
Status: COMPLETED | OUTPATIENT
Start: 2017-11-14 | End: 2017-11-14

## 2017-11-14 RX ORDER — HYDROMORPHONE HYDROCHLORIDE 1 MG/ML
0.5 INJECTION, SOLUTION INTRAMUSCULAR; INTRAVENOUS; SUBCUTANEOUS
Status: DISCONTINUED | OUTPATIENT
Start: 2017-11-14 | End: 2017-11-14 | Stop reason: HOSPADM

## 2017-11-14 RX ORDER — HYDROCODONE BITARTRATE AND ACETAMINOPHEN 7.5; 325 MG/1; MG/1
1 TABLET ORAL ONCE AS NEEDED
Status: DISCONTINUED | OUTPATIENT
Start: 2017-11-14 | End: 2017-11-14 | Stop reason: HOSPADM

## 2017-11-14 RX ORDER — OXYCODONE AND ACETAMINOPHEN 7.5; 325 MG/1; MG/1
1 TABLET ORAL ONCE AS NEEDED
Status: DISCONTINUED | OUTPATIENT
Start: 2017-11-14 | End: 2017-11-14 | Stop reason: HOSPADM

## 2017-11-14 RX ORDER — MORPHINE SULFATE 2 MG/ML
1 INJECTION, SOLUTION INTRAMUSCULAR; INTRAVENOUS EVERY 4 HOURS PRN
Status: DISCONTINUED | OUTPATIENT
Start: 2017-11-14 | End: 2017-11-15

## 2017-11-14 RX ORDER — ONDANSETRON 2 MG/ML
4 INJECTION INTRAMUSCULAR; INTRAVENOUS EVERY 4 HOURS PRN
Status: DISCONTINUED | OUTPATIENT
Start: 2017-11-14 | End: 2017-11-16

## 2017-11-14 RX ORDER — ONDANSETRON 2 MG/ML
4 INJECTION INTRAMUSCULAR; INTRAVENOUS ONCE AS NEEDED
Status: DISCONTINUED | OUTPATIENT
Start: 2017-11-14 | End: 2017-11-14 | Stop reason: HOSPADM

## 2017-11-14 RX ORDER — LABETALOL HYDROCHLORIDE 5 MG/ML
5 INJECTION, SOLUTION INTRAVENOUS
Status: DISCONTINUED | OUTPATIENT
Start: 2017-11-14 | End: 2017-11-14 | Stop reason: HOSPADM

## 2017-11-14 RX ORDER — HEPARIN SODIUM 1000 [USP'U]/ML
INJECTION, SOLUTION INTRAVENOUS; SUBCUTANEOUS AS NEEDED
Status: DISCONTINUED | OUTPATIENT
Start: 2017-11-14 | End: 2017-11-14 | Stop reason: HOSPADM

## 2017-11-14 RX ORDER — SODIUM CHLORIDE 9 MG/ML
9 INJECTION, SOLUTION INTRAVENOUS CONTINUOUS
Status: DISCONTINUED | OUTPATIENT
Start: 2017-11-14 | End: 2017-11-15

## 2017-11-14 RX ORDER — AMLODIPINE BESYLATE 2.5 MG/1
2.5 TABLET ORAL
Status: DISCONTINUED | OUTPATIENT
Start: 2017-11-15 | End: 2017-11-16

## 2017-11-14 RX ORDER — FENTANYL CITRATE 50 UG/ML
50 INJECTION, SOLUTION INTRAMUSCULAR; INTRAVENOUS
Status: DISCONTINUED | OUTPATIENT
Start: 2017-11-14 | End: 2017-11-14 | Stop reason: HOSPADM

## 2017-11-14 RX ORDER — FENTANYL CITRATE 50 UG/ML
INJECTION, SOLUTION INTRAMUSCULAR; INTRAVENOUS AS NEEDED
Status: DISCONTINUED | OUTPATIENT
Start: 2017-11-14 | End: 2017-11-14 | Stop reason: SURG

## 2017-11-14 RX ORDER — PROMETHAZINE HYDROCHLORIDE 25 MG/1
25 TABLET ORAL ONCE AS NEEDED
Status: DISCONTINUED | OUTPATIENT
Start: 2017-11-14 | End: 2017-11-14 | Stop reason: HOSPADM

## 2017-11-14 RX ORDER — MIDAZOLAM HYDROCHLORIDE 1 MG/ML
2 INJECTION INTRAMUSCULAR; INTRAVENOUS
Status: DISCONTINUED | OUTPATIENT
Start: 2017-11-14 | End: 2017-11-14 | Stop reason: HOSPADM

## 2017-11-14 RX ADMIN — FAMOTIDINE 20 MG: 10 INJECTION INTRAVENOUS at 16:04

## 2017-11-14 RX ADMIN — CEFAZOLIN SODIUM 2 G: 2 INJECTION, SOLUTION INTRAVENOUS at 16:43

## 2017-11-14 RX ADMIN — FENTANYL CITRATE 100 MCG: 50 INJECTION, SOLUTION INTRAMUSCULAR; INTRAVENOUS at 16:39

## 2017-11-14 RX ADMIN — PROPOFOL 50 MG: 10 INJECTION, EMULSION INTRAVENOUS at 16:44

## 2017-11-14 RX ADMIN — PROPOFOL 10 MCG/KG/MIN: 10 INJECTION, EMULSION INTRAVENOUS at 16:45

## 2017-11-14 RX ADMIN — SODIUM CHLORIDE 9 ML/HR: 9 INJECTION, SOLUTION INTRAVENOUS at 15:18

## 2017-11-14 RX ADMIN — MIDAZOLAM 1 MG: 1 INJECTION INTRAMUSCULAR; INTRAVENOUS at 16:04

## 2017-11-14 NOTE — ANESTHESIA POSTPROCEDURE EVALUATION
"Patient: Hu Burgess    Procedure Summary     Date Anesthesia Start Anesthesia Stop Room / Location    11/14/17 3080 [id number]  [facility] /  [facility]       Procedure Diagnosis Surgeon Provider    PALINDRONE CATHETERE PLACEMENT (Right ) No diagnosis on file. MD Vickey Clemente MD          Anesthesia Type: MAC  Last vitals  BP   123/66 (11/14/17 1745)   Temp   36.6 °C (97.9 °F) (11/14/17 1745)   Pulse   56 (11/14/17 1745)   Resp   14 (11/14/17 1745)     SpO2   96 % (11/14/17 1745)     Post Anesthesia Care and Evaluation    Patient location during evaluation: bedside  Patient participation: complete - patient participated  Level of consciousness: awake  Pain management: adequate  Airway patency: patent  Anesthetic complications: No anesthetic complications    Cardiovascular status: acceptable  Respiratory status: acceptable  Hydration status: acceptable    Comments: /66 (BP Location: Left arm, Patient Position: Lying)  Pulse 56  Temp 36.6 °C (97.9 °F) (Oral)   Resp 14  Ht 72.99\" (185.4 cm)  Wt 236 lb 6.4 oz (107 kg)  SpO2 96%  BMI 31.2 kg/m2        "

## 2017-11-14 NOTE — PROGRESS NOTES
"Daily progress note    Chief complaint  Doing same  No new complaints    History of present illness  67-year-old white male with history of atherosclerotic heart disease hypertension hyperlipidemia otherwise in good health and no history of kidney disease present it to [facility] emergency room for last 5-7 days weakness nausea not feeling well and decreased urine output.  Patient evaluated found to be in acute renal failure with low potassium admitted for management.  Patient denies any diarrhea fever cough chest pain shortness of breath.     REVIEW OF SYSTEMS  Review of Systems   Constitutional: Negative for activity change, appetite change and fever.   HENT: Negative for congestion and sore throat.    Eyes: Negative.    Respiratory: Negative for cough and shortness of breath.    Cardiovascular: Negative for chest pain and leg swelling.   Gastrointestinal: Negative for abdominal pain, diarrhea and vomiting.   Endocrine: Negative.    Genitourinary: Positive for frequency. Negative for decreased urine volume and dysuria.        Increased thirst   Musculoskeletal: Negative for neck pain.   Skin: Negative for rash and wound.   Allergic/Immunologic: Negative.    Neurological: Positive for weakness. Negative for numbness and headaches.   Hematological: Negative.    Psychiatric/Behavioral: Negative.    All other systems reviewed and are negative.     PHYSICAL EXAM  Blood pressure 95/76, pulse 59, temperature 97.6 °F (36.4 °C), temperature source Oral, resp. rate 18, height 72.99\" (185.4 cm), weight 236 lb 6.4 oz (107 kg), SpO2 93 %.    Constitutional: He is oriented to person, place, and time and well-developed, well-nourished, and in no distress.   Head: Normocephalic and atraumatic.   Mouth/Throat: Mucous membranes are dry.   Eyes: EOM are normal. Pupils are equal, round, and reactive to light.   Neck: Normal range of motion. Neck supple.   Cardiovascular: Regular rhythm and normal heart sounds.  Bradycardia " present.    No murmur heard.  Pulmonary/Chest: Effort normal and breath sounds normal. No respiratory distress.   Abdominal: Soft. He exhibits no distension. There is no tenderness. There is no rebound and no guarding.   Musculoskeletal: Normal range of motion. He exhibits no edema.   Neurological: He is alert and oriented to person, place, and time. He has normal sensation and normal strength.   No focal weakness or facial droop. Slow mentation.   Skin: Skin is warm and dry.   Turgor is normal, cap refill 1-2 seconds.     Psychiatric: Mood and affect normal.     LAB RESULTS  Lab Results (last 24 hours)     Procedure Component Value Units Date/Time    Anti-neutrophilic Cytoplasmic Antibody [[id number]]  (Abnormal) Collected:  11/08/17 1041    Specimen:  Blood Updated:  11/13/17 1309     C-ANCA 1:40 (H) titer      P-ANCA 1:40 (H) titer       The presence of positive fluorescence exhibiting P-ANCA or C-ANCA  patterns alone is not specific for the diagnosis of Wegener's  Granulomatosis (WG) or microscopic polyangiitis. Decisions about  treatment should not be based solely on ANCA IFA results.  The  International ANCA Group Consensus recommends follow up testing of  positive sera with both KS-3 and MPO-ANCA enzyme immunoassays. As  many as 5% serum samples are positive only by EIA.  Ref. AM J Clin Pathol 1999;111:507-513.        Atypical pANCA <1:20 titer       The atypical pANCA pattern has been observed in a significant  percentage of patients with ulcerative colitis, primary sclerosing  cholangitis and autoimmune hepatitis.       Narrative:       Performed at:  [address] [place] [address]  [id number]  : Todd Harper PhD, Phone:  [phone]    Blood Culture - Blood, [[id number]]  (Normal) Collected:  11/10/17 1427    Specimen:  Blood from Arm, Left Updated:  11/13/17 1446     Blood Culture No growth at 3 days    Blood Culture - Blood, [[id number]]  (Normal) Collected:  11/10/17 7779     Specimen:  Blood from Arm, Right Updated:  11/13/17 1601     Blood Culture No growth at 3 days    CBC & Differential [[id number]] Collected:  11/14/17 0537    Specimen:  Blood Updated:  11/14/17 0645    Narrative:       The following orders were created for panel order CBC & Differential.  Procedure                               Abnormality         Status                     ---------                               -----------         ------                     CBC Auto Differential[[id number]]        Abnormal            Final result                 Please view results for these tests on the individual orders.    CBC Auto Differential [[id number]]  (Abnormal) Collected:  11/14/17 0537    Specimen:  Blood Updated:  11/14/17 0645     WBC 12.05 (H) 10*3/mm3      RBC 3.94 (L) 10*6/mm3      Hemoglobin 12.9 (L) g/dL      Hematocrit 40.0 (L) %      .5 (H) fL      MCH 32.7 pg      MCHC 32.3 (L) g/dL      RDW 16.8 (H) %      RDW-SD 60.5 (H) fl      MPV 11.2 fL      Platelets 153 10*3/mm3      Neutrophil % 66.7 %      Lymphocyte % 14.7 (L) %      Monocyte % 13.4 (H) %      Eosinophil % 4.1 %      Basophil % 0.3 %      Immature Grans % 0.8 (H) %      Neutrophils, Absolute 8.03 10*3/mm3      Lymphocytes, Absolute 1.77 10*3/mm3      Monocytes, Absolute 1.61 (H) 10*3/mm3      Eosinophils, Absolute 0.50 10*3/mm3      Basophils, Absolute 0.04 10*3/mm3      Immature Grans, Absolute 0.10 (H) 10*3/mm3     Comprehensive Metabolic Panel [[id number]]  (Abnormal) Collected:  11/14/17 0537    Specimen:  Blood Updated:  11/14/17 0708     Glucose 91 mg/dL      BUN 61 (H) mg/dL      Creatinine 7.53 (H) mg/dL      Sodium 139 mmol/L      Potassium 4.6 mmol/L      Chloride 98 mmol/L      CO2 25.5 mmol/L      Calcium 8.9 mg/dL      Total Protein 5.7 (L) g/dL      Albumin 2.00 (L) g/dL      ALT (SGPT) 109 (H) U/L      AST (SGOT) 119 (H) U/L      Alkaline Phosphatase 269 (H) U/L      Total Bilirubin 2.9 (H) mg/dL      eGFR Non African Amer 7  (L) mL/min/1.73      Globulin 3.7 gm/dL      A/G Ratio 0.5 g/dL      BUN/Creatinine Ratio 8.1     Anion Gap 15.5 mmol/L     CK [[id number]]  (Abnormal) Collected:  11/14/17 0537    Specimen:  Blood Updated:  11/14/17 0755     Creatine Kinase 510 (H) U/L         Imaging Results (last 72 hours)     Procedure Component Value Units Date/Time    US Gallbladder [[id number]] Collected:  11/06/17 1931     Updated:  11/06/17 1937    Narrative:       US GALLBLADDER-        INDICATIONS: Elevated liver function tests     TECHNIQUE: Ultrasound of the right upper quadrant     COMPARISON: None available     FINDINGS:     The gallbladder is nontender, with sludge but no shadowing stones  demonstrated. No gallbladder wall thickening or pericholecystic fluid.     No intrahepatic or extrahepatic biliary ductal dilatation is  demonstrated. The common biliary duct caliber is measured at 4.0 mm.     No liver lesion is identified. Diffusely, the echogenicity of the liver  is otherwise in the range of normal relative to that of the right  kidney.     The pancreas is mostly obscured. The right kidney, 10.8 cm, and the  pancreas otherwise appear unremarkable.                Impression:          Sludge in the gallbladder. No evidence for acute cholecystitis. No  discrete liver lesion or biliary ductal dilatation identified. With  persistent clinical indication, enhanced liver imaging could be  considered for further evaluation.     This report was finalized on 11/6/2017 7:33 PM by Dr. Gokul Real MD.       CT Abdomen Pelvis Without Contrast [[id number]] Collected:  11/06/17 2007     Updated:  11/06/17 2017    Narrative:       CT ABDOMEN AND PELVIS WITHOUT CONTRAST     HISTORY: Elevated liver function test. Gallbladder sludge.      TECHNIQUE: CT abdomen and pelvis without IV or oral contrast.     COMPARISON: Right upper quadrant ultrasound 11/06/2017.     FINDINGS: Lung bases appear clear and there is no pleural effusion.  Liver exhibits  slight undelayed margins consistent with hepatic  cirrhosis. There Is no evidence for intra or extrahepatic biliary duct  dilatation. There is density within the gallbladder consistent with  sludge as demonstrated sonographically. Adrenal glands, pancreas,  spleen, kidneys appear within normal limits. There is no hydronephrosis  or hydroureter. Urinary bladder is mostly decompressed. There is no  bowel dilatation or evidence for bowel obstruction. No ben enlargement  is evident within the abdomen or pelvis. Atherosclerotic calcifications  are present involving the abdominal aorta and the abdominal aorta  measures up to 2.4 cm diameter. Mural calcifications are present.       Impression:       1. Sludge within the gallbladder without other evidence for  cholecystitis. Mild undulating hepatic margins consistent with hepatic  cirrhosis.  2. Atherosclerotic disease. 2.4 cm infrarenal abdominal aorta.     Radiation dose reduction techniques were utilized, including automated  exposure control and exposure modulation based on body size.     This report was finalized on 11/6/2017 8:14 PM by Dr. Marcos Sainz MD.       XR Chest 2 View [[id number]] Collected:  11/06/17 2041     Updated:  11/06/17 2046    Narrative:       XR CHEST 2 VW-     HISTORY: Male who is 67 years-old,  acute renal failure     TECHNIQUE: Frontal and lateral views of the chest     COMPARISON: None available     FINDINGS: Heart, mediastinum and pulmonary vasculature are unremarkable.  No focal pulmonary consolidation, pleural effusion, or pneumothorax. Old  granulomatous disease is apparent. No acute osseous process.       Impression:       No evidence for acute pulmonary process. Follow-up as  clinical indications persist.     This report was finalized on 11/6/2017 8:43 PM by Dr. Gokul Real MD.       US Renal Bilateral [[id number]] Collected:  11/07/17 0145     Updated:  11/07/17 0145    Narrative:       ULTRASOUND RENAL BILATERAL.      TECHNIQUE: Grayscale and color images of the kidneys were obtained.     HISTORY: Acute kidney injury.     COMPARISON: No prior studies for comparison.     FINDINGS:  Right kidney measures 11.6 x 4.8 x 5.4 cm. Left kidney measures 12.5 x  5.9 x 5.7 cm. There is no hydronephrosis, stone or mass.     Urinary bladder could not be well visualized. No calculus, sludge or  mass.       Impression:       No acute findings involving both kidneys.             EKG                                                  Rhythm/Rate: Sinus bradycardia 48  P waves and MS: Normal  QRS, axis: IVCD   ST and T waves: Biphasic T-wave and Inverted T-waves inferiorly        Current Facility-Administered Medications:   •  ALPRAZolam (XANAX) tablet 0.25 mg, 0.25 mg, Oral, 4x Daily PRN, Keith Varela MD, 0.25 mg at 11/10/17 0046  •  amLODIPine (NORVASC) tablet 5 mg, 5 mg, Oral, Q24H, Keith Varela MD, 5 mg at 11/13/17 1009  •  aspirin EC tablet 81 mg, 81 mg, Oral, Daily, Keith Varela MD, 81 mg at 11/13/17 1009  •  heparin (porcine) injection 3,000 Units, 3,000 Units, Intravenous, PRN, Franky Moreau MD  •  nitroglycerin (NITROSTAT) SL tablet 0.4 mg, 0.4 mg, Sublingual, Q5 Min PRN, Keith Varela MD  •  pantoprazole (PROTONIX) EC tablet 40 mg, 40 mg, Oral, Q AM, Keith Varela MD, 40 mg at 11/13/17 0613  •  sodium chloride 0.9 % flush 1-10 mL, 1-10 mL, Intravenous, PRN, Keith Varela MD  •  Insert peripheral IV, , , Once **AND** sodium chloride 0.9 % flush 10 mL, 10 mL, Intravenous, PRN, Adan Simmons MD, 10 mL at 11/10/17 2052     ASSESSMENT  Acute renal failure due to severe rhabdomyolysis on hemodialysis  Rhabdomyolysis  Elevated liver enzymes Questionable etiology probably secondary to Crestor improving  Hypertension  Hyperlipidemia  Atherosclerotic heart disease  Leukocytosis with no fever ? source    PLAN  CPM  Hemodialysis TIW  OFF Crestor  OFF hydrochlorothiazide  Stress ulcer DVT prophylaxis  PT/OT  Follow closely     KEITH VARELA  MD     .

## 2017-11-14 NOTE — ANESTHESIA PREPROCEDURE EVALUATION
Anesthesia Evaluation     Patient summary reviewed and Nursing notes reviewed   no history of anesthetic complications:  NPO Solid Status: > 8 hours  NPO Liquid Status: > 8 hours     Airway   Mallampati: II  TM distance: >3 FB  Neck ROM: full  no difficulty expected  Dental - normal exam     Pulmonary - negative pulmonary ROS and normal exam   Cardiovascular - normal exam  Exercise tolerance: good (4-7 METS)    ECG reviewed  Rhythm: regular  Rate: normal    (+) hypertension well controlled, hyperlipidemia    ROS comment: ECHO 11/2017:  · Left ventricular systolic function is normal. Estimated EF = 58%.  · Left atrial cavity size is mildly dilated.    Neuro/Psych- negative ROS  GI/Hepatic/Renal/Endo - negative ROS     Musculoskeletal (-) negative ROS    Abdominal  - normal exam   Substance History - negative use     OB/GYN negative ob/gyn ROS         Other - negative ROS           Phys Exam Other: Noted overbite                                Anesthesia Plan    ASA 3     MAC     intravenous induction   Anesthetic plan and risks discussed with patient.    Plan discussed with CRNA and attending.

## 2017-11-14 NOTE — PLAN OF CARE
Problem: Perioperative Period (Adult)  Goal: Signs and Symptoms of Listed Potential Problems Will be Absent or Manageable (Perioperative Period)  Outcome: Ongoing (interventions implemented as appropriate)    11/14/17 9590   Perioperative Period   Problems Assessed (Perioperative Period) all   Problems Present (Perioperative Period) none

## 2017-11-14 NOTE — PROGRESS NOTES
[facility]  Inpatient Progress Note    Reason for Follow Up: Elevated liver tests in the setting of rhabdo and renal failure, liver numbers improving with resolution of rhabdo    Subjective     Interval History:   No complaints today.  No significant 24-hour events.  Going for catheter placement today with vascular surgery    Current Facility-Administered Medications:   •  ALPRAZolam (XANAX) tablet 0.25 mg, 0.25 mg, Oral, 4x Daily PRN, Memo Varela MD, 0.25 mg at 11/10/17 0046  •  amLODIPine (NORVASC) tablet 5 mg, 5 mg, Oral, Q24H, Memo Varela MD, 5 mg at 11/13/17 1009  •  aspirin EC tablet 81 mg, 81 mg, Oral, Daily, Memo Varela MD, 81 mg at 11/13/17 1009  •  heparin (porcine) injection 3,000 Units, 3,000 Units, Intravenous, PRN, Franky Moreau MD  •  nitroglycerin (NITROSTAT) SL tablet 0.4 mg, 0.4 mg, Sublingual, Q5 Min PRN, Memo Varela MD  •  pantoprazole (PROTONIX) EC tablet 40 mg, 40 mg, Oral, Q AM, Memo Varela MD, 40 mg at 11/13/17 0613  •  sodium chloride 0.9 % flush 1-10 mL, 1-10 mL, Intravenous, PRN, Memo Varela MD  •  Insert peripheral IV, , , Once **AND** sodium chloride 0.9 % flush 10 mL, 10 mL, Intravenous, PRN, Adan Simmons MD, 10 mL at 11/10/17 2052  Review of Systems:    He endorses weakness, jaundice other systems reviewed and negative    Objective     Vital Signs  Temp:  [97.6 °F (36.4 °C)-98.3 °F (36.8 °C)] 97.6 °F (36.4 °C)  Heart Rate:  [59-63] 59  Resp:  [18] 18  BP: ()/(63-76) 95/76  Body mass index is 31.2 kg/(m^2).    Intake/Output Summary (Last 24 hours) at 11/14/17 0918  Last data filed at 11/14/17 0553   Gross per 24 hour   Intake              600 ml   Output              120 ml   Net              480 ml           Physical Exam:   General: patient awake, alert and cooperative   Eyes: Normal lids and lashes, scleral icterus Present    Neck: supple, normal ROM   Skin: warm and dry,  jaundiced   Cardiovascular: regular rhythm and rate, no murmurs  auscultated   Pulm: clear to auscultation bilaterally, regular and unlabored   Abdomen: soft, nontender, nondistended; normal bowel sounds   Rectal: deferred   Extremities: no rash or edema   Psychiatric: Normal mood and behavior; memory intact     Results Review:     I reviewed the patient's new clinical results.      Results from last 7 days  Lab Units 11/14/17  0537 11/13/17  0554 11/12/17  0754   WBC 10*3/mm3 12.05* 12.01* 11.63*   HEMOGLOBIN g/dL 12.9* 13.3* 13.2*   HEMATOCRIT % 40.0* 40.3* 39.6*   PLATELETS 10*3/mm3 153 144 136*       Results from last 7 days  Lab Units 11/14/17  0537 11/13/17  0554 11/12/17  0754   SODIUM mmol/L 139 140 139   POTASSIUM mmol/L 4.6 4.9 4.6   CHLORIDE mmol/L 98 96* 98   CO2 mmol/L 25.5 25.4 27.2   BUN mg/dL 61* 91* 71*   CREATININE mg/dL 7.53* 9.25* 7.89*   CALCIUM mg/dL 8.9 8.8 8.5*   BILIRUBIN mg/dL 2.9* 3.1* 2.4*   ALK PHOS U/L 269* 263* 249*   ALT (SGPT) U/L 109* 130* 148*   AST (SGOT) U/L 119* 159* 222*   GLUCOSE mg/dL 91 103* 88       Results from last 7 days  Lab Units 11/08/17  0541   INR  1.22*     No results found for: LIPASE    Radiology:  US Renal Bilateral   Final Result   No acute findings involving both kidneys.        This report was finalized on 11/9/2017 11:54 PM by Dr. Maria Eugenia Tariq MD.          XR Chest 1 View   Final Result   Right IJ catheter extends to the superior vena cava. No   pneumothorax.       This report was finalized on 11/8/2017 4:30 PM by Dr. Gokul Real MD.          CT Kidney Biopsy   Final Result   1. Satisfactory CT-guided biopsy of right kidney as described above.   2. Satisfactory postprocedure percutaneous embolization for hemostasis.       This report was finalized on 11/8/2017 4:11 PM by Dr. Good Caba MD.          XR Chest 2 View   Final Result   No evidence for acute pulmonary process. Follow-up as   clinical indications persist.       This report was finalized on 11/6/2017 8:43 PM by Dr. Gokul Real MD.           CT Abdomen Pelvis Without Contrast   Final Result   1. Sludge within the gallbladder without other evidence for   cholecystitis. Mild undulating hepatic margins consistent with hepatic   cirrhosis.   2. Atherosclerotic disease. 2.4 cm infrarenal abdominal aorta.       Radiation dose reduction techniques were utilized, including automated   exposure control and exposure modulation based on body size.       This report was finalized on 11/6/2017 8:14 PM by Dr. Marcos Sainz MD.          US Gallbladder   Final Result       Sludge in the gallbladder. No evidence for acute cholecystitis. No   discrete liver lesion or biliary ductal dilatation identified. With   persistent clinical indication, enhanced liver imaging could be   considered for further evaluation.       This report was finalized on 11/6/2017 7:33 PM by Dr. Gokul Real MD.              Assessment/Plan     Patient Active Problem List   Diagnosis   • Arteriosclerotic vascular disease   • Hyperlipidemia   • Hypertension   • Acute kidney injury (nontraumatic)     Assessment:  1) Elevated LFT's- Bilirubin  has leveled out with the resolving rhabdo.  Given significantly elevated CK most likely acute elevation of liver tests are from muscle origin.    2) Abnormal CT- Admission US with gallbladder sludge, no liver abnormality reported although CT abd/pelvis with cirrhotic appearing liver. Hep panel negative. Doppler US with no portal vein thrombosis.  Serology notable for + ASMA and AFP, ? Significance, likely acute phase reactant  3) STEVEN- renal following, started dialysis 11/8, renal bx pending  4) Rhabdomyolosis- resulting in liver dysfunction, likely Cause.  CK remains elevated but improving     - follow LFT's daily  - No indication for liver biopsy at this time      I discussed the patients findings and my recommendations with patient and nursing staff.    Lavell Augustine MD

## 2017-11-14 NOTE — DISCHARGE PLACEMENT REQUEST
"Hu Burgess (67 y.o. Male)     Date of Birth Social Security Number Address Home Phone MRN    1950  [id number] [address] [phone] [id number]    Judaism Marital Status          Mu-ism        Admission Date Admission Type Admitting Provider Attending Provider Department, Room/Bed    11/6/17 Emergency Memo Varela MD Ahmed, Aftab, MD [facility] MAIN OR, DEEPA Main OR/MAIN OR    Discharge Date Discharge Disposition Discharge Destination                      Attending Provider: Memo Varela MD     Allergies:  No Known Allergies    Isolation:  None   Infection:  None   Code Status:  Not on file    Ht:  72.99\" (185.4 cm)   Wt:  236 lb 6.4 oz (107 kg)    Admission Cmt:  None   Principal Problem:  None                Active Insurance as of 11/6/2017     Primary Coverage     Payor Plan Insurance Group Employer/Plan Group    MEDICARE MEDICARE A & B      Payor Plan Address Payor Plan Phone Number Effective From Effective To    [address] [phone] 9/1/2013     [place] [address]       Subscriber Name Subscriber Birth Date Member ID       HU BURGESS 1950 [id number]           Secondary Coverage     Payor Plan Insurance Group Employer/Plan Group    [address] [id number]     Payor Plan Address Payor Plan Phone Number Effective From Effective To    [address]  1/13/2001     [place] [address]       Subscriber Name Subscriber Birth Date Member ID       CORI BURGESS L 8/21/1960 [id number]                 Emergency Contacts      (Rel.) Home Phone Work Phone Mobile Phone    AdityaCori (Spouse) [phone] -- [phone]              "

## 2017-11-14 NOTE — PLAN OF CARE
Problem: Patient Care Overview (Adult)  Goal: Plan of Care Review    11/14/17 1421   Coping/Psychosocial Response Interventions   Plan Of Care Reviewed With patient   Outcome Evaluation   Outcome Summary/Follow up Plan Pt demonstrates improving endurance and independence w/ mobility during PT. Gait slow, unsteady but no overt LOB while using RW and contact assist. Frequent VC for UE placement during transfers. Discussed activity recommendations while inpatient and PT POC. Planning for DC to [facility].

## 2017-11-14 NOTE — PROGRESS NOTES
"   LOS: 8 days   Patient Care Team:  Aldo Guajardo MD as PCP - General  Aldo Guajardo MD as PCP - Family Medicine    Chief Complaint/ Reason for encounter: Acute renal failure/dialysis management  Chief Complaint   Patient presents with   • Weakness - Generalized     X1 WEEK   • Abnormal Lab     LABS DRAWN TODAY, CREATININE 7.0         Subjective     Weakness - Generalized   Associated symptoms include weakness. Pertinent negatives include no chest pain, fever or nausea.   Abnormal Lab   Associated symptoms include weakness. Pertinent negatives include no chest pain, fever or nausea.       Subjective:  Symptoms:  Stable.  He reports weakness.  No shortness of breath or chest pain.  (Still very weak, requesting rehabilitation placement  Minimal urine output, tolerated dialysis well yesterday).    Diet:  Adequate intake.  No nausea.    Activity level: Impaired due to weakness.    Pain:  He reports no pain.          History taken from: Patient and chart    Objective     Vital Signs  Temp:  [97.6 °F (36.4 °C)-98.3 °F (36.8 °C)] 97.6 °F (36.4 °C)  Heart Rate:  [59-63] 59  Resp:  [18] 18  BP: ()/(63-76) 95/76       Wt Readings from Last 1 Encounters:   11/14/17 0530 236 lb 6.4 oz (107 kg)   11/13/17 1607 229 lb 4.5 oz (104 kg)   11/13/17 1600 228 lb 6.3 oz (104 kg)   11/13/17 0300 236 lb 15.9 oz (107 kg)   11/12/17 0424 229 lb 6.4 oz (104 kg)   11/11/17 0412 229 lb 11.5 oz (104 kg)   11/10/17 0407 232 lb 9.6 oz (106 kg)   11/09/17 0413 234 lb 8 oz (106 kg)   11/07/17 1331 218 lb 11.1 oz (99.2 kg)   11/06/17 2118 218 lb 12.8 oz (99.2 kg)   11/06/17 1643 215 lb (97.5 kg)       Objective:  General Appearance:  Comfortable, ill-appearing, in no acute distress and not in pain.    Vital signs: (most recent): Blood pressure 95/76, pulse 59, temperature 97.6 °F (36.4 °C), temperature source Oral, resp. rate 18, height 72.99\" (185.4 cm), weight 236 lb 6.4 oz (107 kg), SpO2 93 %.  Vital signs are normal.  No " fever.    Output: Minimal urine output.    HEENT: Normal HEENT exam.    Lungs:  Normal respiratory rate and normal effort.  He is not in respiratory distress.  Breath sounds clear to auscultation.  No wheezes or decreased breath sounds.    Heart: Normal rate.  Regular rhythm.  S1 normal.  No murmur.   Abdomen: Abdomen is soft and non-distended.  Bowel sounds are normal.   There is no epigastric area or no suprapubic area tenderness.  There is no rebound tenderness.     Extremities: Normal range of motion.  There is no deformity or dependent edema.    Pulses: Distal pulses are intact.    Neurological: Patient is alert and oriented to person, place and time.    Skin:  Warm and dry.  No rash or cyanosis.             Results Review:    Past Medical History: reviewed and updated  Past Medical History:   Diagnosis Date   • Arteriosclerotic cardiovascular disease    • History of transfusion    • Hyperlipidemia    • Hypertension          Allergies:  No Known Allergies    Intake/Output:     Intake/Output Summary (Last 24 hours) at 11/14/17 0932  Last data filed at 11/14/17 0553   Gross per 24 hour   Intake              600 ml   Output              120 ml   Net              480 ml         DATA:  Interval chart, labs and notes reviewed.    Labs:   Recent Results (from the past 24 hour(s))   CK    Collection Time: 11/14/17  5:37 AM   Result Value Ref Range    Creatine Kinase 510 (H) 20 - 200 U/L   Comprehensive Metabolic Panel    Collection Time: 11/14/17  5:37 AM   Result Value Ref Range    Glucose 91 65 - 99 mg/dL    BUN 61 (H) 8 - 23 mg/dL    Creatinine 7.53 (H) 0.76 - 1.27 mg/dL    Sodium 139 136 - 145 mmol/L    Potassium 4.6 3.5 - 5.2 mmol/L    Chloride 98 98 - 107 mmol/L    CO2 25.5 22.0 - 29.0 mmol/L    Calcium 8.9 8.6 - 10.5 mg/dL    Total Protein 5.7 (L) 6.0 - 8.5 g/dL    Albumin 2.00 (L) 3.50 - 5.20 g/dL    ALT (SGPT) 109 (H) 1 - 41 U/L    AST (SGOT) 119 (H) 1 - 40 U/L    Alkaline Phosphatase 269 (H) 39 - 117 U/L     Total Bilirubin 2.9 (H) 0.1 - 1.2 mg/dL    eGFR Non African Amer 7 (L) >60 mL/min/1.73    Globulin 3.7 gm/dL    A/G Ratio 0.5 g/dL    BUN/Creatinine Ratio 8.1 7.0 - 25.0    Anion Gap 15.5 mmol/L   CBC Auto Differential    Collection Time: 11/14/17  5:37 AM   Result Value Ref Range    WBC 12.05 (H) 4.50 - 10.70 10*3/mm3    RBC 3.94 (L) 4.60 - 6.00 10*6/mm3    Hemoglobin 12.9 (L) 13.7 - 17.6 g/dL    Hematocrit 40.0 (L) 40.4 - 52.2 %    .5 (H) 79.8 - 96.2 fL    MCH 32.7 27.0 - 32.7 pg    MCHC 32.3 (L) 32.6 - 36.4 g/dL    RDW 16.8 (H) 11.5 - 14.5 %    RDW-SD 60.5 (H) 37.0 - 54.0 fl    MPV 11.2 6.0 - 12.0 fL    Platelets 153 140 - 500 10*3/mm3    Neutrophil % 66.7 42.7 - 76.0 %    Lymphocyte % 14.7 (L) 19.6 - 45.3 %    Monocyte % 13.4 (H) 5.0 - 12.0 %    Eosinophil % 4.1 0.3 - 6.2 %    Basophil % 0.3 0.0 - 1.5 %    Immature Grans % 0.8 (H) 0.0 - 0.5 %    Neutrophils, Absolute 8.03 1.90 - 8.10 10*3/mm3    Lymphocytes, Absolute 1.77 0.90 - 4.80 10*3/mm3    Monocytes, Absolute 1.61 (H) 0.20 - 1.20 10*3/mm3    Eosinophils, Absolute 0.50 0.00 - 0.70 10*3/mm3    Basophils, Absolute 0.04 0.00 - 0.20 10*3/mm3    Immature Grans, Absolute 0.10 (H) 0.00 - 0.03 10*3/mm3       Radiology:  Imaging Results (last 24 hours)     ** No results found for the last 24 hours. **             Medications have been reviewed:  Current Facility-Administered Medications   Medication Dose Route Frequency Provider Last Rate Last Dose   • ALPRAZolam (XANAX) tablet 0.25 mg  0.25 mg Oral 4x Daily PRN Memo Varela MD   0.25 mg at 11/10/17 0046   • amLODIPine (NORVASC) tablet 5 mg  5 mg Oral Q24H Memo Varela MD   5 mg at 11/13/17 1009   • aspirin EC tablet 81 mg  81 mg Oral Daily Memo Varela MD   81 mg at 11/13/17 1009   • heparin (porcine) injection 3,000 Units  3,000 Units Intravenous PRN Franky Moreau MD       • nitroglycerin (NITROSTAT) SL tablet 0.4 mg  0.4 mg Sublingual Q5 Min PRN Memo Varela MD       • pantoprazole (PROTONIX) EC  tablet 40 mg  40 mg Oral Q AM Memo Varela MD   40 mg at 11/13/17 0613   • sodium chloride 0.9 % flush 1-10 mL  1-10 mL Intravenous PRN Memo Varela MD       • sodium chloride 0.9 % flush 10 mL  10 mL Intravenous PRN Adan Simmons MD   10 mL at 11/10/17 2052       Assessment/Plan     Active Problems:    Acute kidney injury (nontraumatic)      Assessment:  (Acute renal failure.   secondary to rhabdomyolysis, this was confirmed with renal biopsy, positive ANCA was a false positive, no sign of glomerulonephritis  Nausea, from uremia, better    Severe rhabdomyolysis,  likely From Crestor plus dehydration combination  Hypokalemia, replaced  Elevated LFTs, possibly related to Crestor, also likely muscle source  Mild metabolic acidosis  Hypoalbuminemia  Hyperparathyroidism, likely secondary to renal failure  Mild leukocytosis        Plan:   No evidence of renal recovery so far, continue dialysis 3 times a week  CK levels trending down so discontinued sodium bicarbonate  Plans for tunnel dialysis catheter later today  Discontinue Crestor indefinitely  Plan dialysis tomorrow  Will need outpatient dialysis set up in [place]  Patient also requesting rehabilitation placement and CCP is aware).             Continue to monitor renal function, electroytes and volume closely   Please call me with any questions or concerns      Franky Moreau MD   [facility]   [phone]    11/14/17  9:32 AM      Dictation performed using Dragon dictation software

## 2017-11-14 NOTE — PROGRESS NOTES
Continued Stay Note  [facility]     Patient Name: Hu Burgess  MRN: [id number]  Today's Date: 11/14/2017    Admit Date: 11/6/2017          Discharge Plan       11/14/17 7255    Case Management/Social Work Plan    Plan SNF vs     Patient/Family In Agreement With Plan yes    Additional Comments Pt agreeable to CCP arranging outpatient hemodialysis as indicated per nephrology MD note, and selects [facility] in [place] as preferred facility. CCP contacted [facility] liakenyetta (Radha) who requests CCP fax admissions intake form when completed application is available tomorrow, 11/15/17. [facility] admissions intake application started in CCP office. Per pt request, CCP contacted pt's wife (Cori Burgess, [phone]) to discuss d/c planning. Pt and wife state preference for pt to d/c to sub-acute rehab if appropriate, and request referrals to [facility] ([facility] and [facility] ([facility] which have on-site HD. CCP made referrals and will follow for evals. Delia Gordon LCSW              Discharge Codes     None            Anabelle Gordon LCSW

## 2017-11-14 NOTE — OP NOTE
Operative Note  Date of Admission:  11/6/2017  OR Date: 11/14/2017    Pre-op Diagnosis:   End stage renal failure    Post-op Diagnosis:     Same    Procedure:  1) Tunneled dialysis catheter insertion (46113)  2) Ultrasound guided vascular access (02886)  3) Radiologic supervision of catheter tip placement (83844)    Assistant: Jeane Ferraro KCSA    Anesthesia: Monitor Anesthesia Care    Estimated Blood Loss: none    Staff:   Circulator: Lorena Lundberg RN  Radiology Technologist: Sanjuana Mancilla, RRT; Yesenia Valentine  Scrub Person: Preston KEN Helton  Assistant: Jeane Ferraro    Complications: None    Specimen: None    Findings:  Tip in SVC/Atrial     Indications:    The patient is an 67 y.o. male referred for tunneled dialysis catheter placement.  The risks, benefits, and alternatives have been discussed with the patient and/or family and include but are not limited to injury to artery, vein, lung, bleeding, and infection.    Procedure:  The patient was taken to the operating room and placed supine on the operating room table. After the induction of IV sedation, the neck and chest were prepped and draped with ChloraPrep. The patient was placed in Trendelenburg position. Timeout was observed. The right  Internal Jugular  was evaluated on ultrasound and found to be easily compressible. The vessel was entered under direct ultrasound guidance and photographic documentation was recorded in the chart for the record. An angled wire was then advanced down into the superior vena cava under fluoroscopy without any difficulty. Exit site was chosen on the chest. The tract was anesthetized with 1% lidocaine mixed with 0.25% Marcaine. A 23 cm Palindrome catheter was then flushed and brought up onto the field. It was tunneled in an antegrade fashion up to the IJ insertion site. Dilator and peel-away sheath were then advanced over the wire under fluoroscopy without any significant difficulty. Dilator and wire were removed  leaving the peel-away sheath in place. The distal tip of the catheter was then placed into the peel-away sheath and the peel-away sheath was removed. Under fluoroscopy, the distal tip of the catheter was positioned into the right atrium. There was no kinking at the catheter insertion site. The catheter flushed and aspirated easily. The lung fields were examined. There was no evidence of hemothorax or pneumothorax. The catheter flushed and aspirated quite easily. It was locked with concentrated heparin. The catheter was then anchored to the chest with nylon sutures. A stitch and dermal glue were used to close the neck site as well. Sterile dressings were applied. The patient tolerated the procedure well. There were no immediately apparent complications.  Prior catheter was then removed and a U stitch was placed around his exit port         Active Hospital Problems (** Indicates Principal Problem)    Diagnosis Date Noted   • Acute kidney injury (nontraumatic) [N17.9] 11/06/2017      Resolved Hospital Problems    Diagnosis Date Noted Date Resolved   No resolved problems to display.      Gareth Sánchez MD     Date: 11/14/2017  Time: 5:18 PM

## 2017-11-14 NOTE — PLAN OF CARE
Problem: Patient Care Overview (Adult)  Goal: Plan of Care Review  Outcome: Ongoing (interventions implemented as appropriate)    11/13/17 2108   Coping/Psychosocial Response Interventions   Plan Of Care Reviewed With patient   Patient Care Overview   Progress no change   Outcome Evaluation   Outcome Summary/Follow up Plan Pt balanced rest with activity throughout day.No C/O pain or SOA. Dialysis today. Tunnel cath placement tomorrow per MD. NPO at midnight. Falls precautions maintained. VSS, no acute signs of distress. Will continue to monitor       Goal: Adult Individualization and Mutuality  Outcome: Ongoing (interventions implemented as appropriate)  Goal: Discharge Needs Assessment  Outcome: Ongoing (interventions implemented as appropriate)    Problem: Fall Risk (Adult)  Goal: Absence of Falls  Outcome: Outcome(s) achieved Date Met:  11/13/17    Problem: Renal Failure/Kidney Injury, Acute (Adult)  Goal: Signs and Symptoms of Listed Potential Problems Will be Absent or Manageable (Renal Failure/Kidney Injury, Acute)  Outcome: Ongoing (interventions implemented as appropriate)    Problem: Fluid Volume Deficit (Adult)  Goal: Fluid/Electrolyte Balance  Outcome: Ongoing (interventions implemented as appropriate)  Goal: Comfort/Well Being  Outcome: Ongoing (interventions implemented as appropriate)

## 2017-11-14 NOTE — THERAPY TREATMENT NOTE
Acute Care - Physical Therapy Treatment Note  [facility]     Patient Name: Hu Burgess  : 1950  MRN: [id number]  Today's Date: 2017             Admit Date: 2017    Visit Dx:    ICD-10-CM ICD-9-CM   1. Acute kidney injury (nontraumatic) N17.9 584.9   2. Elevated LFTs R79.89 790.6   3. Hypokalemia E87.6 276.8   4. Dehydration E86.0 276.51   5. Weakness R53.1 780.79     Patient Active Problem List   Diagnosis   • Arteriosclerotic vascular disease   • Hyperlipidemia   • Hypertension   • Acute kidney injury (nontraumatic)               Adult Rehabilitation Note       17 1417          Rehab Assessment/Intervention    Discipline physical therapist  -AR      Document Type therapy note (daily note)  -AR      Subjective Information agree to therapy  -AR      Patient Effort, Rehab Treatment good  -AR      Precautions/Limitations fall precautions  -AR      Recorded by [AR] Laurence Gallo, PT      Pain Assessment    Pain Assessment No/denies pain  -AR      Recorded by [AR] Laurence Gallo, PT      Cognitive Assessment/Intervention    Current Cognitive/Communication Assessment impaired  -AR      Orientation Status oriented x 4  -AR      Recorded by [AR] Laurence Gallo PT      Bed Mobility, Assessment/Treatment    Bed Mobility, Assistive Device bed rails;head of bed elevated  -AR      Bed Mob, Supine to Sit, [place] supervision required  -AR      Bed Mob, Sit to Supine, [place] not tested  -AR      Recorded by [AR] Laurence Gallo PT      Transfer Assessment/Treatment    Transfers, Sit-Stand [place] contact guard assist  -AR      Transfers, Stand-Sit [place] contact guard assist  -AR      Transfers, Sit-Stand-Sit, Assist Device rolling walker  -AR      Transfer, Comment cues for UE placement  -AR      Recorded by [AR] Laurence Gallo, PT      Gait Assessment/Treatment    Gait, [place] Level contact guard assist  -AR      Gait, Assistive Device rolling walker  -AR       Gait, Distance (Feet) 150  -AR      Gait, Gait Deviations decreased heel strike;sandhya decreased  -AR      Gait, Safety Issues step length decreased;weight-shifting ability decreased  -AR      Gait, Impairments strength decreased;impaired balance  -AR      Gait, Comment slow unsteady but no overt LOB  -AR      Recorded by [AR] Laurence Gallo, PT      Therapy Exercises    Bilateral Lower Extremities ankle pumps/circles;LAQ;10 reps  -AR      Recorded by [AR] Laurence Gallo PT      Positioning and Restraints    Pre-Treatment Position in bed  -AR      Post Treatment Position chair  -AR      In Chair reclined;sitting;call light within reach;encouraged to call for assist;exit alarm on  -AR      Recorded by [AR] Laurence Gallo PT        User Key  (r) = Recorded By, (t) = Taken By, (c) = Cosigned By    Initials Name Effective Dates    SUSAN Gallo, PT 06/27/16 -                 IP PT Goals       11/13/17 1053          Bed Mobility PT LTG    Bed Mobility PT LTG, Date Established 11/13/17  -KH      Bed Mobility PT LTG, Time to Achieve 1 wk  -KH      Bed Mobility PT LTG, Activity Type all bed mobility  -KH      Bed Mobility PT LTG, [place] Level independent  -KH      Transfer Training PT LTG    Transfer Training PT LTG, Date Established 11/13/17  -KH      Transfer Training PT LTG, Time to Achieve 1 wk  -KH      Transfer Training PT LTG, Activity Type all transfers  -KH      Transfer Training PT LTG, [place] Level supervision required  -KH      Gait Training PT LTG    Gait Training Goal PT LTG, Date Established 11/13/17  -KH      Gait Training Goal PT LTG, Time to Achieve 1 wk  -KH      Gait Training Goal PT LTG, [place] Level supervision required  -KH      Gait Training Goal PT LTG, Distance to Achieve 200 ft  -KH      Stair Training PT LTG    Stair Training Goal PT LTG, Date Established 11/13/17  -KH      Stair Training Goal PT LTG, Time to Achieve 1 wk  -KH      Stair Training Goal PT LTG, Number  of Steps 2  -      Stair Training Goal PT LTG, [place] Level contact guard assist  -      Patient Education PT LTG    Patient Education PT LTG, Date Established 11/13/17  -      Patient Education PT LTG, Time to Achieve 1 wk  -      Patient Education PT LTG, Education Type HEP  -      Patient Education PT LTG, Education Understanding demonstrate adequately  -        User Key  (r) = Recorded By, (t) = Taken By, (c) = Cosigned By    Initials Name Provider Type     Swetha Petersen, PT Physical Therapist          Physical Therapy Education     Title: PT OT SLP Therapies (Active)     Topic: Physical Therapy (Active)     Point: Mobility training (Active)    Learning Progress Summary    Learner Readiness Method Response Comment Documented by Status   Patient Acceptance E NR  AR 11/14/17 1421 Active    Acceptance E VU,NR   11/13/17 1053 Done               Point: Home exercise program (Active)    Learning Progress Summary    Learner Readiness Method Response Comment Documented by Status   Patient Acceptance E NR  AR 11/14/17 1421 Active    Acceptance E VU,NR   11/13/17 1053 Done               Point: Precautions (Active)    Learning Progress Summary    Learner Readiness Method Response Comment Documented by Status   Patient Acceptance E NR  AR 11/14/17 1421 Active    Acceptance E VU,NR   11/13/17 1053 Done                      User Key     Initials Effective Dates Name Provider Type Discipline     05/18/15 -  Swetha Petersen, PT Physical Therapist PT    AR 06/27/16 -  Laurence Gallo, PT Physical Therapist PT                    PT Recommendation and Plan  Anticipated Discharge Disposition: home with home health  Planned Therapy Interventions: balance training, bed mobility training, gait training, home exercise program, patient/family education, strengthening, stair training, transfer training  PT Frequency: daily  Plan of Care Review  Plan Of Care Reviewed With: patient  Outcome  Summary/Follow up Plan: Pt demonstrates improving endurance and independence w/ mobility during PT.  Gait slow, unsteady but no overt LOB while using RW and contact assist.  Frequent VC for UE placement during transfers.  Discussed activity recommendations while inpatient and PT POC.  Planning for DC to [facility].            Outcome Measures       11/14/17 1400 11/13/17 1056       How much help from another person do you currently need...    Turning from your back to your side while in flat bed without using bedrails? 4  -AR 4  -KH     Moving from lying on back to sitting on the side of a flat bed without bedrails? 4  -AR 4  -KH     Moving to and from a bed to a chair (including a wheelchair)? 3  -AR 3  -KH     Standing up from a chair using your arms (e.g., wheelchair, bedside chair)? 3  -AR 3  -KH     Climbing 3-5 steps with a railing? 2  -AR 2  -KH     To walk in hospital room? 3  -AR 3  -KH     AM-PAC 6 Clicks Score 19  -AR 19  -KH     Functional Assessment    Outcome Measure Options  AM-PAC 6 Clicks Basic Mobility (PT)  -       User Key  (r) = Recorded By, (t) = Taken By, (c) = Cosigned By    Initials Name Provider Type    ASHLEE Petersen, PT Physical Therapist    AR Laurence Gallo PT Physical Therapist           Time Calculation:         PT Charges       11/14/17 1423          Time Calculation    Start Time 1411  -AR      Stop Time 1424  -AR      Time Calculation (min) 13 min  -AR      PT Received On 11/14/17  -AR      PT - Next Appointment 11/15/17  -AR        User Key  (r) = Recorded By, (t) = Taken By, (c) = Cosigned By    Initials Name Provider Type    SUSAN Gallo PT Physical Therapist          Therapy Charges for Today     Code Description Service Date Service Provider Modifiers Qty    04442232347 HC PT THER PROC EA 15 MIN 11/14/2017 Laurence Gallo PT GP 1          PT G-Codes  Outcome Measure Options: AM-PAC 6 Clicks Basic Mobility (PT)    Laurence Gallo PT  11/14/2017

## 2017-11-14 NOTE — PLAN OF CARE
Problem: Patient Care Overview (Adult)  Goal: Plan of Care Review  Outcome: Ongoing (interventions implemented as appropriate)    11/14/17 2460   Coping/Psychosocial Response Interventions   Plan Of Care Reviewed With patient   Patient Care Overview   Progress improving   Outcome Evaluation   Outcome Summary/Follow up Plan PT WILL HAVE NO PAIN AFTER THIS SURGERY

## 2017-11-14 NOTE — PLAN OF CARE
Problem: Patient Care Overview (Adult)  Goal: Plan of Care Review  Outcome: Ongoing (interventions implemented as appropriate)    11/14/17 6194   Coping/Psychosocial Response Interventions   Plan Of Care Reviewed With patient   Patient Care Overview   Progress no change   Outcome Evaluation   Outcome Summary/Follow up Plan pt alert and oriented. held morning meds until after procedure. he is currently down in surgery. waiting for patient to return to the floor.       Goal: Adult Individualization and Mutuality  Outcome: Ongoing (interventions implemented as appropriate)    Problem: Fall Risk (Adult)  Goal: Absence of Falls  Outcome: Ongoing (interventions implemented as appropriate)    Problem: Renal Failure/Kidney Injury, Acute (Adult)  Goal: Signs and Symptoms of Listed Potential Problems Will be Absent or Manageable (Renal Failure/Kidney Injury, Acute)  Outcome: Ongoing (interventions implemented as appropriate)    Problem: Fluid Volume Deficit (Adult)  Goal: Fluid/Electrolyte Balance  Outcome: Ongoing (interventions implemented as appropriate)  Goal: Comfort/Well Being  Outcome: Ongoing (interventions implemented as appropriate)

## 2017-11-14 NOTE — BRIEF OP NOTE
HEMODIALYSIS CATHETER INSERTION  Progress Note    Hu Burgess  11/6/2017 - 11/14/2017    Pre-op Diagnosis:   ESRD       Post-Op Diagnosis Codes:   same    Procedure/CPT® Codes:      Procedure(s):  PALINDRONE CATHETERE PLACEMENT,  flouroscopic guidance and duplex directed access    Surgeon(s):  Gareth Sánchez MD    Anesthesia: Monitor Anesthesia Care    Staff:   Circulator: Lorena Lundberg RN  Radiology Technologist: Sanjuana Mancilla, RRT; Yesenia Valentine  Scrub Person: Preston Helton  Assistant: Jeane Ferraro    Estimated Blood Loss: none    Urine Voided: * No values recorded between 11/14/2017  4:40 PM and 11/14/2017  5:13 PM *    Specimens:                None      Drains:           Findings: see dict    Complications: none      Gareth Sánchez MD     Date: 11/14/2017  Time: 5:13 PM

## 2017-11-15 LAB
ALBUMIN SERPL-MCNC: 2.2 G/DL (ref 3.5–5.2)
ALBUMIN/GLOB SERPL: 0.6 G/DL
ALP SERPL-CCNC: 259 U/L (ref 39–117)
ALT SERPL W P-5'-P-CCNC: 78 U/L (ref 1–41)
ANION GAP SERPL CALCULATED.3IONS-SCNC: 19.1 MMOL/L
AST SERPL-CCNC: 89 U/L (ref 1–40)
BACTERIA SPEC AEROBE CULT: NORMAL
BACTERIA SPEC AEROBE CULT: NORMAL
BASOPHILS # BLD AUTO: 0.04 10*3/MM3 (ref 0–0.2)
BASOPHILS NFR BLD AUTO: 0.3 % (ref 0–1.5)
BILIRUB SERPL-MCNC: 2.6 MG/DL (ref 0.1–1.2)
BUN BLD-MCNC: 78 MG/DL (ref 8–23)
BUN/CREAT SERPL: 8.7 (ref 7–25)
CALCIUM SPEC-SCNC: 8.9 MG/DL (ref 8.6–10.5)
CHLORIDE SERPL-SCNC: 97 MMOL/L (ref 98–107)
CK SERPL-CCNC: 260 U/L (ref 20–200)
CO2 SERPL-SCNC: 22.9 MMOL/L (ref 22–29)
CREAT BLD-MCNC: 8.92 MG/DL (ref 0.76–1.27)
DEPRECATED RDW RBC AUTO: 61.8 FL (ref 37–54)
EOSINOPHIL # BLD AUTO: 0.55 10*3/MM3 (ref 0–0.7)
EOSINOPHIL NFR BLD AUTO: 4.4 % (ref 0.3–6.2)
ERYTHROCYTE [DISTWIDTH] IN BLOOD BY AUTOMATED COUNT: 17.1 % (ref 11.5–14.5)
GFR SERPL CREATININE-BSD FRML MDRD: 6 ML/MIN/1.73
GLOBULIN UR ELPH-MCNC: 3.5 GM/DL
GLUCOSE BLD-MCNC: 91 MG/DL (ref 65–99)
HCT VFR BLD AUTO: 38.8 % (ref 40.4–52.2)
HGB BLD-MCNC: 12.8 G/DL (ref 13.7–17.6)
IMM GRANULOCYTES # BLD: 0.08 10*3/MM3 (ref 0–0.03)
IMM GRANULOCYTES NFR BLD: 0.6 % (ref 0–0.5)
LYMPHOCYTES # BLD AUTO: 1.73 10*3/MM3 (ref 0.9–4.8)
LYMPHOCYTES NFR BLD AUTO: 13.8 % (ref 19.6–45.3)
MCH RBC QN AUTO: 33.6 PG (ref 27–32.7)
MCHC RBC AUTO-ENTMCNC: 33 G/DL (ref 32.6–36.4)
MCV RBC AUTO: 101.8 FL (ref 79.8–96.2)
MONOCYTES # BLD AUTO: 1.68 10*3/MM3 (ref 0.2–1.2)
MONOCYTES NFR BLD AUTO: 13.4 % (ref 5–12)
NEUTROPHILS # BLD AUTO: 8.43 10*3/MM3 (ref 1.9–8.1)
NEUTROPHILS NFR BLD AUTO: 67.5 % (ref 42.7–76)
PLATELET # BLD AUTO: 156 10*3/MM3 (ref 140–500)
PMV BLD AUTO: 11.4 FL (ref 6–12)
POTASSIUM BLD-SCNC: 5.2 MMOL/L (ref 3.5–5.2)
PROT SERPL-MCNC: 5.7 G/DL (ref 6–8.5)
RBC # BLD AUTO: 3.81 10*6/MM3 (ref 4.6–6)
SODIUM BLD-SCNC: 139 MMOL/L (ref 136–145)
WBC NRBC COR # BLD: 12.51 10*3/MM3 (ref 4.5–10.7)

## 2017-11-15 PROCEDURE — 25010000002 HEPARIN (PORCINE) PER 1000 UNITS: Performed by: SURGERY

## 2017-11-15 PROCEDURE — 99232 SBSQ HOSP IP/OBS MODERATE 35: CPT | Performed by: INTERNAL MEDICINE

## 2017-11-15 PROCEDURE — 85025 COMPLETE CBC W/AUTO DIFF WBC: CPT | Performed by: HOSPITALIST

## 2017-11-15 PROCEDURE — 82550 ASSAY OF CK (CPK): CPT | Performed by: INTERNAL MEDICINE

## 2017-11-15 PROCEDURE — 80053 COMPREHEN METABOLIC PANEL: CPT | Performed by: HOSPITALIST

## 2017-11-15 PROCEDURE — 97110 THERAPEUTIC EXERCISES: CPT

## 2017-11-15 PROCEDURE — 94799 UNLISTED PULMONARY SVC/PX: CPT

## 2017-11-15 PROCEDURE — 25010000002 HEPARIN (PORCINE) PER 1000 UNITS: Performed by: INTERNAL MEDICINE

## 2017-11-15 RX ORDER — FAMOTIDINE 10 MG/ML
20 INJECTION, SOLUTION INTRAVENOUS ONCE
Status: DISCONTINUED | OUTPATIENT
Start: 2017-11-15 | End: 2017-11-15

## 2017-11-15 RX ORDER — HEPARIN SODIUM 1000 [USP'U]/ML
3000 INJECTION, SOLUTION INTRAVENOUS; SUBCUTANEOUS AS NEEDED
Status: DISCONTINUED | OUTPATIENT
Start: 2017-11-15 | End: 2017-11-16

## 2017-11-15 RX ADMIN — HEPARIN SODIUM 5000 UNITS: 5000 INJECTION, SOLUTION INTRAVENOUS; SUBCUTANEOUS at 14:24

## 2017-11-15 RX ADMIN — AMLODIPINE BESYLATE 2.5 MG: 2.5 TABLET ORAL at 14:26

## 2017-11-15 RX ADMIN — ASPIRIN 81 MG: 81 TABLET ORAL at 14:25

## 2017-11-15 RX ADMIN — HEPARIN SODIUM 3000 UNITS: 1000 INJECTION, SOLUTION INTRAVENOUS; SUBCUTANEOUS at 13:30

## 2017-11-15 RX ADMIN — PANTOPRAZOLE SODIUM 40 MG: 40 TABLET, DELAYED RELEASE ORAL at 06:30

## 2017-11-15 NOTE — PLAN OF CARE
Problem: Patient Care Overview (Adult)  Goal: Plan of Care Review    11/15/17 163   Outcome Evaluation   Outcome Summary/Follow up Plan Increased fatigue today during PT 2/2 HD earlier today. Ambulated 40' w/ RW and Margot. Gait slow, shuffling w/ 2 brief standing rest breaks.

## 2017-11-15 NOTE — PROGRESS NOTES
"        BGA/GI Progress Note   Chief Complaint:  Elevated LFT's in the setting of rhabdo    Subjective     Interval History: No abd pain, no n/v, eating well.  Normal BM\"s.  Had tunnel cath placed yesterday for continued dialysis    History taken from: patient chart RN    Review of Systems:    The following systems were reviewed and negative;  gastrointestinal    Objective     Vital Signs  Temp:  [97.5 °F (36.4 °C)-98.1 °F (36.7 °C)] 97.7 °F (36.5 °C)  Heart Rate:  [56-69] 67  Resp:  [10-24] 24  BP: ()/(45-81) 153/81  Body mass index is 31.54 kg/(m^2).    Intake/Output Summary (Last 24 hours) at 11/15/17 0856  Last data filed at 11/15/17 0401   Gross per 24 hour   Intake              480 ml   Output                0 ml   Net              480 ml          Physical Exam:   General: patient awake, alert and cooperative   Eyes: Normal lids and lashes, no scleral icterus   Neck: supple, normal ROM, no tracheal deviation   Skin: warm and dry, not jaundiced   Cardiovascular: regular rhythm and rate, no murmurs auscultated   Pulm: clear to auscultation bilaterally, regular and unlabored   Abdomen: soft, nontender, nondistended; normal bowel sounds   Rectal: deferred   Extremities: no rash or edema   Neurologic: Normal mood and behavior    All Medications Have Been Reviewed     Results Review:       Results from last 7 days  Lab Units 11/15/17  0531 11/14/17  0537 11/13/17  0554   WBC 10*3/mm3 12.51* 12.05* 12.01*   HEMOGLOBIN g/dL 12.8* 12.9* 13.3*   HEMATOCRIT % 38.8* 40.0* 40.3*   PLATELETS 10*3/mm3 156 153 144         Results from last 7 days  Lab Units 11/15/17  0531 11/14/17 0537 11/13/17  0554   SODIUM mmol/L 139 139 140   POTASSIUM mmol/L 5.2 4.6 4.9   CHLORIDE mmol/L 97* 98 96*   CO2 mmol/L 22.9 25.5 25.4   BUN mg/dL 78* 61* 91*   CREATININE mg/dL 8.92* 7.53* 9.25*   CALCIUM mg/dL 8.9 8.9 8.8   BILIRUBIN mg/dL 2.6* 2.9* 3.1*   ALK PHOS U/L 259* 269* 263*   ALT (SGPT) U/L 78* 109* 130*   AST (SGOT) U/L 89* 119* " 159*   GLUCOSE mg/dL 91 91 103*             RADIOLOGY:    Imaging Results (last 72 hours)     Procedure Component Value Units Date/Time    IR PICC W Fluoro Surgery Only [[id number]] Updated:  11/14/17 1714    XR Chest Post CVA Port [[id number]] Collected:  11/14/17 1747     Updated:  11/14/17 1747    Narrative:       AP PORTABLE CHEST 11/14/2017     HISTORY: Central line placement.     FINDINGS: Right subclavian central venous catheter is seen with its tip  overlying the superior cavoatrial junction. No pneumothorax is seen.  Heart size is at the upper limits of normal. There is vascular  congestion with a linear band of minimal atelectasis in the right  midlung. Tiny calcified granuloma is seen in the left apex.       Impression:       1. Central venous catheter tip in the superior cavoatrial junction.  2. No pneumothorax is seen.             Assessment/Plan     Patient Active Problem List   Diagnosis Code   • Arteriosclerotic vascular disease I70.90   • Hyperlipidemia E78.5   • Hypertension I10   • Acute kidney injury (nontraumatic) N17.9     Dia Montana, APRN  11/15/17  8:56 AM    Following for rhabdo and resultant elevated liver tests       Constitutional: He is oriented to person, place, and time. He appears well-developed and well-nourished.   HENT: anicteric and no thyromegaly  Head: Normocephalic and atraumatic.   Eyes: Conjunctivae and EOM are normal.   Neck: Normal range of motion. No tracheal deviation present.   Cardiovascular: Normal rate and regular rhythm.    Pulmonary/Chest: Effort normal and breath sounds normal. No respiratory distress.   Abdominal: Soft. Bowel sounds are normal. He exhibits no distension and no mass. There is no tenderness. There is no rebound and no guarding.   Musculoskeletal: Normal range of motion.   Neurological: He is alert and oriented to person, place, and time.   Skin: Skin is warm and dry.   Psychiatric: He has a normal mood and affect. Judgment normal.   Nursing  note and vitals reviewed.        Assessment:  1) Elevated LFT's- improving with resolving rhabdo. Given significantly elevated CK most likely acute elevation is from muscle origin.    2) Abnormal CT- Admission US with gallbladder sludge, no liver abnormality reported although CT abd/pelvis with cirrhotic appearing liver. Hep panel negative. Doppler US with no portal vein thrombosis.  Serology notable for + ASMA and AFP, acute phase reactant, doubt significance can be repeated in 6 weeks  3) STEVEN- renal following, started dialysis 11/8  4) Rhabdomyolosis- resulting in liver failure, ? Cause.  CK remains elevated but improving    - follow LFT's daily  - no plans for liver bx at this time with improving labs  - When he is discharged have him follow up with our office or primary care physician for a CMP, prothrombin time a few days after discharge

## 2017-11-15 NOTE — PROGRESS NOTES
Continued Stay Note  [facility]     Patient Name: Hu Burgess  MRN: [id number]  Today's Date: 11/15/2017    Admit Date: 11/6/2017          Discharge Plan       11/15/17 1524    Case Management/Social Work Plan    Plan [facility] pending bed/chair availability    Patient/Family In Agreement With Plan yes    Additional Comments [facility] ([place]) and [facility] ([place]) approve pt for rehab and hemodialysis pending bed availability at time of d/c. [facility] spoke with pt at bedside and telephoned pt's wife to inform of facility options. Pt and wife select [facility] as first choice because it houses a [facility] facility which pt plans to transition to for HD in [place] following rehab. Per Nicole Rockwell to initiate HD chair set up today. [facility] faxed [facility] admissions intake form to [facility] (fax: [phone]). Transfer packet started in [facility] office. Delia Gordon LCSW              Discharge Codes     None            Anabelle Gordon LCSW

## 2017-11-15 NOTE — PLAN OF CARE
Problem: Patient Care Overview (Adult)  Goal: Plan of Care Review  Outcome: Ongoing (interventions implemented as appropriate)    11/15/17 1700   Coping/Psychosocial Response Interventions   Plan Of Care Reviewed With patient   Patient Care Overview   Progress improving   Outcome Evaluation   Outcome Summary/Follow up Plan pt states that he feels better today. 4 hours of dialysis completed. pt up with assist x1. poss dc tomorrow. pt denies pain. no signs or symptoms of distress. pt currently resting in bed.        Goal: Adult Individualization and Mutuality  Outcome: Ongoing (interventions implemented as appropriate)  Goal: Discharge Needs Assessment  Outcome: Ongoing (interventions implemented as appropriate)    Problem: Fall Risk (Adult)  Goal: Identify Related Risk Factors and Signs and Symptoms  Outcome: Outcome(s) achieved Date Met:  11/15/17  Goal: Absence of Falls  Outcome: Ongoing (interventions implemented as appropriate)    Problem: Renal Failure/Kidney Injury, Acute (Adult)  Goal: Signs and Symptoms of Listed Potential Problems Will be Absent or Manageable (Renal Failure/Kidney Injury, Acute)  Outcome: Ongoing (interventions implemented as appropriate)    Problem: Fluid Volume Deficit (Adult)  Goal: Fluid/Electrolyte Balance  Outcome: Ongoing (interventions implemented as appropriate)  Goal: Comfort/Well Being  Outcome: Ongoing (interventions implemented as appropriate)    Problem: Perioperative Period (Adult)  Goal: Signs and Symptoms of Listed Potential Problems Will be Absent or Manageable (Perioperative Period)  Outcome: Ongoing (interventions implemented as appropriate)

## 2017-11-15 NOTE — PLAN OF CARE
Problem: Patient Care Overview (Adult)  Goal: Plan of Care Review  Outcome: Ongoing (interventions implemented as appropriate)    11/15/17 0510   Coping/Psychosocial Response Interventions   Plan Of Care Reviewed With patient   Patient Care Overview   Progress improving   Outcome Evaluation   Outcome Summary/Follow up Plan Slept well, VSS, dialysis cath secured, no pain complaints, well rested, will continue to monitor, possible rehab or home health to go home        Goal: Adult Individualization and Mutuality  Outcome: Ongoing (interventions implemented as appropriate)    11/15/17 0510   Individualization   Patient Specific Goals keep from falling, monitor labs and blood pressure   Patient Specific Interventions labs taken, bed alarm kept         Problem: Fall Risk (Adult)  Goal: Identify Related Risk Factors and Signs and Symptoms  Outcome: Ongoing (interventions implemented as appropriate)    11/15/17 0510   Fall Risk   Fall Risk: Related Risk Factors age-related changes;history of falls;fear of falling;gait/mobility problems;fatigue/slow reaction;bladder function altered;environment unfamiliar   Fall Risk: Signs and Symptoms presence of risk factors       Goal: Absence of Falls  Outcome: Ongoing (interventions implemented as appropriate)    11/15/17 0510   Fall Risk (Adult)   Absence of Falls making progress toward outcome         Problem: Renal Failure/Kidney Injury, Acute (Adult)  Goal: Signs and Symptoms of Listed Potential Problems Will be Absent or Manageable (Renal Failure/Kidney Injury, Acute)  Outcome: Ongoing (interventions implemented as appropriate)    11/15/17 0510   Renal Failure/Kidney Injury, Acute   Problems Assessed (Acute Renal Failure/Kidney Injury) all   Problems Present (Acute Renal Failure/Kidney Injury) situational response;electrolyte imbalance;fluid imbalance;infection;uremic syndrome         Problem: Fluid Volume Deficit (Adult)  Goal: Fluid/Electrolyte Balance  Outcome: Ongoing  (interventions implemented as appropriate)    11/15/17 0510   Fluid Volume Deficit (Adult)   Fluid/Electrolyte Balance making progress toward outcome       Goal: Comfort/Well Being  Outcome: Ongoing (interventions implemented as appropriate)    11/15/17 0510   Fluid Volume Deficit (Adult)   Comfort/Well Being making progress toward outcome         Problem: Perioperative Period (Adult)  Goal: Signs and Symptoms of Listed Potential Problems Will be Absent or Manageable (Perioperative Period)  Outcome: Ongoing (interventions implemented as appropriate)    11/15/17 0510   Perioperative Period   Problems Assessed (Perioperative Period) all   Problems Present (Perioperative Period) none

## 2017-11-15 NOTE — PROGRESS NOTES
"Daily progress note    Chief complaint  Doing same  No new complaints  Status post tunneled catheter placement    History of present illness  67-year-old white male with history of atherosclerotic heart disease hypertension hyperlipidemia otherwise in good health and no history of kidney disease present it to [facility] emergency room for last 5-7 days weakness nausea not feeling well and decreased urine output.  Patient evaluated found to be in acute renal failure with low potassium admitted for management.  Patient denies any diarrhea fever cough chest pain shortness of breath.     REVIEW OF SYSTEMS  Review of Systems   Constitutional: Negative for activity change, appetite change and fever.   HENT: Negative for congestion and sore throat.    Eyes: Negative.    Respiratory: Negative for cough and shortness of breath.    Cardiovascular: Negative for chest pain and leg swelling.   Gastrointestinal: Negative for abdominal pain, diarrhea and vomiting.   Endocrine: Negative.    Genitourinary: Positive for frequency. Negative for decreased urine volume and dysuria.        Increased thirst   Musculoskeletal: Negative for neck pain.   Skin: Negative for rash and wound.   Allergic/Immunologic: Negative.    Neurological: Positive for weakness. Negative for numbness and headaches.   Hematological: Negative.    Psychiatric/Behavioral: Negative.    All other systems reviewed and are negative.     PHYSICAL EXAM  Blood pressure 153/81, pulse 67, temperature 97.7 °F (36.5 °C), temperature source Oral, resp. rate 24, height 72.99\" (185.4 cm), weight 239 lb (108 kg), SpO2 92 %.    Constitutional: He is oriented to person, place, and time and well-developed, well-nourished, and in no distress.   Head: Normocephalic and atraumatic.   Mouth/Throat: Mucous membranes are dry.   Eyes: EOM are normal. Pupils are equal, round, and reactive to light.   Neck: Normal range of motion. Neck supple.   Cardiovascular: Regular rhythm and " normal heart sounds.  Bradycardia present.    No murmur heard.  Pulmonary/Chest: Effort normal and breath sounds normal. No respiratory distress.   Abdominal: Soft. He exhibits no distension. There is no tenderness. There is no rebound and no guarding.   Musculoskeletal: Normal range of motion. He exhibits no edema.   Neurological: He is alert and oriented to person, place, and time. He has normal sensation and normal strength.   No focal weakness or facial droop. Slow mentation.   Skin: Skin is warm and dry.   Turgor is normal, cap refill 1-2 seconds.     Psychiatric: Mood and affect normal.     LAB RESULTS  Lab Results (last 24 hours)     Procedure Component Value Units Date/Time    Blood Culture - Blood, [[id number]]  (Normal) Collected:  11/10/17 1427    Specimen:  Blood from Arm, Left Updated:  11/14/17 1446     Blood Culture No growth at 4 days    Blood Culture - Blood, [[id number]]  (Normal) Collected:  11/10/17 1537    Specimen:  Blood from Arm, Right Updated:  11/14/17 1601     Blood Culture No growth at 4 days    CK [[id number]]  (Abnormal) Collected:  11/15/17 0531    Specimen:  Blood Updated:  11/15/17 0654     Creatine Kinase 260 (H) U/L     Comprehensive Metabolic Panel [[id number]]  (Abnormal) Collected:  11/15/17 0531    Specimen:  Blood Updated:  11/15/17 0654     Glucose 91 mg/dL      BUN 78 (H) mg/dL      Creatinine 8.92 (H) mg/dL      Sodium 139 mmol/L      Potassium 5.2 mmol/L      Chloride 97 (L) mmol/L      CO2 22.9 mmol/L      Calcium 8.9 mg/dL      Total Protein 5.7 (L) g/dL      Albumin 2.20 (L) g/dL      ALT (SGPT) 78 (H) U/L      AST (SGOT) 89 (H) U/L      Alkaline Phosphatase 259 (H) U/L      Total Bilirubin 2.6 (H) mg/dL      eGFR Non African Amer 6 (L) mL/min/1.73      Globulin 3.5 gm/dL      A/G Ratio 0.6 g/dL      BUN/Creatinine Ratio 8.7     Anion Gap 19.1 mmol/L     CBC & Differential [[id number]] Collected:  11/15/17 0531    Specimen:  Blood Updated:  11/15/17 0713    Narrative:        The following orders were created for panel order CBC & Differential.  Procedure                               Abnormality         Status                     ---------                               -----------         ------                     CBC Auto Differential[[id number]]        Abnormal            Final result                 Please view results for these tests on the individual orders.    CBC Auto Differential [[id number]]  (Abnormal) Collected:  11/15/17 0531    Specimen:  Blood Updated:  11/15/17 0713     WBC 12.51 (H) 10*3/mm3      RBC 3.81 (L) 10*6/mm3      Hemoglobin 12.8 (L) g/dL      Hematocrit 38.8 (L) %      .8 (H) fL      MCH 33.6 (H) pg      MCHC 33.0 g/dL      RDW 17.1 (H) %      RDW-SD 61.8 (H) fl      MPV 11.4 fL      Platelets 156 10*3/mm3      Neutrophil % 67.5 %      Lymphocyte % 13.8 (L) %      Monocyte % 13.4 (H) %      Eosinophil % 4.4 %      Basophil % 0.3 %      Immature Grans % 0.6 (H) %      Neutrophils, Absolute 8.43 (H) 10*3/mm3      Lymphocytes, Absolute 1.73 10*3/mm3      Monocytes, Absolute 1.68 (H) 10*3/mm3      Eosinophils, Absolute 0.55 10*3/mm3      Basophils, Absolute 0.04 10*3/mm3      Immature Grans, Absolute 0.08 (H) 10*3/mm3         Imaging Results (last 72 hours)     Procedure Component Value Units Date/Time    US Gallbladder [[id number]] Collected:  11/06/17 1931     Updated:  11/06/17 1937    Narrative:       US GALLBLADDER-        INDICATIONS: Elevated liver function tests     TECHNIQUE: Ultrasound of the right upper quadrant     COMPARISON: None available     FINDINGS:     The gallbladder is nontender, with sludge but no shadowing stones  demonstrated. No gallbladder wall thickening or pericholecystic fluid.     No intrahepatic or extrahepatic biliary ductal dilatation is  demonstrated. The common biliary duct caliber is measured at 4.0 mm.     No liver lesion is identified. Diffusely, the echogenicity of the liver  is otherwise in the range of normal relative to  that of the right  kidney.     The pancreas is mostly obscured. The right kidney, 10.8 cm, and the  pancreas otherwise appear unremarkable.                Impression:          Sludge in the gallbladder. No evidence for acute cholecystitis. No  discrete liver lesion or biliary ductal dilatation identified. With  persistent clinical indication, enhanced liver imaging could be  considered for further evaluation.     This report was finalized on 11/6/2017 7:33 PM by Dr. Gokul Real MD.       CT Abdomen Pelvis Without Contrast [[id number]] Collected:  11/06/17 2007     Updated:  11/06/17 2017    Narrative:       CT ABDOMEN AND PELVIS WITHOUT CONTRAST     HISTORY: Elevated liver function test. Gallbladder sludge.      TECHNIQUE: CT abdomen and pelvis without IV or oral contrast.     COMPARISON: Right upper quadrant ultrasound 11/06/2017.     FINDINGS: Lung bases appear clear and there is no pleural effusion.  Liver exhibits slight undelayed margins consistent with hepatic  cirrhosis. There Is no evidence for intra or extrahepatic biliary duct  dilatation. There is density within the gallbladder consistent with  sludge as demonstrated sonographically. Adrenal glands, pancreas,  spleen, kidneys appear within normal limits. There is no hydronephrosis  or hydroureter. Urinary bladder is mostly decompressed. There is no  bowel dilatation or evidence for bowel obstruction. No ben enlargement  is evident within the abdomen or pelvis. Atherosclerotic calcifications  are present involving the abdominal aorta and the abdominal aorta  measures up to 2.4 cm diameter. Mural calcifications are present.       Impression:       1. Sludge within the gallbladder without other evidence for  cholecystitis. Mild undulating hepatic margins consistent with hepatic  cirrhosis.  2. Atherosclerotic disease. 2.4 cm infrarenal abdominal aorta.     Radiation dose reduction techniques were utilized, including automated  exposure control and  exposure modulation based on body size.     This report was finalized on 11/6/2017 8:14 PM by Dr. Marcos Sainz MD.       XR Chest 2 View [[id number]] Collected:  11/06/17 2041     Updated:  11/06/17 2046    Narrative:       XR CHEST 2 VW-     HISTORY: Male who is 67 years-old,  acute renal failure     TECHNIQUE: Frontal and lateral views of the chest     COMPARISON: None available     FINDINGS: Heart, mediastinum and pulmonary vasculature are unremarkable.  No focal pulmonary consolidation, pleural effusion, or pneumothorax. Old  granulomatous disease is apparent. No acute osseous process.       Impression:       No evidence for acute pulmonary process. Follow-up as  clinical indications persist.     This report was finalized on 11/6/2017 8:43 PM by Dr. Gokul Real MD.       US Renal Bilateral [[id number]] Collected:  11/07/17 0145     Updated:  11/07/17 0145    Narrative:       ULTRASOUND RENAL BILATERAL.     TECHNIQUE: Grayscale and color images of the kidneys were obtained.     HISTORY: Acute kidney injury.     COMPARISON: No prior studies for comparison.     FINDINGS:  Right kidney measures 11.6 x 4.8 x 5.4 cm. Left kidney measures 12.5 x  5.9 x 5.7 cm. There is no hydronephrosis, stone or mass.     Urinary bladder could not be well visualized. No calculus, sludge or  mass.       Impression:       No acute findings involving both kidneys.             EKG                                                  Rhythm/Rate: Sinus bradycardia 48  P waves and LA: Normal  QRS, axis: IVCD   ST and T waves: Biphasic T-wave and Inverted T-waves inferiorly        Current Facility-Administered Medications:   •  ALPRAZolam (XANAX) tablet 0.25 mg, 0.25 mg, Oral, 4x Daily PRN, Memo Varela MD, 0.25 mg at 11/10/17 0046  •  amLODIPine (NORVASC) tablet 2.5 mg, 2.5 mg, Oral, Q24H, Memo Varela MD  •  aspirin EC tablet 81 mg, 81 mg, Oral, Daily, Memo Varela MD, 81 mg at 11/13/17 1009  •  heparin (porcine) 5000 UNIT/ML  injection 5,000 Units, 5,000 Units, Subcutaneous, Q12H, Gareth Sánchez MD  •  heparin (porcine) injection 3,000 Units, 3,000 Units, Intravenous, PRN, Franky Moreau MD  •  HYDROcodone-acetaminophen (NORCO) 5-325 MG per tablet 1 tablet, 1 tablet, Oral, Q4H PRN, Gareth Sánchez MD  •  morphine injection 1 mg, 1 mg, Intravenous, Q4H PRN **AND** naloxone (NARCAN) injection 0.4 mg, 0.4 mg, Intravenous, Q5 Min PRN, Gareth Sánchez MD  •  ondansetron (ZOFRAN) injection 4 mg, 4 mg, Intravenous, Q4H PRN, Keith Varela MD  •  pantoprazole (PROTONIX) EC tablet 40 mg, 40 mg, Oral, Q AM, Keith Varela MD, 40 mg at 11/15/17 0630  •  Insert peripheral IV, , , Once **AND** sodium chloride 0.9 % flush 10 mL, 10 mL, Intravenous, PRN, Adan Simmons MD, 10 mL at 11/10/17 2052     ASSESSMENT  Acute renal failure due to severe rhabdomyolysis on hemodialysis  Rhabdomyolysis  Elevated liver enzymes Questionable etiology probably secondary to Crestor improving  Hypertension  Hyperlipidemia  Atherosclerotic heart disease  Leukocytosis with no fever ? source    PLAN  CPM  Hemodialysis TIW  Stress ulcer DVT prophylaxis  PT/OT  Follow closely   Discharge once outpatient hemodialysis spot arrange an okay with nephrology and gastroenterology    KEITH VARELA MD     .

## 2017-11-15 NOTE — THERAPY TREATMENT NOTE
Acute Care - Physical Therapy Treatment Note  [facility]     Patient Name: Hu Burgess  : 1950  MRN: [id number]  Today's Date: 11/15/2017             Admit Date: 2017    Visit Dx:    ICD-10-CM ICD-9-CM   1. Acute kidney injury (nontraumatic) N17.9 584.9   2. Elevated LFTs R79.89 790.6   3. Hypokalemia E87.6 276.8   4. Dehydration E86.0 276.51   5. Weakness R53.1 780.79     Patient Active Problem List   Diagnosis   • Arteriosclerotic vascular disease   • Hyperlipidemia   • Hypertension   • Acute kidney injury (nontraumatic)               Adult Rehabilitation Note       11/15/17 1631 17 1417       Rehab Assessment/Intervention    Discipline physical therapist  -AR physical therapist  -AR     Document Type therapy note (daily note)  -AR therapy note (daily note)  -AR     Subjective Information agree to therapy   increased fatigue today after HD  -AR agree to therapy  -AR     Patient Effort, Rehab Treatment good  -AR good  -AR     Precautions/Limitations fall precautions  -AR fall precautions  -AR     Recorded by [AR] Laurence Gallo, PT [AR] Laurence Gallo PT     Pain Assessment    Pain Assessment No/denies pain  -AR No/denies pain  -AR     Recorded by [AR] Laurence Gallo PT [AR] Laurence Gallo PT     Cognitive Assessment/Intervention    Current Cognitive/Communication Assessment functional  -AR impaired  -AR     Orientation Status oriented x 4  -AR oriented x 4  -AR     Follows Commands/Answers Questions 100% of the time  -AR      Recorded by [AR] Laurence Gallo, PT [AR] Laurence Gallo PT     Bed Mobility, Assessment/Treatment    Bed Mobility, Assistive Device bed rails;head of bed elevated  -AR bed rails;head of bed elevated  -AR     Bed Mob, Supine to Sit, [place] supervision required  -AR supervision required  -AR     Bed Mob, Sit to Supine, [place] supervision required  -AR not tested  -AR     Recorded by [AR] Laurence Gallo PT [AR] Laurence Gallo PT     Transfer  Assessment/Treatment    Transfers, Sit-Stand [place] contact guard assist  -AR contact guard assist  -AR     Transfers, Stand-Sit [place] contact guard assist  -AR contact guard assist  -AR     Transfers, Sit-Stand-Sit, Assist Device rolling walker  -AR rolling walker  -AR     Transfer, Comment  cues for UE placement  -AR     Recorded by [AR] Laurence Gallo PT [AR] Laurence Gallo PT     Gait Assessment/Treatment    Gait, [place] Level contact guard assist  -AR contact guard assist  -AR     Gait, Assistive Device rolling walker  -AR rolling walker  -AR     Gait, Distance (Feet) 40  -  -AR     Gait, Gait Deviations decreased heel strike;sandhya decreased  -AR decreased heel strike;sandhya decreased  -AR     Gait, Safety Issues step length decreased;weight-shifting ability decreased  -AR step length decreased;weight-shifting ability decreased  -AR     Gait, Impairments impaired balance;strength decreased  -AR strength decreased;impaired balance  -AR     Gait, Comment increased fatgigue today after HD  -AR slow unsteady but no overt LOB  -AR     Recorded by [AR] Laurence Gallo PT [AR] Laurence Gallo PT     Therapy Exercises    Bilateral Lower Extremities  ankle pumps/circles;LAQ;10 reps  -AR     Recorded by  [AR] Laurence Gallo PT     Positioning and Restraints    Pre-Treatment Position in bed  -AR in bed  -AR     Post Treatment Position bed  -AR chair  -AR     In Bed supine;call light within reach;encouraged to call for assist;exit alarm on;with family/caregiver  -AR      In Chair  reclined;sitting;call light within reach;encouraged to call for assist;exit alarm on  -AR     Recorded by [AR] Laurence Gallo PT [AR] Laurence Gallo PT       User Key  (r) = Recorded By, (t) = Taken By, (c) = Cosigned By    Initials Name Effective Dates    AR Laurence Gallo PT 06/27/16 -                 IP PT Goals       11/13/17 1053          Bed Mobility PT LTG    Bed Mobility PT LTG, Date Established  11/13/17  -KH      Bed Mobility PT LTG, Time to Achieve 1 wk  -KH      Bed Mobility PT LTG, Activity Type all bed mobility  -KH      Bed Mobility PT LTG, [place] Level independent  -KH      Transfer Training PT LTG    Transfer Training PT LTG, Date Established 11/13/17  -KH      Transfer Training PT LTG, Time to Achieve 1 wk  -KH      Transfer Training PT LTG, Activity Type all transfers  -KH      Transfer Training PT LTG, [place] Level supervision required  -KH      Gait Training PT LTG    Gait Training Goal PT LTG, Date Established 11/13/17  -KH      Gait Training Goal PT LTG, Time to Achieve 1 wk  -KH      Gait Training Goal PT LTG, [place] Level supervision required  -KH      Gait Training Goal PT LTG, Distance to Achieve 200 ft  -KH      Stair Training PT LTG    Stair Training Goal PT LTG, Date Established 11/13/17  -KH      Stair Training Goal PT LTG, Time to Achieve 1 wk  -KH      Stair Training Goal PT LTG, Number of Steps 2  -KH      Stair Training Goal PT LTG, [place] Level contact guard assist  -KH      Patient Education PT LTG    Patient Education PT LTG, Date Established 11/13/17  -KH      Patient Education PT LTG, Time to Achieve 1 wk  -KH      Patient Education PT LTG, Education Type HEP  -KH      Patient Education PT LTG, Education Understanding demonstrate adequately  -KH        User Key  (r) = Recorded By, (t) = Taken By, (c) = Cosigned By    Initials Name Provider Type    ASHLEE Petersen, PT Physical Therapist          Physical Therapy Education     Title: PT OT SLP Therapies (Active)     Topic: Physical Therapy (Active)     Point: Mobility training (Active)    Learning Progress Summary    Learner Readiness Method Response Comment Documented by Status   Patient Acceptance E NR  AR 11/15/17 1636 Active    Acceptance E NR  AR 11/14/17 1421 Active    Acceptance E VU,NR  KH 11/13/17 1053 Done               Point: Home exercise program (Active)    Learning Progress Summary     Learner Readiness Method Response Comment Documented by Status   Patient Acceptance E NR  AR 11/15/17 1636 Active    Acceptance E NR  AR 11/14/17 1421 Active    Acceptance E VU,NR   11/13/17 1053 Done               Point: Body mechanics (Active)    Learning Progress Summary    Learner Readiness Method Response Comment Documented by Status   Patient Acceptance E NR  AR 11/15/17 1636 Active               Point: Precautions (Active)    Learning Progress Summary    Learner Readiness Method Response Comment Documented by Status   Patient Acceptance E NR  AR 11/15/17 1636 Active    Acceptance E NR  AR 11/14/17 1421 Active    Acceptance E VU,NR   11/13/17 1053 Done                      User Key     Initials Effective Dates Name Provider Type Discipline     05/18/15 -  Swetha Petersen, PT Physical Therapist PT    AR 06/27/16 -  Laurence Gallo PT Physical Therapist PT                    PT Recommendation and Plan  Anticipated Discharge Disposition: home with assist, home with home health, skilled nursing facility (pending progress)  Planned Therapy Interventions: balance training, bed mobility training, gait training, home exercise program, patient/family education, strengthening, stair training, transfer training  PT Frequency: daily  Plan of Care Review  Plan Of Care Reviewed With: patient  Outcome Summary/Follow up Plan: Increased fatigue today during PT 2/2 HD earlier today.  Ambulated 40' w/ RW and [place].  Gait slow, shuffling w/ 2 brief standing rest breaks.            Outcome Measures       11/15/17 1600 11/14/17 1400 11/13/17 1056    How much help from another person do you currently need...    Turning from your back to your side while in flat bed without using bedrails? 4  -AR 4  -AR 4  -KH    Moving from lying on back to sitting on the side of a flat bed without bedrails? 4  -AR 4  -AR 4  -KH    Moving to and from a bed to a chair (including a wheelchair)? 3  -AR 3  -AR 3  -KH    Standing up from a  chair using your arms (e.g., wheelchair, bedside chair)? 3  -AR 3  -AR 3  -KH    Climbing 3-5 steps with a railing? 2  -AR 2  -AR 2  -KH    To walk in hospital room? 3  -AR 3  -AR 3  -KH    AM-PAC 6 Clicks Score 19  -AR 19  -AR 19  -KH    Functional Assessment    Outcome Measure Options   AM-PAC 6 Clicks Basic Mobility (PT)  -      User Key  (r) = Recorded By, (t) = Taken By, (c) = Cosigned By    Initials Name Provider Type    ASHLEE Petersen, PT Physical Therapist    AR Laurence Gallo PT Physical Therapist           Time Calculation:         PT Charges       11/15/17 1637          Time Calculation    Start Time 1623  -AR      Stop Time 1637  -AR      Time Calculation (min) 14 min  -AR      PT Received On 11/15/17  -AR      PT - Next Appointment 11/16/17  -AR        User Key  (r) = Recorded By, (t) = Taken By, (c) = Cosigned By    Initials Name Provider Type    SUSAN Gallo PT Physical Therapist          Therapy Charges for Today     Code Description Service Date Service Provider Modifiers Qty    55569292681 HC PT THER PROC EA 15 MIN 11/14/2017 Laurence Gallo, PT GP 1    07035522882 HC PT THER PROC EA 15 MIN 11/15/2017 Laurence Gallo, PT GP 1    17094115947 HC PT THER SUPP EA 15 MIN 11/15/2017 Laurence Gallo PT GP 1          PT G-Codes  Outcome Measure Options: AM-PAC 6 Clicks Basic Mobility (PT)    Laurence Gallo PT  11/15/2017

## 2017-11-15 NOTE — PROGRESS NOTES
"   LOS: 9 days   Patient Care Team:  Aldo Guajardo MD as PCP - General  Aldo Guajardo MD as PCP - Family Medicine    Chief Complaint/ Reason for encounter: Acute renal failure/dialysis management        Subjective     Weakness - Generalized   Associated symptoms include weakness. Pertinent negatives include no chest pain, fever or nausea.   Abnormal Lab   Associated symptoms include weakness. Pertinent negatives include no chest pain, fever or nausea.       Subjective:  Symptoms:  Improved.  He reports weakness.  No shortness of breath or chest pain.  (Weakness improved  Tolerated dialysis well today, no complications  No shortness of breath or edema  Still with significant muscle weakness and muscle tenderness lower extremities).    Diet:  Adequate intake.  No nausea.    Activity level: Impaired due to weakness.    Pain:  He reports no pain.          History taken from: Patient and chart    Objective     Vital Signs  Temp:  [97.5 °F (36.4 °C)-98.1 °F (36.7 °C)] 97.6 °F (36.4 °C)  Heart Rate:  [56-69] 69  Resp:  [12-24] 24  BP: ()/(45-81) 156/75       Wt Readings from Last 1 Encounters:   11/15/17 0600 239 lb (108 kg)   11/14/17 0530 236 lb 6.4 oz (107 kg)   11/13/17 1607 229 lb 4.5 oz (104 kg)   11/13/17 1600 228 lb 6.3 oz (104 kg)   11/13/17 0300 236 lb 15.9 oz (107 kg)   11/12/17 0424 229 lb 6.4 oz (104 kg)   11/11/17 0412 229 lb 11.5 oz (104 kg)   11/10/17 0407 232 lb 9.6 oz (106 kg)   11/09/17 0413 234 lb 8 oz (106 kg)   11/07/17 1331 218 lb 11.1 oz (99.2 kg)   11/06/17 2118 218 lb 12.8 oz (99.2 kg)   11/06/17 1643 215 lb (97.5 kg)       Objective:  General Appearance:  Comfortable, in no acute distress and not in pain.    Vital signs: (most recent): Blood pressure 156/75, pulse 69, temperature 97.6 °F (36.4 °C), temperature source Oral, resp. rate 24, height 72.99\" (185.4 cm), weight 239 lb (108 kg), SpO2 95 %.  Vital signs are normal.  No fever.    Output: Minimal urine output.    HEENT: " Normal HEENT exam.    Lungs:  Normal respiratory rate and normal effort.  He is not in respiratory distress.  Breath sounds clear to auscultation.  No wheezes or decreased breath sounds.    Heart: Normal rate.  Regular rhythm.  S1 normal.  No murmur.   Abdomen: Abdomen is soft and non-distended.  Bowel sounds are normal.   There is no epigastric area or no suprapubic area tenderness.  There is no rebound tenderness.     Extremities: Normal range of motion.  There is no deformity or dependent edema.    Pulses: Distal pulses are intact.    Neurological: Patient is alert and oriented to person, place and time.    Skin:  Warm and dry.  No rash or cyanosis.             Results Review:    Past Medical History: reviewed and updated  Past Medical History:   Diagnosis Date   • Arteriosclerotic cardiovascular disease    • History of transfusion    • Hyperlipidemia    • Hypertension          Allergies:  No Known Allergies    Intake/Output:     Intake/Output Summary (Last 24 hours) at 11/15/17 1617  Last data filed at 11/15/17 1100   Gross per 24 hour   Intake              480 ml   Output                0 ml   Net              480 ml         DATA:  Interval chart, labs and notes reviewed.    Labs:   Recent Results (from the past 24 hour(s))   CK    Collection Time: 11/15/17  5:31 AM   Result Value Ref Range    Creatine Kinase 260 (H) 20 - 200 U/L   Comprehensive Metabolic Panel    Collection Time: 11/15/17  5:31 AM   Result Value Ref Range    Glucose 91 65 - 99 mg/dL    BUN 78 (H) 8 - 23 mg/dL    Creatinine 8.92 (H) 0.76 - 1.27 mg/dL    Sodium 139 136 - 145 mmol/L    Potassium 5.2 3.5 - 5.2 mmol/L    Chloride 97 (L) 98 - 107 mmol/L    CO2 22.9 22.0 - 29.0 mmol/L    Calcium 8.9 8.6 - 10.5 mg/dL    Total Protein 5.7 (L) 6.0 - 8.5 g/dL    Albumin 2.20 (L) 3.50 - 5.20 g/dL    ALT (SGPT) 78 (H) 1 - 41 U/L    AST (SGOT) 89 (H) 1 - 40 U/L    Alkaline Phosphatase 259 (H) 39 - 117 U/L    Total Bilirubin 2.6 (H) 0.1 - 1.2 mg/dL    eGFR  Non  Amer 6 (L) >60 mL/min/1.73    Globulin 3.5 gm/dL    A/G Ratio 0.6 g/dL    BUN/Creatinine Ratio 8.7 7.0 - 25.0    Anion Gap 19.1 mmol/L   CBC Auto Differential    Collection Time: 11/15/17  5:31 AM   Result Value Ref Range    WBC 12.51 (H) 4.50 - 10.70 10*3/mm3    RBC 3.81 (L) 4.60 - 6.00 10*6/mm3    Hemoglobin 12.8 (L) 13.7 - 17.6 g/dL    Hematocrit 38.8 (L) 40.4 - 52.2 %    .8 (H) 79.8 - 96.2 fL    MCH 33.6 (H) 27.0 - 32.7 pg    MCHC 33.0 32.6 - 36.4 g/dL    RDW 17.1 (H) 11.5 - 14.5 %    RDW-SD 61.8 (H) 37.0 - 54.0 fl    MPV 11.4 6.0 - 12.0 fL    Platelets 156 140 - 500 10*3/mm3    Neutrophil % 67.5 42.7 - 76.0 %    Lymphocyte % 13.8 (L) 19.6 - 45.3 %    Monocyte % 13.4 (H) 5.0 - 12.0 %    Eosinophil % 4.4 0.3 - 6.2 %    Basophil % 0.3 0.0 - 1.5 %    Immature Grans % 0.6 (H) 0.0 - 0.5 %    Neutrophils, Absolute 8.43 (H) 1.90 - 8.10 10*3/mm3    Lymphocytes, Absolute 1.73 0.90 - 4.80 10*3/mm3    Monocytes, Absolute 1.68 (H) 0.20 - 1.20 10*3/mm3    Eosinophils, Absolute 0.55 0.00 - 0.70 10*3/mm3    Basophils, Absolute 0.04 0.00 - 0.20 10*3/mm3    Immature Grans, Absolute 0.08 (H) 0.00 - 0.03 10*3/mm3       Radiology:  Imaging Results (last 24 hours)     Procedure Component Value Units Date/Time    IR PICC W Fluoro Surgery Only [[id number]] Resulted:  11/15/17 1014     Updated:  11/15/17 1014    XR Chest Post CVA Port [[id number]] Collected:  11/14/17 1747     Updated:  11/15/17 1255    Narrative:       AP PORTABLE CHEST 11/14/2017     HISTORY: Central line placement.     FINDINGS: Right subclavian central venous catheter is seen with its tip  overlying the superior cavoatrial junction. No pneumothorax is seen.  Heart size is at the upper limits of normal. There is vascular  congestion with a linear band of minimal atelectasis in the right  midlung. Tiny calcified granuloma is seen in the left apex.       Impression:       1. Central venous catheter tip in the superior cavoatrial junction.  2. No  pneumothorax is seen.     This report was finalized on 11/15/2017 12:51 PM by Dr. Jan Crockett MD.                Medications have been reviewed:  Current Facility-Administered Medications   Medication Dose Route Frequency Provider Last Rate Last Dose   • ALPRAZolam (XANAX) tablet 0.25 mg  0.25 mg Oral 4x Daily PRN Memo Varela MD   0.25 mg at 11/10/17 0046   • amLODIPine (NORVASC) tablet 2.5 mg  2.5 mg Oral Q24H Memo Varela MD   2.5 mg at 11/15/17 1426   • aspirin EC tablet 81 mg  81 mg Oral Daily Memo Varela MD   81 mg at 11/15/17 1425   • heparin (porcine) 5000 UNIT/ML injection 5,000 Units  5,000 Units Subcutaneous Q12H Gareth Sánchez MD   5,000 Units at 11/15/17 1424   • heparin (porcine) injection 3,000 Units  3,000 Units Intracatheter PRN Franky Moreau MD   3,000 Units at 11/15/17 1330   • HYDROcodone-acetaminophen (NORCO) 5-325 MG per tablet 1 tablet  1 tablet Oral Q4H PRN Gareth Sánchez MD       • ondansetron (ZOFRAN) injection 4 mg  4 mg Intravenous Q4H PRN Memo Varela MD       • pantoprazole (PROTONIX) EC tablet 40 mg  40 mg Oral Q AM Memo Varela MD   40 mg at 11/15/17 0630   • sodium chloride 0.9 % flush 10 mL  10 mL Intravenous PRN Adan Simmons MD   10 mL at 11/10/17 2052       Assessment/Plan     Active Problems:    Acute kidney injury (nontraumatic)      Assessment:  (Acute renal failure.   secondary to rhabdomyolysis, this was confirmed with renal biopsy, positive ANCA was a false positive, no sign of glomerulonephritis, no sign of recovery  Severe rhabdomyolysis,  likely From Crestor plus dehydration combination, CK levels have normalized  Elevated LFTs, possibly related to Crestor, also likely muscle source  Mild metabolic acidosis, stabilized with dialysis  Hypoalbuminemia  Hyperparathyroidism, likely secondary to renal failure  Mild leukocytosis        Plan:   No evidence of renal recovery so far, continue dialysis 3 times a week  CK levels trending down so discontinued  sodium bicarbonate, discontinue daily CK levels  Status post placement of tunnel dialysis catheter yesterday  Discontinue Crestor indefinitely  Stable for discharge once outpatient dialysis and rehabilitation are arranged).             Continue to monitor renal function, electroytes and volume closely   Please call me with any questions or concerns      Franky Moreau MD   [facility]   [phone]    11/15/17  4:17 PM      Dictation performed using Dragon dictation software

## 2017-11-16 ENCOUNTER — APPOINTMENT (OUTPATIENT)
Dept: CARDIOLOGY | Facility: HOSPITAL | Age: 67
End: 2017-11-16
Attending: SURGERY

## 2017-11-16 VITALS
BODY MASS INDEX: 30.47 KG/M2 | TEMPERATURE: 97.8 F | RESPIRATION RATE: 18 BRPM | HEART RATE: 69 BPM | HEIGHT: 73 IN | SYSTOLIC BLOOD PRESSURE: 133 MMHG | WEIGHT: 229.9 LBS | DIASTOLIC BLOOD PRESSURE: 69 MMHG | OXYGEN SATURATION: 97 %

## 2017-11-16 LAB
ALBUMIN SERPL-MCNC: 2.2 G/DL (ref 3.5–5.2)
ALBUMIN/GLOB SERPL: 0.6 G/DL
ALP SERPL-CCNC: 256 U/L (ref 39–117)
ALT SERPL W P-5'-P-CCNC: 44 U/L (ref 1–41)
ANION GAP SERPL CALCULATED.3IONS-SCNC: 14.5 MMOL/L
AST SERPL-CCNC: 78 U/L (ref 1–40)
BASOPHILS # BLD AUTO: 0.03 10*3/MM3 (ref 0–0.2)
BASOPHILS NFR BLD AUTO: 0.3 % (ref 0–1.5)
BILIRUB SERPL-MCNC: 3.1 MG/DL (ref 0.1–1.2)
BUN BLD-MCNC: 43 MG/DL (ref 8–23)
BUN/CREAT SERPL: 6.9 (ref 7–25)
CALCIUM SPEC-SCNC: 8.6 MG/DL (ref 8.6–10.5)
CHLORIDE SERPL-SCNC: 99 MMOL/L (ref 98–107)
CO2 SERPL-SCNC: 25.5 MMOL/L (ref 22–29)
CREAT BLD-MCNC: 6.23 MG/DL (ref 0.76–1.27)
DEPRECATED RDW RBC AUTO: 61 FL (ref 37–54)
EOSINOPHIL # BLD AUTO: 0.51 10*3/MM3 (ref 0–0.7)
EOSINOPHIL NFR BLD AUTO: 5.5 % (ref 0.3–6.2)
ERYTHROCYTE [DISTWIDTH] IN BLOOD BY AUTOMATED COUNT: 17.3 % (ref 11.5–14.5)
GFR SERPL CREATININE-BSD FRML MDRD: 9 ML/MIN/1.73
GLOBULIN UR ELPH-MCNC: 3.7 GM/DL
GLUCOSE BLD-MCNC: 87 MG/DL (ref 65–99)
HCT VFR BLD AUTO: 36.9 % (ref 40.4–52.2)
HGB BLD-MCNC: 12.3 G/DL (ref 13.7–17.6)
IMM GRANULOCYTES # BLD: 0.04 10*3/MM3 (ref 0–0.03)
IMM GRANULOCYTES NFR BLD: 0.4 % (ref 0–0.5)
LYMPHOCYTES # BLD AUTO: 2.04 10*3/MM3 (ref 0.9–4.8)
LYMPHOCYTES NFR BLD AUTO: 22.1 % (ref 19.6–45.3)
MCH RBC QN AUTO: 33.3 PG (ref 27–32.7)
MCHC RBC AUTO-ENTMCNC: 33.3 G/DL (ref 32.6–36.4)
MCV RBC AUTO: 100 FL (ref 79.8–96.2)
MONOCYTES # BLD AUTO: 1.21 10*3/MM3 (ref 0.2–1.2)
MONOCYTES NFR BLD AUTO: 13.1 % (ref 5–12)
NEUTROPHILS # BLD AUTO: 5.38 10*3/MM3 (ref 1.9–8.1)
NEUTROPHILS NFR BLD AUTO: 58.6 % (ref 42.7–76)
PLATELET # BLD AUTO: 158 10*3/MM3 (ref 140–500)
PMV BLD AUTO: 10.8 FL (ref 6–12)
POTASSIUM BLD-SCNC: 4.4 MMOL/L (ref 3.5–5.2)
PROT SERPL-MCNC: 5.9 G/DL (ref 6–8.5)
RBC # BLD AUTO: 3.69 10*6/MM3 (ref 4.6–6)
SODIUM BLD-SCNC: 139 MMOL/L (ref 136–145)
WBC NRBC COR # BLD: 9.21 10*3/MM3 (ref 4.5–10.7)

## 2017-11-16 PROCEDURE — 99231 SBSQ HOSP IP/OBS SF/LOW 25: CPT | Performed by: INTERNAL MEDICINE

## 2017-11-16 PROCEDURE — G0365 VESSEL MAPPING HEMO ACCESS: HCPCS

## 2017-11-16 PROCEDURE — 85025 COMPLETE CBC W/AUTO DIFF WBC: CPT | Performed by: HOSPITALIST

## 2017-11-16 PROCEDURE — 97110 THERAPEUTIC EXERCISES: CPT

## 2017-11-16 PROCEDURE — 25010000002 HEPARIN (PORCINE) PER 1000 UNITS: Performed by: SURGERY

## 2017-11-16 PROCEDURE — 80053 COMPREHEN METABOLIC PANEL: CPT | Performed by: HOSPITALIST

## 2017-11-16 RX ORDER — AMLODIPINE BESYLATE 5 MG/1
5 TABLET ORAL
Qty: 30 TABLET | Refills: 0 | Status: SHIPPED | OUTPATIENT
Start: 2017-11-17 | End: 2017-12-17

## 2017-11-16 RX ORDER — ASPIRIN 81 MG/1
81 TABLET ORAL DAILY
Qty: 30 TABLET | Refills: 0 | Status: SHIPPED | OUTPATIENT
Start: 2017-11-17 | End: 2017-12-17

## 2017-11-16 RX ORDER — AMLODIPINE BESYLATE 5 MG/1
5 TABLET ORAL
Status: DISCONTINUED | OUTPATIENT
Start: 2017-11-17 | End: 2017-11-16 | Stop reason: HOSPADM

## 2017-11-16 RX ORDER — PANTOPRAZOLE SODIUM 40 MG/1
40 TABLET, DELAYED RELEASE ORAL DAILY
Qty: 30 TABLET | Refills: 0 | Status: SHIPPED | OUTPATIENT
Start: 2017-11-16 | End: 2017-12-16

## 2017-11-16 RX ORDER — HYDROCODONE BITARTRATE AND ACETAMINOPHEN 5; 325 MG/1; MG/1
1 TABLET ORAL EVERY 6 HOURS PRN
Qty: 10 TABLET | Refills: 0 | Status: SHIPPED | OUTPATIENT
Start: 2017-11-16 | End: 2017-11-19

## 2017-11-16 RX ADMIN — ASPIRIN 81 MG: 81 TABLET ORAL at 10:09

## 2017-11-16 RX ADMIN — HEPARIN SODIUM 5000 UNITS: 5000 INJECTION, SOLUTION INTRAVENOUS; SUBCUTANEOUS at 10:09

## 2017-11-16 RX ADMIN — HEPARIN SODIUM 5000 UNITS: 5000 INJECTION, SOLUTION INTRAVENOUS; SUBCUTANEOUS at 00:21

## 2017-11-16 RX ADMIN — PANTOPRAZOLE SODIUM 40 MG: 40 TABLET, DELAYED RELEASE ORAL at 07:27

## 2017-11-16 RX ADMIN — AMLODIPINE BESYLATE 2.5 MG: 2.5 TABLET ORAL at 10:09

## 2017-11-16 NOTE — THERAPY TREATMENT NOTE
Acute Care - Physical Therapy Treatment Note  [facility]     Patient Name: Hu Burgess  : 1950  MRN: [id number]  Today's Date: 2017             Admit Date: 2017    Visit Dx:    ICD-10-CM ICD-9-CM   1. Acute kidney injury (nontraumatic) N17.9 584.9   2. Elevated LFTs R79.89 790.6   3. Hypokalemia E87.6 276.8   4. Dehydration E86.0 276.51   5. Weakness R53.1 780.79     Patient Active Problem List   Diagnosis   • Arteriosclerotic vascular disease   • Hyperlipidemia   • Hypertension   • Acute kidney injury (nontraumatic)               Adult Rehabilitation Note       17 1420 11/15/17 1631 17 1417    Rehab Assessment/Intervention    Discipline physical therapist  -AR physical therapist  -AR physical therapist  -AR    Document Type therapy note (daily note)  -AR therapy note (daily note)  -AR therapy note (daily note)  -AR    Subjective Information agree to therapy;complains of;fatigue  -AR agree to therapy   increased fatigue today after HD  -AR agree to therapy  -AR    Patient Effort, Rehab Treatment good  -AR good  -AR good  -AR    Precautions/Limitations fall precautions  -AR fall precautions  -AR fall precautions  -AR    Recorded by [AR] Laurence Gallo, PT [AR] Laurence Gallo, PT [AR] Laurence Gallo PT    Pain Assessment    Pain Assessment No/denies pain  -AR No/denies pain  -AR No/denies pain  -AR    Recorded by [AR] Laurence Gallo, PT [AR] Laurence Gallo, PT [AR] Laurence Gallo PT    Cognitive Assessment/Intervention    Current Cognitive/Communication Assessment functional  -AR functional  -AR impaired  -AR    Orientation Status oriented x 4  -AR oriented x 4  -AR oriented x 4  -AR    Follows Commands/Answers Questions 100% of the time  -% of the time  -AR     Recorded by [AR] Laurence Gallo, PT [AR] Laurence Gallo PT [AR] Laurence Gallo PT    Bed Mobility, Assessment/Treatment    Bed Mobility, Assistive Device bed rails  -AR bed rails;head of bed elevated   -AR bed rails;head of bed elevated  -AR    Bed Mob, Supine to Sit, [place] verbal cues required;supervision required  -AR supervision required  -AR supervision required  -AR    Bed Mob, Sit to Supine, [place] not tested  -AR supervision required  -AR not tested  -AR    Bed Mobility, Comment HOB nearly flat  -AR      Recorded by [AR] Laurence Gallo, PT [AR] Laurence Gallo, PT [AR] Laurence Gallo PT    Transfer Assessment/Treatment    Transfers, Sit-Stand [place] minimum assist (75% patient effort);verbal cues required  -AR contact guard assist  -AR contact guard assist  -AR    Transfers, Stand-Sit [place] minimum assist (75% patient effort)  -AR contact guard assist  -AR contact guard assist  -AR    Transfers, Sit-Stand-Sit, Assist Device rolling walker  -AR rolling walker  -AR rolling walker  -AR    Transfer, Comment 1 LOB upon first standing, mod A x2 - ended up sitting on EOB abruptly  -AR  cues for UE placement  -AR    Recorded by [AR] Laurence Gallo, JORDAN [AR] Laurence Gallo PT [AR] Laurence Gallo, PT    Gait Assessment/Treatment    Gait, [place] Level contact guard assist;2 person assist required  -AR contact guard assist  -AR contact guard assist  -AR    Gait, Assistive Device rolling walker  -AR rolling walker  -AR rolling walker  -AR    Gait, Distance (Feet) 75  -AR 40  -  -AR    Gait, Gait Deviations sandhya decreased;decreased heel strike;forward flexed posture;step length decreased  -AR decreased heel strike;sandhya decreased  -AR decreased heel strike;sandhya decreased  -AR    Gait, Safety Issues step length decreased;weight-shifting ability decreased  -AR step length decreased;weight-shifting ability decreased  -AR step length decreased;weight-shifting ability decreased  -AR    Gait, Impairments strength decreased;impaired balance  -AR impaired balance;strength decreased  -AR strength decreased;impaired balance  -AR    Gait, Comment slow shuffling pace, limited by  fatigue  -AR increased fatgigue today after HD  -AR slow unsteady but no overt LOB  -AR    Recorded by [AR] Laurence Gallo PT [AR] Laurence Gallo PT [AR] Laurence Gallo PT    Therapy Exercises    Bilateral Lower Extremities mini squats;heel raises;10 reps  -AR  ankle pumps/circles;LAQ;10 reps  -AR    Recorded by [AR] Laurence Gallo PT  [AR] Laurence Gallo PT    Positioning and Restraints    Pre-Treatment Position in bed  -AR in bed  -AR in bed  -AR    Post Treatment Position chair  -AR bed  -AR chair  -AR    In Bed  supine;call light within reach;encouraged to call for assist;exit alarm on;with family/caregiver  -AR     In Chair reclined;sitting;call light within reach;exit alarm on;encouraged to call for assist  -AR  reclined;sitting;call light within reach;encouraged to call for assist;exit alarm on  -AR    Recorded by [AR] Laurence Gallo PT [AR] Laurence Gallo, PT [AR] Laurence Gallo PT      User Key  (r) = Recorded By, (t) = Taken By, (c) = Cosigned By    Initials Name Effective Dates    SUSAN Gallo PT 06/27/16 -                 IP PT Goals       11/13/17 1053          Bed Mobility PT LTG    Bed Mobility PT LTG, Date Established 11/13/17  -KH      Bed Mobility PT LTG, Time to Achieve 1 wk  -KH      Bed Mobility PT LTG, Activity Type all bed mobility  -KH      Bed Mobility PT LTG, [place] Level independent  -KH      Transfer Training PT LTG    Transfer Training PT LTG, Date Established 11/13/17  -KH      Transfer Training PT LTG, Time to Achieve 1 wk  -KH      Transfer Training PT LTG, Activity Type all transfers  -KH      Transfer Training PT LTG, [place] Level supervision required  -KH      Gait Training PT LTG    Gait Training Goal PT LTG, Date Established 11/13/17  -KH      Gait Training Goal PT LTG, Time to Achieve 1 wk  -KH      Gait Training Goal PT LTG, [place] Level supervision required  -KH      Gait Training Goal PT LTG, Distance to Achieve 200 ft  -KH      Stair  Training PT LTG    Stair Training Goal PT LTG, Date Established 11/13/17  -KH      Stair Training Goal PT LTG, Time to Achieve 1 wk  -KH      Stair Training Goal PT LTG, Number of Steps 2  -KH      Stair Training Goal PT LTG, [place] Level contact guard assist  -KH      Patient Education PT LTG    Patient Education PT LTG, Date Established 11/13/17  -KH      Patient Education PT LTG, Time to Achieve 1 wk  -KH      Patient Education PT LTG, Education Type HEP  -KH      Patient Education PT LTG, Education Understanding demonstrate adequately  -KH        User Key  (r) = Recorded By, (t) = Taken By, (c) = Cosigned By    Initials Name Provider Type    ASHLEE Petersen, PT Physical Therapist          Physical Therapy Education     Title: PT OT SLP Therapies (Active)     Topic: Physical Therapy (Active)     Point: Mobility training (Active)    Learning Progress Summary    Learner Readiness Method Response Comment Documented by Status   Patient Acceptance E NR  AR 11/16/17 1423 Active    Acceptance E NR  AR 11/15/17 1636 Active    Acceptance E NR  AR 11/14/17 1421 Active    Acceptance E VU,NR  KH 11/13/17 1053 Done               Point: Home exercise program (Active)    Learning Progress Summary    Learner Readiness Method Response Comment Documented by Status   Patient Acceptance E NR  AR 11/16/17 1423 Active    Acceptance E NR  AR 11/15/17 1636 Active    Acceptance E NR  AR 11/14/17 1421 Active    Acceptance E VU,NR  KH 11/13/17 1053 Done               Point: Body mechanics (Active)    Learning Progress Summary    Learner Readiness Method Response Comment Documented by Status   Patient Acceptance E NR  AR 11/16/17 1423 Active    Acceptance E NR  AR 11/15/17 1636 Active               Point: Precautions (Active)    Learning Progress Summary    Learner Readiness Method Response Comment Documented by Status   Patient Acceptance E NR  AR 11/16/17 1423 Active    Acceptance E NR  AR 11/15/17 1636 Active     Acceptance E NR  AR 11/14/17 1421 Active    Acceptance E VU,NR   11/13/17 1053 Done                      User Key     Initials Effective Dates Name Provider Type Discipline     05/18/15 -  Swetha Petersen, PT Physical Therapist PT    AR 06/27/16 -  Laurence Gallo PT Physical Therapist PT                    PT Recommendation and Plan  Anticipated Discharge Disposition: skilled nursing facility  Planned Therapy Interventions: balance training, bed mobility training, gait training, home exercise program, patient/family education, strengthening, stair training, transfer training  PT Frequency: daily  Plan of Care Review  Plan Of Care Reviewed With: patient  Outcome Summary/Follow up Plan: Pt still with significant decline in endurance from baseline status; ambulating 75' today w/ RW and min A.  1 LOB episode requiring mod A x2 with abrupt sitting on EOB.  Performed standing strengthening exercises w/ min A and RW.  Reviewed activity recommendations while inpatient and PT POC.            Outcome Measures       11/16/17 1400 11/15/17 1600 11/14/17 1400    How much help from another person do you currently need...    Turning from your back to your side while in flat bed without using bedrails? 4  -AR 4  -AR 4  -AR    Moving from lying on back to sitting on the side of a flat bed without bedrails? 4  -AR 4  -AR 4  -AR    Moving to and from a bed to a chair (including a wheelchair)? 3  -AR 3  -AR 3  -AR    Standing up from a chair using your arms (e.g., wheelchair, bedside chair)? 3  -AR 3  -AR 3  -AR    Climbing 3-5 steps with a railing? 2  -AR 2  -AR 2  -AR    To walk in hospital room? 3  -AR 3  -AR 3  -AR    AM-PAC 6 Clicks Score 19  -AR 19  -AR 19  -AR      User Key  (r) = Recorded By, (t) = Taken By, (c) = Cosigned By    Initials Name Provider Type    SUSAN Gallo PT Physical Therapist           Time Calculation:         PT Charges       11/16/17 1426          Time Calculation    Start Time 1412  -AR       Stop Time 1426  -AR      Time Calculation (min) 14 min  -AR      PT Received On 11/16/17  -AR      PT - Next Appointment 11/17/17  -AR        User Key  (r) = Recorded By, (t) = Taken By, (c) = Cosigned By    Initials Name Provider Type    SUSAN Gallo PT Physical Therapist          Therapy Charges for Today     Code Description Service Date Service Provider Modifiers Qty    75896029329 HC PT THER PROC EA 15 MIN 11/15/2017 Laurence Gallo, PT GP 1    20792315490 HC PT THER SUPP EA 15 MIN 11/15/2017 Laurence Gallo, PT GP 1    61149881964 HC PT THER PROC EA 15 MIN 11/16/2017 Laurence Gallo, PT GP 1    89029852727 HC PT THER SUPP EA 15 MIN 11/16/2017 Laurence Gallo, PT GP 1          PT G-Codes  Outcome Measure Options: AM-PAC 6 Clicks Basic Mobility (PT)    Laurence Gallo PT  11/16/2017

## 2017-11-16 NOTE — PLAN OF CARE
Problem: Patient Care Overview (Adult)  Goal: Plan of Care Review    11/16/17 1423   Coping/Psychosocial Response Interventions   Plan Of Care Reviewed With patient   Outcome Evaluation   Outcome Summary/Follow up Plan Pt still with significant decline in endurance from baseline status; ambulating 75' today w/ RW and min A. 1 LOB episode requiring mod A x2 with abrupt sitting on EOB. Performed standing strengthening exercises w/ min A and RW. Reviewed activity recommendations while inpatient and PT POC.

## 2017-11-16 NOTE — PROGRESS NOTES
"   LOS: 10 days   Patient Care Team:  Aldo Guajardo MD as PCP - General  Aldo Guajardo MD as PCP - Family Medicine    Chief Complaint/ Reason for encounter: Acute renal failure/dialysis management        Subjective     Weakness - Generalized   Associated symptoms include weakness. Pertinent negatives include no chest pain, fever or nausea.   Abnormal Lab   Associated symptoms include weakness. Pertinent negatives include no chest pain, fever or nausea.       Subjective:  Symptoms:  Improved.  He reports weakness.  No shortness of breath or chest pain.  (Weakness improved  Tolerated dialysis well today, no complications  No shortness of breath or edema  Still with significant muscle weakness and muscle tenderness lower extremities but that's improving on a daily basis).    Diet:  Adequate intake.  No nausea.    Activity level: Impaired due to weakness.    Pain:  He reports no pain.          History taken from: Patient and chart    Objective     Vital Signs  Temp:  [97.8 °F (36.6 °C)-98.8 °F (37.1 °C)] 97.8 °F (36.6 °C)  Heart Rate:  [68-70] 69  Resp:  [18] 18  BP: (133-144)/(69-81) 133/69       Wt Readings from Last 1 Encounters:   11/16/17 0500 229 lb 14.4 oz (104 kg)   11/15/17 0600 239 lb (108 kg)   11/14/17 0530 236 lb 6.4 oz (107 kg)   11/13/17 1607 229 lb 4.5 oz (104 kg)   11/13/17 1600 228 lb 6.3 oz (104 kg)   11/13/17 0300 236 lb 15.9 oz (107 kg)   11/12/17 0424 229 lb 6.4 oz (104 kg)   11/11/17 0412 229 lb 11.5 oz (104 kg)   11/10/17 0407 232 lb 9.6 oz (106 kg)   11/09/17 0413 234 lb 8 oz (106 kg)   11/07/17 1331 218 lb 11.1 oz (99.2 kg)   11/06/17 2118 218 lb 12.8 oz (99.2 kg)   11/06/17 1643 215 lb (97.5 kg)       Objective:  General Appearance:  Comfortable, in no acute distress and not in pain.    Vital signs: (most recent): Blood pressure 133/69, pulse 69, temperature 97.8 °F (36.6 °C), temperature source Oral, resp. rate 18, height 72.99\" (185.4 cm), weight 229 lb 14.4 oz (104 kg), SpO2 " 97 %.  Vital signs are normal.  No fever.    Output: Minimal urine output.    HEENT: Normal HEENT exam.    Lungs:  Normal respiratory rate and normal effort.  He is not in respiratory distress.  Breath sounds clear to auscultation.  No wheezes or decreased breath sounds.    Heart: Normal rate.  Regular rhythm.  S1 normal.  No murmur.   Abdomen: Abdomen is soft and non-distended.  Bowel sounds are normal.   There is no epigastric area or no suprapubic area tenderness.  There is no rebound tenderness.     Extremities: Normal range of motion.  There is no deformity or dependent edema.    Pulses: Distal pulses are intact.    Neurological: Patient is alert and oriented to person, place and time.    Skin:  Warm and dry.  No rash or cyanosis.             Results Review:    Past Medical History: reviewed and updated  Past Medical History:   Diagnosis Date   • Arteriosclerotic cardiovascular disease    • History of transfusion    • Hyperlipidemia    • Hypertension          Allergies:  No Known Allergies    Intake/Output:     Intake/Output Summary (Last 24 hours) at 11/16/17 1524  Last data filed at 11/16/17 1200   Gross per 24 hour   Intake              220 ml   Output                0 ml   Net              220 ml         DATA:  Interval chart, labs and notes reviewed.    Labs:   Recent Results (from the past 24 hour(s))   Comprehensive Metabolic Panel    Collection Time: 11/16/17  7:33 AM   Result Value Ref Range    Glucose 87 65 - 99 mg/dL    BUN 43 (H) 8 - 23 mg/dL    Creatinine 6.23 (H) 0.76 - 1.27 mg/dL    Sodium 139 136 - 145 mmol/L    Potassium 4.4 3.5 - 5.2 mmol/L    Chloride 99 98 - 107 mmol/L    CO2 25.5 22.0 - 29.0 mmol/L    Calcium 8.6 8.6 - 10.5 mg/dL    Total Protein 5.9 (L) 6.0 - 8.5 g/dL    Albumin 2.20 (L) 3.50 - 5.20 g/dL    ALT (SGPT) 44 (H) 1 - 41 U/L    AST (SGOT) 78 (H) 1 - 40 U/L    Alkaline Phosphatase 256 (H) 39 - 117 U/L    Total Bilirubin 3.1 (H) 0.1 - 1.2 mg/dL    eGFR Non  Amer 9 (L) >60  mL/min/1.73    Globulin 3.7 gm/dL    A/G Ratio 0.6 g/dL    BUN/Creatinine Ratio 6.9 (L) 7.0 - 25.0    Anion Gap 14.5 mmol/L   CBC Auto Differential    Collection Time: 11/16/17  7:45 AM   Result Value Ref Range    WBC 9.21 4.50 - 10.70 10*3/mm3    RBC 3.69 (L) 4.60 - 6.00 10*6/mm3    Hemoglobin 12.3 (L) 13.7 - 17.6 g/dL    Hematocrit 36.9 (L) 40.4 - 52.2 %    .0 (H) 79.8 - 96.2 fL    MCH 33.3 (H) 27.0 - 32.7 pg    MCHC 33.3 32.6 - 36.4 g/dL    RDW 17.3 (H) 11.5 - 14.5 %    RDW-SD 61.0 (H) 37.0 - 54.0 fl    MPV 10.8 6.0 - 12.0 fL    Platelets 158 140 - 500 10*3/mm3    Neutrophil % 58.6 42.7 - 76.0 %    Lymphocyte % 22.1 19.6 - 45.3 %    Monocyte % 13.1 (H) 5.0 - 12.0 %    Eosinophil % 5.5 0.3 - 6.2 %    Basophil % 0.3 0.0 - 1.5 %    Immature Grans % 0.4 0.0 - 0.5 %    Neutrophils, Absolute 5.38 1.90 - 8.10 10*3/mm3    Lymphocytes, Absolute 2.04 0.90 - 4.80 10*3/mm3    Monocytes, Absolute 1.21 (H) 0.20 - 1.20 10*3/mm3    Eosinophils, Absolute 0.51 0.00 - 0.70 10*3/mm3    Basophils, Absolute 0.03 0.00 - 0.20 10*3/mm3    Immature Grans, Absolute 0.04 (H) 0.00 - 0.03 10*3/mm3   Duplex Initial Vein Mapping Hemodialysis Access CAR    Collection Time: 11/16/17  9:20 AM   Result Value Ref Range    Cephalic upper arm right - prox 0.55 cm    Cephalic upper arm right - mid 0.54 cm    Cephalic upper arm right - dist 0.58 cm    Cephalic Forearm right - prox 0.38 cm    Cephalic Forearm right - mid 0.33 cm    Cephalic Forearm right - dist 0.28 cm    Basilic upper arm right - prox 0.62 cm    Basilic upper arm right - mid 0.43 cm    Basilic upper arm right - dist 0.36 cm    Basilic Forearm right - prox 0.19 cm    Basilic Forearm right - mid 0.14 cm    Basilic Forearm right - dist 0.19 cm    Radial upper arm right - prox 0.21 cm    Radial upper arm right - mid 0.27 cm    Radial upper arm right - dist 0.26 cm    Cephalic upper arm left - prox 0.41 cm    Cephalic upper arm left - mid 0.28 cm    Cephalic upper arm left - dist 0.33  cm    Cephalic Forearm left - prox 0.27 cm    Cephalic Forearm left - mid 0.21 cm    Cephalic Forearm left - dist 0.19 cm    Basilic upper arm left - prox 0.39 cm    Basilic upper arm left - mid 0.36 cm    Basilic upper arm left - dist 0.30 cm    Basilic Forearm left - prox 0.24 cm    Basilic Forearm left - mid 0.09 cm    Basilic Forearm left - dist 0.12 cm    Radial upper arm left - prox 0.25 cm    Radial upper arm left - mid 0.25 cm    Radial upper arm left - dist 0.23 cm    Upper arterial right arm brachial length 0.47 cm    Upper arterial left arm brachial length 0.45 cm    Basilic Antecubital Fossa Left 0.45 cm    Basilic Antecubital Fossa Right 0.39 cm    Cephalic Antecubital Fossa Left 0.54 cm    Cephalic Antecubital Fossa Right 0.85 cm       Radiology:  Imaging Results (last 24 hours)     ** No results found for the last 24 hours. **             Medications have been reviewed:  Current Facility-Administered Medications   Medication Dose Route Frequency Provider Last Rate Last Dose   • [START ON 11/17/2017] amLODIPine (NORVASC) tablet 5 mg  5 mg Oral Q24H Memo Varela MD       • aspirin EC tablet 81 mg  81 mg Oral Daily Memo Varela MD   81 mg at 11/16/17 1009   • pantoprazole (PROTONIX) EC tablet 40 mg  40 mg Oral Q AM Memo Varela MD   40 mg at 11/16/17 0727   • sodium chloride 0.9 % flush 10 mL  10 mL Intravenous PRN Adan Simmons MD   10 mL at 11/10/17 2052       Assessment/Plan     Active Problems:    Acute kidney injury (nontraumatic)      Assessment:  (Acute renal failure.   secondary to rhabdomyolysis, this was confirmed with renal biopsy, positive ANCA was a false positive, no sign of glomerulonephritis, no sign of recovery so far, continue to monitor  Severe rhabdomyolysis,  likely From Crestor plus dehydration combination, CK levels have normalized  Elevated LFTs, possibly related to Crestor, also likely muscle source  Mild metabolic acidosis, stabilized with  dialysis  Hypoalbuminemia  Hyperparathyroidism, likely secondary to renal failure  Mild leukocytosis        Plan:   No evidence of renal recovery so far, continue dialysis 3 times a week  Status post placement of tunnel dialysis catheter and I was just informed that his outpatient rehabilitation and dialysis are set up starting tomorrow  Discontinue Crestor indefinitely  Stable for discharge later today  Discussed with Dr. Varela).             Continue to monitor renal function, electroytes and volume closely   Please call me with any questions or concerns      Franky Moreau MD   [facility]   [phone]    11/16/17  3:24 PM      Dictation performed using Dragon dictation software

## 2017-11-16 NOTE — PROGRESS NOTES
Discharge Planning Assessment  [facility]     Patient Name: Hu Burgess  MRN: [id number]  Today's Date: 11/16/2017    Admit Date: 11/6/2017      Discharge Plan       11/16/17 1454    Case Management/Social Work Plan    Plan [facility]     Additional Comments Spoke with Luli/Nicole, pt has a bed available today. Pt has a dialysis chair available at [facility] for MWF at 5:00pm starting tomorrow. Dr Varela updated. CCP to follow.          Discharge Placement     Facility/Agency Request Status Selected? Address Phone Number Fax Number    [facility] Accepted    Yes [address] [facility] [id number] [phone]        Anabelle Gordon, [facility] 11/15/2017 15:28    Per Luli, doug pending bed/chair availability               [facility] Accepted     [id number] [facility] [id number] [phone]        Anabelle Gordon, [facility] 11/15/2017 15:28    Per doug Christianson pending bed/chair availability                   Expected Discharge Date and Time     Expected Discharge Date Expected Discharge Time    Nov 16, 2017            Madhuri Batres RN

## 2017-11-16 NOTE — PROGRESS NOTES
"Daily progress note    Chief complaint  Doing same  No new complaints    History of present illness  67-year-old white male with history of atherosclerotic heart disease hypertension hyperlipidemia otherwise in good health and no history of kidney disease present it to [facility] emergency room for last 5-7 days weakness nausea not feeling well and decreased urine output.  Patient evaluated found to be in acute renal failure with low potassium admitted for management.  Patient denies any diarrhea fever cough chest pain shortness of breath.     REVIEW OF SYSTEMS  Review of Systems   Constitutional: Negative for activity change, appetite change and fever.   HENT: Negative for congestion and sore throat.    Eyes: Negative.    Respiratory: Negative for cough and shortness of breath.    Cardiovascular: Negative for chest pain and leg swelling.   Gastrointestinal: Negative for abdominal pain, diarrhea and vomiting.   Endocrine: Negative.    Genitourinary: Positive for frequency. Negative for decreased urine volume and dysuria.        Increased thirst   Musculoskeletal: Negative for neck pain.   Skin: Negative for rash and wound.   Allergic/Immunologic: Negative.    Neurological: Positive for weakness. Negative for numbness and headaches.   Hematological: Negative.    Psychiatric/Behavioral: Negative.    All other systems reviewed and are negative.     PHYSICAL EXAM  Blood pressure 144/70, pulse 68, temperature 98.2 °F (36.8 °C), temperature source Oral, resp. rate 18, height 72.99\" (185.4 cm), weight 229 lb 14.4 oz (104 kg), SpO2 94 %.    Constitutional: He is oriented to person, place, and time and well-developed, well-nourished, and in no distress.   Head: Normocephalic and atraumatic.   Mouth/Throat: Mucous membranes are dry.   Eyes: EOM are normal. Pupils are equal, round, and reactive to light.   Neck: Normal range of motion. Neck supple.   Cardiovascular: Regular rhythm and normal heart sounds.  Bradycardia " present.    No murmur heard.  Pulmonary/Chest: Effort normal and breath sounds normal. No respiratory distress.   Abdominal: Soft. He exhibits no distension. There is no tenderness. There is no rebound and no guarding.   Musculoskeletal: Normal range of motion. He exhibits no edema.   Neurological: He is alert and oriented to person, place, and time. He has normal sensation and normal strength.   No focal weakness or facial droop. Slow mentation.   Skin: Skin is warm and dry.   Turgor is normal, cap refill 1-2 seconds.     Psychiatric: Mood and affect normal.     LAB RESULTS  Lab Results (last 24 hours)     Procedure Component Value Units Date/Time    Blood Culture - Blood, [[id number]]  (Normal) Collected:  11/10/17 1537    Specimen:  Blood from Arm, Right Updated:  11/15/17 1601     Blood Culture No growth at 5 days    CBC & Differential [[id number]] Collected:  11/16/17 0745    Specimen:  Blood Updated:  11/16/17 0754    Narrative:       The following orders were created for panel order CBC & Differential.  Procedure                               Abnormality         Status                     ---------                               -----------         ------                     CBC Auto Differential[[id number]]        Abnormal            Final result                 Please view results for these tests on the individual orders.    CBC Auto Differential [[id number]]  (Abnormal) Collected:  11/16/17 0745    Specimen:  Blood Updated:  11/16/17 0754     WBC 9.21 10*3/mm3      RBC 3.69 (L) 10*6/mm3      Hemoglobin 12.3 (L) g/dL      Hematocrit 36.9 (L) %      .0 (H) fL      MCH 33.3 (H) pg      MCHC 33.3 g/dL      RDW 17.3 (H) %      RDW-SD 61.0 (H) fl      MPV 10.8 fL      Platelets 158 10*3/mm3      Neutrophil % 58.6 %      Lymphocyte % 22.1 %      Monocyte % 13.1 (H) %      Eosinophil % 5.5 %      Basophil % 0.3 %      Immature Grans % 0.4 %      Neutrophils, Absolute 5.38 10*3/mm3      Lymphocytes, Absolute 2.04  10*3/mm3      Monocytes, Absolute 1.21 (H) 10*3/mm3      Eosinophils, Absolute 0.51 10*3/mm3      Basophils, Absolute 0.03 10*3/mm3      Immature Grans, Absolute 0.04 (H) 10*3/mm3     Comprehensive Metabolic Panel [[id number]]  (Abnormal) Collected:  11/16/17 0733    Specimen:  Blood Updated:  11/16/17 0813     Glucose 87 mg/dL      BUN 43 (H) mg/dL      Creatinine 6.23 (H) mg/dL      Sodium 139 mmol/L      Potassium 4.4 mmol/L      Chloride 99 mmol/L      CO2 25.5 mmol/L      Calcium 8.6 mg/dL      Total Protein 5.9 (L) g/dL      Albumin 2.20 (L) g/dL      ALT (SGPT) 44 (H) U/L      AST (SGOT) 78 (H) U/L      Alkaline Phosphatase 256 (H) U/L      Total Bilirubin 3.1 (H) mg/dL      eGFR Non African Amer 9 (L) mL/min/1.73      Globulin 3.7 gm/dL      A/G Ratio 0.6 g/dL      BUN/Creatinine Ratio 6.9 (L)     Anion Gap 14.5 mmol/L         Imaging Results (last 72 hours)     Procedure Component Value Units Date/Time    US Gallbladder [[id number]] Collected:  11/06/17 1931     Updated:  11/06/17 1937    Narrative:       US GALLBLADDER-        INDICATIONS: Elevated liver function tests     TECHNIQUE: Ultrasound of the right upper quadrant     COMPARISON: None available     FINDINGS:     The gallbladder is nontender, with sludge but no shadowing stones  demonstrated. No gallbladder wall thickening or pericholecystic fluid.     No intrahepatic or extrahepatic biliary ductal dilatation is  demonstrated. The common biliary duct caliber is measured at 4.0 mm.     No liver lesion is identified. Diffusely, the echogenicity of the liver  is otherwise in the range of normal relative to that of the right  kidney.     The pancreas is mostly obscured. The right kidney, 10.8 cm, and the  pancreas otherwise appear unremarkable.                Impression:          Sludge in the gallbladder. No evidence for acute cholecystitis. No  discrete liver lesion or biliary ductal dilatation identified. With  persistent clinical indication, enhanced  liver imaging could be  considered for further evaluation.     This report was finalized on 11/6/2017 7:33 PM by Dr. Gokul Real MD.       CT Abdomen Pelvis Without Contrast [[id number]] Collected:  11/06/17 2007     Updated:  11/06/17 2017    Narrative:       CT ABDOMEN AND PELVIS WITHOUT CONTRAST     HISTORY: Elevated liver function test. Gallbladder sludge.      TECHNIQUE: CT abdomen and pelvis without IV or oral contrast.     COMPARISON: Right upper quadrant ultrasound 11/06/2017.     FINDINGS: Lung bases appear clear and there is no pleural effusion.  Liver exhibits slight undelayed margins consistent with hepatic  cirrhosis. There Is no evidence for intra or extrahepatic biliary duct  dilatation. There is density within the gallbladder consistent with  sludge as demonstrated sonographically. Adrenal glands, pancreas,  spleen, kidneys appear within normal limits. There is no hydronephrosis  or hydroureter. Urinary bladder is mostly decompressed. There is no  bowel dilatation or evidence for bowel obstruction. No ben enlargement  is evident within the abdomen or pelvis. Atherosclerotic calcifications  are present involving the abdominal aorta and the abdominal aorta  measures up to 2.4 cm diameter. Mural calcifications are present.       Impression:       1. Sludge within the gallbladder without other evidence for  cholecystitis. Mild undulating hepatic margins consistent with hepatic  cirrhosis.  2. Atherosclerotic disease. 2.4 cm infrarenal abdominal aorta.     Radiation dose reduction techniques were utilized, including automated  exposure control and exposure modulation based on body size.     This report was finalized on 11/6/2017 8:14 PM by Dr. Marcos Sainz MD.       XR Chest 2 View [[id number]] Collected:  11/06/17 2041     Updated:  11/06/17 2046    Narrative:       XR CHEST 2 VW-     HISTORY: Male who is 67 years-old,  acute renal failure     TECHNIQUE: Frontal and lateral views of the  chest     COMPARISON: None available     FINDINGS: Heart, mediastinum and pulmonary vasculature are unremarkable.  No focal pulmonary consolidation, pleural effusion, or pneumothorax. Old  granulomatous disease is apparent. No acute osseous process.       Impression:       No evidence for acute pulmonary process. Follow-up as  clinical indications persist.     This report was finalized on 11/6/2017 8:43 PM by Dr. Gokul Real MD.       US Renal Bilateral [[id number]] Collected:  11/07/17 0145     Updated:  11/07/17 0145    Narrative:       ULTRASOUND RENAL BILATERAL.     TECHNIQUE: Grayscale and color images of the kidneys were obtained.     HISTORY: Acute kidney injury.     COMPARISON: No prior studies for comparison.     FINDINGS:  Right kidney measures 11.6 x 4.8 x 5.4 cm. Left kidney measures 12.5 x  5.9 x 5.7 cm. There is no hydronephrosis, stone or mass.     Urinary bladder could not be well visualized. No calculus, sludge or  mass.       Impression:       No acute findings involving both kidneys.             EKG                                                  Rhythm/Rate: Sinus bradycardia 48  P waves and [place]: Normal  QRS, axis: IVCD   ST and T waves: Biphasic T-wave and Inverted T-waves inferiorly        Current Facility-Administered Medications:   •  ALPRAZolam (XANAX) tablet 0.25 mg, 0.25 mg, Oral, 4x Daily PRN, Memo Varela MD, 0.25 mg at 11/10/17 0046  •  amLODIPine (NORVASC) tablet 2.5 mg, 2.5 mg, Oral, Q24H, Memo Varela MD, 2.5 mg at 11/16/17 1009  •  aspirin EC tablet 81 mg, 81 mg, Oral, Daily, Memo Varela MD, 81 mg at 11/16/17 1009  •  heparin (porcine) 5000 UNIT/ML injection 5,000 Units, 5,000 Units, Subcutaneous, Q12H, Gareth Sánchez MD, 5,000 Units at 11/16/17 1009  •  heparin (porcine) injection 3,000 Units, 3,000 Units, Intracatheter, PRN, Franky Moreau MD, 3,000 Units at 11/15/17 1330  •  HYDROcodone-acetaminophen (NORCO) 5-325 MG per tablet 1 tablet, 1 tablet, Oral, Q4H PRN,  Gareth Sánchez MD  •  ondansetron (ZOFRAN) injection 4 mg, 4 mg, Intravenous, Q4H PRN, Keith Bejarano MD  •  pantoprazole (PROTONIX) EC tablet 40 mg, 40 mg, Oral, Q AM, Keith Bejarano MD, 40 mg at 11/16/17 0727  •  Insert peripheral IV, , , Once **AND** sodium chloride 0.9 % flush 10 mL, 10 mL, Intravenous, PRN, Adan Simmons MD, 10 mL at 11/10/17 2052     ASSESSMENT  Acute renal failure due to severe rhabdomyolysis on hemodialysis  Rhabdomyolysis  Elevated liver enzymes Questionable etiology probably secondary to Crestor improving  Hypertension  Hyperlipidemia  Atherosclerotic heart disease  Leukocytosis with no fever ? source    PLAN  Hemodialysis spot available today  Okay to discharge to rehabilitation  Discharge summary dictated    KEITH BEJARANO MD     .

## 2017-11-16 NOTE — DISCHARGE SUMMARY
Discharge summary    Date of admission 11/6/2017  Date of discharge 11/16/2017    Final diagnosis  Acute renal failure due to severe rhabdomyolysis on hemodialysis  Rhabdomyolysis  Elevated liver enzymes Questionable etiology probably secondary to Crestor improving  Hypertension  Hyperlipidemia  Atherosclerotic heart disease  Leukocytosis resolved    Discharge medications    Current Facility-Administered Medications:   •  [START ON 11/17/2017] amLODIPine (NORVASC) tablet 5 mg, 5 mg, Oral, Q24H, Memo Varela MD  •  aspirin EC tablet 81 mg, 81 mg, Oral, Daily, Memo Varela MD, 81 mg at 11/16/17 1009  •  pantoprazole (PROTONIX) EC tablet 40 mg, 40 mg, Oral, Q AM, Memo Varela MD, 40 mg at 11/16/17 1684  •  Insert peripheral IV, , , Once **AND** sodium chloride 0.9 % flush 10 mL, 10 mL, Intravenous, PRN, Adan Simmons MD, 10 mL at 11/10/17 2052     Consult obtained  Nephrology  Gastroenterology  Vascular surgery    Procedure  Status post kidney biopsy  Status post hemodialysis catheter placement    Hospital course  67-year-old white male with history of coronary artery disease hypertension hyperlipidemia who has been in good health admitted through emergency room with generalized weakness nausea no appetite.  Patient workup revealed acute kidney injury with increase liver enzymes admitted for management.  Patient was in rhabdo received IV fluid nephrology and gastroenterology consult obtained.  As patient was not making any urine IV fluids stopped and started hemodialysis.  Patient was taking Crestor and hydrochlorothiazide which is stopped.  Patient remained on hemodialysis his kidney function is not improving and he underwent kidney biopsy which showed severe rhabdo.  Now patient has a  hemodialysis catheter placement and also evaluated for AV fistula with mapping by vascular surgery and clear with nephrology.to [facility] for outpatient hemodialysis.  Patient liver enzymes returned to normal.  Patient advised not to  take any anticholesterol medication forever.  Patient 2-D echo was within normal limits.    Discharge diet regular    Activity per PT OT    Medication as above    Further care per accepting physician at nursing home and patient is to keep hemodialysis 3 times a week per nephrology instructions    KEITH BEJARANO MD

## 2017-11-16 NOTE — PLAN OF CARE
Problem: Patient Care Overview (Adult)  Goal: Plan of Care Review  Outcome: Ongoing (interventions implemented as appropriate)    11/16/17 0552   Coping/Psychosocial Response Interventions   Plan Of Care Reviewed With patient   Patient Care Overview   Progress improving   Outcome Evaluation   Outcome Summary/Follow up Plan Has slept intervals. Denies pain. VSS. No void so far this shift . No changes. No distress       Goal: Adult Individualization and Mutuality  Outcome: Ongoing (interventions implemented as appropriate)  Goal: Discharge Needs Assessment  Outcome: Ongoing (interventions implemented as appropriate)    Problem: Fall Risk (Adult)  Goal: Absence of Falls  Outcome: Ongoing (interventions implemented as appropriate)    Problem: Renal Failure/Kidney Injury, Acute (Adult)  Goal: Signs and Symptoms of Listed Potential Problems Will be Absent or Manageable (Renal Failure/Kidney Injury, Acute)  Outcome: Ongoing (interventions implemented as appropriate)    Problem: Fluid Volume Deficit (Adult)  Goal: Fluid/Electrolyte Balance  Outcome: Ongoing (interventions implemented as appropriate)  Goal: Comfort/Well Being  Outcome: Ongoing (interventions implemented as appropriate)

## 2017-11-16 NOTE — PROGRESS NOTES
LOS: 10 days   Patient Care Team:  Aldo Guajardo MD as PCP - General  Aldo Guajardo MD as PCP - Family Medicine    Chief Complaint: ESRD    Subjective     67 y.o. male with end-stage renal failure.  No recovery from his acute renal failure so far.  We will map his arms to hopefully preserve a vein if any left.  He is okay for discharge once mapped.  If long-term dialysis is planned he will need to follow up in our office to discuss placement of fistula versus shunts.  If not he will still need a follow-up for catheter removal.  We will arrange a six-week follow-up appointment to discuss the findings if he is discharged before tomorrow    Review of Systems  Review of Systems - Negative     Objective     Vital Signs  Temp:  [97.6 °F (36.4 °C)-98.8 °F (37.1 °C)] 98.2 °F (36.8 °C)  Heart Rate:  [67-70] 68  Resp:  [18-24] 18  BP: (143-156)/(70-81) 144/70    Physical Exam  General: No acute distress. Alert and oriented x 4  HEENT: No jugular venous distension, trachea is midline  CV: RRR, S1S2  Resp: Clear unlabored breathing on all sides  Abd: Abdomen is soft, nontender, nondistended  Extremities: Viable with mild edema  Chest catheter in good position  Results Review:       Recent Results (from the past 24 hour(s))   CBC Auto Differential    Collection Time: 11/16/17  7:45 AM   Result Value Ref Range    WBC 9.21 4.50 - 10.70 10*3/mm3    RBC 3.69 (L) 4.60 - 6.00 10*6/mm3    Hemoglobin 12.3 (L) 13.7 - 17.6 g/dL    Hematocrit 36.9 (L) 40.4 - 52.2 %    .0 (H) 79.8 - 96.2 fL    MCH 33.3 (H) 27.0 - 32.7 pg    MCHC 33.3 32.6 - 36.4 g/dL    RDW 17.3 (H) 11.5 - 14.5 %    RDW-SD 61.0 (H) 37.0 - 54.0 fl    MPV 10.8 6.0 - 12.0 fL    Platelets 158 140 - 500 10*3/mm3    Neutrophil % 58.6 42.7 - 76.0 %    Lymphocyte % 22.1 19.6 - 45.3 %    Monocyte % 13.1 (H) 5.0 - 12.0 %    Eosinophil % 5.5 0.3 - 6.2 %    Basophil % 0.3 0.0 - 1.5 %    Immature Grans % 0.4 0.0 - 0.5 %    Neutrophils, Absolute 5.38 1.90 - 8.10  10*3/mm3    Lymphocytes, Absolute 2.04 0.90 - 4.80 10*3/mm3    Monocytes, Absolute 1.21 (H) 0.20 - 1.20 10*3/mm3    Eosinophils, Absolute 0.51 0.00 - 0.70 10*3/mm3    Basophils, Absolute 0.03 0.00 - 0.20 10*3/mm3    Immature Grans, Absolute 0.04 (H) 0.00 - 0.03 10*3/mm3   ]      Assessment/Plan           Active Problems:    Acute kidney injury (nontraumatic)      Assessment & Plan  67 y.o. male with acute renal failure now in end stage renal disease.  He is in need of vein mapping to preserve any viable vein left for possible fistula in the future.  We will order today and discuss tomorrow.  He is discharged prior to discussion tomorrow then he will follow-up in our office in 6 weeks for discussion      Gareth Sánchez MD  11/16/17  7:57 AM

## 2017-11-16 NOTE — PROGRESS NOTES
bilateral upper extremity vein mapping complete and prelim to [facility] is positive for new rt arm superficial thrombophlebitis

## 2017-11-16 NOTE — PROGRESS NOTES
"Daily progress note    Chief complaint  Doing same  No new complaints    History of present illness  67-year-old white male with history of atherosclerotic heart disease hypertension hyperlipidemia otherwise in good health and no history of kidney disease present it to [facility] [place] emergency room for last 5-7 days weakness nausea not feeling well and decreased urine output.  Patient evaluated found to be in acute renal failure with low potassium admitted for management.  Patient denies any diarrhea fever cough chest pain shortness of breath.     REVIEW OF SYSTEMS  Review of Systems   Constitutional: Negative for activity change, appetite change and fever.   HENT: Negative for congestion and sore throat.    Eyes: Negative.    Respiratory: Negative for cough and shortness of breath.    Cardiovascular: Negative for chest pain and leg swelling.   Gastrointestinal: Negative for abdominal pain, diarrhea and vomiting.   Endocrine: Negative.    Genitourinary: Positive for frequency. Negative for decreased urine volume and dysuria.        Increased thirst   Musculoskeletal: Negative for neck pain.   Skin: Negative for rash and wound.   Allergic/Immunologic: Negative.    Neurological: Positive for weakness. Negative for numbness and headaches.   Hematological: Negative.    Psychiatric/Behavioral: Negative.    All other systems reviewed and are negative.     PHYSICAL EXAM  Blood pressure 144/70, pulse 68, temperature 98.2 °F (36.8 °C), temperature source Oral, resp. rate 18, height 72.99\" (185.4 cm), weight 229 lb 14.4 oz (104 kg), SpO2 94 %.    Constitutional: He is oriented to person, place, and time and well-developed, well-nourished, and in no distress.   Head: Normocephalic and atraumatic.   Mouth/Throat: Mucous membranes are dry.   Eyes: EOM are normal. Pupils are equal, round, and reactive to light.   Neck: Normal range of motion. Neck supple.   Cardiovascular: Regular rhythm and normal heart sounds.  Bradycardia " present.    No murmur heard.  Pulmonary/Chest: Effort normal and breath sounds normal. No respiratory distress.   Abdominal: Soft. He exhibits no distension. There is no tenderness. There is no rebound and no guarding.   Musculoskeletal: Normal range of motion. He exhibits no edema.   Neurological: He is alert and oriented to person, place, and time. He has normal sensation and normal strength.   No focal weakness or facial droop. Slow mentation.   Skin: Skin is warm and dry.   Turgor is normal, cap refill 1-2 seconds.     Psychiatric: Mood and affect normal.     LAB RESULTS  Lab Results (last 24 hours)     Procedure Component Value Units Date/Time    Blood Culture - Blood, [[id number]]  (Normal) Collected:  11/10/17 1427    Specimen:  Blood from Arm, Left Updated:  11/15/17 1446     Blood Culture No growth at 5 days    Blood Culture - Blood, [[id number]]  (Normal) Collected:  11/10/17 1537    Specimen:  Blood from Arm, Right Updated:  11/15/17 1601     Blood Culture No growth at 5 days    CBC & Differential [[id number]] Collected:  11/16/17 0745    Specimen:  Blood Updated:  11/16/17 0754    Narrative:       The following orders were created for panel order CBC & Differential.  Procedure                               Abnormality         Status                     ---------                               -----------         ------                     CBC Auto Differential[[id number]]        Abnormal            Final result                 Please view results for these tests on the individual orders.    CBC Auto Differential [[id number]]  (Abnormal) Collected:  11/16/17 0745    Specimen:  Blood Updated:  11/16/17 0754     WBC 9.21 10*3/mm3      RBC 3.69 (L) 10*6/mm3      Hemoglobin 12.3 (L) g/dL      Hematocrit 36.9 (L) %      .0 (H) fL      MCH 33.3 (H) pg      MCHC 33.3 g/dL      RDW 17.3 (H) %      RDW-SD 61.0 (H) fl      MPV 10.8 fL      Platelets 158 10*3/mm3      Neutrophil % 58.6 %      Lymphocyte % 22.1 %       Monocyte % 13.1 (H) %      Eosinophil % 5.5 %      Basophil % 0.3 %      Immature Grans % 0.4 %      Neutrophils, Absolute 5.38 10*3/mm3      Lymphocytes, Absolute 2.04 10*3/mm3      Monocytes, Absolute 1.21 (H) 10*3/mm3      Eosinophils, Absolute 0.51 10*3/mm3      Basophils, Absolute 0.03 10*3/mm3      Immature Grans, Absolute 0.04 (H) 10*3/mm3     Comprehensive Metabolic Panel [[id number]]  (Abnormal) Collected:  11/16/17 0733    Specimen:  Blood Updated:  11/16/17 0813     Glucose 87 mg/dL      BUN 43 (H) mg/dL      Creatinine 6.23 (H) mg/dL      Sodium 139 mmol/L      Potassium 4.4 mmol/L      Chloride 99 mmol/L      CO2 25.5 mmol/L      Calcium 8.6 mg/dL      Total Protein 5.9 (L) g/dL      Albumin 2.20 (L) g/dL      ALT (SGPT) 44 (H) U/L      AST (SGOT) 78 (H) U/L      Alkaline Phosphatase 256 (H) U/L      Total Bilirubin 3.1 (H) mg/dL      eGFR Non African Amer 9 (L) mL/min/1.73      Globulin 3.7 gm/dL      A/G Ratio 0.6 g/dL      BUN/Creatinine Ratio 6.9 (L)     Anion Gap 14.5 mmol/L         Imaging Results (last 72 hours)     Procedure Component Value Units Date/Time    US Gallbladder [[id number]] Collected:  11/06/17 1931     Updated:  11/06/17 1937    Narrative:       US GALLBLADDER-        INDICATIONS: Elevated liver function tests     TECHNIQUE: Ultrasound of the right upper quadrant     COMPARISON: None available     FINDINGS:     The gallbladder is nontender, with sludge but no shadowing stones  demonstrated. No gallbladder wall thickening or pericholecystic fluid.     No intrahepatic or extrahepatic biliary ductal dilatation is  demonstrated. The common biliary duct caliber is measured at 4.0 mm.     No liver lesion is identified. Diffusely, the echogenicity of the liver  is otherwise in the range of normal relative to that of the right  kidney.     The pancreas is mostly obscured. The right kidney, 10.8 cm, and the  pancreas otherwise appear unremarkable.                Impression:          Sludge  in the gallbladder. No evidence for acute cholecystitis. No  discrete liver lesion or biliary ductal dilatation identified. With  persistent clinical indication, enhanced liver imaging could be  considered for further evaluation.     This report was finalized on 11/6/2017 7:33 PM by Dr. Gokul Real MD.       CT Abdomen Pelvis Without Contrast [[id number]] Collected:  11/06/17 2007     Updated:  11/06/17 2017    Narrative:       CT ABDOMEN AND PELVIS WITHOUT CONTRAST     HISTORY: Elevated liver function test. Gallbladder sludge.      TECHNIQUE: CT abdomen and pelvis without IV or oral contrast.     COMPARISON: Right upper quadrant ultrasound 11/06/2017.     FINDINGS: Lung bases appear clear and there is no pleural effusion.  Liver exhibits slight undelayed margins consistent with hepatic  cirrhosis. There Is no evidence for intra or extrahepatic biliary duct  dilatation. There is density within the gallbladder consistent with  sludge as demonstrated sonographically. Adrenal glands, pancreas,  spleen, kidneys appear within normal limits. There is no hydronephrosis  or hydroureter. Urinary bladder is mostly decompressed. There is no  bowel dilatation or evidence for bowel obstruction. No ben enlargement  is evident within the abdomen or pelvis. Atherosclerotic calcifications  are present involving the abdominal aorta and the abdominal aorta  measures up to 2.4 cm diameter. Mural calcifications are present.       Impression:       1. Sludge within the gallbladder without other evidence for  cholecystitis. Mild undulating hepatic margins consistent with hepatic  cirrhosis.  2. Atherosclerotic disease. 2.4 cm infrarenal abdominal aorta.     Radiation dose reduction techniques were utilized, including automated  exposure control and exposure modulation based on body size.     This report was finalized on 11/6/2017 8:14 PM by Dr. Marcos Sainz MD.       XR Chest 2 View [[id number]] Collected:  11/06/17 2041      Updated:  11/06/17 2046    Narrative:       XR CHEST 2 VW-     HISTORY: Male who is 67 years-old,  acute renal failure     TECHNIQUE: Frontal and lateral views of the chest     COMPARISON: None available     FINDINGS: Heart, mediastinum and pulmonary vasculature are unremarkable.  No focal pulmonary consolidation, pleural effusion, or pneumothorax. Old  granulomatous disease is apparent. No acute osseous process.       Impression:       No evidence for acute pulmonary process. Follow-up as  clinical indications persist.     This report was finalized on 11/6/2017 8:43 PM by Dr. Gokul Real MD.       US Renal Bilateral [[id number]] Collected:  11/07/17 0145     Updated:  11/07/17 0145    Narrative:       ULTRASOUND RENAL BILATERAL.     TECHNIQUE: Grayscale and color images of the kidneys were obtained.     HISTORY: Acute kidney injury.     COMPARISON: No prior studies for comparison.     FINDINGS:  Right kidney measures 11.6 x 4.8 x 5.4 cm. Left kidney measures 12.5 x  5.9 x 5.7 cm. There is no hydronephrosis, stone or mass.     Urinary bladder could not be well visualized. No calculus, sludge or  mass.       Impression:       No acute findings involving both kidneys.             EKG                                                  Rhythm/Rate: Sinus bradycardia 48  P waves and PA: Normal  QRS, axis: IVCD   ST and T waves: Biphasic T-wave and Inverted T-waves inferiorly        Current Facility-Administered Medications:   •  ALPRAZolam (XANAX) tablet 0.25 mg, 0.25 mg, Oral, 4x Daily PRN, Memo Varela MD, 0.25 mg at 11/10/17 0046  •  amLODIPine (NORVASC) tablet 2.5 mg, 2.5 mg, Oral, Q24H, Memo Varela MD, 2.5 mg at 11/16/17 1009  •  aspirin EC tablet 81 mg, 81 mg, Oral, Daily, Memo Varela MD, 81 mg at 11/16/17 1009  •  heparin (porcine) 5000 UNIT/ML injection 5,000 Units, 5,000 Units, Subcutaneous, Q12H, Gareth Sánchez MD, 5,000 Units at 11/16/17 1009  •  heparin (porcine) injection 3,000 Units, 3,000 Units,  Intracatheter, PRN, Franky Moreau MD, 3,000 Units at 11/15/17 1330  •  HYDROcodone-acetaminophen (NORCO) 5-325 MG per tablet 1 tablet, 1 tablet, Oral, Q4H PRN, Gareth Sánchez MD  •  ondansetron (ZOFRAN) injection 4 mg, 4 mg, Intravenous, Q4H PRN, Keith Varela MD  •  pantoprazole (PROTONIX) EC tablet 40 mg, 40 mg, Oral, Q AM, Keith Varela MD, 40 mg at 11/16/17 0727  •  Insert peripheral IV, , , Once **AND** sodium chloride 0.9 % flush 10 mL, 10 mL, Intravenous, PRN, Adan Simmons MD, 10 mL at 11/10/17 2052     ASSESSMENT  Acute renal failure due to severe rhabdomyolysis on hemodialysis  Rhabdomyolysis  Elevated liver enzymes Questionable etiology probably secondary to Crestor improving  Hypertension  Hyperlipidemia  Atherosclerotic heart disease  Leukocytosis with no fever ? source    PLAN  CPM  Hemodialysis TIW  Stress ulcer DVT prophylaxis  PT/OT  Follow closely   Discharge once outpatient hemodialysis spot arrange an okay with nephrology and gastroenterology    KEITH VARELA MD     .

## 2017-11-17 NOTE — PROGRESS NOTES
Case Management Discharge Note    Final Note: pt dc'd to [facility] Placement     Facility/Agency Request Status Selected? Address Phone Number Fax Number    [facility] Accepted    Yes [address] [facility] [address] [phone]    [facility] Accepted     [address] [facility] [address] [phone]        Anabelle Gordon LCSW 11/15/2017 15:28    Per Meghan, accepted pending bed/chair availability                   Other: Other (private vehicle )    Discharge Codes: 03  Discharged/transferred to skilled nursing facility (SNF) with Medicare certification in anticipation of skilled care

## 2017-12-07 LAB
LAB AP CASE REPORT: NORMAL
LAB AP CLINICAL INFORMATION: NORMAL
Lab: NORMAL
PATH REPORT.ADDENDUM SPEC: NORMAL
PATH REPORT.FINAL DX SPEC: NORMAL
PATH REPORT.GROSS SPEC: NORMAL

## 2018-01-04 ENCOUNTER — APPOINTMENT (OUTPATIENT)
Dept: LAB | Facility: HOSPITAL | Age: 68
End: 2018-01-04

## 2018-01-04 ENCOUNTER — LAB (OUTPATIENT)
Dept: LAB | Facility: HOSPITAL | Age: 68
End: 2018-01-04
Attending: INTERNAL MEDICINE

## 2018-01-04 ENCOUNTER — HOSPITAL ENCOUNTER (OUTPATIENT)
Dept: GENERAL RADIOLOGY | Facility: HOSPITAL | Age: 68
Discharge: HOME | End: 2018-01-04
Attending: INTERNAL MEDICINE | Admitting: INTERNAL MEDICINE

## 2018-01-04 ENCOUNTER — TRANSCRIBE ORDERS (OUTPATIENT)
Dept: ADMINISTRATIVE | Facility: HOSPITAL | Age: 68
End: 2018-01-04

## 2018-01-04 ENCOUNTER — LAB (OUTPATIENT)
Dept: LAB | Facility: HOSPITAL | Age: 68
End: 2018-01-04

## 2018-01-04 DIAGNOSIS — Z12.5 SPECIAL SCREENING FOR MALIGNANT NEOPLASM OF PROSTATE: ICD-10-CM

## 2018-01-04 DIAGNOSIS — Z00.00 ROUTINE GENERAL MEDICAL EXAMINATION AT A HEALTH CARE FACILITY: ICD-10-CM

## 2018-01-04 DIAGNOSIS — E78.2 MIXED HYPERLIPIDEMIA: ICD-10-CM

## 2018-01-04 DIAGNOSIS — M54.50 ACUTE LOW BACK PAIN WITHOUT SCIATICA, UNSPECIFIED BACK PAIN LATERALITY: Primary | ICD-10-CM

## 2018-01-04 DIAGNOSIS — Z99.2 DIALYSIS PATIENT (HCC): ICD-10-CM

## 2018-01-04 DIAGNOSIS — N17.9 ACUTE RENAL FAILURE, UNSPECIFIED ACUTE RENAL FAILURE TYPE (HCC): ICD-10-CM

## 2018-01-04 DIAGNOSIS — Z00.00 ROUTINE GENERAL MEDICAL EXAMINATION AT A HEALTH CARE FACILITY: Primary | ICD-10-CM

## 2018-01-04 DIAGNOSIS — M54.50 ACUTE LOW BACK PAIN WITHOUT SCIATICA, UNSPECIFIED BACK PAIN LATERALITY: ICD-10-CM

## 2018-01-04 DIAGNOSIS — Z12.5 SPECIAL SCREENING, PROSTATE CANCER: Primary | ICD-10-CM

## 2018-01-04 DIAGNOSIS — Z12.5 SPECIAL SCREENING, PROSTATE CANCER: ICD-10-CM

## 2018-01-04 LAB
ALBUMIN SERPL-MCNC: 3.1 G/DL (ref 3.5–5.2)
ALP SERPL-CCNC: 342 U/L (ref 40–129)
ALT SERPL W P-5'-P-CCNC: 121 U/L (ref 5–41)
AST SERPL-CCNC: 64 U/L (ref 5–40)
BILIRUB CONJ SERPL-MCNC: 0.7 MG/DL (ref 0.2–0.3)
BILIRUB INDIRECT SERPL-MCNC: 0.8 MG/DL
BILIRUB SERPL-MCNC: 1.5 MG/DL (ref 0.2–1.2)
CHOLEST SERPL-MCNC: 263 MG/DL (ref 0–200)
CK SERPL-CCNC: 15 U/L (ref 36–170)
HDLC SERPL-MCNC: 79 MG/DL (ref 40–60)
LDLC SERPL CALC-MCNC: 162 MG/DL (ref 0–100)
LDLC/HDLC SERPL: 2.05 {RATIO}
PROT SERPL-MCNC: 6.1 G/DL (ref 6–8.5)
PSA SERPL-MCNC: 0.88 NG/ML (ref 0–4)
TRIGL SERPL-MCNC: 109 MG/DL (ref 0–150)
VLDLC SERPL-MCNC: 21.8 MG/DL (ref 8–32)

## 2018-01-04 PROCEDURE — 84153 ASSAY OF PSA TOTAL: CPT

## 2018-01-04 PROCEDURE — 72100 X-RAY EXAM L-S SPINE 2/3 VWS: CPT

## 2018-01-04 PROCEDURE — 36415 COLL VENOUS BLD VENIPUNCTURE: CPT

## 2018-01-04 PROCEDURE — 82550 ASSAY OF CK (CPK): CPT

## 2018-01-04 PROCEDURE — 80061 LIPID PANEL: CPT

## 2018-01-04 PROCEDURE — 84154 ASSAY OF PSA FREE: CPT

## 2018-01-04 PROCEDURE — 80076 HEPATIC FUNCTION PANEL: CPT

## 2018-01-04 PROCEDURE — G0103 PSA SCREENING: HCPCS

## 2018-01-05 LAB
PSA FREE MFR SERPL: 26.7 %
PSA FREE SERPL-MCNC: 0.24 NG/ML
PSA SERPL-MCNC: 0.9 NG/ML (ref 0–4)

## 2018-01-08 ENCOUNTER — TRANSCRIBE ORDERS (OUTPATIENT)
Dept: ADMINISTRATIVE | Facility: HOSPITAL | Age: 68
End: 2018-01-08

## 2018-01-08 DIAGNOSIS — S32.028D OTHER FRACTURE OF SECOND LUMBAR VERTEBRA, SUBSEQUENT ENCOUNTER FOR FRACTURE WITH ROUTINE HEALING: Primary | ICD-10-CM

## 2018-01-11 ENCOUNTER — HOSPITAL ENCOUNTER (OUTPATIENT)
Dept: CT IMAGING | Facility: HOSPITAL | Age: 68
Discharge: HOME | End: 2018-01-11
Attending: INTERNAL MEDICINE | Admitting: INTERNAL MEDICINE

## 2018-01-11 DIAGNOSIS — S32.028D OTHER FRACTURE OF SECOND LUMBAR VERTEBRA, SUBSEQUENT ENCOUNTER FOR FRACTURE WITH ROUTINE HEALING: ICD-10-CM

## 2018-01-11 PROCEDURE — 72131 CT LUMBAR SPINE W/O DYE: CPT

## 2018-01-12 ENCOUNTER — OFFICE VISIT (OUTPATIENT)
Dept: CARDIOLOGY | Facility: CLINIC | Age: 68
End: 2018-01-12

## 2018-01-12 VITALS
HEART RATE: 98 BPM | BODY MASS INDEX: 25.03 KG/M2 | HEIGHT: 74 IN | SYSTOLIC BLOOD PRESSURE: 140 MMHG | DIASTOLIC BLOOD PRESSURE: 70 MMHG | WEIGHT: 195 LBS

## 2018-01-12 DIAGNOSIS — I10 ESSENTIAL HYPERTENSION: ICD-10-CM

## 2018-01-12 DIAGNOSIS — E78.5 HYPERLIPIDEMIA, UNSPECIFIED HYPERLIPIDEMIA TYPE: ICD-10-CM

## 2018-01-12 DIAGNOSIS — I70.90 ARTERIOSCLEROTIC VASCULAR DISEASE: Primary | ICD-10-CM

## 2018-01-12 DIAGNOSIS — R74.8 ELEVATED LIVER ENZYMES: ICD-10-CM

## 2018-01-12 PROCEDURE — 99214 OFFICE O/P EST MOD 30 MIN: CPT | Performed by: INTERNAL MEDICINE

## 2018-01-12 PROCEDURE — 93000 ELECTROCARDIOGRAM COMPLETE: CPT | Performed by: INTERNAL MEDICINE

## 2018-01-12 RX ORDER — ASPIRIN 81 MG/1
1 TABLET ORAL DAILY
Refills: 4 | COMMUNITY
Start: 2017-12-27 | End: 2020-08-14

## 2018-01-12 RX ORDER — HYDROCODONE BITARTRATE AND ACETAMINOPHEN 5; 325 MG/1; MG/1
TABLET ORAL AS NEEDED
COMMUNITY
Start: 2018-01-11 | End: 2018-08-06 | Stop reason: ALTCHOICE

## 2018-01-12 NOTE — PROGRESS NOTES
Date of Office Visit: 2018  Encounter Provider: Windy Wu MD  Place of Service: [facility] CARDIOLOGY  Patient Name: Hu Burgess  :1950      Patient ID:  Hu Burgess is a 67 y.o. male is here for  followup for CAD.         History of Present Illness    He had an abnormal stress study showing inferior ischemia at [facility]. Because of  his multiple cardiovascular risks and some dyspnea, he had coronary angiography done  2009 showing moderate disease of the left circumflex vessel and the right coronary  artery. The left anterior descending artery and left main coronary arteries were normal.      He has known hypertension but now his blood pressures controlled without medication      He stopped smoking in 2016      He had vascular screening done 2010. The abdominal aorta was normal. He has moderate  bilateral and carotid intimal thickening and normal bilateral ankle-brachial indices.    He had vascular screening done in  it was normal.    He was admitted to the hospital 2017 with acute renal failure, acute hepatic failure and rhabdomyolysis.  He felt like the rhabdomyolysis is what drove this.  The rhabdomyolysis no may have been due to a combination of Crestor and the flu shot.  He is now on hemodialysis under the care of Dr. shirley.  He had laboratory values done 2018 showing , HDL 79, , AST 64, , total bili 1.5.    He is still weak and fatigued.  He has lumbar muscle aching.  He has low back pain and was found to have to compression fractures and L2 and L3.  He's now on [place].  He saw Dr. Currie for this.  He doesn't feels heart racing or skipping.  He's had no chest pain or pressure.  His energy level is low.  He has no difficulty breathing.  He has no abdominal pain.  He did have an echocardiogram done 2017 which was normal.  He also had renal duplex studies done which  showed normal renal size.    Past Medical History:   Diagnosis Date   • Arteriosclerotic cardiovascular disease    • History of transfusion    • Hyperlipidemia    • Hypertension          Past Surgical History:   Procedure Laterality Date   • INSERTION HEMODIALYSIS CATHETER Right 11/14/2017    Procedure: PALINDRONE CATHETERE PLACEMENT;  Surgeon: Gareth Sánchez MD;  Location: [facility];  Service:        No current outpatient prescriptions on file prior to visit.     No current facility-administered medications on file prior to visit.        Social History     Social History   • Marital status:      Spouse name: N/A   • Number of children: N/A   • Years of education: N/A     Occupational History   • Not on file.     Social History Main Topics   • Smoking status: Former Smoker     Quit date: 11/6/2016   • Smokeless tobacco: Never Used      Comment: caffeine use   • Alcohol use 0.6 oz/week     1 Cans of beer per week      Comment: social drinker   • Drug use: No   • Sexual activity: Defer     Other Topics Concern   • Not on file     Social History Narrative           Review of Systems   Constitution: Negative.   HENT: Negative for congestion.    Eyes: Negative for vision loss in left eye and vision loss in right eye.   Respiratory: Negative.  Negative for cough, hemoptysis, shortness of breath, sleep disturbances due to breathing, snoring, sputum production and wheezing.    Endocrine: Negative.    Hematologic/Lymphatic: Negative.    Skin: Negative for poor wound healing and rash.   Musculoskeletal: Negative for falls, gout, muscle cramps and myalgias.   Gastrointestinal: Negative for abdominal pain, diarrhea, dysphagia, hematemesis, melena, nausea and vomiting.   Neurological: Negative for excessive daytime sleepiness, dizziness, headaches, light-headedness, loss of balance, seizures and vertigo.   Psychiatric/Behavioral: Negative for depression and substance abuse. The patient is not nervous/anxious.   "      Procedures    ECG 12 Lead  Date/Time: 1/12/2018 10:43 AM  Performed by: GUSTAVO BRANDON  Authorized by: GUSTAVO BRANDON   Comparison: compared with previous ECG   Similar to previous ECG  Rhythm: sinus rhythm  Clinical impression: normal ECG               Objective:      Vitals:    01/12/18 1032   BP: 140/70   BP Location: Right arm   Patient Position: Sitting   Pulse: 98   Weight: 88.5 kg (195 lb)   Height: 188 cm (74\")     Body mass index is 25.04 kg/(m^2).    Physical Exam   Constitutional: He is oriented to person, place, and time. He appears well-developed and well-nourished. No distress.   HENT:   Head: Normocephalic and atraumatic.   Eyes: Conjunctivae are normal. No scleral icterus.   Neck: Neck supple. No JVD present. Carotid bruit is not present. No thyromegaly present.   Cardiovascular: Normal rate, regular rhythm, S1 normal, S2 normal, normal heart sounds and intact distal pulses.   No extrasystoles are present. PMI is not displaced.  Exam reveals no gallop.    No murmur heard.  Pulses:       Carotid pulses are 2+ on the right side, and 2+ on the left side.       Radial pulses are 2+ on the right side, and 2+ on the left side.        Dorsalis pedis pulses are 2+ on the right side, and 2+ on the left side.        Posterior tibial pulses are 2+ on the right side, and 2+ on the left side.   Pulmonary/Chest: Effort normal and breath sounds normal. No respiratory distress. He has no wheezes. He has no rhonchi. He has no rales. He exhibits no tenderness.   Abdominal: Soft. Bowel sounds are normal. He exhibits no distension, no abdominal bruit and no mass. There is no tenderness.   Musculoskeletal: He exhibits no edema or deformity.   Lymphadenopathy:     He has no cervical adenopathy.   Neurological: He is alert and oriented to person, place, and time. No cranial nerve deficit.   Skin: Skin is warm and dry. No rash noted. He is not diaphoretic. No cyanosis. No pallor. Nails show no clubbing. "   Psychiatric: He has a normal mood and affect. Judgment normal.   Vitals reviewed.      Lab Review:       Assessment:      Diagnosis Plan   1. Arteriosclerotic vascular disease     2. Hyperlipidemia, unspecified hyperlipidemia type     3. Essential hypertension       1. Coronary artery disease of the left circumflex and right coronary arteries by  angiography, January 2009. He has had an abnormal stress study in the past showing  inferior ischemia. The disease is moderate. Will continue to treat medically. No angina at this time.  2. Hyperlipidemia. Untreated.   3. Tobacco use. Advised to stop smoking.  4. Bilateral carotid intimal thickening.   5.  Status post rhabdomyolysis likely due to the flu shot and Crestor with renal failure and liver failure.  His liver is come back.  He is still on hemodialysis.  6.  Severe reaction to Crestor and a flu shot  Plan:       See back in 6 months, refer to Dr. Samuel for elevated liver enzymes.  I did talk with Pedro Guajardo today.  He is good changes Norco to another pain medication.

## 2018-02-07 ENCOUNTER — OFFICE (OUTPATIENT)
Dept: URBAN - METROPOLITAN AREA OTHER 6 | Facility: OTHER | Age: 68
End: 2018-02-07

## 2018-02-07 ENCOUNTER — TRANSCRIBE ORDERS (OUTPATIENT)
Dept: ADMINISTRATIVE | Facility: HOSPITAL | Age: 68
End: 2018-02-07

## 2018-02-07 ENCOUNTER — HOSPITAL ENCOUNTER (OUTPATIENT)
Dept: GENERAL RADIOLOGY | Facility: HOSPITAL | Age: 68
Discharge: HOME | End: 2018-02-07
Attending: ORTHOPAEDIC SURGERY | Admitting: ORTHOPAEDIC SURGERY

## 2018-02-07 VITALS — WEIGHT: 185 LBS

## 2018-02-07 DIAGNOSIS — S32.000G LUMBAR COMPRESSION FRACTURE, WITH DELAYED HEALING, SUBSEQUENT ENCOUNTER: ICD-10-CM

## 2018-02-07 DIAGNOSIS — K30 FUNCTIONAL DYSPEPSIA: ICD-10-CM

## 2018-02-07 DIAGNOSIS — S32.000G LUMBAR COMPRESSION FRACTURE, WITH DELAYED HEALING, SUBSEQUENT ENCOUNTER: Primary | ICD-10-CM

## 2018-02-07 DIAGNOSIS — R63.4 ABNORMAL WEIGHT LOSS: ICD-10-CM

## 2018-02-07 DIAGNOSIS — R63.0 ANOREXIA: ICD-10-CM

## 2018-02-07 PROCEDURE — 99204 OFFICE O/P NEW MOD 45 MIN: CPT

## 2018-02-07 PROCEDURE — 72100 X-RAY EXAM L-S SPINE 2/3 VWS: CPT

## 2018-02-08 ENCOUNTER — TRANSCRIBE ORDERS (OUTPATIENT)
Dept: ADMINISTRATIVE | Facility: HOSPITAL | Age: 68
End: 2018-02-08

## 2018-02-08 ENCOUNTER — ANESTHESIA EVENT (OUTPATIENT)
Dept: PERIOP | Facility: HOSPITAL | Age: 68
End: 2018-02-08

## 2018-02-08 DIAGNOSIS — S22.000A COMPRESSION FRACTURE OF BODY OF THORACIC VERTEBRA (HCC): Primary | ICD-10-CM

## 2018-02-09 ENCOUNTER — ANESTHESIA (OUTPATIENT)
Dept: PERIOP | Facility: HOSPITAL | Age: 68
End: 2018-02-09

## 2018-02-09 ENCOUNTER — PREP FOR SURGERY (OUTPATIENT)
Dept: OTHER | Facility: HOSPITAL | Age: 68
End: 2018-02-09

## 2018-02-09 ENCOUNTER — TRANSCRIBE ORDERS (OUTPATIENT)
Dept: ADMINISTRATIVE | Facility: HOSPITAL | Age: 68
End: 2018-02-09

## 2018-02-09 ENCOUNTER — HOSPITAL ENCOUNTER (OUTPATIENT)
Facility: HOSPITAL | Age: 68
Setting detail: HOSPITAL OUTPATIENT SURGERY
Discharge: HOME | End: 2018-02-09
Attending: INTERNAL MEDICINE | Admitting: INTERNAL MEDICINE

## 2018-02-09 ENCOUNTER — ON CAMPUS - OUTPATIENT (OUTPATIENT)
Dept: URBAN - METROPOLITAN AREA HOSPITAL 28 | Facility: HOSPITAL | Age: 68
End: 2018-02-09

## 2018-02-09 VITALS
DIASTOLIC BLOOD PRESSURE: 78 MMHG | OXYGEN SATURATION: 100 % | TEMPERATURE: 98.1 F | BODY MASS INDEX: 24.2 KG/M2 | WEIGHT: 188.6 LBS | SYSTOLIC BLOOD PRESSURE: 152 MMHG | RESPIRATION RATE: 16 BRPM | HEART RATE: 60 BPM | HEIGHT: 74 IN

## 2018-02-09 DIAGNOSIS — K29.60 OTHER GASTRITIS WITHOUT BLEEDING: ICD-10-CM

## 2018-02-09 DIAGNOSIS — R10.13 DYSPEPSIA: ICD-10-CM

## 2018-02-09 DIAGNOSIS — R63.4 WEIGHT LOSS: Primary | ICD-10-CM

## 2018-02-09 DIAGNOSIS — K21.0 GASTRO-ESOPHAGEAL REFLUX DISEASE WITH ESOPHAGITIS: ICD-10-CM

## 2018-02-09 DIAGNOSIS — K30 FUNCTIONAL DYSPEPSIA: ICD-10-CM

## 2018-02-09 DIAGNOSIS — Q40.2 OTHER SPECIFIED CONGENITAL MALFORMATIONS OF STOMACH: ICD-10-CM

## 2018-02-09 DIAGNOSIS — R63.0 ANOREXIA: ICD-10-CM

## 2018-02-09 DIAGNOSIS — R63.4 ABNORMAL WEIGHT LOSS: ICD-10-CM

## 2018-02-09 PROCEDURE — 25010000002 ONDANSETRON PER 1 MG: Performed by: NURSE ANESTHETIST, CERTIFIED REGISTERED

## 2018-02-09 PROCEDURE — 43239 EGD BIOPSY SINGLE/MULTIPLE: CPT

## 2018-02-09 PROCEDURE — 25010000002 PROPOFOL 10 MG/ML EMULSION: Performed by: NURSE ANESTHETIST, CERTIFIED REGISTERED

## 2018-02-09 RX ORDER — LIDOCAINE HYDROCHLORIDE 20 MG/ML
INJECTION, SOLUTION INFILTRATION; PERINEURAL AS NEEDED
Status: DISCONTINUED | OUTPATIENT
Start: 2018-02-09 | End: 2018-02-09 | Stop reason: SURG

## 2018-02-09 RX ORDER — SODIUM CHLORIDE 0.9 % (FLUSH) 0.9 %
1-10 SYRINGE (ML) INJECTION AS NEEDED
Status: DISCONTINUED | OUTPATIENT
Start: 2018-02-09 | End: 2018-02-09 | Stop reason: HOSPADM

## 2018-02-09 RX ORDER — ONDANSETRON 4 MG/1
4 TABLET, ORALLY DISINTEGRATING ORAL EVERY 8 HOURS PRN
COMMUNITY
End: 2019-08-09

## 2018-02-09 RX ORDER — PROPOFOL 10 MG/ML
VIAL (ML) INTRAVENOUS AS NEEDED
Status: DISCONTINUED | OUTPATIENT
Start: 2018-02-09 | End: 2018-02-09 | Stop reason: SURG

## 2018-02-09 RX ORDER — SODIUM CHLORIDE, SODIUM LACTATE, POTASSIUM CHLORIDE, CALCIUM CHLORIDE 600; 310; 30; 20 MG/100ML; MG/100ML; MG/100ML; MG/100ML
9 INJECTION, SOLUTION INTRAVENOUS CONTINUOUS
Status: DISCONTINUED | OUTPATIENT
Start: 2018-02-09 | End: 2018-02-09 | Stop reason: HOSPADM

## 2018-02-09 RX ORDER — LIDOCAINE HYDROCHLORIDE 10 MG/ML
0.5 INJECTION, SOLUTION EPIDURAL; INFILTRATION; INTRACAUDAL; PERINEURAL ONCE AS NEEDED
Status: COMPLETED | OUTPATIENT
Start: 2018-02-09 | End: 2018-02-09

## 2018-02-09 RX ORDER — SODIUM CHLORIDE 9 MG/ML
40 INJECTION, SOLUTION INTRAVENOUS AS NEEDED
Status: DISCONTINUED | OUTPATIENT
Start: 2018-02-09 | End: 2018-02-09 | Stop reason: HOSPADM

## 2018-02-09 RX ORDER — LIDOCAINE HYDROCHLORIDE 10 MG/ML
0.5 INJECTION, SOLUTION EPIDURAL; INFILTRATION; INTRACAUDAL; PERINEURAL ONCE AS NEEDED
Status: DISCONTINUED | OUTPATIENT
Start: 2018-02-09 | End: 2018-02-09 | Stop reason: HOSPADM

## 2018-02-09 RX ORDER — MAGNESIUM HYDROXIDE 1200 MG/15ML
LIQUID ORAL AS NEEDED
Status: DISCONTINUED | OUTPATIENT
Start: 2018-02-09 | End: 2018-02-09 | Stop reason: HOSPADM

## 2018-02-09 RX ORDER — FAMOTIDINE 10 MG/ML
20 INJECTION, SOLUTION INTRAVENOUS
Status: DISCONTINUED | OUTPATIENT
Start: 2018-02-09 | End: 2018-02-09 | Stop reason: HOSPADM

## 2018-02-09 RX ORDER — ONDANSETRON 2 MG/ML
4 INJECTION INTRAMUSCULAR; INTRAVENOUS ONCE AS NEEDED
Status: COMPLETED | OUTPATIENT
Start: 2018-02-09 | End: 2018-02-09

## 2018-02-09 RX ADMIN — FAMOTIDINE 20 MG: 10 INJECTION INTRAVENOUS at 07:52

## 2018-02-09 RX ADMIN — ONDANSETRON 4 MG: 2 INJECTION, SOLUTION INTRAMUSCULAR; INTRAVENOUS at 07:52

## 2018-02-09 RX ADMIN — SODIUM CHLORIDE, POTASSIUM CHLORIDE, SODIUM LACTATE AND CALCIUM CHLORIDE: 600; 310; 30; 20 INJECTION, SOLUTION INTRAVENOUS at 08:18

## 2018-02-09 RX ADMIN — LIDOCAINE HYDROCHLORIDE 0.5 ML: 10 INJECTION, SOLUTION EPIDURAL; INFILTRATION; INTRACAUDAL; PERINEURAL at 07:45

## 2018-02-09 RX ADMIN — SODIUM CHLORIDE, POTASSIUM CHLORIDE, SODIUM LACTATE AND CALCIUM CHLORIDE 9 ML/HR: 600; 310; 30; 20 INJECTION, SOLUTION INTRAVENOUS at 07:45

## 2018-02-09 RX ADMIN — PROPOFOL 50 MG: 10 INJECTION, EMULSION INTRAVENOUS at 08:25

## 2018-02-09 RX ADMIN — PROPOFOL 100 MG: 10 INJECTION, EMULSION INTRAVENOUS at 08:23

## 2018-02-09 RX ADMIN — LIDOCAINE HYDROCHLORIDE 100 MG: 20 INJECTION, SOLUTION INFILTRATION; PERINEURAL at 08:23

## 2018-02-09 NOTE — ANESTHESIA PREPROCEDURE EVALUATION
Anesthesia Evaluation     Patient summary reviewed and Nursing notes reviewed   NPO Solid Status: > 8 hours  NPO Liquid Status: > 8 hours           Airway   Mallampati: II  TM distance: >3 FB  Neck ROM: full  no difficulty expected  Dental      Pulmonary - normal exam    breath sounds clear to auscultation  (+) a smoker (quit smoking 1.5 years ago) Former,   Cardiovascular - normal exam    Rhythm: regular  Rate: normal    (+) hypertension, hyperlipidemia      Neuro/Psych  GI/Hepatic/Renal/Endo    (+)  liver disease, renal disease dialysis,     ROS Comment: Liver and kidney failure r/t crestor    Musculoskeletal     (+) back pain,   Abdominal  - normal exam   Substance History   (+) alcohol use (occ),      OB/GYN          Other                        Anesthesia Plan    ASA 3     MAC     intravenous induction   Anesthetic plan and risks discussed with patient.

## 2018-02-09 NOTE — PLAN OF CARE
Problem: GI Endoscopy (Adult)  Goal: Signs and Symptoms of Listed Potential Problems Will be Absent or Manageable (GI Endoscopy)  Outcome: Ongoing (interventions implemented as appropriate)   02/09/18 0703   GI Endoscopy   Problems Assessed (GI Endoscopy) all   Problems Present (GI Endoscopy) none

## 2018-02-09 NOTE — PLAN OF CARE
Problem: Patient Care Overview (Adult)  Goal: Plan of Care Review  Outcome: Ongoing (interventions implemented as appropriate)   02/09/18 0703   Coping/Psychosocial Response Interventions   Plan Of Care Reviewed With patient   Patient Care Overview   Progress improving   Outcome Evaluation   Outcome Summary/Follow up Plan vss, ready for procedure, nauseaus

## 2018-02-09 NOTE — H&P
Patient Care Team:  Aldo Guajardo MD as PCP - General  Aldo Guajardo MD as PCP - Family Medicine    CHIEF COMPLAINT: Dyspepsia, Anorexia, Wt Loss    HISTORY OF PRESENT ILLNESS:    4 weeks of Protonix and no sig help nearly 40 ppounds down      Past Medical History:   Diagnosis Date   • Arteriosclerotic cardiovascular disease    • Back pain    • History of transfusion    • Hyperlipidemia    • Hypertension    • Kidney failure    • Rhabdomyolysis     due to crestor     Past Surgical History:   Procedure Laterality Date   • INSERTION HEMODIALYSIS CATHETER Right 11/14/2017    Procedure: PALINDRONE CATHETERE PLACEMENT;  Surgeon: Gareth Sánchez MD;  Location: [facility];  Service:      Family History   Problem Relation Age of Onset   • Cancer Father      laryngeal cancer     Social History   Substance Use Topics   • Smoking status: Former Smoker     Years: 50.00     Types: Cigarettes     Quit date: 11/6/2016   • Smokeless tobacco: Never Used      Comment: caffeine use   • Alcohol use 0.6 oz/week     1 Cans of beer per week      Comment: socially     Prescriptions Prior to Admission   Medication Sig Dispense Refill Last Dose   • B Complex-C-Folic Acid (RENAL VITAMIN PO) Take 1 tablet/day by mouth Daily.   2/7/2018   • ondansetron ODT (ZOFRAN-ODT) 4 MG disintegrating tablet Take 4 mg by mouth Every 8 (Eight) Hours As Needed for Nausea or Vomiting.   2/8/2018   • PANTOPRAZOLE SODIUM PO Take 40 mg by mouth Daily.   2/7/2018   • aspirin 81 MG EC tablet Take 1 tablet by mouth Daily.  4 2/7/2018   • HYDROcodone-acetaminophen (NORCO) 5-325 MG per tablet As Needed.   2/8/2018     Allergies:  Statins    REVIEW OF SYSTEMS:  Please see the above history of present illness for pertinent positives and negatives.  The remainder of the patient's systems have been reviewed and are negative.     Vital Signs  Temp:  [97.9 °F (36.6 °C)] 97.9 °F (36.6 °C)  Heart Rate:  [68] 68  Resp:  [18] 18  BP: (137)/(77)  "137/77    Flowsheet Rows         First Filed Value    Admission Height  188 cm (74.02\") Documented at 02/08/2018 1135    Admission Weight  85.5 kg (188 lb 9.6 oz) Documented at 02/09/2018 0703           Physical Exam:  Physical Exam   Constitutional: Patient appears well-developed and well-nourished and in no acute distress   HEENT:   Head: Normocephalic and atraumatic.   Eyes:  Pupils are equal, round, and reactive to light. EOM are intact. Sclera are anicteric and non-injected.  Mouth and Throat: Patient has moist mucous membranes. Oropharynx is clear of any erythema or exudate.     Neck: Neck supple. No JVD present. No thyromegaly present. No lymphadenopathy present.  Cardiovascular: Regular rate, regular rhythm, S1 normal and S2 normal.  Exam reveals no gallop and no friction rub.  No murmur heard.  Pulmonary/Chest: Lungs are clear to auscultation bilaterally. No respiratory distress. No wheezes. No rhonchi. No rales.   Abdominal: Soft. Bowel sounds are normal. No distension and no mass. There is no hepatosplenomegaly. There is no tenderness.   Musculoskeletal: Normal Muscle tone  Extremities: No edema. Pulses are palpable in all 4 extremities.  Neurological: Patient is alert and oriented to person, place, and time. Cranial nerves II-XII are grossly intact with no focal deficits.  Skin: Skin is warm. No rash noted. Nails show no clubbing.  No cyanosis or erythema.     Results Review:    I reviewed the patient's new clinical results.  Lab Results (most recent)     None          Imaging Results (most recent)     None        reviewed    ECG/EMG Results (most recent)     None        reviewed    Assessment/Plan     Dyspepsia  Anorexia  Weight Loss    EGD    I discussed the patients findings and my recommendations with patient.     Marcelino Samuel MD  02/09/18  8:19 AM    Time: 10 min prior to procedure.    "

## 2018-02-09 NOTE — ANESTHESIA POSTPROCEDURE EVALUATION
Patient: Hu Burgess    Procedure Summary     Date Anesthesia Start Anesthesia Stop Room / Location    02/09/18 0818 0835  [facility] /  [facility]       Procedure Diagnosis Surgeon Provider    ESOPHAGOGASTRODUODENOSCOPY (N/A Esophagus) Reflux esophagitis; Gastritis; Ectopic gastric mucosa  (K30, R63.4, R63.0) MD Eliza Rodriguez CRNA          Anesthesia Type: MAC  Last vitals  BP   152/78 (02/09/18 0910)   Temp   98.1 °F (36.7 °C) (02/09/18 0839)   Pulse   60 (02/09/18 0910)   Resp   16 (02/09/18 0910)     SpO2   100 % (02/09/18 0910)     Post Anesthesia Care and Evaluation    Patient location during evaluation: bedside  Patient participation: complete - patient participated  Level of consciousness: awake and alert  Pain score: 0  Pain management: adequate  Airway patency: patent  Anesthetic complications: No anesthetic complications  PONV Status: none  Cardiovascular status: acceptable  Respiratory status: acceptable  Hydration status: acceptable

## 2018-02-09 NOTE — PLAN OF CARE
Problem: Patient Care Overview (Adult)  Goal: Plan of Care Review  Outcome: Outcome(s) achieved Date Met: 02/09/18 02/09/18 0852   Coping/Psychosocial Response Interventions   Plan Of Care Reviewed With patient;ruth   Patient Care Overview   Progress improving   Outcome Evaluation   Outcome Summary/Follow up Plan VSS, NO C/O, TAKING PO, READY FOR D/C HOME       Problem: GI Endoscopy (Adult)  Goal: Signs and Symptoms of Listed Potential Problems Will be Absent or Manageable (GI Endoscopy)  Outcome: Outcome(s) achieved Date Met: 02/09/18

## 2018-02-09 NOTE — BRIEF OP NOTE
ESOPHAGOGASTRODUODENOSCOPY  Progress Note    Hu Burgess  2/9/2018    Pre-op Diagnosis:   K30, R63.4, R63.0       Post-Op Diagnosis Codes:     * Reflux esophagitis [K21.0]     * Gastritis [K29.70]     * Ectopic gastric mucosa [Q40.2]    Procedure/CPT® Codes:      Procedure(s):  ESOPHAGOGASTRODUODENOSCOPY    Surgeon(s):  Marcelino Samuel MD    Anesthesia: Monitor Anesthesia Care    Staff:   Circulator: Madhuri Parra RN  Scrub Person: Lisbet Kimball    Estimated Blood Loss: none    Urine Voided: * No values recorded between 2/9/2018  8:16 AM and 2/9/2018  8:32 AM *    Specimens:                  ID Type Source Tests Collected by Time Destination   A : GASTRIC BIOPSY Tissue Gastric, Body TISSUE EXAM Marcelino Samuel MD 2/9/2018 0829          Drains:           Findings: Normal Duodenum  Mild diffuse gastritis-Biopsy  Mild Reflux esophagitis  Gastric rest Prox esophagus        Complications: none      Marcelino Samuel MD     Date: 2/9/2018  Time: 8:33 AM

## 2018-02-09 NOTE — OP NOTE
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 A bite block was placed between his incisors. Scope was introduced into the oropharynx, advanced under direct visualization into the esophagus, through the distal esophagus. The Z-line was minimally irregular, widely patent, without evidence of ulcer or stricture. Scope was advanced into the stomach and retroflexed revealing diffuse nonspecific changes throughout the gastric body. The fundus was otherwise normal appearing. Scope was de-retroflexed and advanced through the body to the antrum. The prepyloric mucosa again had a diffuse increase in erythema but no ulcers, no evidence of mucosal lesions. Scope was advanced through the pylorus to the duodenal bulb, which was examined and normal, around the angle to 2nd and 3rd portions of the duodenum and to the ligament of Treitz. The ampulla was normal appearing. The mucosa was normal appearing. Scope was withdrawn back into the antrum. Biopsies were taken of the gastritis. Scope was withdrawn in the distal esophagus. No  biopsies were taken of the mild reflux esophagitis. As scope was withdrawn, there was an incidental gastric rest noted in the proximal esophagus.  The scope was taken from the patient. He tolerated procedure very well.          Findings: Normal Duodenum  Mild diffuse gastritis-Biopsy  Mild Reflux esophagitis  Gastric rest Prox esophagus        Complications: none    Recommendations: Await biopsy and schedule HIDA as well as a GES      Marcelino Samuel MD     Date: 2/9/2018  Time: 8:35 AM

## 2018-02-14 ENCOUNTER — HOSPITAL ENCOUNTER (OUTPATIENT)
Dept: NUCLEAR MEDICINE | Facility: HOSPITAL | Age: 68
Discharge: HOME | End: 2018-02-14
Attending: INTERNAL MEDICINE

## 2018-02-14 DIAGNOSIS — R63.4 WEIGHT LOSS: ICD-10-CM

## 2018-02-14 DIAGNOSIS — R10.13 DYSPEPSIA: ICD-10-CM

## 2018-02-14 LAB
LAB AP CASE REPORT: NORMAL
LAB AP CLINICAL INFORMATION: NORMAL
Lab: NORMAL
PATH REPORT.FINAL DX SPEC: NORMAL

## 2018-02-14 PROCEDURE — 0 TECHNETIUM SULFUR COLLOID: Performed by: INTERNAL MEDICINE

## 2018-02-14 PROCEDURE — 0 TECHNETIUM SULFUR COLLOID

## 2018-02-14 PROCEDURE — A9541 TC99M SULFUR COLLOID: HCPCS

## 2018-02-14 PROCEDURE — 78264 GASTRIC EMPTYING IMG STUDY: CPT

## 2018-02-14 PROCEDURE — A9541 TC99M SULFUR COLLOID: HCPCS | Performed by: INTERNAL MEDICINE

## 2018-02-14 RX ADMIN — TECHNETIUM TC 99M SULFUR COLLOID 1 DOSE: KIT at 07:10

## 2018-02-15 ENCOUNTER — HOSPITAL ENCOUNTER (OUTPATIENT)
Dept: NUCLEAR MEDICINE | Facility: HOSPITAL | Age: 68
Discharge: HOME | End: 2018-02-15
Attending: INTERNAL MEDICINE

## 2018-02-15 DIAGNOSIS — R63.4 WEIGHT LOSS: ICD-10-CM

## 2018-02-15 DIAGNOSIS — R10.13 DYSPEPSIA: ICD-10-CM

## 2018-02-15 PROCEDURE — 0 TECHNETIUM TC 99M MEBROFENIN KIT: Performed by: INTERNAL MEDICINE

## 2018-02-15 PROCEDURE — A9537 TC99M MEBROFENIN: HCPCS | Performed by: INTERNAL MEDICINE

## 2018-02-15 PROCEDURE — 25010000002 SINCALIDE PER 5 MCG: Performed by: INTERNAL MEDICINE

## 2018-02-15 PROCEDURE — 78227 HEPATOBIL SYST IMAGE W/DRUG: CPT

## 2018-02-15 RX ORDER — KIT FOR THE PREPARATION OF TECHNETIUM TC 99M MEBROFENIN 45 MG/10ML
1 INJECTION, POWDER, LYOPHILIZED, FOR SOLUTION INTRAVENOUS
Status: COMPLETED | OUTPATIENT
Start: 2018-02-15 | End: 2018-02-15

## 2018-02-15 RX ADMIN — MEBROFENIN 1 DOSE: 45 INJECTION, POWDER, LYOPHILIZED, FOR SOLUTION INTRAVENOUS at 09:46

## 2018-02-15 RX ADMIN — SINCALIDE 1.7 MCG: 5 INJECTION, POWDER, LYOPHILIZED, FOR SOLUTION INTRAVENOUS at 11:10

## 2018-02-20 ENCOUNTER — HOSPITAL ENCOUNTER (OUTPATIENT)
Dept: BONE DENSITY | Facility: HOSPITAL | Age: 68
Discharge: HOME | End: 2018-02-20
Attending: ORTHOPAEDIC SURGERY | Admitting: ORTHOPAEDIC SURGERY

## 2018-02-20 ENCOUNTER — APPOINTMENT (OUTPATIENT)
Dept: BONE DENSITY | Facility: HOSPITAL | Age: 68
End: 2018-02-20
Attending: ORTHOPAEDIC SURGERY

## 2018-02-20 DIAGNOSIS — S22.000A COMPRESSION FRACTURE OF BODY OF THORACIC VERTEBRA (HCC): ICD-10-CM

## 2018-02-20 PROCEDURE — 77080 DXA BONE DENSITY AXIAL: CPT

## 2018-02-21 ENCOUNTER — TRANSCRIBE ORDERS (OUTPATIENT)
Dept: ADMINISTRATIVE | Facility: HOSPITAL | Age: 68
End: 2018-02-21

## 2018-02-21 DIAGNOSIS — K30 MILD DIETARY INDIGESTION: Primary | ICD-10-CM

## 2018-03-08 ENCOUNTER — HOSPITAL ENCOUNTER (OUTPATIENT)
Dept: NUCLEAR MEDICINE | Facility: HOSPITAL | Age: 68
Discharge: HOME | End: 2018-03-08
Attending: INTERNAL MEDICINE

## 2018-03-08 DIAGNOSIS — K30 MILD DIETARY INDIGESTION: ICD-10-CM

## 2018-03-08 PROCEDURE — 0 TECHNETIUM SULFUR COLLOID: Performed by: INTERNAL MEDICINE

## 2018-03-08 PROCEDURE — A9541 TC99M SULFUR COLLOID: HCPCS | Performed by: INTERNAL MEDICINE

## 2018-03-08 PROCEDURE — 78264 GASTRIC EMPTYING IMG STUDY: CPT

## 2018-03-08 RX ADMIN — TECHNETIUM TC 99M SULFUR COLLOID 1 DOSE: KIT at 07:52

## 2018-05-07 ENCOUNTER — TRANSCRIBE ORDERS (OUTPATIENT)
Dept: ADMINISTRATIVE | Facility: HOSPITAL | Age: 68
End: 2018-05-07

## 2018-05-07 ENCOUNTER — LAB (OUTPATIENT)
Dept: LAB | Facility: HOSPITAL | Age: 68
End: 2018-05-07
Attending: INTERNAL MEDICINE

## 2018-05-07 DIAGNOSIS — M62.82 SECONDARY RHABDOMYOLYSIS: ICD-10-CM

## 2018-05-07 DIAGNOSIS — N17.9 ACUTE RENAL FAILURE, UNSPECIFIED ACUTE RENAL FAILURE TYPE (HCC): ICD-10-CM

## 2018-05-07 DIAGNOSIS — D63.1 ANEMIA OF RENAL DISEASE: ICD-10-CM

## 2018-05-07 DIAGNOSIS — N18.9 ANEMIA OF RENAL DISEASE: ICD-10-CM

## 2018-05-07 DIAGNOSIS — N17.9 ACUTE RENAL FAILURE, UNSPECIFIED ACUTE RENAL FAILURE TYPE (HCC): Primary | ICD-10-CM

## 2018-05-07 LAB
ALBUMIN SERPL-MCNC: 3.3 G/DL (ref 3.5–5.2)
ALBUMIN/GLOB SERPL: 1 G/DL
ALP SERPL-CCNC: 190 U/L (ref 40–129)
ALT SERPL W P-5'-P-CCNC: 22 U/L (ref 5–41)
ANION GAP SERPL CALCULATED.3IONS-SCNC: 8.7 MMOL/L
AST SERPL-CCNC: 45 U/L (ref 5–40)
BILIRUB SERPL-MCNC: 0.8 MG/DL (ref 0.2–1.2)
BILIRUB UR QL STRIP: NEGATIVE
BUN BLD-MCNC: 21 MG/DL (ref 8–23)
BUN/CREAT SERPL: 8.9 (ref 7–25)
CALCIUM SPEC-SCNC: 8.8 MG/DL (ref 8.8–10.5)
CHLORIDE SERPL-SCNC: 107 MMOL/L (ref 98–107)
CLARITY UR: CLEAR
CO2 SERPL-SCNC: 26.3 MMOL/L (ref 22–29)
COLOR UR: YELLOW
CREAT BLD-MCNC: 2.35 MG/DL (ref 0.76–1.27)
DEPRECATED RDW RBC AUTO: 53.2 FL (ref 37–54)
ERYTHROCYTE [DISTWIDTH] IN BLOOD BY AUTOMATED COUNT: 14.3 % (ref 11.5–14.5)
GFR SERPL CREATININE-BSD FRML MDRD: 28 ML/MIN/1.73
GLOBULIN UR ELPH-MCNC: 3.3 GM/DL
GLUCOSE BLD-MCNC: 106 MG/DL (ref 65–99)
GLUCOSE UR STRIP-MCNC: NEGATIVE MG/DL
HCT VFR BLD AUTO: 35.8 % (ref 42–52)
HGB BLD-MCNC: 12 G/DL (ref 14–18)
HGB UR QL STRIP.AUTO: NEGATIVE
KETONES UR QL STRIP: NEGATIVE
LEUKOCYTE ESTERASE UR QL STRIP.AUTO: NEGATIVE
MCH RBC QN AUTO: 33.9 PG (ref 27–31)
MCHC RBC AUTO-ENTMCNC: 33.5 G/DL (ref 31–37)
MCV RBC AUTO: 101.1 FL (ref 80–94)
NITRITE UR QL STRIP: NEGATIVE
PH UR STRIP.AUTO: 7 [PH] (ref 4.5–8)
PLATELET # BLD AUTO: 149 10*3/MM3 (ref 140–500)
PMV BLD AUTO: 11.7 FL (ref 7.4–10.4)
POTASSIUM BLD-SCNC: 4.2 MMOL/L (ref 3.5–5.2)
PROT SERPL-MCNC: 6.6 G/DL (ref 6–8.5)
PROT UR QL STRIP: NEGATIVE
RBC # BLD AUTO: 3.54 10*6/MM3 (ref 4.7–6.1)
SODIUM BLD-SCNC: 142 MMOL/L (ref 136–145)
SP GR UR STRIP: 1.01 (ref 1–1.03)
UROBILINOGEN UR QL STRIP: ABNORMAL
WBC NRBC COR # BLD: 6.07 10*3/MM3 (ref 4.8–10.8)

## 2018-05-07 PROCEDURE — 36415 COLL VENOUS BLD VENIPUNCTURE: CPT

## 2018-05-07 PROCEDURE — 81003 URINALYSIS AUTO W/O SCOPE: CPT

## 2018-05-07 PROCEDURE — 85027 COMPLETE CBC AUTOMATED: CPT

## 2018-05-07 PROCEDURE — 80053 COMPREHEN METABOLIC PANEL: CPT

## 2018-06-04 ENCOUNTER — OFFICE VISIT (OUTPATIENT)
Dept: GASTROENTEROLOGY | Facility: CLINIC | Age: 68
End: 2018-06-04

## 2018-06-04 VITALS
WEIGHT: 186.6 LBS | BODY MASS INDEX: 23.95 KG/M2 | DIASTOLIC BLOOD PRESSURE: 78 MMHG | HEIGHT: 74 IN | SYSTOLIC BLOOD PRESSURE: 148 MMHG

## 2018-06-04 DIAGNOSIS — Z12.11 SCREENING FOR COLON CANCER: ICD-10-CM

## 2018-06-04 DIAGNOSIS — K21.00 GASTROESOPHAGEAL REFLUX DISEASE WITH ESOPHAGITIS: Primary | ICD-10-CM

## 2018-06-04 DIAGNOSIS — N18.30 STAGE 3 CHRONIC KIDNEY DISEASE (HCC): ICD-10-CM

## 2018-06-04 PROCEDURE — 99213 OFFICE O/P EST LOW 20 MIN: CPT | Performed by: INTERNAL MEDICINE

## 2018-06-04 RX ORDER — OXYCODONE HYDROCHLORIDE 10 MG/1
10 TABLET ORAL EVERY 12 HOURS PRN
Refills: 0 | COMMUNITY
Start: 2018-05-25 | End: 2018-08-06 | Stop reason: ALTCHOICE

## 2018-06-04 NOTE — PROGRESS NOTES
PATIENT INFORMATION  Hu Burgess       - 1950    CHIEF COMPLAINT  Chief Complaint   Patient presents with   • Follow-up       HISTORY OF PRESENT ILLNESS  Is on his nightly PPI and is on intermittent narc for back pain. Compression fracture x 2. Has undergone Avois NSAIDS for his Kidneys so his chemical gastritis should be better. He is doing well and is off Dialysis. Had Rhabdomyalysis from Statins.  Last colon was  and normal with HP polyps only so recal in       Abdominal Pain   This is a chronic problem. The current episode started more than 1 year ago. The onset quality is gradual. The problem occurs constantly. The problem has been resolved. The patient is experiencing no pain. The quality of the pain is burning. The abdominal pain does not radiate. Associated symptoms include nausea. Nothing aggravates the pain. He has tried proton pump inhibitors for the symptoms. The treatment provided significant relief. Prior diagnostic workup includes upper endoscopy (HIDA sand GES). There is no history of PUD.           REVIEW OF SYSTEMS  Review of Systems   Cardiovascular: Positive for leg swelling.   Gastrointestinal: Positive for abdominal pain and nausea.   Musculoskeletal: Positive for back pain.   Neurological: Positive for weakness.   All other systems reviewed and are negative.        ACTIVE PROBLEMS  Patient Active Problem List    Diagnosis   • Acute kidney injury (nontraumatic) [N17.9]   • Arteriosclerotic vascular disease [I70.90]   • Hyperlipidemia [E78.5]   • Hypertension [I10]         PAST MEDICAL HISTORY  Past Medical History:   Diagnosis Date   • Arteriosclerotic cardiovascular disease    • Back pain    • History of transfusion    • Hyperlipidemia    • Hypertension    • Kidney failure    • Rhabdomyolysis     due to crestor         SURGICAL HISTORY  Past Surgical History:   Procedure Laterality Date   • ENDOSCOPY N/A 2018    Procedure: ESOPHAGOGASTRODUODENOSCOPY;  Surgeon:  "Marcelino Samuel MD;  Location: [facility] OR;  Service:    • INSERTION HEMODIALYSIS CATHETER Right 11/14/2017    Procedure: PALINDRONE CATHETERE PLACEMENT;  Surgeon: Gareth Sánchez MD;  Location: [facility] MAIN OR;  Service:          FAMILY HISTORY  Family History   Problem Relation Age of Onset   • Cancer Father         laryngeal cancer   • Colon cancer Neg Hx    • Colon polyps Neg Hx          SOCIAL HISTORY  Social History     Occupational History   • Not on file.     Social History Main Topics   • Smoking status: Former Smoker     Years: 50.00     Types: Cigarettes     Quit date: 11/6/2016   • Smokeless tobacco: Never Used      Comment: caffeine use   • Alcohol use 0.6 oz/week     1 Cans of beer per week      Comment: socially   • Drug use: No   • Sexual activity: Defer         CURRENT MEDICATIONS    Current Outpatient Prescriptions:   •  aspirin 81 MG EC tablet, Take 1 tablet by mouth Daily., Disp: , Rfl: 4  •  B Complex-C-Folic Acid (RENAL VITAMIN PO), Take 1 tablet/day by mouth Daily., Disp: , Rfl:   •  HYDROcodone-acetaminophen (NORCO) 5-325 MG per tablet, As Needed., Disp: , Rfl:   •  ondansetron ODT (ZOFRAN-ODT) 4 MG disintegrating tablet, Take 4 mg by mouth Every 8 (Eight) Hours As Needed for Nausea or Vomiting., Disp: , Rfl:   •  oxyCODONE (ROXICODONE) 10 MG tablet, Take 10 mg by mouth Every 12 (Twelve) Hours As Needed., Disp: , Rfl: 0  •  PANTOPRAZOLE SODIUM PO, Take 40 mg by mouth Daily., Disp: , Rfl:     ALLERGIES  Statins    VITALS  Vitals:    06/04/18 1539   BP: 148/78   Weight: 84.6 kg (186 lb 9.6 oz)   Height: 188 cm (74.02\")       LAST RESULTS   Lab on 05/07/2018   Component Date Value Ref Range Status   • WBC 05/07/2018 6.07  4.80 - 10.80 10*3/mm3 Final   • RBC 05/07/2018 3.54* 4.70 - 6.10 10*6/mm3 Final   • Hemoglobin 05/07/2018 12.0* 14.0 - 18.0 g/dL Final   • Hematocrit 05/07/2018 35.8* 42.0 - 52.0 % Final   • MCV 05/07/2018 101.1* 80.0 - 94.0 fL Final   • MCH 05/07/2018 33.9* 27.0 - 31.0 " pg Final   • MCHC 05/07/2018 33.5  31.0 - 37.0 g/dL Final   • RDW 05/07/2018 14.3  11.5 - 14.5 % Final   • RDW-SD 05/07/2018 53.2  37.0 - 54.0 fl Final   • MPV 05/07/2018 11.7* 7.4 - 10.4 fL Final   • Platelets 05/07/2018 149  140 - 500 10*3/mm3 Final   • Glucose 05/07/2018 106* 65 - 99 mg/dL Final   • BUN 05/07/2018 21  8 - 23 mg/dL Final   • Creatinine 05/07/2018 2.35* 0.76 - 1.27 mg/dL Final   • Sodium 05/07/2018 142  136 - 145 mmol/L Final   • Potassium 05/07/2018 4.2  3.5 - 5.2 mmol/L Final   • Chloride 05/07/2018 107  98 - 107 mmol/L Final   • CO2 05/07/2018 26.3  22.0 - 29.0 mmol/L Final   • Calcium 05/07/2018 8.8  8.8 - 10.5 mg/dL Final   • Total Protein 05/07/2018 6.6  6.0 - 8.5 g/dL Final   • Albumin 05/07/2018 3.30* 3.50 - 5.20 g/dL Final   • ALT (SGPT) 05/07/2018 22  5 - 41 U/L Final   • AST (SGOT) 05/07/2018 45* 5 - 40 U/L Final   • Alkaline Phosphatase 05/07/2018 190* 40 - 129 U/L Final   • Total Bilirubin 05/07/2018 0.8  0.2 - 1.2 mg/dL Final   • eGFR Non African Amer 05/07/2018 28* >60 mL/min/1.73 Final   • Globulin 05/07/2018 3.3  gm/dL Final   • A/G Ratio 05/07/2018 1.0  g/dL Final   • BUN/Creatinine Ratio 05/07/2018 8.9  7.0 - 25.0 Final   • Anion Gap 05/07/2018 8.7  mmol/L Final   • Color, UA 05/07/2018 Yellow  Yellow, Straw Final   • Appearance, UA 05/07/2018 Clear  Clear Final   • pH, UA 05/07/2018 7.0  4.5 - 8.0 Final   • Specific Gravity, UA 05/07/2018 1.015  1.003 - 1.030 Final   • Glucose, UA 05/07/2018 Negative  Negative Final   • Ketones, UA 05/07/2018 Negative  Negative, 80 mg/dL (3+), >=160 mg/dL (4+) Final   • Bilirubin, UA 05/07/2018 Negative  Negative Final   • Blood, UA 05/07/2018 Negative  Negative Final   • Protein, UA 05/07/2018 Negative  Negative Final   • Leuk Esterase, UA 05/07/2018 Negative  Negative Final   • Nitrite, UA 05/07/2018 Negative  Negative Final   • Urobilinogen, UA 05/07/2018 4.0 E.U./dL* 0.2 - 1.0 E.U./dL Final     No results found.    PHYSICAL EXAM  Physical  Exam   Constitutional: He is oriented to person, place, and time. He appears well-developed and well-nourished.   HENT:   Head: Normocephalic.   Mouth/Throat: Oropharynx is clear and moist.   Eyes: Conjunctivae and EOM are normal. Pupils are equal, round, and reactive to light. No scleral icterus.   Neck: Normal range of motion. Neck supple. No thyromegaly present.   Cardiovascular: Normal rate, regular rhythm, normal heart sounds and intact distal pulses.  Exam reveals no gallop.    No murmur heard.  Pulmonary/Chest: Effort normal and breath sounds normal. He has no wheezes. He has no rales.   Abdominal: Soft. Bowel sounds are normal. He exhibits no shifting dullness, no distension, no fluid wave, no abdominal bruit, no ascites and no mass. There is no hepatosplenomegaly. There is no tenderness. There is no guarding and negative Dennis's sign. Hernia confirmed negative in the ventral area.   Musculoskeletal: Normal range of motion. He exhibits no edema.   Lymphadenopathy:     He has no cervical adenopathy.   Neurological: He is alert and oriented to person, place, and time.   Skin: Skin is warm and dry. No rash noted. He is not diaphoretic. No erythema.   Psychiatric: He has a normal mood and affect. His behavior is normal.       ASSESSMENT  Diagnoses and all orders for this visit:    Gastroesophageal reflux disease with esophagitis    Stage 3 chronic kidney disease    Screening for colon cancer      Put in for 9/2021 for Colonoscopy    Other orders  -     oxyCODONE (ROXICODONE) 10 MG tablet; Take 10 mg by mouth Every 12 (Twelve) Hours As Needed.          PLAN  Return in about 1 year (around 6/4/2019).

## 2018-08-06 ENCOUNTER — OFFICE VISIT (OUTPATIENT)
Dept: CARDIOLOGY | Facility: CLINIC | Age: 68
End: 2018-08-06

## 2018-08-06 VITALS
SYSTOLIC BLOOD PRESSURE: 130 MMHG | HEART RATE: 65 BPM | DIASTOLIC BLOOD PRESSURE: 78 MMHG | HEIGHT: 74 IN | WEIGHT: 183.3 LBS | BODY MASS INDEX: 23.52 KG/M2

## 2018-08-06 DIAGNOSIS — I70.90 ARTERIOSCLEROTIC VASCULAR DISEASE: Primary | ICD-10-CM

## 2018-08-06 DIAGNOSIS — E78.5 HYPERLIPIDEMIA, UNSPECIFIED HYPERLIPIDEMIA TYPE: ICD-10-CM

## 2018-08-06 DIAGNOSIS — I10 ESSENTIAL HYPERTENSION: ICD-10-CM

## 2018-08-06 PROCEDURE — 99214 OFFICE O/P EST MOD 30 MIN: CPT | Performed by: INTERNAL MEDICINE

## 2018-08-06 PROCEDURE — 93000 ELECTROCARDIOGRAM COMPLETE: CPT | Performed by: INTERNAL MEDICINE

## 2018-08-06 RX ORDER — TRAMADOL HYDROCHLORIDE 50 MG/1
1 TABLET ORAL DAILY
COMMUNITY
Start: 2018-07-30 | End: 2021-03-24 | Stop reason: SDUPTHER

## 2018-08-06 NOTE — PROGRESS NOTES
Date of Office Visit: 2018  Encounter Provider: Windy Wu MD  Place of Service: [facility]  Patient Name: Hu Burgess  :1950      Patient ID:  Hu Burgess is a 67 y.o. male is here for  followup for CAD.         History of Present Illness    He had an abnormal stress study showing inferior ischemia at [facility]. Because of  his multiple cardiovascular risks and some dyspnea, he had coronary angiography done  2009 showing moderate disease of the left circumflex vessel and the right coronary  artery. The left anterior descending artery and left main coronary arteries were normal.      He has known hypertension but now his blood pressures controlled without medication      He stopped smoking in 2016      He had vascular screening done 2010. The abdominal aorta was normal. He has moderate  bilateral and carotid intimal thickening and normal bilateral ankle-brachial indices.     He had vascular screening done in  it was normal.     He was admitted to the hospital 2017 with acute renal failure, acute hepatic failure and rhabdomyolysis.  He felt like the rhabdomyolysis is what drove this.  The rhabdomyolysis no may have been due to a combination of Crestor and the flu shot.  He is now on hemodialysis under the care of Dr. shirley.  He had laboratory values done 2018 showing , HDL 79, , AST 64, , total bili 1.5.     He had low back pain and was found to have to compression fractures and L2 and L3.  He saw Dr. Currie for this.  He did have an echocardiogram done 2017 which was normal.  He also had renal duplex studies done which showed normal renal size.    He is now off of hemodialysis as of 2018.  He continues to see Dr. Shirley.  His liver function has gotten better.  His creatinine done 2018 was 1.9.  His liver functions are still normalizing.  He has no chest  pain or pressure.  He's had no difficulty breathing.  He doesn't feels heart racing or skipping.  He's had no dizziness or syncope.    Past Medical History:   Diagnosis Date   • Arteriosclerotic cardiovascular disease    • Back pain    • History of transfusion    • Hyperlipidemia    • Hypertension    • Kidney failure    • Rhabdomyolysis     due to crestor         Past Surgical History:   Procedure Laterality Date   • ENDOSCOPY N/A 2/9/2018    Procedure: ESOPHAGOGASTRODUODENOSCOPY;  Surgeon: Marcelino Samuel MD;  Location: [facility] OR;  Service:    • INSERTION HEMODIALYSIS CATHETER Right 11/14/2017    Procedure: PALINDRONE CATHETERE PLACEMENT;  Surgeon: Gareth Sánchez MD;  Location: [facility] OR;  Service:        Current Outpatient Prescriptions on File Prior to Visit   Medication Sig Dispense Refill   • aspirin 81 MG EC tablet Take 1 tablet by mouth Daily.  4   • PANTOPRAZOLE SODIUM PO Take 40 mg by mouth Daily.     • B Complex-C-Folic Acid (RENAL VITAMIN PO) Take 1 tablet/day by mouth Daily.     • ondansetron ODT (ZOFRAN-ODT) 4 MG disintegrating tablet Take 4 mg by mouth Every 8 (Eight) Hours As Needed for Nausea or Vomiting.     • [DISCONTINUED] HYDROcodone-acetaminophen (NORCO) 5-325 MG per tablet As Needed.     • [DISCONTINUED] oxyCODONE (ROXICODONE) 10 MG tablet Take 10 mg by mouth Every 12 (Twelve) Hours As Needed.  0     No current facility-administered medications on file prior to visit.        Social History     Social History   • Marital status:      Spouse name: N/A   • Number of children: N/A   • Years of education: N/A     Occupational History   • Not on file.     Social History Main Topics   • Smoking status: Former Smoker     Years: 50.00     Types: Cigarettes     Quit date: 11/6/2016   • Smokeless tobacco: Never Used      Comment: caffeine use   • Alcohol use 0.6 oz/week     1 Cans of beer per week      Comment: socially   • Drug use: No   • Sexual activity: Defer     Other Topics  "Concern   • Not on file     Social History Narrative   • No narrative on file           Review of Systems   Constitution: Negative.   HENT: Negative for congestion.    Eyes: Negative for vision loss in left eye and vision loss in right eye.   Respiratory: Negative.  Negative for cough, hemoptysis, shortness of breath, sleep disturbances due to breathing, snoring, sputum production and wheezing.    Endocrine: Negative.    Hematologic/Lymphatic: Negative.    Skin: Negative for poor wound healing and rash.   Musculoskeletal: Negative for falls, gout, muscle cramps and myalgias.   Gastrointestinal: Negative for abdominal pain, diarrhea, dysphagia, hematemesis, melena, nausea and vomiting.   Neurological: Negative for excessive daytime sleepiness, dizziness, headaches, light-headedness, loss of balance, seizures and vertigo.   Psychiatric/Behavioral: Negative for depression and substance abuse. The patient is not nervous/anxious.        Procedures    ECG 12 Lead  Date/Time: 8/6/2018 3:01 PM  Performed by: GUSTAVO BRANDON  Authorized by: GUSTAVO BRANDON   Comparison: compared with previous ECG   Similar to previous ECG  Rhythm: sinus rhythm  Clinical impression: normal ECG                Objective:      Vitals:    08/06/18 1425   BP: 130/78   BP Location: Right arm   Patient Position: Sitting   Pulse: 65   Weight: 83.1 kg (183 lb 4.8 oz)   Height: 188 cm (74\")     Body mass index is 23.53 kg/m².    Physical Exam   Constitutional: He is oriented to person, place, and time. He appears well-developed and well-nourished. No distress.   HENT:   Head: Normocephalic and atraumatic.   Eyes: Conjunctivae are normal. No scleral icterus.   Neck: Neck supple. No JVD present. Carotid bruit is not present. No thyromegaly present.   Cardiovascular: Normal rate, regular rhythm, S1 normal, S2 normal, normal heart sounds and intact distal pulses.   No extrasystoles are present. PMI is not displaced.  Exam reveals no gallop.    No " murmur heard.  Pulses:       Carotid pulses are 2+ on the right side, and 2+ on the left side.       Radial pulses are 2+ on the right side, and 2+ on the left side.        Dorsalis pedis pulses are 2+ on the right side, and 2+ on the left side.        Posterior tibial pulses are 2+ on the right side, and 2+ on the left side.   Pulmonary/Chest: Effort normal and breath sounds normal. No respiratory distress. He has no wheezes. He has no rhonchi. He has no rales. He exhibits no tenderness.   Abdominal: Soft. Bowel sounds are normal. He exhibits no distension, no abdominal bruit and no mass. There is no tenderness.   Musculoskeletal: He exhibits no edema or deformity.   Lymphadenopathy:     He has no cervical adenopathy.   Neurological: He is alert and oriented to person, place, and time. No cranial nerve deficit.   Skin: Skin is warm and dry. No rash noted. He is not diaphoretic. No cyanosis. No pallor. Nails show no clubbing.   Psychiatric: He has a normal mood and affect. Judgment normal.   Vitals reviewed.      Lab Review:       Assessment:      Diagnosis Plan   1. Arteriosclerotic vascular disease     2. Hyperlipidemia, unspecified hyperlipidemia type     3. Essential hypertension       1. Coronary artery disease of the left circumflex and right coronary arteries by  angiography, January 2009. He has had an abnormal stress study in the past showing  inferior ischemia. The disease is moderate. Will continue to treat medically. No angina at this time.  2. Hyperlipidemia. Untreated.   3. Tobacco use. Advised to stop smoking.  4. Bilateral carotid intimal thickening.   5.  Status post rhabdomyolysis likely due to the flu shot and Crestor with renal failure and liver failure.  His liver is better and he is now off of hemodialysis April 2018.  6.  Severe reaction to Crestor and a flu shot     Plan:       See back in 1 year, no changes       Coronary Artery Disease  Assessment  • The patient has no angina    Subjective  - Objective  • Current antiplatelet therapy includes aspirin 81 mg

## 2018-08-08 ENCOUNTER — TRANSCRIBE ORDERS (OUTPATIENT)
Dept: ADMINISTRATIVE | Facility: HOSPITAL | Age: 68
End: 2018-08-08

## 2018-08-08 ENCOUNTER — LAB (OUTPATIENT)
Dept: LAB | Facility: HOSPITAL | Age: 68
End: 2018-08-08
Attending: INTERNAL MEDICINE

## 2018-08-08 DIAGNOSIS — N18.6 ANEMIA IN END-STAGE RENAL DISEASE (HCC): Primary | ICD-10-CM

## 2018-08-08 DIAGNOSIS — D63.1 ANEMIA IN END-STAGE RENAL DISEASE (HCC): Primary | ICD-10-CM

## 2018-08-08 DIAGNOSIS — N18.6 ANEMIA IN END-STAGE RENAL DISEASE (HCC): ICD-10-CM

## 2018-08-08 DIAGNOSIS — D63.1 ANEMIA IN END-STAGE RENAL DISEASE (HCC): ICD-10-CM

## 2018-08-08 LAB
ALBUMIN SERPL-MCNC: 3.8 G/DL (ref 3.5–5.2)
ALBUMIN/GLOB SERPL: 1.3 G/DL
ALP SERPL-CCNC: 188 U/L (ref 40–129)
ALT SERPL W P-5'-P-CCNC: 26 U/L (ref 5–41)
ANION GAP SERPL CALCULATED.3IONS-SCNC: 8.4 MMOL/L
AST SERPL-CCNC: 38 U/L (ref 5–40)
BASOPHILS # BLD AUTO: 0.08 10*3/MM3 (ref 0–0.2)
BASOPHILS NFR BLD AUTO: 1.2 % (ref 0–2)
BILIRUB SERPL-MCNC: 1.2 MG/DL (ref 0.2–1.2)
BILIRUB UR QL STRIP: NEGATIVE
BUN BLD-MCNC: 19 MG/DL (ref 8–23)
BUN/CREAT SERPL: 9.1 (ref 7–25)
CALCIUM SPEC-SCNC: 9.9 MG/DL (ref 8.8–10.5)
CHLORIDE SERPL-SCNC: 102 MMOL/L (ref 98–107)
CLARITY UR: CLEAR
CO2 SERPL-SCNC: 28.6 MMOL/L (ref 22–29)
COLOR UR: YELLOW
CREAT BLD-MCNC: 2.09 MG/DL (ref 0.76–1.27)
DEPRECATED RDW RBC AUTO: 50.3 FL (ref 37–54)
EOSINOPHIL # BLD AUTO: 0.33 10*3/MM3 (ref 0.1–0.3)
EOSINOPHIL NFR BLD AUTO: 5.1 % (ref 0–4)
ERYTHROCYTE [DISTWIDTH] IN BLOOD BY AUTOMATED COUNT: 13.4 % (ref 11.5–14.5)
GFR SERPL CREATININE-BSD FRML MDRD: 32 ML/MIN/1.73
GLOBULIN UR ELPH-MCNC: 3 GM/DL
GLUCOSE BLD-MCNC: 93 MG/DL (ref 65–99)
GLUCOSE UR STRIP-MCNC: NEGATIVE MG/DL
HCT VFR BLD AUTO: 39.9 % (ref 42–52)
HGB BLD-MCNC: 13.5 G/DL (ref 14–18)
HGB UR QL STRIP.AUTO: NEGATIVE
IMM GRANULOCYTES # BLD: 0.01 10*3/MM3 (ref 0–0.03)
IMM GRANULOCYTES NFR BLD: 0.2 % (ref 0–0.5)
KETONES UR QL STRIP: NEGATIVE
LEUKOCYTE ESTERASE UR QL STRIP.AUTO: NEGATIVE
LYMPHOCYTES # BLD AUTO: 2.67 10*3/MM3 (ref 0.6–4.8)
LYMPHOCYTES NFR BLD AUTO: 41.2 % (ref 20–45)
MCH RBC QN AUTO: 34.4 PG (ref 27–31)
MCHC RBC AUTO-ENTMCNC: 33.8 G/DL (ref 31–37)
MCV RBC AUTO: 101.5 FL (ref 80–94)
MONOCYTES # BLD AUTO: 0.74 10*3/MM3 (ref 0–1)
MONOCYTES NFR BLD AUTO: 11.4 % (ref 3–8)
NEUTROPHILS # BLD AUTO: 2.65 10*3/MM3 (ref 1.5–8.3)
NEUTROPHILS NFR BLD AUTO: 40.9 % (ref 45–70)
NITRITE UR QL STRIP: NEGATIVE
NRBC BLD MANUAL-RTO: 0 /100 WBC (ref 0–0)
PH UR STRIP.AUTO: 6.5 [PH] (ref 4.5–8)
PLATELET # BLD AUTO: 180 10*3/MM3 (ref 140–500)
PMV BLD AUTO: 11.7 FL (ref 7.4–10.4)
POTASSIUM BLD-SCNC: 4.3 MMOL/L (ref 3.5–5.2)
PROT SERPL-MCNC: 6.8 G/DL (ref 6–8.5)
PROT UR QL STRIP: NEGATIVE
RBC # BLD AUTO: 3.93 10*6/MM3 (ref 4.7–6.1)
SODIUM BLD-SCNC: 139 MMOL/L (ref 136–145)
SP GR UR STRIP: 1.01 (ref 1–1.03)
UROBILINOGEN UR QL STRIP: NORMAL
WBC NRBC COR # BLD: 6.48 10*3/MM3 (ref 4.8–10.8)

## 2018-08-08 PROCEDURE — 85025 COMPLETE CBC W/AUTO DIFF WBC: CPT

## 2018-08-08 PROCEDURE — 81003 URINALYSIS AUTO W/O SCOPE: CPT

## 2018-08-08 PROCEDURE — 80053 COMPREHEN METABOLIC PANEL: CPT

## 2018-08-08 PROCEDURE — 36415 COLL VENOUS BLD VENIPUNCTURE: CPT

## 2018-12-10 ENCOUNTER — TRANSCRIBE ORDERS (OUTPATIENT)
Dept: ADMINISTRATIVE | Facility: HOSPITAL | Age: 68
End: 2018-12-10

## 2018-12-10 ENCOUNTER — HOSPITAL ENCOUNTER (OUTPATIENT)
Dept: GENERAL RADIOLOGY | Facility: HOSPITAL | Age: 68
Discharge: HOME | End: 2018-12-10
Attending: INTERNAL MEDICINE | Admitting: INTERNAL MEDICINE

## 2018-12-10 DIAGNOSIS — M54.9 CHRONIC BACK PAIN, UNSPECIFIED BACK LOCATION, UNSPECIFIED BACK PAIN LATERALITY: Primary | ICD-10-CM

## 2018-12-10 DIAGNOSIS — M54.9 CHRONIC BACK PAIN, UNSPECIFIED BACK LOCATION, UNSPECIFIED BACK PAIN LATERALITY: ICD-10-CM

## 2018-12-10 DIAGNOSIS — G89.29 CHRONIC BACK PAIN, UNSPECIFIED BACK LOCATION, UNSPECIFIED BACK PAIN LATERALITY: ICD-10-CM

## 2018-12-10 DIAGNOSIS — G89.29 CHRONIC BACK PAIN, UNSPECIFIED BACK LOCATION, UNSPECIFIED BACK PAIN LATERALITY: Primary | ICD-10-CM

## 2018-12-10 PROCEDURE — 72100 X-RAY EXAM L-S SPINE 2/3 VWS: CPT

## 2018-12-19 ENCOUNTER — TRANSCRIBE ORDERS (OUTPATIENT)
Dept: ADMINISTRATIVE | Facility: HOSPITAL | Age: 68
End: 2018-12-19

## 2018-12-19 ENCOUNTER — LAB (OUTPATIENT)
Dept: LAB | Facility: HOSPITAL | Age: 68
End: 2018-12-19
Attending: INTERNAL MEDICINE

## 2018-12-19 DIAGNOSIS — N18.30 CHRONIC KIDNEY DISEASE, STAGE III (MODERATE) (HCC): Primary | ICD-10-CM

## 2018-12-19 DIAGNOSIS — N18.30 CHRONIC KIDNEY DISEASE, STAGE III (MODERATE) (HCC): ICD-10-CM

## 2018-12-19 LAB
ALBUMIN SERPL-MCNC: 3.7 G/DL (ref 3.5–5.2)
ALBUMIN/GLOB SERPL: 1.1 G/DL
ALP SERPL-CCNC: 173 U/L (ref 40–129)
ALT SERPL W P-5'-P-CCNC: 20 U/L (ref 5–41)
ANION GAP SERPL CALCULATED.3IONS-SCNC: 11.4 MMOL/L
AST SERPL-CCNC: 35 U/L (ref 5–40)
BASOPHILS # BLD AUTO: 0.05 10*3/MM3 (ref 0–0.2)
BASOPHILS NFR BLD AUTO: 0.8 % (ref 0–2)
BILIRUB SERPL-MCNC: 1.3 MG/DL (ref 0.2–1.2)
BILIRUB UR QL STRIP: NEGATIVE
BUN BLD-MCNC: 21 MG/DL (ref 8–23)
BUN/CREAT SERPL: 11.1 (ref 7–25)
CALCIUM SPEC-SCNC: 9.1 MG/DL (ref 8.8–10.5)
CHLORIDE SERPL-SCNC: 103 MMOL/L (ref 98–107)
CLARITY UR: CLEAR
CO2 SERPL-SCNC: 24.6 MMOL/L (ref 22–29)
COLOR UR: YELLOW
CREAT BLD-MCNC: 1.9 MG/DL (ref 0.76–1.27)
DEPRECATED RDW RBC AUTO: 48.6 FL (ref 37–54)
EOSINOPHIL # BLD AUTO: 0.26 10*3/MM3 (ref 0.1–0.3)
EOSINOPHIL NFR BLD AUTO: 4.4 % (ref 0–4)
ERYTHROCYTE [DISTWIDTH] IN BLOOD BY AUTOMATED COUNT: 13.4 % (ref 11.5–14.5)
GFR SERPL CREATININE-BSD FRML MDRD: 35 ML/MIN/1.73
GLOBULIN UR ELPH-MCNC: 3.5 GM/DL
GLUCOSE BLD-MCNC: 98 MG/DL (ref 65–99)
GLUCOSE UR STRIP-MCNC: NEGATIVE MG/DL
HCT VFR BLD AUTO: 42 % (ref 42–52)
HGB BLD-MCNC: 14.2 G/DL (ref 14–18)
HGB UR QL STRIP.AUTO: NEGATIVE
IMM GRANULOCYTES # BLD: 0.01 10*3/MM3 (ref 0–0.03)
IMM GRANULOCYTES NFR BLD: 0.2 % (ref 0–0.5)
KETONES UR QL STRIP: NEGATIVE
LEUKOCYTE ESTERASE UR QL STRIP.AUTO: NEGATIVE
LYMPHOCYTES # BLD AUTO: 2.1 10*3/MM3 (ref 0.6–4.8)
LYMPHOCYTES NFR BLD AUTO: 35.5 % (ref 20–45)
MCH RBC QN AUTO: 33.2 PG (ref 27–31)
MCHC RBC AUTO-ENTMCNC: 33.8 G/DL (ref 31–37)
MCV RBC AUTO: 98.1 FL (ref 80–94)
MONOCYTES # BLD AUTO: 0.63 10*3/MM3 (ref 0–1)
MONOCYTES NFR BLD AUTO: 10.6 % (ref 3–8)
NEUTROPHILS # BLD AUTO: 2.87 10*3/MM3 (ref 1.5–8.3)
NEUTROPHILS NFR BLD AUTO: 48.5 % (ref 45–70)
NITRITE UR QL STRIP: NEGATIVE
NRBC BLD MANUAL-RTO: 0 /100 WBC (ref 0–0)
PH UR STRIP.AUTO: 6.5 [PH] (ref 4.5–8)
PLATELET # BLD AUTO: 168 10*3/MM3 (ref 140–500)
PMV BLD AUTO: 11.1 FL (ref 7.4–10.4)
POTASSIUM BLD-SCNC: 4.4 MMOL/L (ref 3.5–5.2)
PROT SERPL-MCNC: 7.2 G/DL (ref 6–8.5)
PROT UR QL STRIP: NEGATIVE
RBC # BLD AUTO: 4.28 10*6/MM3 (ref 4.7–6.1)
SODIUM BLD-SCNC: 139 MMOL/L (ref 136–145)
SP GR UR STRIP: 1.02 (ref 1–1.03)
UROBILINOGEN UR QL STRIP: ABNORMAL
WBC NRBC COR # BLD: 5.92 10*3/MM3 (ref 4.8–10.8)

## 2018-12-19 PROCEDURE — 81003 URINALYSIS AUTO W/O SCOPE: CPT

## 2018-12-19 PROCEDURE — 85025 COMPLETE CBC W/AUTO DIFF WBC: CPT

## 2018-12-19 PROCEDURE — 80053 COMPREHEN METABOLIC PANEL: CPT

## 2018-12-19 PROCEDURE — 36415 COLL VENOUS BLD VENIPUNCTURE: CPT

## 2019-06-24 ENCOUNTER — LAB (OUTPATIENT)
Dept: LAB | Facility: HOSPITAL | Age: 69
End: 2019-06-24

## 2019-06-24 ENCOUNTER — TRANSCRIBE ORDERS (OUTPATIENT)
Dept: ADMINISTRATIVE | Facility: HOSPITAL | Age: 69
End: 2019-06-24

## 2019-06-24 DIAGNOSIS — N18.30 CHRONIC KIDNEY DISEASE, STAGE III (MODERATE) (HCC): Primary | ICD-10-CM

## 2019-06-24 DIAGNOSIS — N18.30 CHRONIC KIDNEY DISEASE, STAGE III (MODERATE) (HCC): ICD-10-CM

## 2019-06-24 DIAGNOSIS — I12.9 HYPERTENSIVE NEPHROPATHY: ICD-10-CM

## 2019-06-24 LAB
ALBUMIN SERPL-MCNC: 3.6 G/DL (ref 3.5–5.2)
ALBUMIN/GLOB SERPL: 1.1 G/DL
ALP SERPL-CCNC: 194 U/L (ref 39–117)
ALT SERPL W P-5'-P-CCNC: 43 U/L (ref 1–41)
ANION GAP SERPL CALCULATED.3IONS-SCNC: 10.6 MMOL/L
AST SERPL-CCNC: 71 U/L (ref 1–40)
BASOPHILS # BLD AUTO: 0.05 10*3/MM3 (ref 0–0.2)
BASOPHILS NFR BLD AUTO: 0.8 % (ref 0–1.5)
BILIRUB SERPL-MCNC: 1.3 MG/DL (ref 0.2–1.2)
BILIRUB UR QL STRIP: NEGATIVE
BUN BLD-MCNC: 21 MG/DL (ref 8–23)
BUN/CREAT SERPL: 10.9 (ref 7–25)
CALCIUM SPEC-SCNC: 9.6 MG/DL (ref 8.6–10.5)
CHLORIDE SERPL-SCNC: 105 MMOL/L (ref 98–107)
CLARITY UR: CLEAR
CO2 SERPL-SCNC: 24.4 MMOL/L (ref 22–29)
COLOR UR: YELLOW
CREAT BLD-MCNC: 1.93 MG/DL (ref 0.76–1.27)
DEPRECATED RDW RBC AUTO: 50.6 FL (ref 37–54)
EOSINOPHIL # BLD AUTO: 0.27 10*3/MM3 (ref 0–0.4)
EOSINOPHIL NFR BLD AUTO: 4.6 % (ref 0.3–6.2)
ERYTHROCYTE [DISTWIDTH] IN BLOOD BY AUTOMATED COUNT: 14 % (ref 12.3–15.4)
GFR SERPL CREATININE-BSD FRML MDRD: 35 ML/MIN/1.73
GLOBULIN UR ELPH-MCNC: 3.4 GM/DL
GLUCOSE BLD-MCNC: 81 MG/DL (ref 65–99)
GLUCOSE UR STRIP-MCNC: NEGATIVE MG/DL
HCT VFR BLD AUTO: 43.7 % (ref 37.5–51)
HGB BLD-MCNC: 15.1 G/DL (ref 13–17.7)
HGB UR QL STRIP.AUTO: NEGATIVE
IMM GRANULOCYTES # BLD AUTO: 0.01 10*3/MM3 (ref 0–0.05)
IMM GRANULOCYTES NFR BLD AUTO: 0.2 % (ref 0–0.5)
KETONES UR QL STRIP: NEGATIVE
LEUKOCYTE ESTERASE UR QL STRIP.AUTO: NEGATIVE
LYMPHOCYTES # BLD AUTO: 2.68 10*3/MM3 (ref 0.7–3.1)
LYMPHOCYTES NFR BLD AUTO: 45.5 % (ref 19.6–45.3)
MCH RBC QN AUTO: 33.6 PG (ref 26.6–33)
MCHC RBC AUTO-ENTMCNC: 34.6 G/DL (ref 31.5–35.7)
MCV RBC AUTO: 97.1 FL (ref 79–97)
MONOCYTES # BLD AUTO: 0.76 10*3/MM3 (ref 0.1–0.9)
MONOCYTES NFR BLD AUTO: 12.9 % (ref 5–12)
NEUTROPHILS # BLD AUTO: 2.13 10*3/MM3 (ref 1.7–7)
NEUTROPHILS NFR BLD AUTO: 36.2 % (ref 42.7–76)
NITRITE UR QL STRIP: NEGATIVE
NRBC BLD AUTO-RTO: 0 /100 WBC (ref 0–0.2)
PH UR STRIP.AUTO: 6.5 [PH] (ref 5–8)
PLATELET # BLD AUTO: 163 10*3/MM3 (ref 140–450)
PMV BLD AUTO: 13 FL (ref 6–12)
POTASSIUM BLD-SCNC: 4.8 MMOL/L (ref 3.5–5.2)
PROT SERPL-MCNC: 7 G/DL (ref 6–8.5)
PROT UR QL STRIP: NEGATIVE
RBC # BLD AUTO: 4.5 10*6/MM3 (ref 4.14–5.8)
SODIUM BLD-SCNC: 140 MMOL/L (ref 136–145)
SP GR UR STRIP: 1.01 (ref 1–1.03)
UROBILINOGEN UR QL STRIP: NORMAL
WBC NRBC COR # BLD: 5.89 10*3/MM3 (ref 3.4–10.8)

## 2019-06-24 PROCEDURE — 80053 COMPREHEN METABOLIC PANEL: CPT

## 2019-06-24 PROCEDURE — 81003 URINALYSIS AUTO W/O SCOPE: CPT

## 2019-06-24 PROCEDURE — 85025 COMPLETE CBC W/AUTO DIFF WBC: CPT

## 2019-06-24 PROCEDURE — 36415 COLL VENOUS BLD VENIPUNCTURE: CPT

## 2019-08-09 ENCOUNTER — OFFICE VISIT (OUTPATIENT)
Dept: CARDIOLOGY | Facility: CLINIC | Age: 69
End: 2019-08-09

## 2019-08-09 VITALS
SYSTOLIC BLOOD PRESSURE: 128 MMHG | HEART RATE: 53 BPM | HEIGHT: 74 IN | DIASTOLIC BLOOD PRESSURE: 88 MMHG | WEIGHT: 203.3 LBS | BODY MASS INDEX: 26.09 KG/M2

## 2019-08-09 DIAGNOSIS — I70.90 ARTERIOSCLEROTIC VASCULAR DISEASE: Primary | ICD-10-CM

## 2019-08-09 DIAGNOSIS — E78.5 HYPERLIPIDEMIA, UNSPECIFIED HYPERLIPIDEMIA TYPE: ICD-10-CM

## 2019-08-09 DIAGNOSIS — I10 ESSENTIAL HYPERTENSION: ICD-10-CM

## 2019-08-09 PROCEDURE — 99214 OFFICE O/P EST MOD 30 MIN: CPT | Performed by: INTERNAL MEDICINE

## 2019-08-09 PROCEDURE — 93000 ELECTROCARDIOGRAM COMPLETE: CPT | Performed by: INTERNAL MEDICINE

## 2019-08-09 RX ORDER — AMLODIPINE BESYLATE 2.5 MG/1
2.5 TABLET ORAL DAILY
Qty: 90 TABLET | Refills: 3 | Status: SHIPPED | OUTPATIENT
Start: 2019-08-09 | End: 2019-08-22 | Stop reason: CLARIF

## 2019-08-09 NOTE — PROGRESS NOTES
Date of Office Visit: 2019  Encounter Provider: Windy Wu MD  Place of Service: [facility]  Patient Name: Hu Burgess  :1950      Patient ID:  Hu Burgess is a 68 y.o. male is here for  followup for CAD.         History of Present Illness    He had an abnormal stress study showing inferior ischemia at [facility]. Because of  his multiple cardiovascular risks and some dyspnea, he had coronary angiography done  2009 showing moderate disease of the left circumflex vessel and the right coronary  artery. The left anterior descending artery and left main coronary arteries were normal.      He has known hypertension but now his blood pressures controlled without medication      He stopped smoking in 2016      He had vascular screening done 2010. The abdominal aorta was normal. He has moderate  bilateral and carotid intimal thickening and normal bilateral ankle-brachial indices.     He had vascular screening done in  it was normal.     He was admitted to the hospital 2017 with acute renal failure, acute hepatic failure and rhabdomyolysis.  He felt like the rhabdomyolysis is what drove this.  The rhabdomyolysis no may have been due to a combination of Crestor and the flu shot.       He had low back pain and was found to have to compression fractures and L2 and L3.  He saw Dr. Currie for this.  He did have an echocardiogram done 2017 which was normal.  He also had renal duplex studies done which showed normal renal size.     He is now off of hemodialysis as of 2018.  He continues to see  Daily.      Labs in 2019 show normal CBC, CMP abnormal with creatinine 1.93, alkaline phosphatase 194, elevated ALT and AST.  Last lipids I have are from 2018.    He sees a Dr. Guajardo every 4 months.  He did notice that when he goes for physical therapy for his back pain, his blood pressures been  high.  He has had no tachycardia, dizziness, syncope.  He has had no exertional chest tightness or pressure.  He has no orthopnea or PND.    Past Medical History:   Diagnosis Date   • Arteriosclerotic cardiovascular disease    • Back pain    • History of transfusion    • Hyperlipidemia    • Hypertension    • Kidney failure    • Rhabdomyolysis     due to crestor         Past Surgical History:   Procedure Laterality Date   • ENDOSCOPY N/A 2018    Procedure: ESOPHAGOGASTRODUODENOSCOPY;  Surgeon: Marcelino Samuel MD;  Location: [facility] OR;  Service:    • INSERTION HEMODIALYSIS CATHETER Right 2017    Procedure: PALINDRONE CATHETERE PLACEMENT;  Surgeon: Gareth Sánchez MD;  Location: [facility] OR;  Service:        Current Outpatient Medications on File Prior to Visit   Medication Sig Dispense Refill   • aspirin 81 MG EC tablet Take 1 tablet by mouth Daily.  4   • traMADol (ULTRAM) 50 MG tablet Take 1 tablet by mouth Daily.     • B Complex-C-Folic Acid (RENAL VITAMIN PO) Take 1 tablet/day by mouth Daily.     • ondansetron ODT (ZOFRAN-ODT) 4 MG disintegrating tablet Take 4 mg by mouth Every 8 (Eight) Hours As Needed for Nausea or Vomiting.     • PANTOPRAZOLE SODIUM PO Take 40 mg by mouth Daily.       No current facility-administered medications on file prior to visit.        Social History     Socioeconomic History   • Marital status:      Spouse name: Not on file   • Number of children: Not on file   • Years of education: Not on file   • Highest education level: Not on file   Tobacco Use   • Smoking status: Former Smoker     Years: 50.00     Types: Cigarettes     Last attempt to quit: 2016     Years since quittin.7   • Smokeless tobacco: Never Used   • Tobacco comment: caffeine use   Substance and Sexual Activity   • Alcohol use: Yes     Alcohol/week: 0.6 oz     Types: 1 Cans of beer per week     Comment: socially   • Drug use: No   • Sexual activity: Defer           Review of Systems  "  Constitution: Negative.   HENT: Negative for congestion.    Eyes: Negative for vision loss in left eye and vision loss in right eye.   Respiratory: Negative.  Negative for cough, hemoptysis, shortness of breath, sleep disturbances due to breathing, snoring, sputum production and wheezing.    Endocrine: Negative.    Hematologic/Lymphatic: Negative.    Skin: Negative for poor wound healing and rash.   Musculoskeletal: Negative for falls, gout, muscle cramps and myalgias.   Gastrointestinal: Negative for abdominal pain, diarrhea, dysphagia, hematemesis, melena, nausea and vomiting.   Neurological: Negative for excessive daytime sleepiness, dizziness, headaches, light-headedness, loss of balance, seizures and vertigo.   Psychiatric/Behavioral: Negative for depression and substance abuse. The patient is not nervous/anxious.        Procedures    ECG 12 Lead  Date/Time: 8/9/2019 12:50 PM  Performed by: Windy Wu MD  Authorized by: Windy Wu MD   Comparison: compared with previous ECG   Similar to previous ECG  Rhythm: sinus rhythm    Clinical impression: normal ECG                Objective:      Vitals:    08/09/19 1233   BP: 128/88   BP Location: Right arm   Patient Position: Sitting   Pulse: 53   Weight: 92.2 kg (203 lb 4.8 oz)   Height: 188 cm (74\")     Body mass index is 26.1 kg/m².    Physical Exam   Constitutional: He is oriented to person, place, and time. He appears well-developed and well-nourished. No distress.   HENT:   Head: Normocephalic and atraumatic.   Eyes: Conjunctivae are normal. No scleral icterus.   Neck: Neck supple. No JVD present. Carotid bruit is not present. No thyromegaly present.   Cardiovascular: Normal rate, regular rhythm, S1 normal, S2 normal, normal heart sounds and intact distal pulses.  No extrasystoles are present. PMI is not displaced. Exam reveals no gallop.   No murmur heard.  Pulses:       Carotid pulses are 2+ on the right side, and 2+ on the left side.     "   Radial pulses are 2+ on the right side, and 2+ on the left side.        Dorsalis pedis pulses are 2+ on the right side, and 2+ on the left side.        Posterior tibial pulses are 2+ on the right side, and 2+ on the left side.   Pulmonary/Chest: Effort normal and breath sounds normal. No respiratory distress. He has no wheezes. He has no rhonchi. He has no rales. He exhibits no tenderness.   Abdominal: Soft. Bowel sounds are normal. He exhibits no distension, no abdominal bruit and no mass. There is no tenderness.   Musculoskeletal: He exhibits no edema or deformity.   Lymphadenopathy:     He has no cervical adenopathy.   Neurological: He is alert and oriented to person, place, and time. No cranial nerve deficit.   Skin: Skin is warm and dry. No rash noted. He is not diaphoretic. No cyanosis. No pallor. Nails show no clubbing.   Psychiatric: He has a normal mood and affect. Judgment normal.   Vitals reviewed.      Lab Review:       Assessment:      Diagnosis Plan   1. Arteriosclerotic vascular disease     2. Essential hypertension     3. Hyperlipidemia, unspecified hyperlipidemia type       1. Coronary artery disease of the left circumflex and right coronary arteries by  angiography, January 2009. He has had an abnormal stress study in the past showing  inferior ischemia. The disease is moderate. Will continue to treat medically. No angina at this time.  2. Hyperlipidemia. Untreated, due to severe reaction from Crestor..   3. Tobacco use. Advised to stop smoking.  4. Bilateral carotid intimal thickening.   5.  Status post rhabdomyolysis likely due to the flu shot and Crestor with renal failure and liver failure.    6.  Severe reaction to Crestor and a flu shot  7.  CKD, follows with Dr. Upton.  8.  Pretension, blood pressure goal is less than 120/80.     Plan:       See Merrill in 6 months, add amlodipine 2.5 mill grams daily due to elevated blood pressure.  See me back in 1 year, no further testing at this time.

## 2019-08-19 ENCOUNTER — TELEPHONE (OUTPATIENT)
Dept: CARDIOLOGY | Facility: CLINIC | Age: 69
End: 2019-08-19

## 2019-08-19 NOTE — TELEPHONE ENCOUNTER
Pt called. He was seen on 8/9/19. You started him on amlodipine 2.5 MG daily. He has been having a upset stomach. Can that be one of the side effect?. Is there an alternative?    PT #: [phone]    Thanks Sada

## 2019-08-22 RX ORDER — NIFEDIPINE 30 MG/1
30 TABLET, EXTENDED RELEASE ORAL DAILY
Qty: 90 TABLET | Refills: 0 | Status: SHIPPED | OUTPATIENT
Start: 2019-08-22 | End: 2019-11-08 | Stop reason: SDUPTHER

## 2019-11-08 RX ORDER — NIFEDIPINE 30 MG/1
TABLET, EXTENDED RELEASE ORAL
Qty: 90 TABLET | Refills: 0 | Status: SHIPPED | OUTPATIENT
Start: 2019-11-08 | End: 2020-01-28

## 2020-01-28 RX ORDER — NIFEDIPINE 30 MG/1
TABLET, EXTENDED RELEASE ORAL
Qty: 90 TABLET | Refills: 0 | Status: SHIPPED | OUTPATIENT
Start: 2020-01-28 | End: 2020-04-21

## 2020-02-13 NOTE — PROGRESS NOTES
Date of Office Visit: 2020  Encounter Provider: JULIUS Bahena  Place of Service: [facility]  Patient Name: Hu Burgess  :1950  Primary Cardiologist: Dr. Wu    CC:  6 month follow up    Dear Dr. JONES: Hu Burgess is a pleasant 69 y.o. male who presents 2020 for cardiac follow up. He is a new patient to me and I have reviewed his past medical records.  He is a patient of Dr. Wu.    He had an abnormal stress study showing inferior ischemia at [facility]. Because of his multiple cardiovascular risks and some dyspnea, he had coronary angiography done 2009 showing moderate disease of the left circumflex vessel and the right coronary artery. The left anterior descending artery and left main coronary arteries were normal.      He has known hypertension but now his blood pressures controlled without medication      He stopped smoking in 2016      He had vascular screening done 2010. The abdominal aorta was normal. He has moderate bilateral and carotid intimal thickening and normal bilateral ankle-brachial indices.     He had vascular screening done in  it was normal.     He was admitted to the hospital 2017 with acute renal failure, acute hepatic failure and rhabdomyolysis.  He felt like the rhabdomyolysis is what drove this.  The rhabdomyolysis  may have been due to a combination of Crestor and the flu shot.       He had low back pain and was found to have to compression fractures and L2 and L3.  He saw Dr. Currie for this.  He did have an echocardiogram done 2017 which was normal.  He also had renal duplex studies done which showed normal renal size.     He is now off of hemodialysis as of 2018.  He continues to see Dr. Upton.      Labs in 2019 show normal CBC, CMP abnormal with creatinine 1.93, alkaline phosphatase 194, elevated ALT and AST.  Last lipids I have are from  January 2018.     He sees a Dr. Guajardo every 4 months.  He did notice that when he goes for physical therapy for his back pain, his blood pressures been high.  Amlodipine 2.5 mg was started.       He states he feels good.  He denies any palpitations, shortness of breath or lower extremity edema.  He said no episode of chest pain or chest tightness.  He denies any dizziness or lightheadedness.  He denies any fatigue.  He tries to stay active.  He feels like he is very denae having overcome his serious health issues.  He is taking his medications as directed.    Past Medical History:   Diagnosis Date   • Arteriosclerotic cardiovascular disease    • Back pain    • History of transfusion    • Hyperlipidemia    • Hypertension    • Kidney failure    • Rhabdomyolysis     due to crestor       Past Surgical History:   Procedure Laterality Date   • ENDOSCOPY N/A 2/9/2018    Procedure: ESOPHAGOGASTRODUODENOSCOPY;  Surgeon: Marcelino Samuel MD;  Location: [facility] OR;  Service:    • INSERTION HEMODIALYSIS CATHETER Right 11/14/2017    Procedure: PALINDRONE CATHETERE PLACEMENT;  Surgeon: Gareth Sánchez MD;  Location: [facility] OR;  Service:        Social History     Socioeconomic History   • Marital status:      Spouse name: Not on file   • Number of children: Not on file   • Years of education: Not on file   • Highest education level: Not on file   Tobacco Use   • Smoking status: Former Smoker     Years: 50.00     Types: Cigarettes     Last attempt to quit: 11/6/2016     Years since quitting: 3.2   • Smokeless tobacco: Never Used   • Tobacco comment: no caffiene   Substance and Sexual Activity   • Alcohol use: Not Currently     Alcohol/week: 1.0 standard drinks     Types: 1 Cans of beer per week   • Drug use: No   • Sexual activity: Defer       Family History   Problem Relation Age of Onset   • Cancer Father         laryngeal cancer   • Colon cancer Neg Hx    • Colon polyps Neg Hx        The following portion of  the patient's history were reviewed and updated as appropriate: past medical history, past surgical history, past social history, past family history, allergies, current medications, and problem list.    Review of Systems   Constitution: Negative for diaphoresis, fever and malaise/fatigue.   HENT: Negative for congestion, hearing loss, hoarse voice, nosebleeds and sore throat.    Eyes: Negative for photophobia, vision loss in left eye, vision loss in right eye and visual disturbance.   Cardiovascular: Negative for chest pain, dyspnea on exertion, irregular heartbeat, leg swelling, near-syncope, orthopnea, palpitations, paroxysmal nocturnal dyspnea and syncope.   Respiratory: Negative for cough, hemoptysis, shortness of breath, sleep disturbances due to breathing, snoring, sputum production and wheezing.    Endocrine: Negative for cold intolerance, heat intolerance, polydipsia, polyphagia and polyuria.   Hematologic/Lymphatic: Negative for bleeding problem. Does not bruise/bleed easily.   Skin: Negative for color change, dry skin, poor wound healing, rash and suspicious lesions.   Musculoskeletal: Negative for arthritis, back pain, falls, gout, joint pain, joint swelling, muscle cramps, muscle weakness and myalgias.   Gastrointestinal: Negative for bloating, abdominal pain, constipation, diarrhea, dysphagia, melena, nausea and vomiting.   Neurological: Negative for excessive daytime sleepiness, dizziness, headaches, light-headedness, loss of balance, numbness, paresthesias, seizures, vertigo and weakness.   Psychiatric/Behavioral: Negative for depression, memory loss and substance abuse. The patient is not nervous/anxious.        Allergies   Allergen Reactions   • Statins Other (See Comments)     Liver failure         Current Outpatient Medications:   •  aspirin 81 MG EC tablet, Take 1 tablet by mouth Daily., Disp: , Rfl: 4  •  NIFEdipine XL (PROCARDIA XL) 30 MG 24 hr tablet, TAKE 1 TABLET BY MOUTH EVERY DAY, Disp:  "90 tablet, Rfl: 0  •  traMADol (ULTRAM) 50 MG tablet, Take 1 tablet by mouth Daily., Disp: , Rfl:         Objective:     Vitals:    02/14/20 1405   BP: 124/70   BP Location: Right arm   Patient Position: Sitting   Cuff Size: Adult   Pulse: 55   Resp: 16   SpO2: 96%   Weight: 89.4 kg (197 lb)   Height: 188 cm (74\")     Body mass index is 25.29 kg/m².      Physical Exam   Constitutional: He is oriented to person, place, and time. He appears well-developed and well-nourished. No distress.   HENT:   Head: Normocephalic and atraumatic.   Right Ear: External ear normal.   Left Ear: External ear normal.   Nose: Nose normal.   Eyes: Pupils are equal, round, and reactive to light. Conjunctivae are normal. Right eye exhibits no discharge. Left eye exhibits no discharge.   Neck: Normal range of motion. Neck supple. No JVD present. No tracheal deviation present. No thyromegaly present.   Cardiovascular: Normal rate, regular rhythm, normal heart sounds and intact distal pulses. Exam reveals no gallop and no friction rub.   No murmur heard.  Pulmonary/Chest: Effort normal and breath sounds normal. No respiratory distress. He has no wheezes. He has no rales. He exhibits no tenderness.   Abdominal: Soft. Bowel sounds are normal. He exhibits no distension. There is no tenderness.   Musculoskeletal: Normal range of motion. He exhibits no edema, tenderness or deformity.   Lymphadenopathy:     He has no cervical adenopathy.   Neurological: He is alert and oriented to person, place, and time. Coordination normal.   Skin: Skin is warm and dry. No rash noted. No erythema.   Psychiatric: He has a normal mood and affect. His behavior is normal. Judgment and thought content normal.             ECG 12 Lead  Date/Time: 2/14/2020 2:10 PM  Performed by: Rocío Sutton APRN  Authorized by: Rocío Sutton APRN   Comparison: compared with previous ECG from 8/9/2019  Similar to previous ECG  Rhythm: sinus rhythm  Rate: normal  Conduction: conduction " normal  ST Segments: ST segments normal  T Waves: T waves normal  QRS axis: normal  Other findings: poor R wave progression    Clinical impression: non-specific ECG              Assessment:       Diagnosis Plan   1. Arteriosclerotic vascular disease     2. Hyperlipidemia, unspecified hyperlipidemia type     3. Essential hypertension            Plan:       1.  CAD- disease of the left circumflex and right coronary arteries by angiography, January 2009.  He had an abnormal stress test showing inferior ischemia.  The disease is moderate.  He has been treated medically. Denies angina.    2.  Hyperlipidemia-untreated due to severe reaction from Crestor    3.  Hypertension-his goal is less than 120/80    4.  Bilateral carotid intimal thickening    5.  Status post rhabdomyolysis likely due to the flu shot and Crestor with renal failure and liver failure    6.  CKD-follows with Dr. Manzanares    7.  Tobacco use- cessation advised.    As always, it has been a pleasure to participate in your patient's care. Thank you.     RTO in 6 months with RM  Sincerely,       JULIUS Bahena      Current Outpatient Medications:   •  aspirin 81 MG EC tablet, Take 1 tablet by mouth Daily., Disp: , Rfl: 4  •  NIFEdipine XL (PROCARDIA XL) 30 MG 24 hr tablet, TAKE 1 TABLET BY MOUTH EVERY DAY, Disp: 90 tablet, Rfl: 0  •  traMADol (ULTRAM) 50 MG tablet, Take 1 tablet by mouth Daily., Disp: , Rfl:     Dictated utilizing Dragon dictation

## 2020-02-14 ENCOUNTER — OFFICE VISIT (OUTPATIENT)
Dept: CARDIOLOGY | Facility: CLINIC | Age: 70
End: 2020-02-14

## 2020-02-14 VITALS
BODY MASS INDEX: 25.28 KG/M2 | WEIGHT: 197 LBS | SYSTOLIC BLOOD PRESSURE: 124 MMHG | OXYGEN SATURATION: 96 % | DIASTOLIC BLOOD PRESSURE: 70 MMHG | HEART RATE: 55 BPM | HEIGHT: 74 IN | RESPIRATION RATE: 16 BRPM

## 2020-02-14 DIAGNOSIS — I10 ESSENTIAL HYPERTENSION: ICD-10-CM

## 2020-02-14 DIAGNOSIS — E78.5 HYPERLIPIDEMIA, UNSPECIFIED HYPERLIPIDEMIA TYPE: ICD-10-CM

## 2020-02-14 DIAGNOSIS — I70.90 ARTERIOSCLEROTIC VASCULAR DISEASE: Primary | ICD-10-CM

## 2020-02-14 PROCEDURE — 99214 OFFICE O/P EST MOD 30 MIN: CPT | Performed by: NURSE PRACTITIONER

## 2020-02-14 PROCEDURE — 93000 ELECTROCARDIOGRAM COMPLETE: CPT | Performed by: NURSE PRACTITIONER

## 2020-04-21 RX ORDER — NIFEDIPINE 30 MG/1
TABLET, EXTENDED RELEASE ORAL
Qty: 90 TABLET | Refills: 0 | Status: SHIPPED | OUTPATIENT
Start: 2020-04-21 | End: 2020-07-20

## 2020-05-27 ENCOUNTER — TRANSCRIBE ORDERS (OUTPATIENT)
Dept: ADMINISTRATIVE | Facility: HOSPITAL | Age: 70
End: 2020-05-27

## 2020-05-27 ENCOUNTER — HOSPITAL ENCOUNTER (OUTPATIENT)
Dept: GENERAL RADIOLOGY | Facility: HOSPITAL | Age: 70
Discharge: HOME | End: 2020-05-27
Admitting: INTERNAL MEDICINE

## 2020-05-27 DIAGNOSIS — R52 PAIN: ICD-10-CM

## 2020-05-27 DIAGNOSIS — R52 PAIN: Primary | ICD-10-CM

## 2020-05-27 PROCEDURE — 72100 X-RAY EXAM L-S SPINE 2/3 VWS: CPT

## 2020-06-19 ENCOUNTER — LAB (OUTPATIENT)
Dept: LAB | Facility: HOSPITAL | Age: 70
End: 2020-06-19

## 2020-06-19 ENCOUNTER — TRANSCRIBE ORDERS (OUTPATIENT)
Dept: ADMINISTRATIVE | Facility: HOSPITAL | Age: 70
End: 2020-06-19

## 2020-06-19 DIAGNOSIS — N18.30 CHRONIC KIDNEY DISEASE, STAGE III (MODERATE) (HCC): ICD-10-CM

## 2020-06-19 DIAGNOSIS — N18.30 CHRONIC KIDNEY DISEASE, STAGE III (MODERATE) (HCC): Primary | ICD-10-CM

## 2020-06-19 LAB
ALBUMIN SERPL-MCNC: 3.9 G/DL (ref 3.5–5.2)
ALBUMIN/GLOB SERPL: 1.2 G/DL
ALP SERPL-CCNC: 275 U/L (ref 39–117)
ALT SERPL W P-5'-P-CCNC: 46 U/L (ref 1–41)
ANION GAP SERPL CALCULATED.3IONS-SCNC: 12.6 MMOL/L (ref 5–15)
AST SERPL-CCNC: 67 U/L (ref 1–40)
BILIRUB SERPL-MCNC: 1.8 MG/DL (ref 0.2–1.2)
BUN BLD-MCNC: 17 MG/DL (ref 8–23)
BUN/CREAT SERPL: 10.5 (ref 7–25)
CALCIUM SPEC-SCNC: 9.5 MG/DL (ref 8.6–10.5)
CHLORIDE SERPL-SCNC: 105 MMOL/L (ref 98–107)
CO2 SERPL-SCNC: 22.4 MMOL/L (ref 22–29)
CREAT BLD-MCNC: 1.62 MG/DL (ref 0.76–1.27)
GFR SERPL CREATININE-BSD FRML MDRD: 42 ML/MIN/1.73
GLOBULIN UR ELPH-MCNC: 3.2 GM/DL
GLUCOSE BLD-MCNC: 91 MG/DL (ref 65–99)
POTASSIUM BLD-SCNC: 4.1 MMOL/L (ref 3.5–5.2)
PROT SERPL-MCNC: 7.1 G/DL (ref 6–8.5)
SODIUM BLD-SCNC: 140 MMOL/L (ref 136–145)

## 2020-06-19 PROCEDURE — 80053 COMPREHEN METABOLIC PANEL: CPT

## 2020-06-19 PROCEDURE — 36415 COLL VENOUS BLD VENIPUNCTURE: CPT

## 2020-07-20 RX ORDER — NIFEDIPINE 30 MG/1
TABLET, EXTENDED RELEASE ORAL
Qty: 90 TABLET | Refills: 0 | Status: SHIPPED | OUTPATIENT
Start: 2020-07-20 | End: 2020-10-20

## 2020-08-04 ENCOUNTER — TRANSCRIBE ORDERS (OUTPATIENT)
Dept: ADMINISTRATIVE | Facility: HOSPITAL | Age: 70
End: 2020-08-04

## 2020-08-04 DIAGNOSIS — R94.5 ABNORMAL RESULTS OF LIVER FUNCTION STUDIES: Primary | ICD-10-CM

## 2020-08-10 ENCOUNTER — HOSPITAL ENCOUNTER (OUTPATIENT)
Dept: ULTRASOUND IMAGING | Facility: HOSPITAL | Age: 70
Discharge: HOME | End: 2020-08-10
Admitting: INTERNAL MEDICINE

## 2020-08-10 DIAGNOSIS — R94.5 ABNORMAL RESULTS OF LIVER FUNCTION STUDIES: ICD-10-CM

## 2020-08-10 PROCEDURE — 76705 ECHO EXAM OF ABDOMEN: CPT

## 2020-08-14 ENCOUNTER — OFFICE VISIT (OUTPATIENT)
Dept: CARDIOLOGY | Facility: CLINIC | Age: 70
End: 2020-08-14

## 2020-08-14 VITALS
HEIGHT: 74 IN | BODY MASS INDEX: 24.4 KG/M2 | DIASTOLIC BLOOD PRESSURE: 62 MMHG | WEIGHT: 190.1 LBS | HEART RATE: 58 BPM | SYSTOLIC BLOOD PRESSURE: 114 MMHG

## 2020-08-14 DIAGNOSIS — I10 ESSENTIAL HYPERTENSION: ICD-10-CM

## 2020-08-14 DIAGNOSIS — E78.2 MIXED HYPERLIPIDEMIA: ICD-10-CM

## 2020-08-14 DIAGNOSIS — I70.90 ARTERIOSCLEROTIC VASCULAR DISEASE: Primary | ICD-10-CM

## 2020-08-14 PROCEDURE — 93000 ELECTROCARDIOGRAM COMPLETE: CPT | Performed by: INTERNAL MEDICINE

## 2020-08-14 PROCEDURE — 99214 OFFICE O/P EST MOD 30 MIN: CPT | Performed by: INTERNAL MEDICINE

## 2020-08-14 NOTE — PROGRESS NOTES
Date of Office Visit: 2020  Encounter Provider: Windy Wu MD  Place of Service: [facility] CARDIOLOG[facility]  Patient Name: Hu Burgess  :1950      Patient ID:  Hu Burgess is a 69 y.o. male is here for  followup for CAD.         History of Present Illness    He had an abnormal stress study showing inferior ischemia at [facility]. Because of  his multiple cardiovascular risks and some dyspnea, he had coronary angiography done  2009 showing moderate disease of the left circumflex vessel and the right coronary  artery. The left anterior descending artery and left main coronary arteries were normal.      He has known hypertension but now his blood pressures controlled without medication      He stopped smoking in 2016      He had vascular screening done 2010. The abdominal aorta was normal. He has moderate  bilateral and carotid intimal thickening and normal bilateral ankle-brachial indices.  He had vascular screening done in  it was normal.     He was admitted to the hospital 2017 with acute renal failure, acute hepatic failure and rhabdomyolysis.  He felt like the rhabdomyolysis is what drove this.  The rhabdomyolysis no may have been due to a combination of Crestor and the flu shot.  He had a normal echocardiogram done in .     He had low back pain and was found to have to compression fractures and L2 and L3.  He saw Dr. Currie for this.  He did have an echocardiogram done 2017 which was normal.  He also had renal duplex studies done which showed normal renal size.     He was off of hemodialysis as of 2018.  He continues to see Dr. pUton.      Labs in 2020 show creatinine 1.62 and slightly elevated liver enzymes, otherwise fairly unremarkable CMP.  Ultrasound on the gallbladder done 8/10/2020 shows no evidence of cholecystitis but gallstones noted.  He has no chest pain or pressure.  He has  back pain because he fell off of a ladder.  He has no orthopnea or PND.  He has no tachycardia, dizziness or syncope.  He has no exertional dyspnea.  He is tolerating his medications well.    Past Medical History:   Diagnosis Date   • Arteriosclerotic cardiovascular disease    • Back pain    • History of transfusion    • Hyperlipidemia    • Hypertension    • Kidney failure    • Rhabdomyolysis     due to crestor         Past Surgical History:   Procedure Laterality Date   • ENDOSCOPY N/A 2/9/2018    Procedure: ESOPHAGOGASTRODUODENOSCOPY;  Surgeon: Marcelino Samuel MD;  Location: [facility] OR;  Service:    • INSERTION HEMODIALYSIS CATHETER Right 11/14/2017    Procedure: PALINDRONE CATHETERE PLACEMENT;  Surgeon: Gareth Sánchez MD;  Location: [facility] OR;  Service:        Current Outpatient Medications on File Prior to Visit   Medication Sig Dispense Refill   • NIFEdipine XL (PROCARDIA XL) 30 MG 24 hr tablet TAKE 1 TABLET BY MOUTH EVERY DAY 90 tablet 0   • traMADol (ULTRAM) 50 MG tablet Take 1 tablet by mouth Daily.     • [DISCONTINUED] aspirin 81 MG EC tablet Take 1 tablet by mouth Daily.  4     No current facility-administered medications on file prior to visit.        Social History     Socioeconomic History   • Marital status:      Spouse name: Not on file   • Number of children: Not on file   • Years of education: Not on file   • Highest education level: Not on file   Tobacco Use   • Smoking status: Former Smoker     Years: 50.00     Types: Cigarettes     Last attempt to quit: 11/6/2016     Years since quitting: 3.7   • Smokeless tobacco: Never Used   • Tobacco comment: no caffiene   Substance and Sexual Activity   • Alcohol use: Not Currently     Alcohol/week: 1.0 standard drinks     Types: 1 Cans of beer per week   • Drug use: No   • Sexual activity: Defer           Review of Systems   Constitution: Negative.   HENT: Negative for congestion.    Eyes: Negative for vision loss in left eye and  "vision loss in right eye.   Respiratory: Negative.  Negative for cough, hemoptysis, shortness of breath, sleep disturbances due to breathing, snoring, sputum production and wheezing.    Endocrine: Negative.    Hematologic/Lymphatic: Negative.    Skin: Negative for poor wound healing and rash.   Musculoskeletal: Negative for falls, gout, muscle cramps and myalgias.   Gastrointestinal: Negative for abdominal pain, diarrhea, dysphagia, hematemesis, melena, nausea and vomiting.   Neurological: Negative for excessive daytime sleepiness, dizziness, headaches, light-headedness, loss of balance, seizures and vertigo.   Psychiatric/Behavioral: Negative for depression and substance abuse. The patient is not nervous/anxious.        Procedures    ECG 12 Lead  Date/Time: 8/14/2020 1:13 PM  Performed by: Windy Wu MD  Authorized by: Windy Wu MD   Comparison: compared with previous ECG   Similar to previous ECG  Rhythm: sinus rhythm    Clinical impression: normal ECG                Objective:      Vitals:    08/14/20 1252   BP: 114/62   BP Location: Right arm   Patient Position: Sitting   Pulse: 58   Weight: 86.2 kg (190 lb 1.6 oz)   Height: 188 cm (74\")     Body mass index is 24.41 kg/m².    Physical Exam   Constitutional: He is oriented to person, place, and time. He appears well-developed and well-nourished. No distress.   HENT:   Head: Normocephalic and atraumatic.   Eyes: Conjunctivae are normal. No scleral icterus.   Neck: Neck supple. No JVD present. Carotid bruit is not present. No thyromegaly present.   Cardiovascular: Normal rate, regular rhythm, S1 normal, S2 normal and intact distal pulses.  No extrasystoles are present. PMI is not displaced. Exam reveals no gallop.   No murmur heard.  Pulses:       Carotid pulses are 2+ on the right side, and 2+ on the left side.       Radial pulses are 2+ on the right side, and 2+ on the left side.        Dorsalis pedis pulses are 2+ on the right side, and 2+ " on the left side.        Posterior tibial pulses are 2+ on the right side, and 2+ on the left side.   Pulmonary/Chest: Effort normal and breath sounds normal. No respiratory distress. He has no wheezes. He has no rhonchi. He has no rales. He exhibits no tenderness.   Abdominal: Soft. Bowel sounds are normal. He exhibits no distension, no abdominal bruit and no mass. There is no tenderness.   Musculoskeletal: He exhibits no edema or deformity.   Lymphadenopathy:     He has no cervical adenopathy.   Neurological: He is alert and oriented to person, place, and time. No cranial nerve deficit.   Skin: Skin is warm and dry. No rash noted. He is not diaphoretic. No cyanosis. No pallor. Nails show no clubbing.   Psychiatric: He has a normal mood and affect. Judgment normal.   Vitals reviewed.      Lab Review:       Assessment:      Diagnosis Plan   1. Arteriosclerotic vascular disease     2. Essential hypertension     3. Mixed hyperlipidemia       1. Coronary artery disease of the left circumflex and right coronary arteries by  angiography, January 2009. He has had an abnormal stress study in the past showing  inferior ischemia. The disease is moderate. Will continue to treat medically. No angina at this time.  2. Hyperlipidemia. Untreated, due to severe reaction from Crestor..   3. Tobacco use. Advised to stop smoking.  4. Bilateral carotid intimal thickening.   5.  Status post rhabdomyolysis likely due to the flu shot and Crestor with renal failure and liver failure.    6.  Severe reaction to Crestor and a flu shot  7.  CKD, follows with Dr. Moreau.  8.  Pretension, blood pressure goal is less than 120/80.     Plan:       See keli in 1 year, no changes.

## 2020-10-20 RX ORDER — NIFEDIPINE 30 MG/1
TABLET, EXTENDED RELEASE ORAL
Qty: 90 TABLET | Refills: 3 | Status: SHIPPED | OUTPATIENT
Start: 2020-10-20 | End: 2021-10-21

## 2020-10-20 NOTE — TELEPHONE ENCOUNTER
Next appointment 08/20/2021        Assessment:        Diagnosis Plan   1. Arteriosclerotic vascular disease      2. Essential hypertension      3. Mixed hyperlipidemia         1. Coronary artery disease of the left circumflex and right coronary arteries by  angiography, January 2009. He has had an abnormal stress study in the past showing  inferior ischemia. The disease is moderate. Will continue to treat medically. No angina at this time.  2. Hyperlipidemia. Untreated, due to severe reaction from Crestor..   3. Tobacco use. Advised to stop smoking.  4. Bilateral carotid intimal thickening.   5.  Status post rhabdomyolysis likely due to the flu shot and Crestor with renal failure and liver failure.    6.  Severe reaction to Crestor and a flu shot  7.  CKD, follows with Dr. Moreau.  8.  Pretension, blood pressure goal is less than 120/80.     Plan:       See keli in 1 year, no changes.

## 2021-03-18 ENCOUNTER — CLINICAL SUPPORT (OUTPATIENT)
Dept: INTERNAL MEDICINE | Facility: CLINIC | Age: 71
End: 2021-03-18

## 2021-03-18 DIAGNOSIS — R73.01 IMPAIRED FASTING GLUCOSE: ICD-10-CM

## 2021-03-18 DIAGNOSIS — I10 ESSENTIAL (PRIMARY) HYPERTENSION: ICD-10-CM

## 2021-03-18 DIAGNOSIS — R35.1 NOCTURIA: ICD-10-CM

## 2021-03-18 DIAGNOSIS — E78.5 HYPERLIPIDEMIA, UNSPECIFIED HYPERLIPIDEMIA TYPE: Primary | ICD-10-CM

## 2021-03-18 DIAGNOSIS — Z00.00 ROUTINE GENERAL MEDICAL EXAMINATION AT A HEALTH CARE FACILITY: ICD-10-CM

## 2021-03-18 DIAGNOSIS — Z12.5 ENCOUNTER FOR SCREENING FOR MALIGNANT NEOPLASM OF PROSTATE: ICD-10-CM

## 2021-03-19 LAB
ALBUMIN SERPL-MCNC: 3.5 G/DL (ref 3.5–5.2)
ALBUMIN/GLOB SERPL: 1 G/DL
ALP SERPL-CCNC: 302 U/L (ref 39–117)
ALT SERPL-CCNC: 40 U/L (ref 1–41)
AST SERPL-CCNC: 83 U/L (ref 1–40)
BASOPHILS # BLD AUTO: 0.08 10*3/MM3 (ref 0–0.2)
BASOPHILS NFR BLD AUTO: 1.3 % (ref 0–1.5)
BILIRUB SERPL-MCNC: 2.1 MG/DL (ref 0–1.2)
BUN SERPL-MCNC: 17 MG/DL (ref 8–23)
BUN/CREAT SERPL: 10.1 (ref 7–25)
CALCIUM SERPL-MCNC: 9.6 MG/DL (ref 8.6–10.5)
CHLORIDE SERPL-SCNC: 106 MMOL/L (ref 98–107)
CHOLEST SERPL-MCNC: 188 MG/DL (ref 0–200)
CO2 SERPL-SCNC: 26.2 MMOL/L (ref 22–29)
CREAT SERPL-MCNC: 1.68 MG/DL (ref 0.76–1.27)
EOSINOPHIL # BLD AUTO: 0.14 10*3/MM3 (ref 0–0.4)
EOSINOPHIL NFR BLD AUTO: 2.2 % (ref 0.3–6.2)
ERYTHROCYTE [DISTWIDTH] IN BLOOD BY AUTOMATED COUNT: 12.7 % (ref 12.3–15.4)
GLOBULIN SER CALC-MCNC: 3.4 GM/DL
GLUCOSE SERPL-MCNC: 95 MG/DL (ref 65–99)
HBA1C MFR BLD: 4.6 % (ref 4.8–5.6)
HCT VFR BLD AUTO: 40.3 % (ref 37.5–51)
HDLC SERPL-MCNC: 51 MG/DL (ref 40–60)
HGB BLD-MCNC: 14 G/DL (ref 13–17.7)
IMM GRANULOCYTES # BLD AUTO: 0.01 10*3/MM3 (ref 0–0.05)
IMM GRANULOCYTES NFR BLD AUTO: 0.2 % (ref 0–0.5)
LDLC SERPL CALC-MCNC: 121 MG/DL (ref 0–100)
LYMPHOCYTES # BLD AUTO: 1.16 10*3/MM3 (ref 0.7–3.1)
LYMPHOCYTES NFR BLD AUTO: 18.3 % (ref 19.6–45.3)
MCH RBC QN AUTO: 34 PG (ref 26.6–33)
MCHC RBC AUTO-ENTMCNC: 34.7 G/DL (ref 31.5–35.7)
MCV RBC AUTO: 97.8 FL (ref 79–97)
MONOCYTES # BLD AUTO: 0.8 10*3/MM3 (ref 0.1–0.9)
MONOCYTES NFR BLD AUTO: 12.6 % (ref 5–12)
NEUTROPHILS # BLD AUTO: 4.14 10*3/MM3 (ref 1.7–7)
NEUTROPHILS NFR BLD AUTO: 65.4 % (ref 42.7–76)
NRBC BLD AUTO-RTO: 0 /100 WBC (ref 0–0.2)
PLATELET # BLD AUTO: 160 10*3/MM3 (ref 140–450)
POTASSIUM SERPL-SCNC: 4.5 MMOL/L (ref 3.5–5.2)
PROT SERPL-MCNC: 6.9 G/DL (ref 6–8.5)
PSA SERPL-MCNC: 1.04 NG/ML (ref 0–4)
RBC # BLD AUTO: 4.12 10*6/MM3 (ref 4.14–5.8)
SODIUM SERPL-SCNC: 141 MMOL/L (ref 136–145)
TRIGL SERPL-MCNC: 89 MG/DL (ref 0–150)
TSH SERPL DL<=0.005 MIU/L-ACNC: 1.29 UIU/ML (ref 0.27–4.2)
VLDLC SERPL CALC-MCNC: 16 MG/DL (ref 5–40)
WBC # BLD AUTO: 6.33 10*3/MM3 (ref 3.4–10.8)

## 2021-03-24 ENCOUNTER — OFFICE VISIT (OUTPATIENT)
Dept: INTERNAL MEDICINE | Facility: CLINIC | Age: 71
End: 2021-03-24

## 2021-03-24 VITALS
HEIGHT: 74 IN | HEART RATE: 63 BPM | WEIGHT: 184 LBS | SYSTOLIC BLOOD PRESSURE: 112 MMHG | OXYGEN SATURATION: 98 % | TEMPERATURE: 97.3 F | RESPIRATION RATE: 16 BRPM | DIASTOLIC BLOOD PRESSURE: 66 MMHG | BODY MASS INDEX: 23.61 KG/M2

## 2021-03-24 DIAGNOSIS — G89.29 CHRONIC LOW BACK PAIN, UNSPECIFIED BACK PAIN LATERALITY, UNSPECIFIED WHETHER SCIATICA PRESENT: Primary | ICD-10-CM

## 2021-03-24 DIAGNOSIS — M54.50 CHRONIC LOW BACK PAIN, UNSPECIFIED BACK PAIN LATERALITY, UNSPECIFIED WHETHER SCIATICA PRESENT: Primary | ICD-10-CM

## 2021-03-24 PROCEDURE — 99214 OFFICE O/P EST MOD 30 MIN: CPT | Performed by: INTERNAL MEDICINE

## 2021-03-24 RX ORDER — TRAMADOL HYDROCHLORIDE 50 MG/1
50 TABLET ORAL DAILY
Qty: 60 TABLET | Refills: 2 | Status: SHIPPED | OUTPATIENT
Start: 2021-03-24 | End: 2021-04-23 | Stop reason: SDUPTHER

## 2021-03-24 NOTE — PROGRESS NOTES
"Chief Complaint  Hyperlipidemia and Hypertension    Subjective          Hu Burgess presents to [facility]  History of Present Illness  He is here for follow-up lab work.  Chronic kidney disease with stable creatinine.  He does have some mild elevation in his bilirubin, he had been fasting and mild alk phos and G OT elevation.  No alcohol reported.  Appetite is good.  He does have gallstones but no postprandial pain or nausea.  He has had some weight loss but he feels like that is been stable over the past several months.  He mainly complains of back pain and is interested in doing some physical therapy to try to see if that helps.  Some radicular pain down the right leg  Objective   Vital Signs:   /66 (BP Location: Left arm, Patient Position: Sitting, Cuff Size: Large Adult)   Pulse 63   Temp 97.3 °F (36.3 °C) (Temporal)   Resp 16   Ht 188 cm (74\")   Wt 83.5 kg (184 lb)   SpO2 98%   BMI 23.62 kg/m²     Physical Exam  Vitals and nursing note reviewed.   Constitutional:       Appearance: Normal appearance.   HENT:      Head: Normocephalic and atraumatic.   Eyes:      Pupils: Pupils are equal, round, and reactive to light.   Cardiovascular:      Rate and Rhythm: Normal rate and regular rhythm.      Pulses: Normal pulses.   Pulmonary:      Effort: Pulmonary effort is normal.      Breath sounds: Normal breath sounds.   Abdominal:      Palpations: Abdomen is soft.   Musculoskeletal:      Cervical back: Normal range of motion.      Right lower leg: No edema.      Left lower leg: No edema.      Comments: Stiffness in the low back with flexion and extension.  Negative straight leg raises   Skin:     Capillary Refill: Capillary refill takes less than 2 seconds.   Neurological:      General: No focal deficit present.      Mental Status: He is alert.   Psychiatric:         Mood and Affect: Mood normal.         Behavior: Behavior normal.        Result Review : "                 Assessment and Plan chronic back pain with a history of compression fractures.  Tramadol definitely helps.  He does look more frail than I have seen him recently.  He says he feels pretty good however and wants to try to get more active.  We will schedule the physical therapy.  MRI lumbar spine and follow-up in 4 weeks.  We reviewed his lab work.  His creatinine is stable.  His bilirubin and alk phos mildly elevated.  He had a n ultrasound last fall that showed gallstones but otherwise no liver lesions.  We will continue to monitor that.  Repeat it next visit.  Diagnoses and all orders for this visit:    1. Chronic low back pain, unspecified back pain laterality, unspecified whether sciatica present (Primary)  -     traMADol (ULTRAM) 50 MG tablet; Take 1 tablet by mouth Daily.  Dispense: 60 tablet; Refill: 2  -     MRI Lumbar Spine Without Contrast; Future  -     Ambulatory Referral to Physical Therapy        Follow Up   Return in about 4 weeks (around 4/21/2021).  Patient was given instructions and counseling regarding his condition or for health maintenance advice. Please see specific information pulled into the AVS if appropriate.

## 2021-04-05 ENCOUNTER — TREATMENT (OUTPATIENT)
Dept: PHYSICAL THERAPY | Facility: CLINIC | Age: 71
End: 2021-04-05

## 2021-04-05 DIAGNOSIS — R29.898 DIFFICULTY IN WEIGHT BEARING: ICD-10-CM
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
## 2021-04-05 DIAGNOSIS — M54.50 LUMBAR SPINE PAIN: Primary | ICD-10-CM

## 2021-04-05 PROCEDURE — 97162 PT EVAL MOD COMPLEX 30 MIN: CPT | Performed by: PHYSICAL THERAPIST

## 2021-04-05 PROCEDURE — 97110 THERAPEUTIC EXERCISES: CPT | Performed by: PHYSICAL THERAPIST

## 2021-04-05 NOTE — PROGRESS NOTES
Physical Therapy Initial Evaluation and Plan of Care    Patient: Hu Burgess   : 1950  Diagnosis/ICD-10 Code:  Lumbar spine pain [M54.5]  Referring practitioner: Aldo REID (Tony)  Date of Initial Visit: 2021  Today's Date: 2021  Patient seen for 1 sessions           Subjective Questionnaire: Back index: 40%    Subjective Evaluation    History of Present Illness  Mechanism of injury: Pt presents for physical therapy evaluation on this date with a ~30 year history of LBP that has worsened in the last year or two. Pt states he first began having LBP after he was doing some yard work and went to pick something up off the ground and could not get back up after bending forward. Pt states that his back pain went away for the most part with MD prescribed muscle relaxants and would come back once or twice a year at which point he would be prescribed muscle relaxants again and his back pain would again reduce. Pt states his pain first started in his upper lumbar/lower thoracic spine but is now in his lower lumbar centrally and on either side of his lower back. Pt states that in 2018 he had rhabdomyolysis and was hospitalized for 3-4 months and 1-2 years earlier he fell off a ladder and hurt his back again. Pt denies the presence of numbness/tingling or bowel/bladder issues. States his back bothers him at night if he lays on his back but if he changes position he can fall asleep. Pt states he has a follow up with his doctor scheduled for next week and will also receive an MRI.      Patient Occupation: Post  Quality of life: good    Pain  Current pain ratin  At worst pain ratin  Quality: dull ache  Alleviating factors: Sitting down (most frequent), laying down.  Aggravating factors: lifting and standing (Lifting heavy things causes pain, Rolling over in bed, laying on back, standing/walking = fatigue)    Diagnostic Tests  X-ray: abnormal    Treatments  Previous treatment:  physical therapy ([facility] for LBP 4-5 years ago - helped a lot)  Patient Goals  Patient goals for therapy: decreased pain, improved balance, increased strength, independence with ADLs/IADLs and increased motion  Patient goal: strength and endurance           Objective          Observations     Additional Wrist/Hand Observation Details  Pt exhibited noticeable tremor in Lt arm/hand while laying in Rt SL on table.    Palpation   Left   No palpable tenderness to the lumbar paraspinals.   Hypertonic in the quadratus lumborum.   Tenderness of the quadratus lumborum.     Right   No palpable tenderness to the lumbar paraspinals and quadratus lumborum.     Left Hip Palpation Comments   Quadratus lumborum: Min.     Neurological Testing     Sensation     Lumbar   Left   Intact: light touch    Right   Intact: light touch    Active Range of Motion     Lumbar   Flexion: Active lumbar flexion: 90%, pain coming up.   Extension: Active lumbar extension: 5% with pain  Left lateral flexion: Active left lumbar lateral flexion: 20% with pain  Right lateral flexion: Active right lumbar lateral flexion: 20% with pain  Left rotation: Active left lumbar rotation: 25% with pain  Right rotation: Active right lumbar rotation: 25% with pain    Passive Range of Motion   Left Hip   Flexion: Left hip passive flexion: 100%   External rotation (prone): Left hip passive external rotation prone: 100%   Internal rotation (90/90): Left hip passive internal rotation 90/90: 75% with pain    Right Hip   Flexion: Right hip passive flexion: 100%   External rotation (90/90): Right hip passive external rotation 90/90: 100%   Internal rotation (90/90): Right hip passive internal rotation 90/90: 75% with pain    Additional Passive Range of Motion Details  PAIVM: CPA LL3-L4, L4-L5 hypomobile/pain, Lt L5-S1 UPA hypomobile/pain    Strength/Myotome Testing     Left Hip   Planes of Motion   Flexion: 4-  External rotation: 4+  Internal rotation: 4+    Right Hip   Planes  "of Motion   Flexion: 4  External rotation: 4-  Internal rotation: 4-    Left Knee   Extension: 4    Right Knee   Extension: 4    Left Ankle/Foot   Dorsiflexion: 5  Plantar flexion: 4  Eversion: 5    Right Ankle/Foot   Dorsiflexion: 5  Plantar flexion: 4  Eversion: 5    Additional Strength Details  Extension not tested secondary to Pt not being able to positioned in prone.    Tests     Lumbar     Left   Negative passive SLR.     Right   Negative passive SLR.     Left Pelvic Girdle/Sacrum   Negative: sacrum compression and gapping.     Left Hip   Negative FADIR and scour.   SLR: Negative.     Right Hip   Positive FADIR and scour.   SLR: Negative.     Functional Assessment   Squat   Pain.     Forward Step Up 6\"   Left Leg  Within functional limits.     Right Leg  Within functional limits.     Single Leg Stance   Left: 15 seconds  Right: 15 seconds          Assessment & Plan     Assessment  Impairments: abnormal muscle firing, abnormal muscle tone, abnormal or restricted ROM, activity intolerance, impaired balance, impaired physical strength, lacks appropriate home exercise program, pain with function, safety issue and weight-bearing intolerance  Assessment details: Hu Burgess is a 70 y.o. year-old male referred to physical therapy for LBP. He presents with a stable clinical presentation. Signs and symptoms are consistent with physical therapy diagnosis of lumbar spine pain with mobility deficits as evidenced by painful/severely restricted lumbar AROM, pain/hypomobility with accessory testing of the inferior lumbar spine, pain laying supine with shakir LE's extended, pain with transitional movements, and decrease hip strength/mobility. Pt also states he has not fallen recently but does trip over objects occasionally and has almost fallen in the past and feels he has decreased endurance. Patient is appropriate for skilled physical therapy in order to reduce pain and increase ease with daily mobility in order to " improve pain free lumbar AROM, reduce pain/increase mobility with accessory testing of the lumbar spine, improve standing balance, and improve hip strength/mobility for improvements/increased function with performance of his normal ADL's.     Prognosis: good  Functional Limitations: carrying objects, lifting, sleeping, uncomfortable because of pain, standing and stooping  Goals  Plan Goals: STG's (0-4 weeks)    1. Pt will be compliant with HEP  2. Pt will tolerate accessory motion testing of the lumbar spine without increases in pain >/= 4/10.  3. Pt will increase lumbar extension AROM to >/=25% without increases in pain >/= 4/10.  4. Pt will be able to stand on one leg for >/= 30 sec without UE support.    LTG's (4-8 weeks)    1. Pt will be independent with HEP.  2. Pt will decrease Back index score to </= 10%.  3. Pt will increase lumbar AROM to >/= 50% in shakir SB and rot and extension.  4. Pt will be able to lay in supine with bilateral LE extended without increases in pain >/= 1-2/10.  5. Pt will increase hip strength shakir to 5/5 in ER, flexion, and IR.    Plan  Therapy options: will be seen for skilled physical therapy services  Planned modality interventions: electrical stimulation/Russian stimulation, dry needling, cryotherapy, traction, thermotherapy (hydrocollator packs) and TENS  Planned therapy interventions: abdominal trunk stabilization, ADL retraining, body mechanics training, balance/weight-bearing training, fine motor coordination training, flexibility, functional ROM exercises, gait training, home exercise program, IADL retraining, joint mobilization, therapeutic activities, stretching, strengthening, spinal/joint mobilization, soft tissue mobilization, postural training, neuromuscular re-education, motor coordination training and manual therapy  Frequency: 2x week  Duration in visits: 16  Treatment plan discussed with: patient  Plan details: See assessment        Manual Therapy:          mins   02389;  Therapeutic Exercise:    12     mins  89923;     Neuromuscular Rosario:        mins  21813;    Therapeutic Activity:          mins  74546;     Gait Training:           mins  33189;     Ultrasound:          mins  20070;    Electrical Stimulation:         mins  12313 ( );  Dry Needling          mins self-pay    Timed Treatment:   12   mins   Total Treatment:     59   mins    PT SIGNATURE: Esthela Grigsby, PT; Kory Suarez Student PT   DATE TREATMENT INITIATED: 4/5/2021    Initial Certification  Certification Period: 7/4/2021  I certify that the therapy services are furnished while this patient is under my care.  The services outlined above are required by this patient, and will be reviewed every 90 days.     PHYSICIAN: Aldo Guajardo MD (Tony)      DATE:     Please sign and return via fax to [phone].. Thank you, [facility].

## 2021-04-12 ENCOUNTER — TREATMENT (OUTPATIENT)
Dept: PHYSICAL THERAPY | Facility: CLINIC | Age: 71
End: 2021-04-12
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
## 2021-04-12 DIAGNOSIS — R29.898 DIFFICULTY IN WEIGHT BEARING: ICD-10-CM

## 2021-04-12 DIAGNOSIS — M54.50 LUMBAR SPINE PAIN: Primary | ICD-10-CM

## 2021-04-12 PROCEDURE — 97110 THERAPEUTIC EXERCISES: CPT | Performed by: PHYSICAL THERAPIST

## 2021-04-12 PROCEDURE — 97140 MANUAL THERAPY 1/> REGIONS: CPT | Performed by: PHYSICAL THERAPIST

## 2021-04-12 NOTE — PROGRESS NOTES
Physical Therapy Daily Progress Note        Patient: Hu Burgess   : 1950  Diagnosis/ICD-10 Code:  Lumbar spine pain [M54.5]  Referring practitioner: Aldo REID (Tony)  Date of Initial Visit: Type: THERAPY  Noted: 2021  Today's Date: 2021  Patient seen for 2 sessions         Hu Burgess reports: his back has been a little sore but better.  He rated it as a 5/10.         Subjective     Objective   See Exercise, Manual, and Modality Logs for complete treatment.       Assessment/Plan  Progressed core strengthening and pt reported no complaints with addition.  Also added additional stretches to increase flexibility and ROM.  Cues provided throughout the session for proper technique with exercises especially new exercises.  Trial manual traction as well.  He reported relief during however no change in symptoms after return to sit/stand.  Will monitor tolerance to changes especially manual traction and progress as tolerated including mechanical traction.     Progress per Plan of Care           Manual Therapy:    8     mins  63403;  Therapeutic Exercise:    30     mins  41450;     Neuromuscular Rosario:        mins  94182;    Therapeutic Activity:          mins  45553;     Gait Training:           mins  57244;     Ultrasound:          mins  31574;    Electrical Stimulation:         mins  36278 ( );  Dry Needling          mins self-pay    Timed Treatment:   38   mins   Total Treatment:     38   mins    Any Voss PTA  Physical Therapist Assistant

## 2021-04-14 ENCOUNTER — HOSPITAL ENCOUNTER (OUTPATIENT)
Dept: MRI IMAGING | Facility: HOSPITAL | Age: 71
Discharge: HOME | End: 2021-04-14

## 2021-04-14 ENCOUNTER — HOSPITAL ENCOUNTER (OUTPATIENT)
Dept: GENERAL RADIOLOGY | Facility: HOSPITAL | Age: 71
Discharge: HOME | End: 2021-04-14

## 2021-04-14 DIAGNOSIS — M54.50 CHRONIC LOW BACK PAIN, UNSPECIFIED BACK PAIN LATERALITY, UNSPECIFIED WHETHER SCIATICA PRESENT: ICD-10-CM

## 2021-04-14 DIAGNOSIS — G89.29 CHRONIC LOW BACK PAIN, UNSPECIFIED BACK PAIN LATERALITY, UNSPECIFIED WHETHER SCIATICA PRESENT: ICD-10-CM

## 2021-04-14 PROCEDURE — 73560 X-RAY EXAM OF KNEE 1 OR 2: CPT

## 2021-04-14 PROCEDURE — 72148 MRI LUMBAR SPINE W/O DYE: CPT

## 2021-04-21 ENCOUNTER — OFFICE VISIT (OUTPATIENT)
Dept: INTERNAL MEDICINE | Facility: CLINIC | Age: 71
End: 2021-04-21

## 2021-04-21 VITALS
WEIGHT: 184 LBS | HEIGHT: 74 IN | DIASTOLIC BLOOD PRESSURE: 70 MMHG | RESPIRATION RATE: 16 BRPM | SYSTOLIC BLOOD PRESSURE: 118 MMHG | HEART RATE: 66 BPM | OXYGEN SATURATION: 99 % | BODY MASS INDEX: 23.61 KG/M2 | TEMPERATURE: 97.3 F

## 2021-04-21 DIAGNOSIS — S32.000D COMPRESSION FRACTURE OF LUMBAR VERTEBRA WITH ROUTINE HEALING, UNSPECIFIED LUMBAR VERTEBRAL LEVEL, SUBSEQUENT ENCOUNTER: Primary | ICD-10-CM

## 2021-04-21 DIAGNOSIS — I70.90 ARTERIOSCLEROTIC VASCULAR DISEASE: ICD-10-CM

## 2021-04-21 DIAGNOSIS — I10 ESSENTIAL HYPERTENSION: ICD-10-CM

## 2021-04-21 PROCEDURE — 99214 OFFICE O/P EST MOD 30 MIN: CPT | Performed by: INTERNAL MEDICINE

## 2021-04-21 NOTE — PROGRESS NOTES
"Chief Complaint  Follow-up (MRI AND LABS )    Subjective          Hu Burgess presents to [facility]  History of Present Illness  Overall his back pain is doing a little better now.  He has just started physical therapy.  He had his MRI which shows compression fracture from L2-L4.  He is not having any radicular pain.  He takes some tramadol as needed.  The physical therapy is just started but he feels confident that is going to help  Objective   Vital Signs:   /70 (BP Location: Left arm, Patient Position: Sitting, Cuff Size: Large Adult)   Pulse 66   Temp 97.3 °F (36.3 °C) (Temporal)   Resp 16   Ht 188 cm (74\")   Wt 83.5 kg (184 lb)   SpO2 99%   BMI 23.62 kg/m²     Physical Exam  Vitals and nursing note reviewed.   Constitutional:       Appearance: Normal appearance.   HENT:      Head: Normocephalic and atraumatic.   Eyes:      Pupils: Pupils are equal, round, and reactive to light.   Cardiovascular:      Rate and Rhythm: Normal rate and regular rhythm.      Pulses: Normal pulses.   Pulmonary:      Effort: Pulmonary effort is normal.      Breath sounds: Normal breath sounds.   Abdominal:      Palpations: Abdomen is soft.   Musculoskeletal:      Cervical back: Normal range of motion.      Right lower leg: No edema.      Left lower leg: No edema.      Comments: Stiffness in the low back with flexion and extension.  Negative straight leg raises   Skin:     Capillary Refill: Capillary refill takes less than 2 seconds.   Neurological:      General: No focal deficit present.      Mental Status: He is alert.   Psychiatric:         Mood and Affect: Mood normal.         Behavior: Behavior normal.        Result Review :                 Assessment and Plan low back pain.  MRI shows compression fraction L2-L4.  No obvious radicular pain.  He is very committed to do the physical therapy and see if he sees improvement.  I talked to him about kyphoplasty if the " pain continues but he is wanting to hold off on that for now.  I think that sounds good.  Tramadol as needed.  Follow-up in 3 months or sooner if problems  Diagnoses and all orders for this visit:    1. Mixed hyperlipidemia (Primary)    2. Essential hypertension    3. Arteriosclerotic vascular disease    4. Compression fracture of lumbar vertebra with routine healing, unspecified lumbar vertebral level, subsequent encounter        Follow Up   No follow-ups on file.  Patient was given instructions and counseling regarding his condition or for health maintenance advice. Please see specific information pulled into the AVS if appropriate.

## 2021-04-23 ENCOUNTER — TREATMENT (OUTPATIENT)
Dept: PHYSICAL THERAPY | Facility: CLINIC | Age: 71
End: 2021-04-23

## 2021-04-23 ENCOUNTER — TELEPHONE (OUTPATIENT)
Dept: INTERNAL MEDICINE | Facility: CLINIC | Age: 71
End: 2021-04-23

## 2021-04-23 DIAGNOSIS — R29.898 DIFFICULTY IN WEIGHT BEARING: ICD-10-CM

## 2021-04-23 DIAGNOSIS — M54.50 CHRONIC LOW BACK PAIN, UNSPECIFIED BACK PAIN LATERALITY, UNSPECIFIED WHETHER SCIATICA PRESENT: ICD-10-CM
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
## 2021-04-23 DIAGNOSIS — G89.29 CHRONIC LOW BACK PAIN, UNSPECIFIED BACK PAIN LATERALITY, UNSPECIFIED WHETHER SCIATICA PRESENT: ICD-10-CM

## 2021-04-23 DIAGNOSIS — M54.50 LUMBAR SPINE PAIN: Primary | ICD-10-CM

## 2021-04-23 PROCEDURE — 97530 THERAPEUTIC ACTIVITIES: CPT | Performed by: PHYSICAL THERAPIST

## 2021-04-23 PROCEDURE — 97110 THERAPEUTIC EXERCISES: CPT | Performed by: PHYSICAL THERAPIST

## 2021-04-23 RX ORDER — TRAMADOL HYDROCHLORIDE 50 MG/1
50 TABLET ORAL DAILY
Qty: 60 TABLET | Refills: 2 | Status: CANCELLED | OUTPATIENT
Start: 2021-04-23

## 2021-04-23 RX ORDER — TRAMADOL HYDROCHLORIDE 50 MG/1
50 TABLET ORAL DAILY
Qty: 60 TABLET | Refills: 2 | Status: SHIPPED | OUTPATIENT
Start: 2021-04-23 | End: 2021-04-26 | Stop reason: SDUPTHER

## 2021-04-23 NOTE — TELEPHONE ENCOUNTER
Caller: Hu Burgess    Relationship: Self    Best call back number: [phone]   Medication needed:   Requested Prescriptions     Pending Prescriptions Disp Refills   • traMADol (ULTRAM) 50 MG tablet 60 tablet 2     Sig: Take 1 tablet by mouth Daily.       When do you need the refill by: 04/26/21  What additional details did the patient provide when requesting the medication: PATIENT ACTUALLY TAKES THIS MEDICATION TWICE A DAY DOES NOT KNOW HOW IT GOT CHANGED. SO HE IS RUNNING OUT TOO SOON AND THEY WON'T REFILL EARLY    Does the patient have less than a 3 day supply:  [x] Yes  [] No    What is the patient's preferred pharmacy: [facility]/PHARMACY #[id number] - [place], [place] - [address] [address] AT [address]CTION OF [facility] [address] - [phone]  - [phone]

## 2021-04-23 NOTE — PROGRESS NOTES
"Physical Therapy Daily Progress Note    Visit # : 3  Hu Burgess reports: his back is still there, but he has only been able to come 2 other times.  States he feels like the stretches are helping, but did not see a difference with the leg pull.  Pt states \"it almost seemed like it hurt it more than make it feel better.\"       Subjective     Objective   See Exercise, Manual, and Modality Logs for complete treatment.   Pt denies having any palpable tightness over the right L-S area-offered MHP post exercise, but pt refused heat.  Discussed using heat pre and post exercise for home.        Assessment & Plan     Assessment  Assessment details: Pt is doing fairly well, but is still having some right lower back pain.  Pt denies improvement with manual LE traction, so did not try mechanical today.  Increased pt's reps with stabilization exercises.  Pt needed some VC to perform PPT and open book exercises correctly.  Pt ambulates into a forward flexed posture, so discussed with pt the importance for maintaining upright standing posture and maintaining a tight core in standing.  Instructed pt in standing upright against wall 2-3x/day for one minute and doing standing TB vs shoulder Ext and rows to strengthening his upper back.  Will continue to see pt 1-2x/week for strengthening, stretching, and modalities PRN.          Progress per Plan of Care           Manual Therapy:         mins  66621;  Therapeutic Exercise:   32      mins  62412;     Neuromuscular Rosario:        mins  14682;    Therapeutic Activity:    15      mins  77305;     Gait Training:           mins  74300;     Ultrasound:          mins  42643;    Electrical Stimulation:         mins  16761 ( );  Dry Needling          mins self-pay    Timed Treatment: 47     mins   Total Treatment:   50     mins    Esthela Grigsby, PT  Physical Therapist  "

## 2021-04-26 ENCOUNTER — TREATMENT (OUTPATIENT)
Dept: PHYSICAL THERAPY | Facility: CLINIC | Age: 71
End: 2021-04-26

## 2021-04-26 ENCOUNTER — TELEPHONE (OUTPATIENT)
Dept: INTERNAL MEDICINE | Facility: CLINIC | Age: 71
End: 2021-04-26

## 2021-04-26 DIAGNOSIS — M54.50 LUMBAR SPINE PAIN: Primary | ICD-10-CM
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
## 2021-04-26 DIAGNOSIS — M54.50 CHRONIC LOW BACK PAIN, UNSPECIFIED BACK PAIN LATERALITY, UNSPECIFIED WHETHER SCIATICA PRESENT: ICD-10-CM

## 2021-04-26 DIAGNOSIS — R29.898 DIFFICULTY IN WEIGHT BEARING: ICD-10-CM

## 2021-04-26 DIAGNOSIS — G89.29 CHRONIC LOW BACK PAIN, UNSPECIFIED BACK PAIN LATERALITY, UNSPECIFIED WHETHER SCIATICA PRESENT: ICD-10-CM

## 2021-04-26 PROCEDURE — 97110 THERAPEUTIC EXERCISES: CPT | Performed by: PHYSICAL THERAPIST

## 2021-04-26 PROCEDURE — 97530 THERAPEUTIC ACTIVITIES: CPT | Performed by: PHYSICAL THERAPIST

## 2021-04-26 RX ORDER — TRAMADOL HYDROCHLORIDE 50 MG/1
50 TABLET ORAL DAILY
Qty: 60 TABLET | Refills: 2 | Status: SHIPPED | OUTPATIENT
Start: 2021-04-26 | End: 2021-04-27

## 2021-04-26 NOTE — PROGRESS NOTES
Physical Therapy Daily Progress Note        Patient: Hu Burgess   : 1950  Diagnosis/ICD-10 Code:  Lumbar spine pain [M54.5]  Referring practitioner: Aldo REID (Tony)  Date of Initial Visit: Type: THERAPY  Noted: 2021  Today's Date: 2021  Patient seen for 4 sessions         Hu Burgess reports: he is getting there.  It is getting a little better and he knows it is going to take a little bit.  He would rate it a little more than 5/10 but has been taking his tramadol.          Subjective     Objective   See Exercise, Manual, and Modality Logs for complete treatment.       Assessment/Plan  Pt continued to demonstrated rounded shoulder and hip flexion during gait thus added cat/camel and cane flexion to increase mobility especially trunk ext.  Limited mobility noted.  Increased focus on postural strengthening with standing exercises this date with pt able to return demonstrate cues.  He reported fatigue with progression and addition of new exercises.  Will continue to monitor tolerance to exercises and progress core/postural strengthening to improve functional mobility, gait mechanics, and decrease pain.    Progress per Plan of Care           Manual Therapy:         mins  27780;  Therapeutic Exercise:    26     mins  06926;     Neuromuscular Rosario:        mins  90492;    Therapeutic Activity:     10     mins  97101;     Gait Training:           mins  38051;     Ultrasound:          mins  28454;    Electrical Stimulation:         mins  43379 ( );  Dry Needling          mins self-pay    Timed Treatment:   36   mins   Total Treatment:     36   mins    Any Voss PTA  Physical Therapist Assistant

## 2021-04-26 NOTE — TELEPHONE ENCOUNTER
Caller: Cori Burgess    Relationship: Emergency Contact    Best call back number: [phone] (M)    Medication needed:   Requested Prescriptions     Pending Prescriptions Disp Refills   • traMADol (ULTRAM) 50 MG tablet 60 tablet 2     Sig: Take 1 tablet by mouth Daily.       When do you need the refill by: 04/26/21    What additional details did the patient provide when requesting the medication: PATIENT WIFE STATES PATIENT UNABLE TO RECEIVE MEDICATION REFILL UNTIL MAY AND PATENT HAS FOUR LEFT WIFE STATES PATIENT HAS BEEN TAKING 2 A DAY     Does the patient have less than a 3 day supply:  [x] Yes  [] No    What is the patient's preferred pharmacy:    [facility]/pharmacy #[id number] - [place], [place] - [address] [address] AT [address]CTION OF [address] [address] - [phone] [facility] [phone]   [phone]

## 2021-04-27 DIAGNOSIS — M54.50 CHRONIC LOW BACK PAIN, UNSPECIFIED BACK PAIN LATERALITY, UNSPECIFIED WHETHER SCIATICA PRESENT: ICD-10-CM

## 2021-04-27 DIAGNOSIS — G89.29 CHRONIC LOW BACK PAIN, UNSPECIFIED BACK PAIN LATERALITY, UNSPECIFIED WHETHER SCIATICA PRESENT: ICD-10-CM

## 2021-04-27 RX ORDER — TRAMADOL HYDROCHLORIDE 50 MG/1
50 TABLET ORAL 2 TIMES DAILY
Qty: 60 TABLET | Refills: 2 | Status: SHIPPED | OUTPATIENT
Start: 2021-04-27 | End: 2021-04-28 | Stop reason: SDUPTHER

## 2021-04-27 NOTE — TELEPHONE ENCOUNTER
PATIENT STATES: THAT THE traMADol (ULTRAM) 50 MG tablet NEEDS TO BE TAKE TWICE A DAY NOT ONCE A DAY PLEASE ADVISE     PATIENT CAN BE REACHED ON: [phone]    PHARMACY PREFERRED:[facility]/pharmacy #[id number] - [place]  [address] [address] - [phone] [facility] [phone]   [phone]

## 2021-04-28 ENCOUNTER — TELEPHONE (OUTPATIENT)
Dept: INTERNAL MEDICINE | Facility: CLINIC | Age: 71
End: 2021-04-28

## 2021-04-28 DIAGNOSIS — G89.29 CHRONIC LOW BACK PAIN, UNSPECIFIED BACK PAIN LATERALITY, UNSPECIFIED WHETHER SCIATICA PRESENT: ICD-10-CM

## 2021-04-28 DIAGNOSIS — M54.50 CHRONIC LOW BACK PAIN, UNSPECIFIED BACK PAIN LATERALITY, UNSPECIFIED WHETHER SCIATICA PRESENT: ICD-10-CM

## 2021-04-28 RX ORDER — TRAMADOL HYDROCHLORIDE 50 MG/1
50 TABLET ORAL 2 TIMES DAILY
Qty: 60 TABLET | Refills: 2 | Status: SHIPPED | OUTPATIENT
Start: 2021-04-28 | End: 2021-04-28 | Stop reason: SDUPTHER

## 2021-04-28 RX ORDER — TRAMADOL HYDROCHLORIDE 50 MG/1
50 TABLET ORAL 2 TIMES DAILY
Qty: 60 TABLET | Refills: 0 | Status: SHIPPED | OUTPATIENT
Start: 2021-04-28 | End: 2021-05-20 | Stop reason: SDUPTHER

## 2021-05-03 ENCOUNTER — TREATMENT (OUTPATIENT)
Dept: PHYSICAL THERAPY | Facility: CLINIC | Age: 71
End: 2021-05-03

## 2021-05-03 DIAGNOSIS — R29.898 DIFFICULTY IN WEIGHT BEARING: ICD-10-CM

## 2021-05-03 DIAGNOSIS — M54.50 LUMBAR SPINE PAIN: Primary | ICD-10-CM

## 2021-05-03 PROCEDURE — 97035 APP MDLTY 1+ULTRASOUND EA 15: CPT | Performed by: PHYSICAL THERAPIST

## 2021-05-03 PROCEDURE — 97530 THERAPEUTIC ACTIVITIES: CPT | Performed by: PHYSICAL THERAPIST

## 2021-05-03 PROCEDURE — 97110 THERAPEUTIC EXERCISES: CPT | Performed by: PHYSICAL THERAPIST

## 2021-05-03 NOTE — PROGRESS NOTES
Re-Assessment / Re-Certification      Patient: Hu Burgess   : 1950  Diagnosis/ICD-10 Code:  Lumbar spine pain [M54.5]  Referring practitioner: Aldo REID (Tony)  Date of Initial Visit: 5/3/2021  Today's Date: 5/3/2021  Patient seen for 5 sessions      Subjective:   Hu Burgess reports: he is not good today, but thinks he did too much over the weekend.  Pt did some yard work and sat in a hard chair on Saturday while he was at a [employer] party.  Pt states if he stays off of it the pain is better.  Pt rates his pain a 5/10 described as aching to sharp.  Pt states it becomes sharp after walking more than a mile.    Subjective Questionnaire: Oswestry: lower back index 24/50/48%  Clinical Progress: unchanged  Home Program Compliance: Yes  Treatment has included: therapeutic exercise, manual therapy, therapeutic activity, ultrasound, moist heat and pt has been instructed in a HEP    Subjective   Objective          Palpation   Left   Tenderness of the erector spinae and lumbar paraspinals.     Right Tenderness of the erector spinae and lumbar paraspinals.     Tenderness     Lumbar Spine  Tenderness in the facet joint, left transverse process and right transverse process.     Additional Tenderness Details  Pt tender to palpation over L3-L5 and intervertebral joint     Active Range of Motion     Lumbar   Flexion: 90 degrees   Extension: 15 degrees with pain  Left lateral flexion: 25 degrees   Right lateral flexion: 15 degrees with pain  Left rotation: 50 degrees with pain  Right rotation: 50 degrees with pain    Strength/Myotome Testing     Left Hip   Planes of Motion   Flexion: 4+    Right Hip   Planes of Motion   Flexion: 4+    Left Knee   Flexion: 4+  Extension: 5    Right Knee   Flexion: 4+  Extension: 5    Left Ankle/Foot   Dorsiflexion: 5  Plantar flexion: 5  Eversion: 5    Right Ankle/Foot   Dorsiflexion: 5  Plantar flexion: 5  Eversion: 5    Tests     Lumbar     Left   Negative crossed SLR and  passive SLR.     Right   Negative crossed SLR and passive SLR.     Left Hip   Positive AYDEN.   90/90 SLR: Positive.   SLR: Positive.     Right Hip   Positive AYDEN and FADIR.   90/90 SLR: Positive.   SLR: Positive.     Additional Tests Details  Pt with pain at L3-L4 with A-P joint mobilizations- fair joint mobility   Mild B hamstring tightness with SLR and 90-90 test  B positive Ayden with LBP  Right IR-firm end feel with IR       Assessment & Plan     Assessment  Assessment details: Pt is making slow progress, but pt has only been able to attend 5 visits.  Pt with increased pain today, but pt reports being more active this weekend.  Pt continues to be limited in lumbar AROM Ext, Right SB, and B ROt with pain and firm end feels with A-P joint mobs.  Pt with negative SLR test, but pain with B Ayden testing due to increased LBP.  Added continuous US treatments to B lumbar PS to decrease pt's pain and tenderness.  Discharged cat/camel exercise due to pt having increased pain with exercise.  Will continue to see pt 2x/week for another month to increase pt's mobility, strength, decrease pain, and improve his functional mobility with less pain.        Progress toward previous goals: Partially Met  Met 1/9 goals      Plan Goals: STG's (0-4 weeks)    1. Pt will be compliant with HEP   MET   2. Pt will tolerate accessory motion testing of the lumbar spine without increases in pain >/= 4/10.  PROGRESSING   3. Pt will increase lumbar extension AROM to >/=25% without increases in pain >/= 4/10.  NOT MET   4. Pt will be able to stand on one leg for >/= 30 sec without UE support.  NA     LTG's (4-8 weeks)    1. Pt will be independent with HEP.  PROGRESSING   2. Pt will decrease Back index score to </= 10%.  NOT MET, SCORE INCREASED   3. Pt will increase lumbar AROM to >/= 50% in shakir SB and rot and extension.  PARTIALLY MET, ROT 50%  4. Pt will be able to lay in supine with bilateral LE extended without increases in pain >/=  1-2/10.  NOT MET   5. Pt will increase hip strength shakir to 5/5 in ER, flexion, and IR  PROGRESSING         Recommendations: Continue as planned  Timeframe: 1 month  Prognosis to achieve goals: good    PT Signature: Esthela Grigsby PT      Based upon review of the patient's progress and continued therapy plan, it is my medical opinion that Hu Burgess should continue physical therapy treatment at [facility]  [address] [place] [id number] [phone].    Signature: __________________________________  Aldo Guajardo MD (Tony)    Manual Therapy:         mins  38650;  Therapeutic Exercise:  35       mins  81422;     Neuromuscular Rosario:        mins  85090;    Therapeutic Activity:    15      mins  22389;     Gait Training:           mins  45607;     Ultrasound:     8     mins  38813;    Electrical Stimulation:         mins  81515 ( );  Dry Needling          mins self-pay    Timed Treatment:  58    mins   Total Treatment:     60   mins

## 2021-05-07 ENCOUNTER — TREATMENT (OUTPATIENT)
Dept: PHYSICAL THERAPY | Facility: CLINIC | Age: 71
End: 2021-05-07

## 2021-05-07 DIAGNOSIS — M54.50 LUMBAR SPINE PAIN: Primary | ICD-10-CM

## 2021-05-07 DIAGNOSIS — R29.898 DIFFICULTY IN WEIGHT BEARING: ICD-10-CM
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
## 2021-05-07 PROCEDURE — 97110 THERAPEUTIC EXERCISES: CPT | Performed by: PHYSICAL THERAPIST

## 2021-05-07 PROCEDURE — 97530 THERAPEUTIC ACTIVITIES: CPT | Performed by: PHYSICAL THERAPIST

## 2021-05-07 NOTE — PROGRESS NOTES
Physical Therapy Daily Progress Note        Patient: Hu Burgess   : 1950  Diagnosis/ICD-10 Code:  Lumbar spine pain [M54.5]  Referring practitioner: Aldo REID (Tony)  Date of Initial Visit: Type: THERAPY  Noted: 2021  Today's Date: 2021  Patient seen for 6 sessions         Hu Burgess reports: he is about the same but it is getting a little better and he hasn't been coming too long.  He said the US helped last time.         Subjective     Objective   See Exercise, Manual, and Modality Logs for complete treatment.       Assessment/Plan  Updated HEP this date and reviewed exercises.  Focused on independence with exercises for improved carryover at home and greater progress towards goals.  Improved posture noted with exercises this date and pt reported improvements with this.  Tenderness to palpation (R) paraspinals with no tenderness (L) thus modified US to predominantly treat (R).  Will continue to monitor symptoms and progress as tolerated.        Progress per Plan of Care           Manual Therapy:         mins  37766;  Therapeutic Exercise:    28     mins  24610;     Neuromuscular Rosario:        mins  52894;    Therapeutic Activity:    10      mins  59625;     Gait Training:           mins  60486;     Ultrasound:     8     mins  74805;    Electrical Stimulation:         mins  52389 ( );  Dry Needling          mins self-pay    Timed Treatment:   46   mins   Total Treatment:     46   mins    Any Voss PTA  Physical Therapist Assistant

## 2021-05-07 NOTE — PATIENT INSTRUCTIONS
Access Code: [email or web address]  URL: [email or web address]  Date: 05/07/2021  Prepared by: Any Voss    Exercises  Supine Hamstring Stretch with Strap - 2 x daily - 7 x weekly - 1 sets - 5 reps - 10 hold  Supine Lower Trunk Rotation - 3 x daily - 7 x weekly - 15 reps  Sidelying Thoracic Rotation with Open Book - 1 x daily - 7 x weekly - 15 reps  Supine Posterior Pelvic Tilt - 1 x daily - 7 x weekly - 1 sets - 15 reps - 5 hold  Clamshell with Resistance - 3 x daily - 7 x weekly - 10 reps - 3 hold  Hooklying Clamshell with Resistance - 1 x daily - 7 x weekly - 1 sets - 20 reps - 5 hold  Supine Hip Adduction Isometric with Ball - 1 x daily - 7 x weekly - 1 sets - 20 reps - 5 hold  Supine Shoulder Flexion with Dowel - 1 x daily - 7 x weekly - 1 sets - 10 reps - 5 hold  Standing Row with Anchored Resistance - 1 x daily - 7 x weekly - 1 sets - 15 reps  Shoulder Extension with Resistance - 1 x daily - 7 x weekly - 1 sets - 15 reps  Standing Anti-Rotation Press with Anchored Resistance - 1 x daily - 7 x weekly - 1 sets - 15 reps  Mid-Thoracic Spine Mobilization with Taped Tennis Balls Shoulder Flexion at Wall - 1 x daily - 7 x weekly - 1 sets - 10 reps

## 2021-05-10 ENCOUNTER — TREATMENT (OUTPATIENT)
Dept: PHYSICAL THERAPY | Facility: CLINIC | Age: 71
End: 2021-05-10

## 2021-05-10 DIAGNOSIS — R29.898 DIFFICULTY IN WEIGHT BEARING: ICD-10-CM

## 2021-05-10 DIAGNOSIS — M54.50 LUMBAR SPINE PAIN: Primary | ICD-10-CM
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
## 2021-05-10 PROCEDURE — 97530 THERAPEUTIC ACTIVITIES: CPT | Performed by: PHYSICAL THERAPIST

## 2021-05-10 PROCEDURE — 97110 THERAPEUTIC EXERCISES: CPT | Performed by: PHYSICAL THERAPIST

## 2021-05-10 NOTE — PROGRESS NOTES
Physical Therapy Daily Progress Note        Patient: Hu Burgess   : 1950  Diagnosis/ICD-10 Code:  Lumbar spine pain [M54.5]  Referring practitioner: Aldo REID (Tony)  Date of Initial Visit: Type: THERAPY  Noted: 2021  Today's Date: 5/10/2021  Patient seen for 7 sessions         Hu Burgess reports: he isn't doing too bad but his pain is a 5-6/10 depending on what he is doing.         Subjective     Objective          Palpation   Left   Tenderness of the lumbar paraspinals.     Right Tenderness of the lumbar paraspinals.     Additional Palpation Details  (R) greater than (L)      See Exercise, Manual, and Modality Logs for complete treatment.       Assessment/Plan  Maintained previous exercises however added LSVT inspired exercises to focus on trunk ROM, flexibility, balance, and strengthening of postural muscles.  He reported discomfort with forward flexion and difficulty maintaining posture with exercises.  Continued tenderness to palpation especially (R) paraspinals thus focused US to this area.  Will monitor tolerance to these additions and continue to progress exercises especially for postural and core strengthening to improve gait mechanics and decrease pain.      Progress per Plan of Care           Manual Therapy:         mins  91696;  Therapeutic Exercise:    25     mins  12371;     Neuromuscular Rosario:        mins  01735;    Therapeutic Activity:     15     mins  47948;     Gait Training:           mins  07273;     Ultrasound:          mins  54587;    Electrical Stimulation:         mins  72740 ( );  Dry Needling          mins self-pay    Timed Treatment:   40   mins   Total Treatment:     48   mins    Any Voss PTA  Physical Therapist Assistant

## 2021-05-14 ENCOUNTER — TREATMENT (OUTPATIENT)
Dept: PHYSICAL THERAPY | Facility: CLINIC | Age: 71
End: 2021-05-14
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
## 2021-05-14 DIAGNOSIS — M54.50 LUMBAR SPINE PAIN: Primary | ICD-10-CM

## 2021-05-14 DIAGNOSIS — R29.898 DIFFICULTY IN WEIGHT BEARING: ICD-10-CM

## 2021-05-14 PROCEDURE — 97112 NEUROMUSCULAR REEDUCATION: CPT | Performed by: PHYSICAL THERAPIST

## 2021-05-14 PROCEDURE — 97035 APP MDLTY 1+ULTRASOUND EA 15: CPT | Performed by: PHYSICAL THERAPIST

## 2021-05-14 PROCEDURE — 97110 THERAPEUTIC EXERCISES: CPT | Performed by: PHYSICAL THERAPIST

## 2021-05-14 NOTE — PROGRESS NOTES
Physical Therapy Daily Progress Note    Patient: Hu Bugress   : 1950  Diagnosis/ICD-10 Code:  No primary diagnosis found.  Referring practitioner: Aldo PALMA (Tony)*  Date of Initial Visit: No linked episodes  Today's Date: 2021  Patient seen for Visit count could not be calculated. Make sure you are using a visit which is associated with an episode. sessions           Subjective Doing ok, not much change yet but that's to be expected. 5/10 pain now, in low back. I can only walk about 10-15 minutes.     Objective   See Exercise, Manual, and Modality Logs for complete treatment.       Assessment/Plan Continued with postural and core stabilization and also added some flexion-focused exercises. Continued US as pt reported positive benefit.            Timed:    Manual Therapy:         mins  75790;  Therapeutic Exercise:    16     mins  67716;     Neuromuscular Rosario:    14    mins  69581;    Therapeutic Activity:          mins  27795;     Gait Training:           mins  50329;     Ultrasound:     10     mins  45241;    Electrical Stimulation:         mins  70579 (MC );  Iontophoresis         mins 74543;  Aquatic Therapy         mins 16121;  Dry Needling                   mins    Untimed:  Electrical Stimulation:         mins  58646 (MC );  Mechanical Traction:        mins  71907;     Timed Treatment:   40   mins   Total Treatment:     45   mins  Snehal Barber, PT  Physical Therapist

## 2021-05-17 ENCOUNTER — TREATMENT (OUTPATIENT)
Dept: PHYSICAL THERAPY | Facility: CLINIC | Age: 71
End: 2021-05-17

## 2021-05-17 DIAGNOSIS — M54.50 LUMBAR SPINE PAIN: Primary | ICD-10-CM
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
## 2021-05-17 DIAGNOSIS — R29.898 DIFFICULTY IN WEIGHT BEARING: ICD-10-CM

## 2021-05-17 PROCEDURE — 97110 THERAPEUTIC EXERCISES: CPT | Performed by: PHYSICAL THERAPIST

## 2021-05-17 PROCEDURE — 97530 THERAPEUTIC ACTIVITIES: CPT | Performed by: PHYSICAL THERAPIST

## 2021-05-17 NOTE — PROGRESS NOTES
Physical Therapy Daily Progress Note        Patient: Hu Burgess   : 1950  Diagnosis/ICD-10 Code:  Lumbar spine pain [M54.5]  Referring practitioner: Aldo REID (Tony)  Date of Initial Visit: Type: THERAPY  Noted: 2021  Today's Date: 2021  Patient seen for 9 sessions         Hu Burgess reports: he is doing better and everything seems to be helping.         Subjective     Objective          Static Posture     Head  Forward.    Shoulders  Rounded.    Palpation   Left   Tenderness of the lumbar paraspinals.     Right Tenderness of the lumbar paraspinals.     Additional Palpation Details  (R) greater than (L)    Ambulation     Quality of Movement During Gait   Trunk  Forward lean.     Additional Quality of Movement During Gait Details  Forward head and rounded shoulders      See Exercise, Manual, and Modality Logs for complete treatment.       Assessment/Plan  Continued paraspinal tenderness and posture/gait deviations as noted.  Increased pain (B) today thus modified US to include both sides this date.  Continued to focus on core and postural strength as well as increased core flexibility/ROM.  Continued with LSVT inspired exercises to improve posture, flexibility, and balance.  He tolerated progressions with all exercises well.  Will continue to progress exercises as tolerated towards goals and improved functional mobility with decreased pain.    Progress per Plan of Care           Manual Therapy:         mins  76869;  Therapeutic Exercise:    33     mins  96540;     Neuromuscular Rosario:        mins  73408;    Therapeutic Activity:     15     mins  72751;     Gait Training:           mins  88865;     Ultrasound:          mins  06187;    Electrical Stimulation:         mins  31890 ( );  Dry Needling          mins self-pay    Timed Treatment:   48   mins   Total Treatment:     58   mins    Any Voss PTA  Physical Therapist Assistant

## 2021-05-20 DIAGNOSIS — G89.29 CHRONIC LOW BACK PAIN, UNSPECIFIED BACK PAIN LATERALITY, UNSPECIFIED WHETHER SCIATICA PRESENT: ICD-10-CM

## 2021-05-20 DIAGNOSIS — M54.50 CHRONIC LOW BACK PAIN, UNSPECIFIED BACK PAIN LATERALITY, UNSPECIFIED WHETHER SCIATICA PRESENT: ICD-10-CM

## 2021-05-20 RX ORDER — TRAMADOL HYDROCHLORIDE 50 MG/1
50 TABLET ORAL 2 TIMES DAILY
Qty: 60 TABLET | Refills: 0 | Status: SHIPPED | OUTPATIENT
Start: 2021-05-20 | End: 2021-07-19 | Stop reason: SDUPTHER

## 2021-05-24 ENCOUNTER — TREATMENT (OUTPATIENT)
Dept: PHYSICAL THERAPY | Facility: CLINIC | Age: 71
End: 2021-05-24

## 2021-05-24 DIAGNOSIS — M54.50 LUMBAR SPINE PAIN: Primary | ICD-10-CM
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
## 2021-05-24 DIAGNOSIS — R29.898 DIFFICULTY IN WEIGHT BEARING: ICD-10-CM

## 2021-05-24 PROCEDURE — 97530 THERAPEUTIC ACTIVITIES: CPT | Performed by: PHYSICAL THERAPIST

## 2021-05-24 PROCEDURE — 97110 THERAPEUTIC EXERCISES: CPT | Performed by: PHYSICAL THERAPIST

## 2021-05-24 NOTE — PROGRESS NOTES
"Physical Therapy Daily Progress Note    Visit # : 10  Hu Burgess reports: it is feeling better now that he is spreading the sessions out more.  Pt states his pain is about the same.      Subjective     Objective   See Exercise, Manual, and Modality Logs for complete treatment.       Assessment & Plan     Assessment  Assessment details: Pt continues to c/o his lower back aching and states at times it aches more after doing the exercise.  Held trunk flex exercises due to pt c/o increased pain during exercise.  Increased pt's bands to blue and pt was able to perform all exercises without pain.  Pt still needs verbal cueing to stand more upright.  Pt denied having \"aching back pain\" today post treatment.  Will continue to see pt 1-2x/week for another 1-2 more weeks with possible discharge if pt continues to report limited progress.        Progress per Plan of Care         Manual Therapy:         mins  07204;  Therapeutic Exercise:   35      mins  75219;     Neuromuscular Rosario:        mins  42495;    Therapeutic Activity:    15      mins  07473;     Gait Training:           mins  16207;     Ultrasound:          mins  31016;    Electrical Stimulation:         mins  07980 ( );  Dry Needling          mins self-pay    Timed Treatment:  50    mins   Total Treatment:     55   mins    Esthela Grigsby, PT  Physical Therapist  "

## 2021-05-28 ENCOUNTER — TREATMENT (OUTPATIENT)
Dept: PHYSICAL THERAPY | Facility: CLINIC | Age: 71
End: 2021-05-28

## 2021-05-28 DIAGNOSIS — M54.50 LUMBAR SPINE PAIN: Primary | ICD-10-CM
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
## 2021-05-28 DIAGNOSIS — R29.898 DIFFICULTY IN WEIGHT BEARING: ICD-10-CM

## 2021-05-28 PROCEDURE — 97035 APP MDLTY 1+ULTRASOUND EA 15: CPT | Performed by: PHYSICAL THERAPIST

## 2021-05-28 PROCEDURE — 97530 THERAPEUTIC ACTIVITIES: CPT | Performed by: PHYSICAL THERAPIST

## 2021-05-28 PROCEDURE — 97110 THERAPEUTIC EXERCISES: CPT | Performed by: PHYSICAL THERAPIST

## 2021-05-28 NOTE — PROGRESS NOTES
Physical Therapy Daily Progress Note    Visit # : 11  Hu Burgess reports: his back is feeling a little better.  Pt denies having any increased pain after treatment.      Subjective     Objective   See Exercise, Manual, and Modality Logs for complete treatment.       Assessment & Plan     Assessment  Assessment details: Pt is doing better with decreased c/o pain since last visit.  Pt's point tenderness more isolated over the right lumbar PS and quadratus lumborum today, so only did US treatment to the right side.  Pt was able to complete all stabilization exercises without difficulty, but held seated reaching to the floor and ceiling due to increased LBP.  Will continue to see pt 1-2x/week for stretching, strengthening, and modalities PRN.          Progress per Plan of Care           Manual Therapy:         mins  86820;  Therapeutic Exercise:    37     mins  77241;     Neuromuscular Rosario:        mins  52444;    Therapeutic Activity:    18      mins  10408;     Gait Training:           mins  76332;     Ultrasound:    8     mins  01078;    Electrical Stimulation:         mins  47538 ( );  Dry Needling          mins self-pay    Timed Treatment:  18    mins   Total Treatment:    65    mins    Esthela Grigsby, PT  Physical Therapist

## 2021-06-03 ENCOUNTER — TREATMENT (OUTPATIENT)
Dept: PHYSICAL THERAPY | Facility: CLINIC | Age: 71
End: 2021-06-03

## 2021-06-03 DIAGNOSIS — R29.898 DIFFICULTY IN WEIGHT BEARING: ICD-10-CM
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
## 2021-06-03 DIAGNOSIS — M54.50 LUMBAR SPINE PAIN: Primary | ICD-10-CM

## 2021-06-03 PROCEDURE — 97530 THERAPEUTIC ACTIVITIES: CPT | Performed by: PHYSICAL THERAPIST

## 2021-06-03 PROCEDURE — 97110 THERAPEUTIC EXERCISES: CPT | Performed by: PHYSICAL THERAPIST

## 2021-06-03 PROCEDURE — 97035 APP MDLTY 1+ULTRASOUND EA 15: CPT | Performed by: PHYSICAL THERAPIST

## 2021-06-03 NOTE — PROGRESS NOTES
Re-Assessment / Re-Certification      Patient: Hu Burgess   : 1950  Diagnosis/ICD-10 Code:  Lumbar spine pain [M54.5]  Referring practitioner: Aldo REID (Tony)  Date of Initial Visit: 6/3/2021  Today's Date: 6/3/2021  Patient seen for 12 sessions      Subjective:   Hu Burgess reports: his back pain is a 4-5/10 described as throbbing when he lifts anything with weight that is moderately heavy, but then after that is continues to hurt the rest of the day.    Subjective Questionnaire: Oswestry: back index 25/50-50%  Clinical Progress: slow progress-decreased lumbar AROM due to pain from initial evaluation   Home Program Compliance: Yes  Treatment has included: therapeutic exercise, therapeutic activity, ultrasound and pt has been instructed in a HEP            Subjective   Objective          Palpation     Right Tenderness of the erector spinae and lumbar paraspinals.     Additional Palpation Details  MILD RIGHT LUMBAR PS TENDERNESS WITH PALPATION     Active Range of Motion     Lumbar   Flexion: 50 degrees with pain  Extension: Active lumbar extension: NEUTRAL  with pain  Left lateral flexion: 15 degrees with pain  Right lateral flexion: 15 degrees with pain  Left rotation: 50 degrees with pain  Right rotation: 50 degrees with pain    Additional Active Range of Motion Details  ALL LUMBAR AROM PAINFUL     Strength/Myotome Testing     Left Hip   Planes of Motion   Flexion: 5  External rotation: 4  Internal rotation: 4+    Right Hip   Planes of Motion   Flexion: 5  External rotation: 5  Internal rotation: 5    Left Knee   Flexion: 5  Extension: 5    Right Knee   Flexion: 5  Extension: 5    Left Ankle/Foot   Dorsiflexion: 5  Plantar flexion: 5  Eversion: 5    Right Ankle/Foot   Dorsiflexion: 5  Plantar flexion: 5  Eversion: 5    Tests     Lumbar     Left   Negative crossed SLR and passive SLR.     Right   Negative crossed SLR and passive SLR.     Additional Tests Details  RIGHT HIP IR DUE TO  INCREASED LBP- 25% HIP IR AVAILABLE DUE PAIN IN BACK.        Assessment & Plan     Assessment  Assessment details: Pt is slowly progressing due to increased c/o pain in lower back with limited AROM all planes and right hip IR due to referred pain in right lower back during passive ROM.  Pt has only met 2/9 goals being HEP, but all objective testing is limited by pain with ROM, palpation, and hip IR.  Pt can tolerated all stabilization exercises and reports less pain after US treatments, but feel pt is starting to show signs of plateauing in PT and may benefit from returning to MD for further testing/evaluation.  Will see pt 1-2 more weeks, but if symptoms continue to not change will refer pt back to MD.        Progress toward previous goals: Partially Met    1. Pt will be compliant with HEP  MET   2. Pt will tolerate accessory motion testing of the lumbar spine without increases in pain >/= 4/10.  NOT MET   3. Pt will increase lumbar extension AROM to >/=25% without increases in pain >/= 4/10.  NOT MET   4. Pt will be able to stand on one leg for >/= 30 sec without UE support. NOT MET     LTG's (4-8 weeks)    1. Pt will be independent with HEP.  MET   2. Pt will decrease Back index score to </= 10%.  NOT MET   3. Pt will increase lumbar AROM to >/= 50% in shakir SB and rot and extension.  NOT MET   4. Pt will be able to lay in supine with bilateral LE extended without increases in pain >/= 1-2/10. NOT MET   5. Pt will increase hip strength shakir to 5/5 in ER, flexion, and IR.  RIGHT HIP 4/5.      Recommendations: Continue with recommendations see for 1-2 more weeks with plans to refer back to MD soon if symptoms don't improve.    Timeframe: 2 weeks  Prognosis to achieve goals: fair    PT Signature: Esthela Grigsby, PT      Based upon review of the patient's progress and continued therapy plan, it is my medical opinion that Hu Burgess should continue physical therapy treatment at [facility]  [facility] PHYS[facility]  [address] [id number] [phone].    Signature: __________________________________  Aldo Guajardo MD (Tony)    Manual Therapy:         mins  51404;  Therapeutic Exercise:   35      mins  40416;     Neuromuscular Rosario:        mins  96462;    Therapeutic Activity:    15      mins  03884;     Gait Training:           mins  43660;     Ultrasound:     8     mins  84257;    Electrical Stimulation:         mins  71670 ( );  Dry Needling          mins self-pay    Timed Treatment:  58    mins   Total Treatment:    63    mins

## 2021-06-11 ENCOUNTER — TREATMENT (OUTPATIENT)
Dept: PHYSICAL THERAPY | Facility: CLINIC | Age: 71
End: 2021-06-11

## 2021-06-11 DIAGNOSIS — M54.50 LUMBAR SPINE PAIN: Primary | ICD-10-CM

## 2021-06-11 DIAGNOSIS — R29.898 DIFFICULTY IN WEIGHT BEARING: ICD-10-CM
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
## 2021-06-11 PROCEDURE — 97110 THERAPEUTIC EXERCISES: CPT | Performed by: PHYSICAL THERAPIST

## 2021-06-11 NOTE — PROGRESS NOTES
Physical Therapy Daily Progress Note        Patient: Hu Burgess   : 1950  Diagnosis/ICD-10 Code:  Lumbar spine pain [M54.5]  Referring practitioner: Aldo REID (Tony)  Date of Initial Visit: Type: THERAPY  Noted: 2021  Today's Date: 2021  Patient seen for 13 sessions         Hu Burgess reports: his pain is better some days.  He tries to get out more when the weather is nice.  He says some days are better than others and the thinks it is the way he sleeps.  He said it never goes away.  He report        Subjective     Objective          Palpation     Right Tenderness of the lumbar paraspinals.       See Exercise, Manual, and Modality Logs for complete treatment.       Assessment/Plan  Pt reported some improvements but it is inconsistent.  Maintained exercises to focus on independence with exercises and prepare for D/C to HEP for self management of condition.  Continued cues required for technique.  Discussed dry needling to assist with pain relief and self management of condition as persistent tightness/tenderness with palpation.  Pt plans to speak with MD and may trial after PT finishes.      Progress per Plan of Care and Anticipate DC next Visit           Manual Therapy:         mins  63449;  Therapeutic Exercise:    43     mins  31510;     Neuromuscular Rosario:        mins  32423;    Therapeutic Activity:          mins  48818;     Gait Training:           mins  54633;     Ultrasound:     8     mins  91844;    Electrical Stimulation:         mins  17604 ( );  Dry Needling          mins self-pay    Timed Treatment:   51   mins   Total Treatment:     51   mins    Any Voss PTA  Physical Therapist Assistant

## 2021-06-18 ENCOUNTER — TELEPHONE (OUTPATIENT)
Dept: PHYSICAL THERAPY | Facility: CLINIC | Age: 71
End: 2021-06-18

## 2021-06-25 ENCOUNTER — TELEPHONE (OUTPATIENT)
Dept: PHYSICAL THERAPY | Facility: CLINIC | Age: 71
End: 2021-06-25

## 2021-06-25 NOTE — TELEPHONE ENCOUNTER
PATIENT STATES HE MAY WANT DRY NEEDLING.  HE WOULD LIKE A THERAPIST TO CALL HIM AND TALK ABOUT IT.

## 2021-06-28 ENCOUNTER — OFFICE VISIT (OUTPATIENT)
Dept: INTERNAL MEDICINE | Facility: CLINIC | Age: 71
End: 2021-06-28

## 2021-06-28 VITALS
HEART RATE: 55 BPM | BODY MASS INDEX: 24 KG/M2 | OXYGEN SATURATION: 98 % | RESPIRATION RATE: 16 BRPM | HEIGHT: 74 IN | WEIGHT: 187 LBS | DIASTOLIC BLOOD PRESSURE: 69 MMHG | TEMPERATURE: 96.6 F | SYSTOLIC BLOOD PRESSURE: 114 MMHG

## 2021-06-28 DIAGNOSIS — I50.9 HEART FAILURE, UNSPECIFIED HF CHRONICITY, UNSPECIFIED HEART FAILURE TYPE (HCC): ICD-10-CM

## 2021-06-28 DIAGNOSIS — R60.0 BILATERAL LOWER EXTREMITY EDEMA: Primary | ICD-10-CM

## 2021-06-28 PROCEDURE — 99214 OFFICE O/P EST MOD 30 MIN: CPT | Performed by: INTERNAL MEDICINE

## 2021-06-28 NOTE — PROGRESS NOTES
"Chief Complaint  Foot Swelling    Subjective          Hu Burgess presents to [facility] for swelling in both feet.     Noticed over last 1-2 weeks. Worse at the end of the day, better in the morning or after elevation. No redness or warmth, no fevers, no chills. Denies shortness of breath, orthopnea, or PND. Normal urination and urinary patterns. He does have CKD stage 3, no known history of heart failure (last echo in 2017 with EF 58%), no history of liver failure. Has been on nifedipine for quite some time for hypertension.     Objective   Vital Signs:     /69   Pulse 55   Temp 96.6 °F (35.9 °C)   Resp 16   Ht 188 cm (74\")   Wt 84.8 kg (187 lb)   SpO2 98%   BMI 24.01 kg/m²     Physical Exam  Vitals and nursing note reviewed.   Constitutional:       General: He is not in acute distress.     Appearance: Normal appearance. He is not toxic-appearing.   HENT:      Head: Normocephalic and atraumatic.      Nose: Nose normal.      Mouth/Throat:      Mouth: Mucous membranes are moist.      Pharynx: No oropharyngeal exudate.   Eyes:      General:         Right eye: No discharge.         Left eye: No discharge.      Extraocular Movements: Extraocular movements intact.      Conjunctiva/sclera: Conjunctivae normal.      Pupils: Pupils are equal, round, and reactive to light.   Cardiovascular:      Rate and Rhythm: Normal rate and regular rhythm.      Pulses: Normal pulses.      Heart sounds: Normal heart sounds. No murmur heard.     Pulmonary:      Effort: Pulmonary effort is normal. No respiratory distress.      Breath sounds: Normal breath sounds. No wheezing.   Abdominal:      General: Abdomen is flat. Bowel sounds are normal. There is no distension.      Palpations: Abdomen is soft.      Tenderness: There is no abdominal tenderness.   Musculoskeletal:         General: Normal range of motion.      Cervical back: Normal range of motion and neck supple. No " tenderness.   Lymphadenopathy:      Cervical: No cervical adenopathy.   Skin:     General: Skin is warm.      Comments: Bilateral lower extremity edema, pitting up to knees, no erythema, warmth, equal bilaterally    Neurological:      General: No focal deficit present.      Mental Status: He is alert.   Psychiatric:         Mood and Affect: Mood normal.          Result Review :   The following data was reviewed by: Donal Vu MD on 06/28/2021:  Common labs    Common Labsle 3/18/21 3/18/21 3/18/21 3/18/21 3/18/21    0833 0833 0833 0833 0833   Glucose    95    BUN    17    Creatinine    1.68 (A)    eGFR Non  Am    40 (A)    eGFR African Am    49 (A)    Sodium    141    Potassium    4.5    Chloride    106    Calcium    9.6    Total Protein    6.9    Albumin    3.50    Total Bilirubin    2.1 (A)    Alkaline Phosphatase    302 (A)    AST (SGOT)    83 (A)    ALT (SGPT)    40    WBC     6.33   Hemoglobin     14.0   Hematocrit     40.3   Platelets     160   Total Cholesterol  188      Triglycerides  89      HDL Cholesterol  51      LDL Cholesterol   121 (A)      Hemoglobin A1C 4.60 (A)       PSA   1.040     (A) Abnormal value       Comments are available for some flowsheets but are not being displayed.                  Assessment and Plan      Hu Burgess is a 70 y.o. male with CKD stage 3 and hypertension who presents with bilateral lower extremity edema in absence of shortness of breath, orthopnea, PND, no abdominal pain or other red flag symptoms. Certainly his CKD puts him at risk of having fluid retention so will recheck CMP today also to assess liver function and albumin as factors. Will check BNP as quick screening for new onset heart failure, certainly can be elevated in CKD but if significant elevated, he will warrant further workup with echocardiogram as his CKD and HTN are risk factors for heart failure.        Diagnoses and all orders for this visit:    1. Bilateral lower extremity edema  (Primary)  Assessment & Plan:  - check CMP and BNP to eval for worsening kidney function, hypoalbuminemia/liver function and BNP to eval for heart failure  - most likely venous insufficiency but will r/o more serious causes first  - start use of compression stockings, discussed options and frequency of use   - diuretics not currently indicated but will consider pending current workup.    Orders:  -     Comprehensive Metabolic Panel  -     BNP    Follow Up   Return if symptoms worsen or fail to improve.    Patient was given instructions and counseling regarding his condition or for health maintenance advice. Please see specific information pulled into the AVS if appropriate.     Patient seen and evaluated with Dr. Vu. Pertinent portions of history and exam repeated. Agree with assessment and plan as above. 71 yo M here with new onset bilateral lower extremity swelling. Present iwht 1-2+ pitting to knee level, started 2 weeks ago. Most likely c/w venous insufficiency, but since new onset in setting of CKD, will get labs today to ensure no STEVEN or other acute lab change. BNP to assess change in cardiac function, BP in goal range today btu last ECHO 2017. On CCB which can contribute, but has been on this long term without any issues. Rec compression stockings, will update plan as indicated based on labs.     Lisandra Richard MD  [facility]  Phone: [phone]  Fax: [phone]

## 2021-06-28 NOTE — ASSESSMENT & PLAN NOTE
- check CMP and BNP to eval for worsening kidney function, hypoalbuminemia/liver function and BNP to eval for heart failure  - most likely venous insufficiency but will r/o more serious causes first  - start use of compression stockings, discussed options and frequency of use   - diuretics not currently indicated but will consider pending current workup.

## 2021-06-29 LAB
ALBUMIN SERPL-MCNC: 3.6 G/DL (ref 3.5–5.2)
ALBUMIN/GLOB SERPL: 1.1 G/DL
ALP SERPL-CCNC: 315 U/L (ref 39–117)
ALT SERPL-CCNC: 30 U/L (ref 1–41)
AST SERPL-CCNC: 71 U/L (ref 1–40)
BILIRUB SERPL-MCNC: 2.3 MG/DL (ref 0–1.2)
BUN SERPL-MCNC: 17 MG/DL (ref 8–23)
BUN/CREAT SERPL: 10.6 (ref 7–25)
CALCIUM SERPL-MCNC: 9.2 MG/DL (ref 8.6–10.5)
CHLORIDE SERPL-SCNC: 107 MMOL/L (ref 98–107)
CO2 SERPL-SCNC: 26.7 MMOL/L (ref 22–29)
CREAT SERPL-MCNC: 1.6 MG/DL (ref 0.76–1.27)
GLOBULIN SER CALC-MCNC: 3.4 GM/DL
GLUCOSE SERPL-MCNC: 73 MG/DL (ref 65–99)
POTASSIUM SERPL-SCNC: 4.4 MMOL/L (ref 3.5–5.2)
PROT SERPL-MCNC: 7 G/DL (ref 6–8.5)
SODIUM SERPL-SCNC: 139 MMOL/L (ref 136–145)

## 2021-06-30 ENCOUNTER — TRANSCRIBE ORDERS (OUTPATIENT)
Dept: ADMINISTRATIVE | Facility: HOSPITAL | Age: 71
End: 2021-06-30

## 2021-06-30 ENCOUNTER — LAB (OUTPATIENT)
Dept: LAB | Facility: HOSPITAL | Age: 71
End: 2021-06-30

## 2021-06-30 DIAGNOSIS — N18.30 MALIGNANT HYPERTENSIVE HEART AND CHRONIC KIDNEY DISEASE STAGE III (HCC): ICD-10-CM

## 2021-06-30 DIAGNOSIS — M62.82 NON-TRAUMATIC RHABDOMYOLYSIS: ICD-10-CM

## 2021-06-30 DIAGNOSIS — I13.10 MALIGNANT HYPERTENSIVE HEART AND CHRONIC KIDNEY DISEASE STAGE III (HCC): ICD-10-CM

## 2021-06-30 DIAGNOSIS — I13.10 MALIGNANT HYPERTENSIVE HEART AND CHRONIC KIDNEY DISEASE STAGE III (HCC): Primary | ICD-10-CM

## 2021-06-30 DIAGNOSIS — N18.6 ANEMIA IN END-STAGE RENAL DISEASE (HCC): ICD-10-CM

## 2021-06-30 DIAGNOSIS — N18.30 MALIGNANT HYPERTENSIVE HEART AND CHRONIC KIDNEY DISEASE STAGE III (HCC): Primary | ICD-10-CM

## 2021-06-30 DIAGNOSIS — D63.1 ANEMIA IN END-STAGE RENAL DISEASE (HCC): ICD-10-CM

## 2021-06-30 LAB
ALBUMIN SERPL-MCNC: 3.4 G/DL (ref 3.5–5.2)
ANION GAP SERPL CALCULATED.3IONS-SCNC: 8.5 MMOL/L (ref 5–15)
BACTERIA UR QL AUTO: ABNORMAL /HPF
BILIRUB UR QL STRIP: NEGATIVE
BUN SERPL-MCNC: 15 MG/DL (ref 8–23)
BUN/CREAT SERPL: 10.4 (ref 7–25)
CALCIUM SPEC-SCNC: 9.1 MG/DL (ref 8.6–10.5)
CHLORIDE SERPL-SCNC: 105 MMOL/L (ref 98–107)
CLARITY UR: CLEAR
CO2 SERPL-SCNC: 23.5 MMOL/L (ref 22–29)
COLOR UR: ABNORMAL
CREAT SERPL-MCNC: 1.44 MG/DL (ref 0.76–1.27)
GFR SERPL CREATININE-BSD FRML MDRD: 48 ML/MIN/1.73
GLUCOSE SERPL-MCNC: 92 MG/DL (ref 65–99)
GLUCOSE UR STRIP-MCNC: NEGATIVE MG/DL
HGB UR QL STRIP.AUTO: NEGATIVE
HYALINE CASTS UR QL AUTO: ABNORMAL /LPF
KETONES UR QL STRIP: NEGATIVE
LEUKOCYTE ESTERASE UR QL STRIP.AUTO: NEGATIVE
NITRITE UR QL STRIP: NEGATIVE
PH UR STRIP.AUTO: 7 [PH] (ref 5–8)
PHOSPHATE SERPL-MCNC: 3.7 MG/DL (ref 2.5–4.5)
POTASSIUM SERPL-SCNC: 4.1 MMOL/L (ref 3.5–5.2)
PROT UR QL STRIP: NEGATIVE
RBC # UR: ABNORMAL /HPF
REF LAB TEST METHOD: ABNORMAL
SODIUM SERPL-SCNC: 137 MMOL/L (ref 136–145)
SP GR UR STRIP: 1.01 (ref 1–1.03)
SQUAMOUS #/AREA URNS HPF: ABNORMAL /HPF
UROBILINOGEN UR QL STRIP: ABNORMAL
WBC UR QL AUTO: ABNORMAL /HPF

## 2021-06-30 PROCEDURE — 36415 COLL VENOUS BLD VENIPUNCTURE: CPT

## 2021-06-30 PROCEDURE — 80069 RENAL FUNCTION PANEL: CPT

## 2021-06-30 PROCEDURE — 81001 URINALYSIS AUTO W/SCOPE: CPT

## 2021-07-07 NOTE — PROGRESS NOTES
Physical Therapy Initial Evaluation and Plan of Care         Patient: Hu Burgess   : 1950  Diagnosis/ICD-10 Code:  Lumbar spine pain [M54.5]  Referring practitioner: No ref. provider found  Date of Initial Visit: 2021  Today's Date: 2021  Patient seen for 1 sessions                Subjective Evaluation    History of Present Illness  Mechanism of injury:  Patient states ongoing pain for years.  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  Occasional radiation into the back of the buttocks and thighs, worse on right than left.    Subjective comment: See evaluation performed in physical therapy for more subjective history. Pt denies active infection, blood bourne illness, needle phobia, use of blood thinners, any compromised immune system, recent surgery. PMH: HTN, kidney failure, hyperlipidemia.Pain  Location: lower back, right worse. Some into buttocks  Quality: dull ache  Aggravating factors: lifting    Treatments  Previous treatment: physical therapy  Patient Goals  Patient goals for therapy: decreased pain             Objective          Postural Observations  Standing posture: good        Palpation   Left   Hypertonic in the erector spinae, gluteus medius, lumbar paraspinals and quadratus lumborum.   Tenderness of the erector spinae, lumbar paraspinals and quadratus lumborum.     Right   Hypertonic in the erector spinae, gluteus medius, lumbar paraspinals and quadratus lumborum. Tenderness of the erector spinae, lumbar paraspinals and quadratus lumborum.     Neurological Testing     Sensation     Lumbar   Left   Intact: light touch    Right   Intact: light touch    Active Range of Motion     Lumbar   Flexion: 30 degrees with pain  Extension: 30 degrees   Left lateral flexion: 15 degrees with pain  Right lateral flexion: 15 degrees with pain      Patient was instructed in indications for dry needling treatment,  conditions treated,  procedure to be performed, possible side effects, contraindications, and risks of the procedure.      Assessment/Plan  Patient presents with chronic low back pain and muscle guarding.  He has some radiating symptoms but not into the LE.  Patient presents with  decreased ROM, and pain affecting ADL's.  Based on the above findings and review of patient's medical history, he is a good candidate for dry needling.  Conservative treatment today, will assess how patient tolerated treatment and progress as able.  Treatment was tolerated well with increased ROM and decreased pain.    Add dry needling to plan of care for decreased pain, muscle guarding, and improved neuromuscular control.    STG X 2 weeks  1.  Increase trunk ROM in flexion with 25% less pain.  2.  ADL's with tolerable symptoms.  LTG X 6 weeks  1.  Functional trunk ROM.  2.  Minimal to no pain radiating to buttocks.  3.  Decrease pain with lifting 25%.    Manual Therapy:    -     mins  59549;  Therapeutic Exercise:    -     mins  32313;     Neuromuscular Rosario:    -    mins  20581;    Therapeutic Activity:     7     mins  14990;     Gait Training:      -     mins  64977;     Ultrasound:     -     mins  15623;    Electrical Stimulation:    -     mins  63649 ( );  Dry Needling     10     mins self-pay    Timed Treatment:   17   mins   Total Treatment:     25   mins    PT SIGNATURE: Brunilda Manjarrez, PT   DATE TREATMENT INITIATED: 7/9/2021    Initial Certification  Certification Period: 10/7/2021

## 2021-07-09 ENCOUNTER — TREATMENT (OUTPATIENT)
Dept: PHYSICAL THERAPY | Facility: CLINIC | Age: 71
End: 2021-07-09

## 2021-07-09 DIAGNOSIS — M54.50 LUMBAR SPINE PAIN: Primary | ICD-10-CM

## 2021-07-09 PROCEDURE — DRYNDL PR CUSTOM DRY NEEDLING SELF PAY: Performed by: PHYSICAL THERAPIST

## 2021-07-19 ENCOUNTER — TELEMEDICINE (OUTPATIENT)
Dept: INTERNAL MEDICINE | Facility: CLINIC | Age: 71
End: 2021-07-19

## 2021-07-19 DIAGNOSIS — I10 ESSENTIAL HYPERTENSION: ICD-10-CM

## 2021-07-19 DIAGNOSIS — R60.0 BILATERAL LOWER EXTREMITY EDEMA: Primary | ICD-10-CM

## 2021-07-19 DIAGNOSIS — N18.9 CHRONIC KIDNEY DISEASE, UNSPECIFIED CKD STAGE: ICD-10-CM

## 2021-07-19 DIAGNOSIS — M54.50 CHRONIC LOW BACK PAIN, UNSPECIFIED BACK PAIN LATERALITY, UNSPECIFIED WHETHER SCIATICA PRESENT: ICD-10-CM

## 2021-07-19 DIAGNOSIS — G89.29 CHRONIC LOW BACK PAIN, UNSPECIFIED BACK PAIN LATERALITY, UNSPECIFIED WHETHER SCIATICA PRESENT: ICD-10-CM

## 2021-07-19 PROCEDURE — 99214 OFFICE O/P EST MOD 30 MIN: CPT | Performed by: INTERNAL MEDICINE

## 2021-07-19 RX ORDER — CYCLOBENZAPRINE HCL 10 MG
10 TABLET ORAL 3 TIMES DAILY PRN
Qty: 30 TABLET | Refills: 1 | Status: SHIPPED | OUTPATIENT
Start: 2021-07-19 | End: 2021-08-20

## 2021-07-19 RX ORDER — TRAMADOL HYDROCHLORIDE 50 MG/1
50 TABLET ORAL 2 TIMES DAILY
Qty: 60 TABLET | Refills: 0 | Status: SHIPPED | OUTPATIENT
Start: 2021-07-19 | End: 2021-10-13 | Stop reason: SDUPTHER

## 2021-07-19 NOTE — PROGRESS NOTES
Chief Complaint  No chief complaint on file.    Subjective          Hu Burgess presents to [facility]  History of Present Illness  Here for the physical today.  Overall doing very well.  No specific concerns.  We talked about diet and exercise habits.  Discussed prevention recommendations.  Procardia XL causing some edema,mild and not bothering him.  He needs a pain medicine refill for his chronic low back pain.  Also wanted to try the Flexeril for muscle spasm.  Overall he is doing pretty well with that.  Seeing physical therapy still.You have chosen to receive care through a telehealth visit.  Do you consent to use a video/audio connection for your medical care today? Yes    Objective   Vital Signs:   There were no vitals taken for this visit.    Physical Exam pleasant gentleman conversive and understanding of situation appropriately.  Also is concerned about his low back pain and wanting to try the muscle relaxer  Result Review :                 Assessment and Plan mild lower extremity edema probably related to the Procardia XL.  No other concerning symptoms.  Renal function actually better on his chemistry profile and urine does not show any proteinuria.  Albumin is normal.  Recheck with exam next visit.  He has a cardiology follow-up as well.  At this point he is doing well on the medicine will not change it as the edema is not bothering him much and it is mild.  Chronic low back pain with history of compression fractures.  Tramadol prescription refill given.  Cyclobenzaprine prescription sent in as well.  Follow-up if any problems and for routine care    Diagnoses and all orders for this visit:    1. Bilateral lower extremity edema (Primary)    2. Chronic low back pain, unspecified back pain laterality, unspecified whether sciatica present  -     traMADol (ULTRAM) 50 MG tablet; Take 1 tablet by mouth 2 (two) times a day.  Dispense: 60 tablet; Refill:  0    3. Essential hypertension    4. Chronic kidney disease, unspecified CKD stage    Other orders  -     cyclobenzaprine (FLEXERIL) 10 MG tablet; Take 1 tablet by mouth 3 (Three) Times a Day As Needed for Muscle Spasms.  Dispense: 30 tablet; Refill: 1        Follow Up   No follow-ups on file.  Patient was given instructions and counseling regarding his condition or for health maintenance advice. Please see specific information pulled into the AVS if appropriate.

## 2021-07-23 ENCOUNTER — TREATMENT (OUTPATIENT)
Dept: PHYSICAL THERAPY | Facility: CLINIC | Age: 71
End: 2021-07-23

## 2021-07-23 DIAGNOSIS — R29.898 DIFFICULTY IN WEIGHT BEARING: ICD-10-CM

## 2021-07-23 DIAGNOSIS — M54.50 LUMBAR SPINE PAIN: Primary | ICD-10-CM

## 2021-07-23 PROCEDURE — DRYNDL PR CUSTOM DRY NEEDLING SELF PAY: Performed by: PHYSICAL THERAPIST

## 2021-07-23 NOTE — PROGRESS NOTES
Physical Therapy Daily Progress Note        Patient: Hu Burgess   : 1950  Diagnosis/ICD-10 Code:  Lumbar spine pain [M54.5]  Referring practitioner: No ref. provider found  Date of Initial Visit: 2021  Today's Date: 2021  Patient seen for 2 sessions         Hu Burgess reports: I felt a little better after the first Dry Needling session.         Subjective     Objective          Postural Observations  Standing posture: good        Palpation   Left   Hypertonic in the erector spinae, gluteus medius, lumbar paraspinals and quadratus lumborum.   Tenderness of the erector spinae, lumbar paraspinals and quadratus lumborum.     Right   Hypertonic in the erector spinae, gluteus medius, lumbar paraspinals and quadratus lumborum. Tenderness of the erector spinae, lumbar paraspinals and quadratus lumborum.     Neurological Testing     Sensation     Lumbar   Left   Intact: light touch    Right   Intact: light touch      See Exercise, Manual, and Modality Logs for complete treatment.       Assessment & Plan     Assessment  Assessment details: Patient presents with increased muscle tone and tenderness T12-L4 PVM's. Tolerated Dry Needling well today. Demonstrated improved muscle tone and mobility after treatment. Educated patient to apply heat again tonight. Cont PT 1x per week for Dry Needling or as indicated by patient.         Progress per Plan of Care           Manual Therapy:         mins  78679;  Therapeutic Exercise:         mins  80893;     Neuromuscular Rosario:       mins  16873;    Therapeutic Activity:          mins  52679;     Gait Training:           mins  64017;     Ultrasound:          mins  06539;    Electrical Stimulation:        mins  61276 ( );  Dry Needling     15     mins self-pay    Timed Treatment:      mins   Total Treatment:     25   mins    Yesi Majano, PT  Physical Therapist

## 2021-08-06 ENCOUNTER — TREATMENT (OUTPATIENT)
Dept: PHYSICAL THERAPY | Facility: CLINIC | Age: 71
End: 2021-08-06

## 2021-08-06 DIAGNOSIS — R29.898 DIFFICULTY IN WEIGHT BEARING: ICD-10-CM
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
## 2021-08-06 DIAGNOSIS — M54.50 LUMBAR SPINE PAIN: Primary | ICD-10-CM

## 2021-08-06 PROCEDURE — DRYNDL PR CUSTOM DRY NEEDLING SELF PAY: Performed by: PHYSICAL THERAPIST

## 2021-08-06 NOTE — PROGRESS NOTES
Physical Therapy Daily Progress Note        Patient: Hu Burgess   : 1950  Diagnosis/ICD-10 Code:  Lumbar spine pain [M54.5]  Referring practitioner: No ref. provider found  Date of Initial Visit: 2021  Today's Date: 2021  Patient seen for 3 sessions         Hu Burgess reports: I think I am getting better - felt good after last Dry Needling session.         Subjective     Objective          Postural Observations  Standing posture: good        Palpation   Left   Hypertonic in the erector spinae, gluteus medius, lumbar paraspinals and quadratus lumborum.   Tenderness of the erector spinae, lumbar paraspinals and quadratus lumborum.     Right   Hypertonic in the erector spinae, gluteus medius, lumbar paraspinals and quadratus lumborum. Tenderness of the erector spinae, lumbar paraspinals and quadratus lumborum.     Neurological Testing     Sensation     Lumbar   Left   Intact: light touch    Right   Intact: light touch      See Exercise, Manual, and Modality Logs for complete treatment.       Assessment & Plan     Assessment  Assessment details: Patient presents with increased muscle tone thoracic T10-L2 PVM's, decreased mobility and c/o pain with movement. Tolerated Dry Needling well - twitch response L1 PVM. Improved muscle tone and mobility. Cont PT 1x per week or as indicated by patient.         Progress per Plan of Care           Manual Therapy:         mins  75984;  Therapeutic Exercise:         mins  97873;     Neuromuscular Rosario:       mins  79479;    Therapeutic Activity:          mins  61617;     Gait Training:           mins  97253;     Ultrasound:          mins  82743;    Electrical Stimulation:        mins  22914 ( );  Dry Needling     15     mins self-pay    Timed Treatment:      mins   Total Treatment:     25   mins    Yesi Majano, PT  Physical Therapist

## 2021-08-20 ENCOUNTER — OFFICE VISIT (OUTPATIENT)
Dept: CARDIOLOGY | Facility: CLINIC | Age: 71
End: 2021-08-20

## 2021-08-20 VITALS
WEIGHT: 179 LBS | RESPIRATION RATE: 16 BRPM | HEIGHT: 74 IN | BODY MASS INDEX: 22.97 KG/M2 | OXYGEN SATURATION: 96 % | SYSTOLIC BLOOD PRESSURE: 120 MMHG | DIASTOLIC BLOOD PRESSURE: 64 MMHG | HEART RATE: 61 BPM

## 2021-08-20 DIAGNOSIS — E78.2 MIXED HYPERLIPIDEMIA: ICD-10-CM

## 2021-08-20 DIAGNOSIS — I70.90 ARTERIOSCLEROTIC VASCULAR DISEASE: Primary | ICD-10-CM

## 2021-08-20 DIAGNOSIS — R00.2 PALPITATIONS: ICD-10-CM

## 2021-08-20 DIAGNOSIS — I10 ESSENTIAL HYPERTENSION: ICD-10-CM

## 2021-08-20 PROCEDURE — 99214 OFFICE O/P EST MOD 30 MIN: CPT | Performed by: NURSE PRACTITIONER

## 2021-08-20 PROCEDURE — 93000 ELECTROCARDIOGRAM COMPLETE: CPT | Performed by: NURSE PRACTITIONER

## 2021-08-20 NOTE — PROGRESS NOTES
Date of Office Visit: 2021  Encounter Provider: JULIUS Bahena  Place of Service: [facility]  Patient Name: Hu Burgess  :1950  Primary Cardiologist: Dr. Wu     CC:  Annual cardiac evaluation    Dear Dr. Guajardo    HPI: Hu Burgess is a pleasant 70 y.o. male who presents 2021 for cardiac follow up.  I reviewed his past medical records including notes, labs and testing in preparation for today's visit.    He had an abnormal stress study showing inferior ischemia at [facility]. Because of his multiple cardiovascular risks and some dyspnea, he had coronary angiography done 2009 showing moderate disease of the left circumflex vessel and the right coronary artery. The left anterior descending artery and left main coronary arteries were normal.      He has known hypertension but now his blood pressures controlled without medication.      He stopped smoking in 2016      He had vascular screening done 2010. The abdominal aorta was normal. He has moderate bilateral and carotid intimal thickening and normal bilateral ankle-brachial indices.  He had vascular screening done in  it was normal.     He was admitted to the hospital 2017 with acute renal failure, acute hepatic failure and rhabdomyolysis.  He felt like the rhabdomyolysis is what drove this.  The rhabdomyolysis no may have been due to a combination of Crestor and the flu shot.  He had a normal echocardiogram done in .     He had low back pain and was found to have to compression fractures and L2 and L3.  He saw Dr. Currie for this.  He did have an echocardiogram done 2017 which was normal.  He also had renal duplex studies done which showed normal renal size.     He was off of hemodialysis as of 2018.  He continues to see Dr. Upton.        Ultrasound on the gallbladder done 8/10/2020 shows no evidence of cholecystitis but  gallstones noted.     He returns today for his annual cardiac evaluation.  He does have bilateral hand tremors that he states he has discussed with you several times.  He states this is been happening for about the last 6 months.  He denies any shortness of breath, dizziness or lightheadedness.  He is not had any chest pain or chest pressure.  He denies fatigue.  He does state that you had noted some lower extremity edema, he actually admits to it being there for about 6 months.  He may occasionally feel a palpitation but he is unsure.  His blood pressures well controlled.    Past Medical History:   Diagnosis Date   • Arteriosclerotic cardiovascular disease    • Back pain    • History of transfusion    • Hyperlipidemia    • Hypertension    • Kidney failure    • Rhabdomyolysis     due to crestor       Past Surgical History:   Procedure Laterality Date   • ENDOSCOPY N/A 2018    Procedure: ESOPHAGOGASTRODUODENOSCOPY;  Surgeon: Marcelino Samuel MD;  Location: [facility] OR;  Service:    • INSERTION HEMODIALYSIS CATHETER Right 2017    Procedure: PALINDRONE CATHETERE PLACEMENT;  Surgeon: Gareth Sánchez MD;  Location: [facility] OR;  Service:        Social History     Socioeconomic History   • Marital status:      Spouse name: Not on file   • Number of children: Not on file   • Years of education: Not on file   • Highest education level: Not on file   Tobacco Use   • Smoking status: Former Smoker     Years: 50.00     Types: Cigarettes     Quit date: 2016     Years since quittin.7   • Smokeless tobacco: Never Used   • Tobacco comment: no caffiene   Vaping Use   • Vaping Use: Never used   Substance and Sexual Activity   • Alcohol use: Not Currently     Alcohol/week: 1.0 standard drinks     Types: 1 Cans of beer per week   • Drug use: No   • Sexual activity: Defer       Family History   Problem Relation Age of Onset   • Cancer Father         laryngeal cancer   • Colon cancer Neg Hx    • Colon  polyps Neg Hx        The following portion of the patient's history were reviewed and updated as appropriate: past medical history, past surgical history, past social history, past family history, allergies, current medications, and problem list.    Review of Systems   Constitutional: Negative for diaphoresis, fever and malaise/fatigue.   HENT: Negative for congestion, hearing loss, hoarse voice, nosebleeds and sore throat.    Eyes: Negative for photophobia, vision loss in left eye, vision loss in right eye and visual disturbance.   Cardiovascular: Positive for leg swelling and palpitations. Negative for chest pain, dyspnea on exertion, irregular heartbeat, near-syncope, orthopnea, paroxysmal nocturnal dyspnea and syncope.   Respiratory: Negative for cough, hemoptysis, shortness of breath, sleep disturbances due to breathing, snoring, sputum production and wheezing.    Endocrine: Negative for cold intolerance, heat intolerance, polydipsia, polyphagia and polyuria.   Hematologic/Lymphatic: Negative for bleeding problem. Does not bruise/bleed easily.   Skin: Negative for color change, dry skin, poor wound healing, rash and suspicious lesions.   Musculoskeletal: Negative for arthritis, back pain, falls, gout, joint pain, joint swelling, muscle cramps, muscle weakness and myalgias.   Gastrointestinal: Negative for bloating, abdominal pain, constipation, diarrhea, dysphagia, melena, nausea and vomiting.   Neurological: Positive for tremors. Negative for excessive daytime sleepiness, dizziness, headaches, light-headedness, loss of balance, numbness, paresthesias, seizures, vertigo and weakness.   Psychiatric/Behavioral: Negative for depression, memory loss and substance abuse. The patient is not nervous/anxious.        Allergies   Allergen Reactions   • Statins Other (See Comments)     Liver failure  Liver failure         Current Outpatient Medications:   •  NIFEdipine XL (PROCARDIA XL) 30 MG 24 hr tablet, TAKE 1 TABLET  "BY MOUTH EVERY DAY, Disp: 90 tablet, Rfl: 3  •  traMADol (ULTRAM) 50 MG tablet, Take 1 tablet by mouth 2 (two) times a day., Disp: 60 tablet, Rfl: 0        Objective:     Vitals:    08/20/21 1338   BP: 120/64   Pulse: 61   Resp: 16   SpO2: 96%   Weight: 81.2 kg (179 lb)   Height: 188 cm (74\")     Body mass index is 22.98 kg/m².      Vitals reviewed.   Constitutional:       General: Not in acute distress.     Appearance: Healthy appearance. Well-developed and not in distress.   Eyes:      General:         Right eye: No discharge.         Left eye: No discharge.      Conjunctiva/sclera: Conjunctivae normal.   HENT:      Head: Normocephalic and atraumatic.      Right Ear: External ear normal.      Left Ear: External ear normal.      Nose: Nose normal.   Neck:      Thyroid: No thyromegaly.      Vascular: No JVD.      Trachea: No tracheal deviation.      Lymphadenopathy: No cervical adenopathy.   Pulmonary:      Effort: Pulmonary effort is normal. No respiratory distress.      Breath sounds: Normal breath sounds. No wheezing. No rales.   Chest:      Chest wall: Not tender to palpatation.   Cardiovascular:      Normal rate. Frequent ectopic beats. Regular rhythm.      No gallop.   Pulses:     Intact distal pulses.   Edema:     Peripheral edema present.     Pretibial: bilateral 1+ pitting edema of the pretibial area.     Ankle: bilateral 1+ pitting edema of the ankle.     Feet: bilateral 1+ pitting edema of the feet.  Abdominal:      General: There is no distension.      Palpations: Abdomen is soft.      Tenderness: There is no abdominal tenderness.   Musculoskeletal: Normal range of motion.         General: No tenderness or deformity.      Cervical back: Normal range of motion and neck supple. Skin:     General: Skin is warm and dry.      Findings: No erythema or rash.   Neurological:      Mental Status: Alert and oriented to person, place, and time.      Motor: Tremor present.      Coordination: Coordination normal. "   Psychiatric:         Attention and Perception: Attention normal.         Mood and Affect: Mood normal.         Speech: Speech normal.         Behavior: Behavior normal.         Thought Content: Thought content normal.         Cognition and Memory: Cognition normal.         Judgment: Judgment normal.               ECG 12 Lead    Date/Time: 8/20/2021 2:15 PM  Performed by: Rocío Sutton APRN  Authorized by: Rocío Sutton APRN   Comparison: compared with previous ECG from 8/14/2020  Rhythm: atrial flutter  Rate: normal  Conduction: conduction normal  ST Segments: ST segments normal  T inversion: III and aVF  T flattening: V1  QRS axis: normal    Clinical impression: abnormal EKG              Assessment:       Diagnosis Plan   1. Arteriosclerotic vascular disease     2. Essential hypertension     3. Mixed hyperlipidemia     4. Palpitations  Holter Monitor - 24 Hour    Adult Transthoracic Echo Complete W/ Cont if Necessary Per Protocol          Plan:       1. Coronary artery disease of the left circumflex and right coronary arteries by angiography, January 2009. He has had an abnormal stress study in the past showing inferior ischemia. The disease is moderate. Will continue to treat medically. Denies angina.  2. Hyperlipidemia. Untreated, due to severe reaction from Crestor..   3. Tobacco use. He has stopped smoking  4. Bilateral carotid intimal thickening.   5.  Status post rhabdomyolysis likely due to the flu shot and Crestor with renal failure and liver failure.    6.  Severe reaction to Crestor and a flu shot  7.  CKD, follows with Dr. Moreau.  8.  HTN - well controlled  9.  Palpitations/Question atrial flutter - His EKG is hard to interpret.  First EKG showed Atrial flutter but it is not seen in all leads.  Second read SR but still showed questionable rhythm in several leads.  The flutter waves seen are regular.  I am going to check an Echo and 24 hour monitor.  10.  LE edema - he states this has been  intermittent for about 6 months.  He was told it could be from the CCB.  It could possibly be from Aflutter as well.    Questionable atrial flutter - check echo and 24 hour holter.       As always, it has been a pleasure to participate in your patient's care. Thank you.       Sincerely,       JULIUS Bahena      Current Outpatient Medications:   •  NIFEdipine XL (PROCARDIA XL) 30 MG 24 hr tablet, TAKE 1 TABLET BY MOUTH EVERY DAY, Disp: 90 tablet, Rfl: 3  •  traMADol (ULTRAM) 50 MG tablet, Take 1 tablet by mouth 2 (two) times a day., Disp: 60 tablet, Rfl: 0    Dictated utilizing Dragon dictation

## 2021-08-27 ENCOUNTER — TREATMENT (OUTPATIENT)
Dept: PHYSICAL THERAPY | Facility: CLINIC | Age: 71
End: 2021-08-27

## 2021-08-27 DIAGNOSIS — M54.50 LUMBAR SPINE PAIN: Primary | ICD-10-CM
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
## 2021-08-27 DIAGNOSIS — R29.898 DIFFICULTY IN WEIGHT BEARING: ICD-10-CM

## 2021-08-27 PROCEDURE — DRYNDL PR CUSTOM DRY NEEDLING SELF PAY: Performed by: PHYSICAL THERAPIST

## 2021-08-27 NOTE — PROGRESS NOTES
Physical Therapy Daily Progress Note        Patient: Hu Burgess   : 1950  Diagnosis/ICD-10 Code:  Lumbar spine pain [M54.5]  Referring practitioner: No ref. provider found  Date of Initial Visit: 2021  Today's Date: 2021  Patient seen for 4 sessions         Hu Burgess reports: The pain does not go away however, between my exercises and Dry Needling the pain is less.         Subjective     Objective          Postural Observations  Standing posture: good        Palpation   Left   Hypertonic in the erector spinae, gluteus medius, lumbar paraspinals and quadratus lumborum.   Tenderness of the erector spinae, lumbar paraspinals and quadratus lumborum.     Right   Hypertonic in the erector spinae, gluteus medius, lumbar paraspinals and quadratus lumborum. Tenderness of the erector spinae, lumbar paraspinals and quadratus lumborum.     Neurological Testing     Sensation     Lumbar   Left   Intact: light touch    Right   Intact: light touch      See Exercise, Manual, and Modality Logs for complete treatment.       Assessment & Plan     Assessment  Assessment details: Patient presents with increased muscle tone lumbar L2-L5 PVM's, decreased mobility and c/o pain with movement. Tolerated Dry Needling well - twitch response L L2-L3 PVM. Improved muscle tone and mobility. Cont PT 1x per week or as indicated by patient.         Progress per Plan of Care           Manual Therapy:         mins  00680;  Therapeutic Exercise:         mins  09279;     Neuromuscular Rosario:       mins  44847;    Therapeutic Activity:          mins  24888;     Gait Training:           mins  82063;     Ultrasound:          mins  07439;    Electrical Stimulation:         mins  19418 ( );  Dry Needling     15     mins self-pay    Timed Treatment:      mins   Total Treatment:     25   mins    Yesi Majano, PT  Physical Therapist

## 2021-09-07 ENCOUNTER — HOSPITAL ENCOUNTER (OUTPATIENT)
Dept: CARDIOLOGY | Facility: HOSPITAL | Age: 71
Discharge: HOME | End: 2021-09-07

## 2021-09-07 ENCOUNTER — HOSPITAL ENCOUNTER (OUTPATIENT)
Dept: CARDIOLOGY | Facility: HOSPITAL | Age: 71
Setting detail: HOSPITAL OUTPATIENT SURGERY
Discharge: HOME | End: 2021-09-07

## 2021-09-07 VITALS
HEIGHT: 74 IN | BODY MASS INDEX: 22.92 KG/M2 | DIASTOLIC BLOOD PRESSURE: 73 MMHG | SYSTOLIC BLOOD PRESSURE: 148 MMHG | HEART RATE: 60 BPM | WEIGHT: 178.57 LBS

## 2021-09-07 DIAGNOSIS — R00.2 PALPITATIONS: ICD-10-CM

## 2021-09-07 LAB
BH CV ECHO MEAS - ACS: 2.1 CM
BH CV ECHO MEAS - AO MAX PG (FULL): 4.3 MMHG
BH CV ECHO MEAS - AO MAX PG: 9.9 MMHG
BH CV ECHO MEAS - AO MEAN PG (FULL): 3 MMHG
BH CV ECHO MEAS - AO MEAN PG: 5 MMHG
BH CV ECHO MEAS - AO ROOT AREA (BSA CORRECTED): 1.7
BH CV ECHO MEAS - AO ROOT AREA: 10.2 CM^2
BH CV ECHO MEAS - AO ROOT DIAM: 3.6 CM
BH CV ECHO MEAS - AO V2 MAX: 157 CM/SEC
BH CV ECHO MEAS - AO V2 MEAN: 100 CM/SEC
BH CV ECHO MEAS - AO V2 VTI: 34.2 CM
BH CV ECHO MEAS - ASC AORTA: 3.4 CM
BH CV ECHO MEAS - AVA(I,A): 2.3 CM^2
BH CV ECHO MEAS - AVA(I,D): 2.3 CM^2
BH CV ECHO MEAS - AVA(V,A): 2.4 CM^2
BH CV ECHO MEAS - AVA(V,D): 2.4 CM^2
BH CV ECHO MEAS - BSA(HAYCOCK): 2.1 M^2
BH CV ECHO MEAS - BSA: 2.1 M^2
BH CV ECHO MEAS - BZI_BMI: 22.9 KILOGRAMS/M^2
BH CV ECHO MEAS - BZI_METRIC_HEIGHT: 188 CM
BH CV ECHO MEAS - BZI_METRIC_WEIGHT: 81 KG
BH CV ECHO MEAS - EDV(CUBED): 150.6 ML
BH CV ECHO MEAS - EDV(MOD-SP2): 89.5 ML
BH CV ECHO MEAS - EDV(MOD-SP4): 90.3 ML
BH CV ECHO MEAS - EDV(TEICH): 136.5 ML
BH CV ECHO MEAS - EF(CUBED): 63.6 %
BH CV ECHO MEAS - EF(MOD-BP): 59.7 %
BH CV ECHO MEAS - EF(MOD-SP2): 58 %
BH CV ECHO MEAS - EF(MOD-SP4): 61.5 %
BH CV ECHO MEAS - EF(TEICH): 54.6 %
BH CV ECHO MEAS - ESV(CUBED): 54.9 ML
BH CV ECHO MEAS - ESV(MOD-SP2): 37.6 ML
BH CV ECHO MEAS - ESV(MOD-SP4): 34.8 ML
BH CV ECHO MEAS - ESV(TEICH): 62 ML
BH CV ECHO MEAS - FS: 28.6 %
BH CV ECHO MEAS - IVS/LVPW: 0.84
BH CV ECHO MEAS - IVSD: 0.93 CM
BH CV ECHO MEAS - LAT PEAK E' VEL: 9.3 CM/SEC
BH CV ECHO MEAS - LV DIASTOLIC VOL/BSA (35-75): 43.6 ML/M^2
BH CV ECHO MEAS - LV MASS(C)D: 207.6 GRAMS
BH CV ECHO MEAS - LV MASS(C)DI: 100.2 GRAMS/M^2
BH CV ECHO MEAS - LV MAX PG: 5.6 MMHG
BH CV ECHO MEAS - LV MEAN PG: 2 MMHG
BH CV ECHO MEAS - LV SYSTOLIC VOL/BSA (12-30): 16.8 ML/M^2
BH CV ECHO MEAS - LV V1 MAX: 118 CM/SEC
BH CV ECHO MEAS - LV V1 MEAN: 71.3 CM/SEC
BH CV ECHO MEAS - LV V1 VTI: 24.7 CM
BH CV ECHO MEAS - LVIDD: 5.3 CM
BH CV ECHO MEAS - LVIDS: 3.8 CM
BH CV ECHO MEAS - LVLD AP2: 7.7 CM
BH CV ECHO MEAS - LVLD AP4: 7.3 CM
BH CV ECHO MEAS - LVLS AP2: 6.6 CM
BH CV ECHO MEAS - LVLS AP4: 6.1 CM
BH CV ECHO MEAS - LVOT AREA (M): 3.1 CM^2
BH CV ECHO MEAS - LVOT AREA: 3.1 CM^2
BH CV ECHO MEAS - LVOT DIAM: 2 CM
BH CV ECHO MEAS - LVPWD: 1.1 CM
BH CV ECHO MEAS - MED PEAK E' VEL: 6.1 CM/SEC
BH CV ECHO MEAS - MV A MAX VEL: 78.3 CM/SEC
BH CV ECHO MEAS - MV DEC SLOPE: 220 CM/SEC^2
BH CV ECHO MEAS - MV DEC TIME: 217 SEC
BH CV ECHO MEAS - MV E MAX VEL: 56.2 CM/SEC
BH CV ECHO MEAS - MV E/A: 0.72
BH CV ECHO MEAS - MV MEAN PG: 1 MMHG
BH CV ECHO MEAS - MV P1/2T MAX VEL: 68.6 CM/SEC
BH CV ECHO MEAS - MV P1/2T: 91.3 MSEC
BH CV ECHO MEAS - MV V2 MEAN: 40.1 CM/SEC
BH CV ECHO MEAS - MV V2 VTI: 31.9 CM
BH CV ECHO MEAS - MVA P1/2T LCG: 3.2 CM^2
BH CV ECHO MEAS - MVA(P1/2T): 2.4 CM^2
BH CV ECHO MEAS - MVA(VTI): 2.4 CM^2
BH CV ECHO MEAS - PA ACC TIME: 0.12 SEC
BH CV ECHO MEAS - PA MAX PG: 7.5 MMHG
BH CV ECHO MEAS - PA PR(ACCEL): 24.6 MMHG
BH CV ECHO MEAS - PA V2 MAX: 137 CM/SEC
BH CV ECHO MEAS - QP/QS: 2.3
BH CV ECHO MEAS - RAP SYSTOLE: 8 MMHG
BH CV ECHO MEAS - RV MEAN PG: 1 MMHG
BH CV ECHO MEAS - RV V1 MEAN: 50.6 CM/SEC
BH CV ECHO MEAS - RV V1 VTI: 18.7 CM
BH CV ECHO MEAS - RVOT AREA: 9.6 CM^2
BH CV ECHO MEAS - RVOT DIAM: 3.5 CM
BH CV ECHO MEAS - RVSP: 28 MMHG
BH CV ECHO MEAS - SI(AO): 168.1 ML/M^2
BH CV ECHO MEAS - SI(CUBED): 46.2 ML/M^2
BH CV ECHO MEAS - SI(LVOT): 37.5 ML/M^2
BH CV ECHO MEAS - SI(MOD-SP2): 25.1 ML/M^2
BH CV ECHO MEAS - SI(MOD-SP4): 26.8 ML/M^2
BH CV ECHO MEAS - SI(TEICH): 36 ML/M^2
BH CV ECHO MEAS - SV(AO): 348.1 ML
BH CV ECHO MEAS - SV(CUBED): 95.7 ML
BH CV ECHO MEAS - SV(LVOT): 77.6 ML
BH CV ECHO MEAS - SV(MOD-SP2): 51.9 ML
BH CV ECHO MEAS - SV(MOD-SP4): 55.5 ML
BH CV ECHO MEAS - SV(RVOT): 179.9 ML
BH CV ECHO MEAS - SV(TEICH): 74.6 ML
BH CV ECHO MEAS - TAPSE (>1.6): 2.6 CM
BH CV ECHO MEAS - TR MAX PG: 20 MMHG
BH CV ECHO MEAS - TR MAX VEL: 224.5 CM/SEC
BH CV ECHO MEASUREMENTS AVERAGE E/E' RATIO: 7.3
BH CV XLRA - RV BASE: 3.4 CM
BH CV XLRA - RV LENGTH: 5.7 CM
BH CV XLRA - TDI S': 16 CM/SEC
LEFT ATRIUM VOLUME INDEX: 30.3 ML/M2
MAXIMAL PREDICTED HEART RATE: 148 BPM
STRESS TARGET HR: 126 BPM

## 2021-09-07 PROCEDURE — 93226 XTRNL ECG REC<48 HR SCAN A/R: CPT

## 2021-09-07 PROCEDURE — 93225 XTRNL ECG REC<48 HRS REC: CPT

## 2021-09-07 PROCEDURE — 93306 TTE W/DOPPLER COMPLETE: CPT | Performed by: INTERNAL MEDICINE

## 2021-09-07 PROCEDURE — 93306 TTE W/DOPPLER COMPLETE: CPT

## 2021-09-09 LAB
MAXIMAL PREDICTED HEART RATE: 148 BPM
STRESS TARGET HR: 126 BPM

## 2021-09-09 PROCEDURE — 93227 XTRNL ECG REC<48 HR R&I: CPT | Performed by: INTERNAL MEDICINE

## 2021-09-10 NOTE — PROGRESS NOTES
I had Dr. Davey review the strips as well, to rule out A. fib flutter.  Spoke to patient with the results.  He states occasionally he will feel palpitations but does not have any symptoms from that.  No medication changesat this time.

## 2021-09-17 NOTE — PROGRESS NOTES
"        BGA/GI Progress Note   Chief Complaint:  Elevated LFT\"s in the setting of rhabdo    Subjective     Interval History: Pt currently off floor in vascular for vein mapping procedure.  Per RN no co's of abd pain, has had no n/v, eating well and had reported brown stools.  Possible d/c today    History taken from: chart RN    Review of Systems:    The following systems were reviewed and negative;  gastrointestinal    Objective     Vital Signs  Temp:  [97.6 °F (36.4 °C)-98.8 °F (37.1 °C)] 98.2 °F (36.8 °C)  Heart Rate:  [68-70] 68  Resp:  [18-24] 18  BP: (143-156)/(70-81) 144/70  Body mass index is 30.34 kg/(m^2).    Intake/Output Summary (Last 24 hours) at 11/16/17 0910  Last data filed at 11/16/17 0100   Gross per 24 hour   Intake              120 ml   Output              150 ml   Net              -30 ml          Physical Exam:   General:    Eyes:    Neck:    Skin:    Cardiovascular:    Pulm:    Abdomen:    Rectal:    Extremities:    Neurologic:     All Medications Have Been Reviewed     Results Review:       Results from last 7 days  Lab Units 11/16/17  0745 11/15/17  0531 11/14/17  0537   WBC 10*3/mm3 9.21 12.51* 12.05*   HEMOGLOBIN g/dL 12.3* 12.8* 12.9*   HEMATOCRIT % 36.9* 38.8* 40.0*   PLATELETS 10*3/mm3 158 156 153         Results from last 7 days  Lab Units 11/16/17  0733 11/15/17  0531 11/14/17  0537   SODIUM mmol/L 139 139 139   POTASSIUM mmol/L 4.4 5.2 4.6   CHLORIDE mmol/L 99 97* 98   CO2 mmol/L 25.5 22.9 25.5   BUN mg/dL 43* 78* 61*   CREATININE mg/dL 6.23* 8.92* 7.53*   CALCIUM mg/dL 8.6 8.9 8.9   BILIRUBIN mg/dL 3.1* 2.6* 2.9*   ALK PHOS U/L 256* 259* 269*   ALT (SGPT) U/L 44* 78* 109*   AST (SGOT) U/L 78* 89* 119*   GLUCOSE mg/dL 87 91 91             RADIOLOGY:    Imaging Results (last 72 hours)     Procedure Component Value Units Date/Time    IR PICC W Fluoro Surgery Only [[id number]] Resulted:  11/15/17 1014     Updated:  11/15/17 1014    XR Chest Post CVA Port [[id number]] Collected:  11/14/17 " Cholesterol,Tot 100 - 199 mg/dL 240 High   185    Triglycerides 0 - 149 mg/dL 90  134    HDL >=40 mg/dL 40  38 Abnormal     LDL <100 mg/dL 182 High   120      Would like to work on diet changes and repeat in 3 months. Does not want to be on statin. If calculated as non-smoker, 10 yr major CV event risk is <10%   1747     Updated:  11/15/17 1255    Narrative:       AP PORTABLE CHEST 11/14/2017     HISTORY: Central line placement.     FINDINGS: Right subclavian central venous catheter is seen with its tip  overlying the superior cavoatrial junction. No pneumothorax is seen.  Heart size is at the upper limits of normal. There is vascular  congestion with a linear band of minimal atelectasis in the right  midlung. Tiny calcified granuloma is seen in the left apex.       Impression:       1. Central venous catheter tip in the superior cavoatrial junction.  2. No pneumothorax is seen.     This report was finalized on 11/15/2017 12:51 PM by Dr. Jan Crockett MD.             Assessment/Plan     Patient Active Problem List   Diagnosis Code   • Arteriosclerotic vascular disease I70.90   • Hyperlipidemia E78.5   • Hypertension I10   • Acute kidney injury (nontraumatic) N17.9     Dia Montana, APRN  11/16/17  9:10 AM      Feeling better - eating some but appetite not great.  No n/v.    abd - s/nt/nd     Labs reviewed by me    Assessment:  1) Elevated LFT's- improving with resolving rhabdo. Given significantly elevated CK most likely acute elevation is from muscle origin.    2) Abnormal CT- Admission US with gallbladder sludge, no liver abnormality reported although CT abd/pelvis with cirrhotic appearing liver. Hep panel negative. Doppler US with no portal vein thrombosis.  Serology notable for + ASMA and AFP, acute phase reactant, doubt significance can be repeated in 6 weeks  3) STEVEN- renal following, started dialysis 11/8  4) Rhabdomyolosis- resulting in liver failure, confirmed with bx       Plan:  Plans for rehab tpday noted  Will need repeat cmp in 1 week and continued f/u labs until resolution - can be followed by pcp as long as he continues to improve

## 2021-10-13 ENCOUNTER — OFFICE VISIT (OUTPATIENT)
Dept: INTERNAL MEDICINE | Facility: CLINIC | Age: 71
End: 2021-10-13

## 2021-10-13 VITALS
HEIGHT: 74 IN | TEMPERATURE: 97.1 F | OXYGEN SATURATION: 97 % | SYSTOLIC BLOOD PRESSURE: 132 MMHG | DIASTOLIC BLOOD PRESSURE: 72 MMHG | WEIGHT: 178 LBS | RESPIRATION RATE: 16 BRPM | BODY MASS INDEX: 22.84 KG/M2 | HEART RATE: 57 BPM

## 2021-10-13 DIAGNOSIS — G89.29 CHRONIC LOW BACK PAIN, UNSPECIFIED BACK PAIN LATERALITY, UNSPECIFIED WHETHER SCIATICA PRESENT: ICD-10-CM

## 2021-10-13 DIAGNOSIS — M54.50 CHRONIC LOW BACK PAIN, UNSPECIFIED BACK PAIN LATERALITY, UNSPECIFIED WHETHER SCIATICA PRESENT: ICD-10-CM

## 2021-10-13 DIAGNOSIS — G25.0 ESSENTIAL TREMOR: ICD-10-CM

## 2021-10-13 DIAGNOSIS — R60.0 BILATERAL LOWER EXTREMITY EDEMA: Primary | ICD-10-CM

## 2021-10-13 DIAGNOSIS — I10 ESSENTIAL HYPERTENSION: ICD-10-CM

## 2021-10-13 PROCEDURE — 99214 OFFICE O/P EST MOD 30 MIN: CPT | Performed by: INTERNAL MEDICINE

## 2021-10-13 RX ORDER — TRAMADOL HYDROCHLORIDE 50 MG/1
50 TABLET ORAL 2 TIMES DAILY
Qty: 60 TABLET | Refills: 0 | Status: SHIPPED | OUTPATIENT
Start: 2021-10-13 | End: 2021-12-16

## 2021-10-13 NOTE — PROGRESS NOTES
"Chief Complaint  Tremors (HAND TREMORS) and Med Refill    Subjective          Hu Burgess presents to [facility]  History of Present Illness  Some intention tremor that seems to have gotten worse over the past month.  It seems to come and go sometimes worse than others.  No changes in medicines or ingestions.  He is not interested in medication at this point  Back pain overall is stable.  He takes the tramadol twice a day as needed.  He is more active and feels like he is doing better overall  Mild pedal edema worse on the nifedipine of course.  It bothers him a little bit but it does not sound like he is ready to change to medicine yet  Objective   Vital Signs:   /72   Pulse 57   Temp 97.1 °F (36.2 °C)   Resp 16   Ht 188 cm (74\")   Wt 80.7 kg (178 lb)   SpO2 97%   BMI 22.85 kg/m²     Physical Exam  Vitals and nursing note reviewed.   Constitutional:       Appearance: Normal appearance.   HENT:      Head: Normocephalic and atraumatic.   Cardiovascular:      Rate and Rhythm: Normal rate and regular rhythm.   Pulmonary:      Effort: Pulmonary effort is normal.      Breath sounds: Normal breath sounds.   Abdominal:      General: Abdomen is flat.      Palpations: Abdomen is soft.   Musculoskeletal:         General: No swelling or deformity.      Cervical back: Neck supple.      Right lower leg: Edema present.      Left lower leg: Edema present.   Skin:     General: Skin is warm.      Capillary Refill: Capillary refill takes less than 2 seconds.      Findings: No rash.   Neurological:      General: No focal deficit present.      Mental Status: He is alert and oriented to person, place, and time.        Result Review : Previous labs                Assessment and Plan chronic kidney disease with continued improvement.  His creatinine last time was 1.4.  I think his edema is related to the Procardia but he is not quite ready to change that.  Once it bothers " him enough we can probably put him safely back on an ARB but we want to make sure we check his renal function  Chronic back pain with history of compression fracture lumbar spine.  Tramadol twice daily as needed prescription given.  He uses it pretty sparingly  Essential tremor.  At this point he is not ready to try any medication.  We talked about primidone but at this point he is wanting to wait on that  Diagnoses and all orders for this visit:    1. Chronic low back pain, unspecified back pain laterality, unspecified whether sciatica present  -     traMADol (ULTRAM) 50 MG tablet; Take 1 tablet by mouth 2 (two) times a day.  Dispense: 60 tablet; Refill: 0        Follow Up   Return in about 3 months (around 1/13/2022) for Recheck.  Patient was given instructions and counseling regarding his condition or for health maintenance advice. Please see specific information pulled into the AVS if appropriate.

## 2021-10-20 ENCOUNTER — FLU SHOT (OUTPATIENT)
Dept: INTERNAL MEDICINE | Facility: CLINIC | Age: 71
End: 2021-10-20

## 2021-10-20 DIAGNOSIS — Z23 NEED FOR INFLUENZA VACCINATION: Primary | ICD-10-CM

## 2021-10-20 PROCEDURE — 90662 IIV NO PRSV INCREASED AG IM: CPT | Performed by: INTERNAL MEDICINE

## 2021-10-20 PROCEDURE — G0008 ADMIN INFLUENZA VIRUS VAC: HCPCS | Performed by: INTERNAL MEDICINE

## 2021-10-21 RX ORDER — NIFEDIPINE 30 MG/1
TABLET, EXTENDED RELEASE ORAL
Qty: 90 TABLET | Refills: 3 | Status: SHIPPED | OUTPATIENT
Start: 2021-10-21 | End: 2022-03-08

## 2021-11-15 ENCOUNTER — OFFICE VISIT (OUTPATIENT)
Dept: INTERNAL MEDICINE | Facility: CLINIC | Age: 71
End: 2021-11-15

## 2021-11-15 VITALS
WEIGHT: 188 LBS | HEART RATE: 65 BPM | DIASTOLIC BLOOD PRESSURE: 60 MMHG | HEIGHT: 74 IN | TEMPERATURE: 97.1 F | SYSTOLIC BLOOD PRESSURE: 122 MMHG | BODY MASS INDEX: 24.13 KG/M2 | RESPIRATION RATE: 16 BRPM | OXYGEN SATURATION: 97 %

## 2021-11-15 DIAGNOSIS — N18.9 CHRONIC KIDNEY DISEASE, UNSPECIFIED CKD STAGE: Primary | ICD-10-CM

## 2021-11-15 DIAGNOSIS — G20 PARKINSONIAN TREMOR (HCC): ICD-10-CM

## 2021-11-15 DIAGNOSIS — I10 ESSENTIAL HYPERTENSION: ICD-10-CM

## 2021-11-15 DIAGNOSIS — K40.90 RIGHT INGUINAL HERNIA: ICD-10-CM

## 2021-11-15 PROCEDURE — 99214 OFFICE O/P EST MOD 30 MIN: CPT | Performed by: INTERNAL MEDICINE

## 2021-11-15 RX ORDER — PRIMIDONE 50 MG/1
50 TABLET ORAL NIGHTLY
Qty: 90 TABLET | Refills: 1 | Status: SHIPPED | OUTPATIENT
Start: 2021-11-15 | End: 2021-12-13 | Stop reason: ALTCHOICE

## 2021-11-17 ENCOUNTER — OFFICE VISIT (OUTPATIENT)
Dept: SURGERY | Facility: CLINIC | Age: 71
End: 2021-11-17

## 2021-11-17 VITALS
SYSTOLIC BLOOD PRESSURE: 130 MMHG | DIASTOLIC BLOOD PRESSURE: 70 MMHG | BODY MASS INDEX: 24.26 KG/M2 | HEIGHT: 74 IN | WEIGHT: 189 LBS

## 2021-11-17 DIAGNOSIS — K40.90 RIGHT INGUINAL HERNIA: Primary | ICD-10-CM

## 2021-11-17 PROBLEM — N18.9 ANEMIA IN CHRONIC KIDNEY DISEASE: Status: ACTIVE | Noted: 2017-12-11

## 2021-11-17 PROBLEM — M62.82 DISEASE CHARACTERIZED BY DESTRUCTION OF SKELETAL MUSCLE: Status: ACTIVE | Noted: 2017-12-11

## 2021-11-17 PROBLEM — D63.1 ANEMIA IN CHRONIC KIDNEY DISEASE: Status: ACTIVE | Noted: 2017-12-11

## 2021-11-17 PROBLEM — N18.30 STAGE 3 CHRONIC KIDNEY DISEASE: Status: ACTIVE | Noted: 2018-08-15

## 2021-11-17 PROCEDURE — 99204 OFFICE O/P NEW MOD 45 MIN: CPT | Performed by: SURGERY

## 2021-11-17 NOTE — PROGRESS NOTES
PATIENT INFORMATION  Hu Burgess       - 1950    CHIEF COMPLAINT  Chief Complaint   Patient presents with   • Hernia     [facility]       HISTORY OF PRESENT ILLNESS  HPI he complains of approximately 2 to 3-week history of right inguinal bulge.  He says it stays there pretty much all the time and bothers him psychologically and he may have some mild discomfort associated with it.  He denies any GI complaints.        REVIEW OF SYSTEMS  Review of Systems   Constitutional: Negative for activity change, chills, fever and unexpected weight change.   HENT: Negative for congestion.    Eyes: Negative for visual disturbance.   Respiratory: Negative for shortness of breath.    Cardiovascular: Negative for chest pain and palpitations.   Gastrointestinal: Negative for abdominal pain and blood in stool.   Endocrine: Negative for cold intolerance and heat intolerance.   Genitourinary: Negative for hematuria.   Musculoskeletal: Negative for gait problem.   Skin: Negative for color change.   Allergic/Immunologic: Negative for immunocompromised state.   Neurological: Negative for weakness and light-headedness.   Hematological: Negative for adenopathy.   Psychiatric/Behavioral: Negative for sleep disturbance. The patient is not nervous/anxious.          ACTIVE PROBLEMS  Patient Active Problem List    Diagnosis    • Bilateral lower extremity edema [R60.0]    • Acute kidney injury (nontraumatic) (HCC) [N17.9]    • Arteriosclerotic vascular disease [I70.90]    • Hyperlipidemia [E78.5]    • Hypertension [I10]          PAST MEDICAL HISTORY  Past Medical History:   Diagnosis Date   • Arteriosclerotic cardiovascular disease    • Back pain    • History of transfusion    • Hyperlipidemia    • Hypertension    • Kidney failure    • Rhabdomyolysis     due to crestor         SURGICAL HISTORY  Past Surgical History:   Procedure Laterality Date   • ENDOSCOPY N/A 2018    Procedure: ESOPHAGOGASTRODUODENOSCOPY;  Surgeon: Marcelino Walls  "MD Lizzie;  Location: [facility] OR;  Service:    • INSERTION HEMODIALYSIS CATHETER Right 2017    Procedure: PALINDRONE CATHETERE PLACEMENT;  Surgeon: Gareth Sánchez MD;  Location: [facility] OR;  Service:          FAMILY HISTORY  Family History   Problem Relation Age of Onset   • Cancer Father         laryngeal cancer   • Colon cancer Neg Hx    • Colon polyps Neg Hx          SOCIAL HISTORY  Social History     Occupational History   • Not on file   Tobacco Use   • Smoking status: Former Smoker     Years: 50.00     Types: Cigarettes     Quit date: 2016     Years since quittin.0   • Smokeless tobacco: Never Used   • Tobacco comment: no caffiene   Vaping Use   • Vaping Use: Never used   Substance and Sexual Activity   • Alcohol use: Not Currently     Alcohol/week: 1.0 standard drink     Types: 1 Cans of beer per week   • Drug use: No   • Sexual activity: Defer         CURRENT MEDICATIONS    Current Outpatient Medications:   •  NIFEdipine XL (PROCARDIA XL) 30 MG 24 hr tablet, TAKE 1 TABLET BY MOUTH EVERY DAY, Disp: 90 tablet, Rfl: 3  •  primidone (MYSOLINE) 50 MG tablet, Take 1 tablet by mouth Every Night., Disp: 90 tablet, Rfl: 1  •  traMADol (ULTRAM) 50 MG tablet, Take 1 tablet by mouth 2 (two) times a day., Disp: 60 tablet, Rfl: 0    ALLERGIES  Statins    VITALS  Vitals:    21 1428   BP: 130/70   BP Location: Left arm   Patient Position: Sitting   Cuff Size: Adult   Weight: 85.7 kg (189 lb)   Height: 188 cm (74\")       LAST RESULTS   Hospital Outpatient Visit on 2021   Component Date Value Ref Range Status   • Target HR (85%) 2021 126  bpm Final   • Max. Pred. HR (100%) 2021 148  bpm Final     No results found.    PHYSICAL EXAM  Debilities/Disabilities Identified: None  Emotional Behavior: Appropriate  Physical Exam alert white male in no active distress.  Heart shows a regular rate and rhythm lungs are clear and equal.  He has a tremor of his hands right greater than left.  " His testicles are descended bilaterally without mass.  He has no left inguinal impulse he has a reducible right inguinal hernia that is nontender.    ASSESSMENT  Right inguinal hernia    PLAN  The risk benefits and options were discussed with patient in detail.  We will proceed with repair of his right inguinal hernia at [facility] on an outpatient basis for November 29.  We will place an On-Q pump for postoperative pain control.  I did discuss the possibility of postoperative pain 5 to 7% of patients.

## 2021-11-17 NOTE — H&P
PATIENT INFORMATION  Hu Burgess       - 1950    CHIEF COMPLAINT  Chief Complaint   Patient presents with   • Hernia     [facility]       HISTORY OF PRESENT ILLNESS  HPI he complains of approximately 2 to 3-week history of right inguinal bulge.  He says it stays there pretty much all the time and bothers him psychologically and he may have some mild discomfort associated with it.  He denies any GI complaints.        REVIEW OF SYSTEMS  Review of Systems   Constitutional: Negative for activity change, chills, fever and unexpected weight change.   HENT: Negative for congestion.    Eyes: Negative for visual disturbance.   Respiratory: Negative for shortness of breath.    Cardiovascular: Negative for chest pain and palpitations.   Gastrointestinal: Negative for abdominal pain and blood in stool.   Endocrine: Negative for cold intolerance and heat intolerance.   Genitourinary: Negative for hematuria.   Musculoskeletal: Negative for gait problem.   Skin: Negative for color change.   Allergic/Immunologic: Negative for immunocompromised state.   Neurological: Negative for weakness and light-headedness.   Hematological: Negative for adenopathy.   Psychiatric/Behavioral: Negative for sleep disturbance. The patient is not nervous/anxious.          ACTIVE PROBLEMS  Patient Active Problem List    Diagnosis    • Bilateral lower extremity edema [R60.0]    • Acute kidney injury (nontraumatic) (HCC) [N17.9]    • Arteriosclerotic vascular disease [I70.90]    • Hyperlipidemia [E78.5]    • Hypertension [I10]          PAST MEDICAL HISTORY  Past Medical History:   Diagnosis Date   • Arteriosclerotic cardiovascular disease    • Back pain    • History of transfusion    • Hyperlipidemia    • Hypertension    • Kidney failure    • Rhabdomyolysis     due to crestor         SURGICAL HISTORY  Past Surgical History:   Procedure Laterality Date   • ENDOSCOPY N/A 2018    Procedure: ESOPHAGOGASTRODUODENOSCOPY;  Surgeon: Mracelino Walls  "MD Lizzie;  Location: [facility] OR;  Service:    • INSERTION HEMODIALYSIS CATHETER Right 2017    Procedure: PALINDRONE CATHETERE PLACEMENT;  Surgeon: Gareth Sánchez MD;  Location: [facility] OR;  Service:          FAMILY HISTORY  Family History   Problem Relation Age of Onset   • Cancer Father         laryngeal cancer   • Colon cancer Neg Hx    • Colon polyps Neg Hx          SOCIAL HISTORY  Social History     Occupational History   • Not on file   Tobacco Use   • Smoking status: Former Smoker     Years: 50.00     Types: Cigarettes     Quit date: 2016     Years since quittin.0   • Smokeless tobacco: Never Used   • Tobacco comment: no caffiene   Vaping Use   • Vaping Use: Never used   Substance and Sexual Activity   • Alcohol use: Not Currently     Alcohol/week: 1.0 standard drink     Types: 1 Cans of beer per week   • Drug use: No   • Sexual activity: Defer         CURRENT MEDICATIONS    Current Outpatient Medications:   •  NIFEdipine XL (PROCARDIA XL) 30 MG 24 hr tablet, TAKE 1 TABLET BY MOUTH EVERY DAY, Disp: 90 tablet, Rfl: 3  •  primidone (MYSOLINE) 50 MG tablet, Take 1 tablet by mouth Every Night., Disp: 90 tablet, Rfl: 1  •  traMADol (ULTRAM) 50 MG tablet, Take 1 tablet by mouth 2 (two) times a day., Disp: 60 tablet, Rfl: 0    ALLERGIES  Statins    VITALS  Vitals:    21 1428   BP: 130/70   BP Location: Left arm   Patient Position: Sitting   Cuff Size: Adult   Weight: 85.7 kg (189 lb)   Height: 188 cm (74\")       LAST RESULTS   Hospital Outpatient Visit on 2021   Component Date Value Ref Range Status   • Target HR (85%) 2021 126  bpm Final   • Max. Pred. HR (100%) 2021 148  bpm Final     No results found.    PHYSICAL EXAM  Debilities/Disabilities Identified: None  Emotional Behavior: Appropriate  Physical Exam alert white male in no active distress.  Heart shows a regular rate and rhythm lungs are clear and equal.  He has a tremor of his hands right greater than left.  " His testicles are descended bilaterally without mass.  He has no left inguinal impulse he has a reducible right inguinal hernia that is nontender.    ASSESSMENT  Right inguinal hernia    PLAN  The risk benefits and options were discussed with patient in detail.  We will proceed with repair of his right inguinal hernia at [facility] on an outpatient basis for November 29.  We will place an On-Q pump for postoperative pain control.  I did discuss the possibility of postoperative pain 5 to 7% of patients.

## 2021-11-18 ENCOUNTER — PATIENT ROUNDING (BHMG ONLY) (OUTPATIENT)
Dept: SURGERY | Facility: CLINIC | Age: 71
End: 2021-11-18

## 2021-11-18 ENCOUNTER — TELEPHONE (OUTPATIENT)
Dept: SURGERY | Facility: CLINIC | Age: 71
End: 2021-11-18

## 2021-11-18 NOTE — PROGRESS NOTES
November 18, 2021    Hello, may I speak with Hu Burgess?    My name is Kenya Gregroy      I am  with [facility]  [address]  [place] [id number] [phone].    Before we get started may I verify your date of birth? 1950    I am calling to officially welcome you to our practice and ask about your recent visit. Is this a good time to talk? yes    Tell me about your visit with us. What things went well?  Things went great.  What a good sandeep.  We have a special needs daughter and appreciated how well we were treated.       We're always looking for ways to make our patients' experiences even better. Do you have recommendations on ways we may improve?  no    Overall were you satisfied with your first visit to our practice? yes       I appreciate you taking the time to speak with me today. Is there anything else I can do for you? no      Thank you, and have a great day.

## 2021-11-18 NOTE — TELEPHONE ENCOUNTER
He needs to reschedule his surgery for later in January.  They have a special needs daughter who has a broken leg and in a cast.  He will need to be available for her.  Cancel all for now, but he will want to reschedule the last part of January.  Will someone call him then?

## 2021-11-22 ENCOUNTER — APPOINTMENT (OUTPATIENT)
Dept: PREADMISSION TESTING | Facility: HOSPITAL | Age: 71
End: 2021-11-22

## 2021-11-23 DIAGNOSIS — G20 PARKINSONIAN TREMOR (HCC): Primary | ICD-10-CM

## 2021-11-23 DIAGNOSIS — I10 ESSENTIAL HYPERTENSION: ICD-10-CM

## 2021-11-23 DIAGNOSIS — N18.9 CHRONIC KIDNEY DISEASE, UNSPECIFIED CKD STAGE: ICD-10-CM

## 2021-11-24 ENCOUNTER — TELEPHONE (OUTPATIENT)
Dept: INTERNAL MEDICINE | Facility: CLINIC | Age: 71
End: 2021-11-24

## 2021-11-24 ENCOUNTER — APPOINTMENT (OUTPATIENT)
Dept: CT IMAGING | Facility: HOSPITAL | Age: 71
End: 2021-11-24

## 2021-11-24 ENCOUNTER — HOSPITAL ENCOUNTER (OUTPATIENT)
Dept: CT IMAGING | Facility: HOSPITAL | Age: 71
Discharge: HOME | End: 2021-11-24
Admitting: INTERNAL MEDICINE

## 2021-11-24 DIAGNOSIS — R17 CHOLESTATIC JAUNDICE: ICD-10-CM

## 2021-11-24 DIAGNOSIS — R17 CHOLESTATIC JAUNDICE: Primary | ICD-10-CM

## 2021-11-24 LAB
ALBUMIN SERPL-MCNC: 3.2 G/DL (ref 3.7–4.7)
ALBUMIN/GLOB SERPL: 0.8 {RATIO} (ref 1.2–2.2)
ALP SERPL-CCNC: 327 IU/L (ref 44–121)
ALT SERPL-CCNC: 50 IU/L (ref 0–44)
AST SERPL-CCNC: 107 IU/L (ref 0–40)
BASOPHILS # BLD AUTO: 0.1 X10E3/UL (ref 0–0.2)
BASOPHILS NFR BLD AUTO: 1 %
BILIRUB SERPL-MCNC: 3.5 MG/DL (ref 0–1.2)
BUN SERPL-MCNC: 16 MG/DL (ref 8–27)
BUN/CREAT SERPL: 11 (ref 10–24)
CALCIUM SERPL-MCNC: 9.2 MG/DL (ref 8.6–10.2)
CHLORIDE SERPL-SCNC: 101 MMOL/L (ref 96–106)
CHOLEST SERPL-MCNC: 191 MG/DL (ref 100–199)
CHOLEST/HDLC SERPL: 4.8 RATIO (ref 0–5)
CO2 SERPL-SCNC: 22 MMOL/L (ref 20–29)
CREAT SERPL-MCNC: 1.5 MG/DL (ref 0.76–1.27)
CRP SERPL-MCNC: 9 MG/L (ref 0–10)
EOSINOPHIL # BLD AUTO: 0.3 X10E3/UL (ref 0–0.4)
EOSINOPHIL NFR BLD AUTO: 4 %
ERYTHROCYTE [DISTWIDTH] IN BLOOD BY AUTOMATED COUNT: 13.4 % (ref 11.6–15.4)
GLOBULIN SER CALC-MCNC: 3.9 G/DL (ref 1.5–4.5)
GLUCOSE SERPL-MCNC: 84 MG/DL (ref 65–99)
HCT VFR BLD AUTO: 39.4 % (ref 37.5–51)
HDLC SERPL-MCNC: 40 MG/DL
HGB BLD-MCNC: 13.5 G/DL (ref 13–17.7)
IMM GRANULOCYTES # BLD AUTO: 0 X10E3/UL (ref 0–0.1)
IMM GRANULOCYTES NFR BLD AUTO: 0 %
LDLC SERPL CALC-MCNC: 132 MG/DL (ref 0–99)
LYMPHOCYTES # BLD AUTO: 2.6 X10E3/UL (ref 0.7–3.1)
LYMPHOCYTES NFR BLD AUTO: 34 %
MCH RBC QN AUTO: 34.3 PG (ref 26.6–33)
MCHC RBC AUTO-ENTMCNC: 34.3 G/DL (ref 31.5–35.7)
MCV RBC AUTO: 100 FL (ref 79–97)
MONOCYTES # BLD AUTO: 1 X10E3/UL (ref 0.1–0.9)
MONOCYTES NFR BLD AUTO: 13 %
NEUTROPHILS # BLD AUTO: 3.7 X10E3/UL (ref 1.4–7)
NEUTROPHILS NFR BLD AUTO: 48 %
PLATELET # BLD AUTO: 181 X10E3/UL (ref 150–450)
POTASSIUM SERPL-SCNC: 3.6 MMOL/L (ref 3.5–5.2)
PROT SERPL-MCNC: 7.1 G/DL (ref 6–8.5)
RBC # BLD AUTO: 3.94 X10E6/UL (ref 4.14–5.8)
SODIUM SERPL-SCNC: 138 MMOL/L (ref 134–144)
TRIGL SERPL-MCNC: 102 MG/DL (ref 0–149)
TSH SERPL DL<=0.005 MIU/L-ACNC: 2.76 UIU/ML (ref 0.45–4.5)
VIT B12 SERPL-MCNC: 523 PG/ML (ref 232–1245)
VLDLC SERPL CALC-MCNC: 19 MG/DL (ref 5–40)
WBC # BLD AUTO: 7.6 X10E3/UL (ref 3.4–10.8)

## 2021-11-24 PROCEDURE — 0 IOPAMIDOL PER 1 ML: Performed by: INTERNAL MEDICINE

## 2021-11-24 PROCEDURE — 0 DIATRIZOATE MEGLUMINE & SODIUM PER 1 ML: Performed by: INTERNAL MEDICINE

## 2021-11-24 PROCEDURE — 74177 CT ABD & PELVIS W/CONTRAST: CPT

## 2021-11-24 RX ORDER — FUROSEMIDE 20 MG/1
20 TABLET ORAL 2 TIMES DAILY
Qty: 30 TABLET | Refills: 2 | Status: SHIPPED | OUTPATIENT
Start: 2021-11-24 | End: 2021-12-03

## 2021-11-24 RX ADMIN — DIATRIZOATE MEGLUMINE AND DIATRIZOATE SODIUM 30 ML: 600; 100 SOLUTION ORAL; RECTAL at 13:15

## 2021-11-24 RX ADMIN — IOPAMIDOL 100 ML: 755 INJECTION, SOLUTION INTRAVENOUS at 14:45

## 2021-11-24 NOTE — TELEPHONE ENCOUNTER
Having stomach issues since Saturday  Worried with his hernia over the weekend and wanted to talk to you?  Did have a stool softner that seemed to help  Had labs yesterday

## 2021-11-26 ENCOUNTER — APPOINTMENT (OUTPATIENT)
Dept: LAB | Facility: HOSPITAL | Age: 71
End: 2021-11-26

## 2021-12-03 RX ORDER — FUROSEMIDE 20 MG/1
TABLET ORAL
Qty: 30 TABLET | Refills: 2 | Status: SHIPPED | OUTPATIENT
Start: 2021-12-03 | End: 2021-12-13 | Stop reason: SDUPTHER

## 2021-12-13 ENCOUNTER — OFFICE VISIT (OUTPATIENT)
Dept: INTERNAL MEDICINE | Facility: CLINIC | Age: 71
End: 2021-12-13

## 2021-12-13 VITALS
OXYGEN SATURATION: 99 % | SYSTOLIC BLOOD PRESSURE: 140 MMHG | WEIGHT: 176 LBS | DIASTOLIC BLOOD PRESSURE: 80 MMHG | TEMPERATURE: 97.3 F | HEIGHT: 74 IN | RESPIRATION RATE: 16 BRPM | HEART RATE: 66 BPM | BODY MASS INDEX: 22.59 KG/M2

## 2021-12-13 DIAGNOSIS — N18.9 CHRONIC KIDNEY DISEASE, UNSPECIFIED CKD STAGE: ICD-10-CM

## 2021-12-13 DIAGNOSIS — K74.60 CIRRHOSIS OF LIVER WITHOUT ASCITES, UNSPECIFIED HEPATIC CIRRHOSIS TYPE (HCC): ICD-10-CM

## 2021-12-13 DIAGNOSIS — R17 CHOLESTATIC JAUNDICE: Primary | ICD-10-CM

## 2021-12-13 DIAGNOSIS — G20 PARKINSONIAN TREMOR (HCC): ICD-10-CM

## 2021-12-13 PROCEDURE — 99214 OFFICE O/P EST MOD 30 MIN: CPT | Performed by: INTERNAL MEDICINE

## 2021-12-13 RX ORDER — PRIMIDONE 50 MG/1
50 TABLET ORAL 2 TIMES DAILY
Qty: 60 TABLET | Refills: 3 | Status: ON HOLD | OUTPATIENT
Start: 2021-12-13 | End: 2022-01-10

## 2021-12-13 RX ORDER — FUROSEMIDE 20 MG/1
20 TABLET ORAL DAILY
Qty: 90 TABLET | Refills: 1 | Status: SHIPPED | OUTPATIENT
Start: 2021-12-13 | End: 2021-12-17

## 2021-12-13 NOTE — PROGRESS NOTES
"Chief Complaint  Follow-up (boogie go )    Subjective          Hu Burgess presents to [facility]  History of Present Illness  Abdominal discomfort has improved. He had a CT scan which showed some evidence of cirrhosis and ascites along with his inguinal hernia. Seems to have improved with the diuresis.  Tremor on the primidone really has not improved. He is on 50 mg daily. Really has more of a Parkinson characteristic and is scheduled to see neurology next month    Objective   Vital Signs:   /80 (BP Location: Left arm, Patient Position: Sitting, Cuff Size: Large Adult)   Pulse 66   Temp 97.3 °F (36.3 °C) (Temporal)   Resp 16   Ht 188 cm (74\")   Wt 79.8 kg (176 lb)   SpO2 99%   BMI 22.60 kg/m²     Physical Exam  Vitals and nursing note reviewed.   Constitutional:       Appearance: Normal appearance.   HENT:      Head: Normocephalic and atraumatic.   Cardiovascular:      Rate and Rhythm: Normal rate and regular rhythm.   Pulmonary:      Effort: Pulmonary effort is normal.      Breath sounds: Normal breath sounds.   Abdominal:      General: Abdomen is flat.      Palpations: Abdomen is soft.   Musculoskeletal:         General: No swelling or deformity.      Cervical back: Neck supple.      Right lower leg: No edema.      Left lower leg: No edema.   Skin:     General: Skin is warm.      Capillary Refill: Capillary refill takes less than 2 seconds.      Findings: No rash.   Neurological:      General: No focal deficit present.      Mental Status: He is alert and oriented to person, place, and time.      Comments: Resting tremor on the right        Result Review : Previous labs                Assessment and Plan cirrhosis with portal hypertension. Repeat lab work including PT/INR and NH3. Probably needs GI consult at some point in the near future. No significant problems regarding that now. Reduce Lasix to 20 mg in the morning  Chronic kidney " disease. Recheck BMP and also recheck the liver function test. He looks less jaundiced  Parkinson tremor. We can increase the primidone 100 mg daily. Deferred starting any other medication until neurology sees him. Otherwise he is doing well. No other significant concerns at the moment. Follow-up pending lab results and neurology consult  Diagnoses and all orders for this visit:    1. Cholestatic jaundice (Primary)  -     Comprehensive metabolic panel  -     Ammonia  -     Protime-INR    2. Parkinsonian tremor (HCC)    3. Chronic kidney disease, unspecified CKD stage  -     Comprehensive metabolic panel  -     Ammonia  -     Protime-INR    4. Cirrhosis of liver without ascites, unspecified hepatic cirrhosis type (HCC)  -     Comprehensive metabolic panel  -     Ammonia  -     Protime-INR    Other orders  -     furosemide (LASIX) 20 MG tablet; Take 1 tablet by mouth Daily.  Dispense: 90 tablet; Refill: 1  -     primidone (MYSOLINE) 50 MG tablet; Take 1 tablet by mouth 2 (Two) Times a Day.  Dispense: 60 tablet; Refill: 3        Follow Up   No follow-ups on file.  Patient was given instructions and counseling regarding his condition or for health maintenance advice. Please see specific information pulled into the AVS if appropriate.

## 2021-12-14 LAB
ALBUMIN SERPL-MCNC: 3.5 G/DL (ref 3.7–4.7)
ALBUMIN/GLOB SERPL: 1 {RATIO} (ref 1.2–2.2)
ALP SERPL-CCNC: 312 IU/L (ref 44–121)
ALT SERPL-CCNC: 47 IU/L (ref 0–44)
AMMONIA PLAS-MCNC: 100 UG/DL (ref 31–169)
AST SERPL-CCNC: 95 IU/L (ref 0–40)
BILIRUB SERPL-MCNC: 2.8 MG/DL (ref 0–1.2)
BUN SERPL-MCNC: 19 MG/DL (ref 8–27)
BUN/CREAT SERPL: 12 (ref 10–24)
CALCIUM SERPL-MCNC: 9.1 MG/DL (ref 8.6–10.2)
CHLORIDE SERPL-SCNC: 100 MMOL/L (ref 96–106)
CO2 SERPL-SCNC: 28 MMOL/L (ref 20–29)
CREAT SERPL-MCNC: 1.63 MG/DL (ref 0.76–1.27)
GLOBULIN SER CALC-MCNC: 3.4 G/DL (ref 1.5–4.5)
GLUCOSE SERPL-MCNC: 85 MG/DL (ref 65–99)
INR PPP: 1.1 (ref 0.9–1.2)
POTASSIUM SERPL-SCNC: 3.4 MMOL/L (ref 3.5–5.2)
PROT SERPL-MCNC: 6.9 G/DL (ref 6–8.5)
PROTHROMBIN TIME: 12.4 SEC (ref 9.1–12)
SODIUM SERPL-SCNC: 140 MMOL/L (ref 134–144)

## 2021-12-15 DIAGNOSIS — M54.50 CHRONIC LOW BACK PAIN, UNSPECIFIED BACK PAIN LATERALITY, UNSPECIFIED WHETHER SCIATICA PRESENT: ICD-10-CM

## 2021-12-15 DIAGNOSIS — G89.29 CHRONIC LOW BACK PAIN, UNSPECIFIED BACK PAIN LATERALITY, UNSPECIFIED WHETHER SCIATICA PRESENT: ICD-10-CM

## 2021-12-16 RX ORDER — TRAMADOL HYDROCHLORIDE 50 MG/1
TABLET ORAL
Qty: 60 TABLET | Refills: 0 | Status: SHIPPED | OUTPATIENT
Start: 2021-12-16 | End: 2022-01-10 | Stop reason: HOSPADM

## 2021-12-17 RX ORDER — FUROSEMIDE 20 MG/1
TABLET ORAL
Qty: 30 TABLET | Refills: 2 | Status: SHIPPED | OUTPATIENT
Start: 2021-12-17 | End: 2022-01-26 | Stop reason: SDUPTHER

## 2021-12-29 ENCOUNTER — PRE-ADMISSION TESTING (OUTPATIENT)
Dept: PREADMISSION TESTING | Facility: HOSPITAL | Age: 71
End: 2021-12-29

## 2021-12-29 VITALS
OXYGEN SATURATION: 98 % | SYSTOLIC BLOOD PRESSURE: 150 MMHG | RESPIRATION RATE: 20 BRPM | DIASTOLIC BLOOD PRESSURE: 76 MMHG | BODY MASS INDEX: 23.23 KG/M2 | HEIGHT: 74 IN | WEIGHT: 181 LBS

## 2021-12-29 DIAGNOSIS — K40.90 RIGHT INGUINAL HERNIA: ICD-10-CM

## 2021-12-29 LAB
ANION GAP SERPL CALCULATED.3IONS-SCNC: 8.9 MMOL/L (ref 5–15)
BUN SERPL-MCNC: 15 MG/DL (ref 8–23)
BUN/CREAT SERPL: 9.4 (ref 7–25)
CALCIUM SPEC-SCNC: 8.6 MG/DL (ref 8.6–10.5)
CHLORIDE SERPL-SCNC: 104 MMOL/L (ref 98–107)
CO2 SERPL-SCNC: 24.1 MMOL/L (ref 22–29)
CREAT SERPL-MCNC: 1.6 MG/DL (ref 0.76–1.27)
DEPRECATED RDW RBC AUTO: 55.2 FL (ref 37–54)
ERYTHROCYTE [DISTWIDTH] IN BLOOD BY AUTOMATED COUNT: 14.1 % (ref 12.3–15.4)
GFR SERPL CREATININE-BSD FRML MDRD: 43 ML/MIN/1.73
GLUCOSE SERPL-MCNC: 92 MG/DL (ref 65–99)
HCT VFR BLD AUTO: 39.9 % (ref 37.5–51)
HGB BLD-MCNC: 12.9 G/DL (ref 13–17.7)
MCH RBC QN AUTO: 33.6 PG (ref 26.6–33)
MCHC RBC AUTO-ENTMCNC: 32.3 G/DL (ref 31.5–35.7)
MCV RBC AUTO: 103.9 FL (ref 79–97)
PLATELET # BLD AUTO: 169 10*3/MM3 (ref 140–450)
PMV BLD AUTO: 12.6 FL (ref 6–12)
POTASSIUM SERPL-SCNC: 4 MMOL/L (ref 3.5–5.2)
RBC # BLD AUTO: 3.84 10*6/MM3 (ref 4.14–5.8)
SODIUM SERPL-SCNC: 137 MMOL/L (ref 136–145)
WBC NRBC COR # BLD: 5.94 10*3/MM3 (ref 3.4–10.8)

## 2021-12-29 PROCEDURE — 85027 COMPLETE CBC AUTOMATED: CPT | Performed by: SURGERY

## 2021-12-29 PROCEDURE — 80048 BASIC METABOLIC PNL TOTAL CA: CPT | Performed by: SURGERY

## 2021-12-29 PROCEDURE — 36415 COLL VENOUS BLD VENIPUNCTURE: CPT

## 2022-01-03 ENCOUNTER — OFFICE VISIT (OUTPATIENT)
Dept: NEUROLOGY | Facility: CLINIC | Age: 72
End: 2022-01-03

## 2022-01-03 VITALS
BODY MASS INDEX: 24.26 KG/M2 | DIASTOLIC BLOOD PRESSURE: 66 MMHG | SYSTOLIC BLOOD PRESSURE: 118 MMHG | WEIGHT: 189 LBS | HEIGHT: 74 IN | OXYGEN SATURATION: 98 % | HEART RATE: 65 BPM

## 2022-01-03 DIAGNOSIS — G20 PARKINSON'S DISEASE: Primary | ICD-10-CM

## 2022-01-03 PROCEDURE — 99204 OFFICE O/P NEW MOD 45 MIN: CPT | Performed by: PSYCHIATRY & NEUROLOGY

## 2022-01-03 NOTE — PROGRESS NOTES
Chief Complaint   Patient presents with   • Tremors       Patient ID: Hu Burgess is a 71 y.o. male.    HPI: I had the pleasure of seeing your patient today.  As you may know he is a 71-year-old gentleman here for an evaluation for tremor.  Patient states that approximately year ago he noted a tremor in his right upper extremity.  Tremor was predominantly noted at rest.  He says that the tremoring would come and go in severity however.  He denied any tremoring in the left arm.  No tremor of the lower extremities.  Patient states that the tremor is noted primarily when he is sitting down.  He says that if he crosses his arms the tremor may resolve for a moment then recur after a minute or 2.  He has noted some slowing of movements, predominantly in the upper extremities.  He has also noted some slowing of gait.  He has not had any recent falls.  He has noted some drooling.  No hallucinations.  His voice has a particularly low volume that I have noticed during or talk today.  He also has an expressionless face.  He has no significant lightheadedness upon standing.  He denies any family history of Parkinson's disease however he does mention history of [place]'s disease in his family.  This was his mother's sister and brother.  They have since .  No other family members with movement disorders.  He denies any history of stroke or strokelike symptoms.  He was recently prescribed primidone for which he takes 50 mg twice daily.  He says that that has not helped with his tremor.    The following portions of the patient's history were reviewed and updated as appropriate: allergies, current medications, past family history, past medical history, past social history, past surgical history and problem list.    Review of Systems   Constitutional: Negative for activity change, appetite change and fatigue.   HENT: Positive for congestion, dental problem and sore throat.    Eyes: Positive for itching. Negative for  photophobia and visual disturbance.   Respiratory: Negative for chest tightness, shortness of breath and wheezing.    Cardiovascular: Negative for chest pain, palpitations and leg swelling.   Gastrointestinal: Negative for abdominal pain, nausea and vomiting.   Endocrine: Negative for cold intolerance, heat intolerance and polydipsia.   Musculoskeletal: Negative for arthralgias, back pain and gait problem.   Skin: Negative for color change, rash and wound.   Allergic/Immunologic: Negative for environmental allergies, food allergies and immunocompromised state.   Neurological: Positive for tremors (right hand and arm. sometimes bottom jaw shakes. ). Negative for dizziness, seizures, syncope, facial asymmetry, speech difficulty, weakness, light-headedness, numbness and headaches.   Hematological: Negative for adenopathy. Does not bruise/bleed easily.   Psychiatric/Behavioral: Negative for agitation, behavioral problems, confusion, decreased concentration, dysphoric mood, hallucinations, self-injury, sleep disturbance and suicidal ideas. The patient is not nervous/anxious and is not hyperactive.       I have reviewed the review of systems above performed by my medical assistant.      Vitals:    01/03/22 1310   BP: 118/66   Pulse: 65   SpO2: 98%       Neurologic Exam     Mental Status   Oriented to person, place, and time.   Registration: recalls 3 of 3 objects. Follows 3 step commands.   Attention: normal. Concentration: normal.   Speech: speech is normal   Level of consciousness: alert  Knowledge: consistent with education (No deficits found.).   Normal comprehension.     Cranial Nerves     CN II   Visual fields full to confrontation.     CN III, IV, VI   Pupils are equal, round, and reactive to light.  Extraocular motions are normal.   CN III: no CN III palsy  CN VI: no CN VI palsy  Nystagmus: none   Diplopia: none    CN V   Facial sensation intact.     CN VII   Facial expression full, symmetric.     CN VIII   CN  VIII normal.     CN IX, X   CN IX normal.   CN X normal.     CN XI   CN XI normal.     CN XII   CN XII normal.     Motor Exam   Muscle bulk: normal  Overall muscle tone: increased  Right arm tone: normal and cogwheel rigidity  Left arm tone: normal and cogwheel rigidity  Right leg tone: normal  Left leg tone: normal    Strength   Right neck flexion: 5/5  Left neck flexion: 5/5  Right neck extension: 5/5  Left neck extension: 5/5  Right deltoid: 5/5  Left deltoid: 5/5  Right biceps: 5/5  Left biceps: 5/5  Right triceps: 5/5  Left triceps: 5/5  Right wrist flexion: 5/5  Left wrist flexion: 5/5  Right wrist extension: 5/5  Left wrist extension: 5/5  Right interossei: 5/5  Left interossei: 5/5  Right abdominals: 5/5  Left abdominals: 5/5  Right iliopsoas: 5/5  Left iliopsoas: 5/5  Right quadriceps: 5/5  Left quadriceps: 5/5  Right hamstrin/5  Left hamstrin/5  Right glutei: 5/5  Left glutei: 5/5  Right anterior tibial: 5/5  Left anterior tibial: 5/5  Right posterior tibial: 5/5  Left posterior tibial: 5/5  Right peroneal: 5/5  Left peroneal: 5/5  Right gastroc: 5/5  Left gastroc: 5/5    Sensory Exam   Light touch normal.   Vibration normal.   Proprioception normal.   Pinprick normal.     Gait, Coordination, and Reflexes     Gait  Gait: (Gait is slow.  I do note some shuffling.  Armswing is decreased.  He turns slowly.)    Coordination   Romberg: negative    Tremor   Resting tremor: present  Intention tremor: absent    Reflexes   Right brachioradialis: 2+  Left brachioradialis: 2+  Right biceps: 2+  Left biceps: 2+  Right triceps: 2+  Left triceps: 2+  Right patellar: 2+  Left patellar: 2+  Right achilles: 2+  Left achilles: 2+  Right : 2+  Left : 2+Station is normal.       Physical Exam  Vitals reviewed.   Constitutional:       General: He is not in acute distress.     Appearance: He is well-developed.   HENT:      Head: Normocephalic and atraumatic.   Eyes:      Extraocular Movements: EOM normal.       Pupils: Pupils are equal, round, and reactive to light.   Cardiovascular:      Rate and Rhythm: Normal rate and regular rhythm.      Heart sounds: Normal heart sounds.   Pulmonary:      Effort: Pulmonary effort is normal. No respiratory distress.      Breath sounds: Normal breath sounds.   Abdominal:      General: Bowel sounds are normal. There is no distension.      Palpations: Abdomen is soft.      Tenderness: There is no abdominal tenderness.   Musculoskeletal:         General: No deformity.      Cervical back: Normal range of motion.   Skin:     General: Skin is warm.      Findings: No rash.   Neurological:      Mental Status: He is oriented to person, place, and time.      Coordination: Romberg Test normal.      Deep Tendon Reflexes:      Reflex Scores:       Tricep reflexes are 2+ on the right side and 2+ on the left side.       Bicep reflexes are 2+ on the right side and 2+ on the left side.       Brachioradialis reflexes are 2+ on the right side and 2+ on the left side.       Patellar reflexes are 2+ on the right side and 2+ on the left side.       Achilles reflexes are 2+ on the right side and 2+ on the left side.  Psychiatric:         Speech: Speech normal.         Judgment: Judgment normal.         Procedures    Assessment/Plan: I would like to schedule him for an MRI of the brain both without contrast.  He certainly does have parkinsonism, likely idiopathic Parkinson's disease.  It is interesting however that he seems to have a family history of [place]'s disease.  He does not have that.  Neither of his parents had [place]'s symptoms.  We will try Sinemet 10/100 3 times daily.  I have given him weaning instructions for the primidone for now.  We will see him back in approximately 3 months or sooner if needed.       Diagnoses and all orders for this visit:    1. Parkinson's disease (HCC) (Primary)  -     carbidopa-levodopa (Sinemet)  MG per tablet; Take 1 tablet by mouth 3 (Three) Times a Day  for 30 days.  Dispense: 90 tablet; Refill: 3  -     MRI Brain With & Without Contrast; Future           Gokul Restrepo II, MD

## 2022-01-07 ENCOUNTER — ANESTHESIA EVENT (OUTPATIENT)
Dept: PERIOP | Facility: HOSPITAL | Age: 72
End: 2022-01-07

## 2022-01-07 ENCOUNTER — LAB (OUTPATIENT)
Dept: LAB | Facility: HOSPITAL | Age: 72
End: 2022-01-07

## 2022-01-07 DIAGNOSIS — K40.90 RIGHT INGUINAL HERNIA: ICD-10-CM

## 2022-01-07 LAB — SARS-COV-2 RNA PNL SPEC NAA+PROBE: NOT DETECTED

## 2022-01-07 PROCEDURE — 87635 SARS-COV-2 COVID-19 AMP PRB: CPT | Performed by: SURGERY

## 2022-01-07 PROCEDURE — C9803 HOPD COVID-19 SPEC COLLECT: HCPCS

## 2022-01-10 ENCOUNTER — ANESTHESIA (OUTPATIENT)
Dept: PERIOP | Facility: HOSPITAL | Age: 72
End: 2022-01-10

## 2022-01-10 ENCOUNTER — TELEPHONE (OUTPATIENT)
Dept: NEUROLOGY | Facility: CLINIC | Age: 72
End: 2022-01-10

## 2022-01-10 ENCOUNTER — HOSPITAL ENCOUNTER (OUTPATIENT)
Facility: HOSPITAL | Age: 72
Setting detail: HOSPITAL OUTPATIENT SURGERY
Discharge: HOME | End: 2022-01-10
Attending: SURGERY | Admitting: SURGERY

## 2022-01-10 VITALS
TEMPERATURE: 97.8 F | RESPIRATION RATE: 14 BRPM | BODY MASS INDEX: 23.83 KG/M2 | WEIGHT: 185.6 LBS | DIASTOLIC BLOOD PRESSURE: 75 MMHG | HEART RATE: 65 BPM | SYSTOLIC BLOOD PRESSURE: 140 MMHG | OXYGEN SATURATION: 96 %

## 2022-01-10 DIAGNOSIS — K40.90 RIGHT INGUINAL HERNIA: ICD-10-CM

## 2022-01-10 DIAGNOSIS — G89.29 CHRONIC LOW BACK PAIN, UNSPECIFIED BACK PAIN LATERALITY, UNSPECIFIED WHETHER SCIATICA PRESENT: ICD-10-CM

## 2022-01-10 DIAGNOSIS — M54.50 CHRONIC LOW BACK PAIN, UNSPECIFIED BACK PAIN LATERALITY, UNSPECIFIED WHETHER SCIATICA PRESENT: ICD-10-CM

## 2022-01-10 PROCEDURE — 0 CEFAZOLIN SODIUM-DEXTROSE 2-3 GM-%(50ML) RECONSTITUTED SOLUTION: Performed by: SURGERY

## 2022-01-10 PROCEDURE — 25010000002 ONDANSETRON PER 1 MG: Performed by: NURSE ANESTHETIST, CERTIFIED REGISTERED

## 2022-01-10 PROCEDURE — S0260 H&P FOR SURGERY: HCPCS | Performed by: SURGERY

## 2022-01-10 PROCEDURE — 25010000002 DEXAMETHASONE PER 1 MG: Performed by: NURSE ANESTHETIST, CERTIFIED REGISTERED

## 2022-01-10 PROCEDURE — 25010000002 FENTANYL CITRATE (PF) 50 MCG/ML SOLUTION: Performed by: NURSE ANESTHETIST, CERTIFIED REGISTERED

## 2022-01-10 PROCEDURE — 25010000002 PROPOFOL 10 MG/ML EMULSION: Performed by: NURSE ANESTHETIST, CERTIFIED REGISTERED

## 2022-01-10 PROCEDURE — 49505 PRP I/HERN INIT REDUC >5 YR: CPT | Performed by: SURGERY

## 2022-01-10 PROCEDURE — C1781 MESH (IMPLANTABLE): HCPCS | Performed by: SURGERY

## 2022-01-10 DEVICE — BARD MESH PERFIX PLUG, LARGE
Type: IMPLANTABLE DEVICE | Site: GROIN | Status: FUNCTIONAL
Brand: BARD MESH PERFIX PLUG

## 2022-01-10 RX ORDER — ACETAMINOPHEN 500 MG
1000 TABLET ORAL ONCE
Status: COMPLETED | OUTPATIENT
Start: 2022-01-10 | End: 2022-01-10

## 2022-01-10 RX ORDER — SODIUM CHLORIDE 0.9 % (FLUSH) 0.9 %
10 SYRINGE (ML) INJECTION EVERY 12 HOURS SCHEDULED
Status: DISCONTINUED | OUTPATIENT
Start: 2022-01-10 | End: 2022-01-10 | Stop reason: HOSPADM

## 2022-01-10 RX ORDER — DEXAMETHASONE SODIUM PHOSPHATE 4 MG/ML
4 INJECTION, SOLUTION INTRA-ARTICULAR; INTRALESIONAL; INTRAMUSCULAR; INTRAVENOUS; SOFT TISSUE ONCE AS NEEDED
Status: COMPLETED | OUTPATIENT
Start: 2022-01-10 | End: 2022-01-10

## 2022-01-10 RX ORDER — CEFAZOLIN SODIUM 2 G/50ML
2 SOLUTION INTRAVENOUS ONCE
Status: COMPLETED | OUTPATIENT
Start: 2022-01-10 | End: 2022-01-10

## 2022-01-10 RX ORDER — ONDANSETRON 2 MG/ML
4 INJECTION INTRAMUSCULAR; INTRAVENOUS ONCE AS NEEDED
Status: DISCONTINUED | OUTPATIENT
Start: 2022-01-10 | End: 2022-01-10 | Stop reason: HOSPADM

## 2022-01-10 RX ORDER — LIDOCAINE HYDROCHLORIDE 10 MG/ML
0.5 INJECTION, SOLUTION EPIDURAL; INFILTRATION; INTRACAUDAL; PERINEURAL ONCE AS NEEDED
Status: DISCONTINUED | OUTPATIENT
Start: 2022-01-10 | End: 2022-01-10 | Stop reason: HOSPADM

## 2022-01-10 RX ORDER — PROPOFOL 10 MG/ML
VIAL (ML) INTRAVENOUS AS NEEDED
Status: DISCONTINUED | OUTPATIENT
Start: 2022-01-10 | End: 2022-01-10 | Stop reason: SURG

## 2022-01-10 RX ORDER — ONDANSETRON 2 MG/ML
4 INJECTION INTRAMUSCULAR; INTRAVENOUS ONCE
Status: COMPLETED | OUTPATIENT
Start: 2022-01-10 | End: 2022-01-10

## 2022-01-10 RX ORDER — MAGNESIUM HYDROXIDE 1200 MG/15ML
LIQUID ORAL AS NEEDED
Status: DISCONTINUED | OUTPATIENT
Start: 2022-01-10 | End: 2022-01-10 | Stop reason: HOSPADM

## 2022-01-10 RX ORDER — SODIUM CHLORIDE 9 MG/ML
40 INJECTION, SOLUTION INTRAVENOUS AS NEEDED
Status: DISCONTINUED | OUTPATIENT
Start: 2022-01-10 | End: 2022-01-10 | Stop reason: HOSPADM

## 2022-01-10 RX ORDER — SODIUM CHLORIDE 0.9 % (FLUSH) 0.9 %
10 SYRINGE (ML) INJECTION AS NEEDED
Status: DISCONTINUED | OUTPATIENT
Start: 2022-01-10 | End: 2022-01-10 | Stop reason: HOSPADM

## 2022-01-10 RX ORDER — BUPIVACAINE HYDROCHLORIDE 2.5 MG/ML
INJECTION, SOLUTION INFILTRATION; PERINEURAL AS NEEDED
Status: DISCONTINUED | OUTPATIENT
Start: 2022-01-10 | End: 2022-01-10 | Stop reason: HOSPADM

## 2022-01-10 RX ORDER — FENTANYL CITRATE 50 UG/ML
INJECTION, SOLUTION INTRAMUSCULAR; INTRAVENOUS AS NEEDED
Status: DISCONTINUED | OUTPATIENT
Start: 2022-01-10 | End: 2022-01-10 | Stop reason: SURG

## 2022-01-10 RX ORDER — FENTANYL CITRATE 50 UG/ML
50 INJECTION, SOLUTION INTRAMUSCULAR; INTRAVENOUS
Status: DISCONTINUED | OUTPATIENT
Start: 2022-01-10 | End: 2022-01-10 | Stop reason: HOSPADM

## 2022-01-10 RX ORDER — OXYCODONE HYDROCHLORIDE AND ACETAMINOPHEN 5; 325 MG/1; MG/1
1 TABLET ORAL EVERY 4 HOURS PRN
Qty: 30 TABLET | Refills: 0 | Status: SHIPPED | OUTPATIENT
Start: 2022-01-10 | End: 2022-01-19

## 2022-01-10 RX ORDER — EPHEDRINE SULFATE 50 MG/ML
INJECTION, SOLUTION INTRAVENOUS AS NEEDED
Status: DISCONTINUED | OUTPATIENT
Start: 2022-01-10 | End: 2022-01-10 | Stop reason: SURG

## 2022-01-10 RX ORDER — LIDOCAINE HYDROCHLORIDE 20 MG/ML
INJECTION, SOLUTION INFILTRATION; PERINEURAL AS NEEDED
Status: DISCONTINUED | OUTPATIENT
Start: 2022-01-10 | End: 2022-01-10 | Stop reason: SURG

## 2022-01-10 RX ORDER — SODIUM CHLORIDE, SODIUM LACTATE, POTASSIUM CHLORIDE, CALCIUM CHLORIDE 600; 310; 30; 20 MG/100ML; MG/100ML; MG/100ML; MG/100ML
9 INJECTION, SOLUTION INTRAVENOUS CONTINUOUS
Status: DISCONTINUED | OUTPATIENT
Start: 2022-01-10 | End: 2022-01-10 | Stop reason: HOSPADM

## 2022-01-10 RX ORDER — FAMOTIDINE 10 MG/ML
20 INJECTION, SOLUTION INTRAVENOUS
Status: DISCONTINUED | OUTPATIENT
Start: 2022-01-10 | End: 2022-01-10 | Stop reason: HOSPADM

## 2022-01-10 RX ORDER — OXYCODONE HYDROCHLORIDE AND ACETAMINOPHEN 5; 325 MG/1; MG/1
1 TABLET ORAL ONCE AS NEEDED
Status: DISCONTINUED | OUTPATIENT
Start: 2022-01-10 | End: 2022-01-10 | Stop reason: HOSPADM

## 2022-01-10 RX ADMIN — ONDANSETRON 4 MG: 2 INJECTION INTRAMUSCULAR; INTRAVENOUS at 08:10

## 2022-01-10 RX ADMIN — FENTANYL CITRATE 25 MCG: 50 INJECTION INTRAMUSCULAR; INTRAVENOUS at 09:07

## 2022-01-10 RX ADMIN — DEXAMETHASONE SODIUM PHOSPHATE 4 MG: 4 INJECTION, SOLUTION INTRAMUSCULAR; INTRAVENOUS at 08:10

## 2022-01-10 RX ADMIN — EPHEDRINE SULFATE 5 MG: 50 INJECTION, SOLUTION INTRAVENOUS at 09:08

## 2022-01-10 RX ADMIN — PROPOFOL 150 MG: 10 INJECTION, EMULSION INTRAVENOUS at 08:51

## 2022-01-10 RX ADMIN — LIDOCAINE HYDROCHLORIDE 100 MG: 20 INJECTION, SOLUTION INFILTRATION; PERINEURAL at 08:51

## 2022-01-10 RX ADMIN — EPHEDRINE SULFATE 5 MG: 50 INJECTION, SOLUTION INTRAVENOUS at 09:23

## 2022-01-10 RX ADMIN — ACETAMINOPHEN 1000 MG: 500 TABLET ORAL at 08:13

## 2022-01-10 RX ADMIN — CEFAZOLIN SODIUM 2 G: 2 SOLUTION INTRAVENOUS at 08:51

## 2022-01-10 RX ADMIN — FAMOTIDINE 20 MG: 10 INJECTION, SOLUTION INTRAVENOUS at 08:10

## 2022-01-10 RX ADMIN — FENTANYL CITRATE 25 MCG: 50 INJECTION INTRAMUSCULAR; INTRAVENOUS at 08:51

## 2022-01-10 RX ADMIN — SODIUM CHLORIDE, POTASSIUM CHLORIDE, SODIUM LACTATE AND CALCIUM CHLORIDE 9 ML/HR: 600; 310; 30; 20 INJECTION, SOLUTION INTRAVENOUS at 08:10

## 2022-01-10 NOTE — H&P
Encounter Information    Encounter Information    Provider Department Encounter #   2021 2:20 PM Kishan Puga MD [facility] [id number]      Kishan Puga MD   Physician   Specialty:  General Surgery   H&P      Signed   Encounter Date:  2021                 Signed              Show:Clear all  []Manual[x]Template[]Copied    Added by:  [x]Kishan Puga MD      []Allenver for details            PATIENT INFORMATION  Hu Burgess         - 1950     CHIEF COMPLAINT       Chief Complaint   Patient presents with   • Hernia       [facility]         HISTORY OF PRESENT ILLNESS  HPI he complains of approximately 2 to 3-week history of right inguinal bulge.  He says it stays there pretty much all the time and bothers him psychologically and he may have some mild discomfort associated with it.  He denies any GI complaints.           REVIEW OF SYSTEMS  Review of Systems   Constitutional: Negative for activity change, chills, fever and unexpected weight change.   HENT: Negative for congestion.    Eyes: Negative for visual disturbance.   Respiratory: Negative for shortness of breath.    Cardiovascular: Negative for chest pain and palpitations.   Gastrointestinal: Negative for abdominal pain and blood in stool.   Endocrine: Negative for cold intolerance and heat intolerance.   Genitourinary: Negative for hematuria.   Musculoskeletal: Negative for gait problem.   Skin: Negative for color change.   Allergic/Immunologic: Negative for immunocompromised state.   Neurological: Negative for weakness and light-headedness.   Hematological: Negative for adenopathy.   Psychiatric/Behavioral: Negative for sleep disturbance. The patient is not nervous/anxious.             ACTIVE PROBLEMS       Patient Active Problem List     Diagnosis     • Bilateral lower extremity edema [R60.0]     • Acute kidney injury (nontraumatic) (HCC) [N17.9]     • Arteriosclerotic vascular disease [I70.90]     • Hyperlipidemia  [E78.5]     • Hypertension [I10]              PAST MEDICAL HISTORY       Past Medical History:   Diagnosis Date   • Arteriosclerotic cardiovascular disease     • Back pain     • History of transfusion     • Hyperlipidemia     • Hypertension     • Kidney failure     • Rhabdomyolysis       due to crestor            SURGICAL HISTORY        Past Surgical History:   Procedure Laterality Date   • ENDOSCOPY N/A 2018     Procedure: ESOPHAGOGASTRODUODENOSCOPY;  Surgeon: Marcelino Samuel MD;  Location:  [facility] OR;  Service:    • INSERTION HEMODIALYSIS CATHETER Right 2017     Procedure: PALINDRONE CATHETERE PLACEMENT;  Surgeon: Gareth Sánchez MD;  Location: [facility] MAIN OR;  Service:             FAMILY HISTORY        Family History   Problem Relation Age of Onset   • Cancer Father           laryngeal cancer   • Colon cancer Neg Hx     • Colon polyps Neg Hx              SOCIAL HISTORY  Social History            Occupational History   • Not on file   Tobacco Use   • Smoking status: Former Smoker       Years: 50.00       Types: Cigarettes       Quit date: 2016       Years since quittin.0   • Smokeless tobacco: Never Used   • Tobacco comment: no caffiene   Vaping Use   • Vaping Use: Never used   Substance and Sexual Activity   • Alcohol use: Not Currently       Alcohol/week: 1.0 standard drink       Types: 1 Cans of beer per week   • Drug use: No   • Sexual activity: Defer            CURRENT MEDICATIONS     Current Outpatient Medications:   •  NIFEdipine XL (PROCARDIA XL) 30 MG 24 hr tablet, TAKE 1 TABLET BY MOUTH EVERY DAY, Disp: 90 tablet, Rfl: 3  •  primidone (MYSOLINE) 50 MG tablet, Take 1 tablet by mouth Every Night., Disp: 90 tablet, Rfl: 1  •  traMADol (ULTRAM) 50 MG tablet, Take 1 tablet by mouth 2 (two) times a day., Disp: 60 tablet, Rfl: 0     ALLERGIES  Statins     VITALS  Vitals:     21 1428   BP: 130/70   BP Location: Left arm   Patient Position: Sitting   Cuff Size: Adult   Weight:  "85.7 kg (189 lb)   Height: 188 cm (74\")         LAST RESULTS                   Hospital Outpatient Visit on 09/07/2021   Component Date Value Ref Range Status   • Target HR (85%) 09/07/2021 126  bpm Final   • Max. Pred. HR (100%) 09/07/2021 148  bpm Final      No results found.     PHYSICAL EXAM  Debilities/Disabilities Identified: None  Emotional Behavior: Appropriate  Physical Exam alert white male in no active distress.  Heart shows a regular rate and rhythm lungs are clear and equal.  He has a tremor of his hands right greater than left.  His testicles are descended bilaterally without mass.  He has no left inguinal impulse he has a reducible right inguinal hernia that is nontender.     ASSESSMENT  Right inguinal hernia     PLAN  The risk benefits and options were discussed with patient in detail.  We will proceed with repair of his right inguinal hernia at [facility] on an outpatient basis for November 29.  We will place an On-Q pump for postoperative pain control.  I did discuss the possibility of postoperative pain 5 to 7% of patients.                        Office Visit on 11/17/2021           Office Visit on 11/17/2021                Routing History                Detailed Report                ROS Info            Note shared with patient          "

## 2022-01-10 NOTE — TELEPHONE ENCOUNTER
Provider: VERA  Caller: GEORGIA MARI  Relationship to Patient: WIFE  Pharmacy: N/A  Phone Number: [phone]  Reason for Call: PT WIFE TEL; PT HAD PROCEDURE (HERNIA) TODAY; PT IS ON PAIN PUMP; CAN PT START PARKINSON'S MEDICATION WHILE HE IS ON THIS PAIN PUMP.    PLEASE CALL & ADVISE.    THANK YOU.

## 2022-01-10 NOTE — ANESTHESIA PREPROCEDURE EVALUATION
Anesthesia Evaluation     Patient summary reviewed and Nursing notes reviewed   no history of anesthetic complications:  NPO Solid Status: > 8 hours  NPO Liquid Status: > 8 hours           Airway   Mallampati: II  TM distance: >3 FB  Neck ROM: full  No difficulty expected  Dental - normal exam     Pulmonary - normal exam    breath sounds clear to auscultation  (+) a smoker Former,   Cardiovascular - normal exam  Exercise tolerance: good (4-7 METS)    ECG reviewed  Rhythm: regular  Rate: normal    (+) hypertension,   (-) past MI    ROS comment: atherosclerosis     · Calculated left ventricular EF = 59.7% Estimated left ventricular EF was in agreement with the calculated left ventricular EF. Left ventricular systolic function is normal.  · Left ventricular diastolic function is consistent with (grade I) impaired relaxation.  · Mild tricuspid valve regurgitation is present.  · Estimated right ventricular systolic pressure from tricuspid regurgitation is normal (<35 mmHg). Calculated right ventricular systolic pressure from tricuspid regurgitation is 28 mmHg.    Holter 9/7/21  · A relatively benign monitor study.  · Predominant underlying rhythm is sinus with average heart rate of 59 bpm: Range 46-94 bpm  · Total PVC burden of 0.4%. A total of 8 runs reported with longest being 8 beats. Total PVC burden of 0.2% without any ventricular run.  · No patient diary submitted.        Neuro/Psych    ROS Comment: Parkinsons   GI/Hepatic/Renal/Endo    (+)  GERD (OTC. Rare.) well controlled,  liver disease (Usually elevated but stable he says) history of elevated LFT, renal disease CRI,     Musculoskeletal     (+) back pain,   Abdominal  - normal exam   Substance History - negative use     OB/GYN          Other - negative ROS                     Anesthesia Plan    ASA 2     general     intravenous induction     Anesthetic plan, all risks, benefits, and alternatives have been provided, discussed and informed consent has been  obtained with: patient.  Use of blood products discussed with patient  Consented to blood products.

## 2022-01-10 NOTE — ANESTHESIA POSTPROCEDURE EVALUATION
Patient: Hu Burgess    Procedure Summary     Date: 01/10/22 Room / Location:  [facility] /  [facility]    Anesthesia Start: 0845 Anesthesia Stop: 0952    Procedure: INGUINAL HERNIA REPAIR (Right Abdomen) Diagnosis:       Right inguinal hernia      (Right inguinal hernia [K40.90])    Surgeons: Kishan Puga MD Provider: Mauricio Yates CRNA    Anesthesia Type: general ASA Status: 2          Anesthesia Type: general    Vitals  Vitals Value Taken Time   /81 01/10/22 1030   Temp 98.6 °F (37 °C) 01/10/22 0949   Pulse 64 01/10/22 1030   Resp 15 01/10/22 1030   SpO2 96 % 01/10/22 1031   Vitals shown include unvalidated device data.        Post Anesthesia Care and Evaluation    Patient location during evaluation: bedside  Patient participation: complete - patient participated  Level of consciousness: awake and alert  Pain score: 0  Pain management: adequate  Airway patency: patent  Anesthetic complications: No anesthetic complications  PONV Status: none  Cardiovascular status: acceptable  Respiratory status: acceptable  Hydration status: acceptable

## 2022-01-10 NOTE — INTERVAL H&P NOTE
H&P updated. The patient was examined and the following changes are noted:      /80 (BP Location: Right arm, Patient Position: Lying)   Pulse 58   Temp 97.7 °F (36.5 °C) (Oral)   Resp 20   Wt 84.2 kg (185 lb 9.6 oz)   SpO2 97%   BMI 23.83 kg/m²

## 2022-01-10 NOTE — ANESTHESIA PROCEDURE NOTES
Airway  Urgency: elective    Date/Time: 1/10/2022 8:51 AM  Airway not difficult    General Information and Staff    Patient location during procedure: OR    Indications and Patient Condition  Indications for airway management: airway protection    Preoxygenated: yes  Mask difficulty assessment: 0 - not attempted    Final Airway Details  Final airway type: supraglottic airway      Successful airway: unique  Size 5    Number of attempts at approach: 1  Assessment: lips, teeth, and gum same as pre-op

## 2022-01-10 NOTE — OP NOTE
Preoperative diagnosis right inguinal hernia  Postoperative diagnosis right indirect inguinal hernia  Procedure repair right inguinal hernia with placement of On-Q pump  Complications none  Drains none  Specimen none  Estimated blood loss 5 cc  Anesthesia General via LMA  Surgeon Dr. Puga  Assistant none  Findings moderate size direct inguinal hernia  Procedure after satisfactory induction of general anesthesia via LMA the patient's right groin was prepped and draped in sterile fashion.  Transverse skin incision was made carried down through the skin and subcutaneous tissue.  External oblique was divided along its fibers.  Ilioinguinal nerve was identified and preserved.  Cord was mobilized and pubis.  He had a moderate size direct hernia.  Cord was explored finding no evidence of an indirect inguinal hernia.  Transversalis fascia was divided circumferentially down the preperitoneal fat at the base of the defect.  Defect was reduced back upon itself and the defect was filled with a large Bard Marlex hernia plug which was sutured in place with using interrupted 2-0 silk simple sutures.  Floor of the canal was reconstituted with onlay Marlex patch cut to size placed down overlying the pubis and the keyhole was loosely closed around the cord with interrupted 2-0 silk simple suture.  On-Q pump tubing was placed percutaneously was placed in the floor the canal.  Cord was placed back in situ.  External oblique was closed in a running simple fashion with use of 3-0 Vicryl.  Subcutaneous tissue was closed interrupted simple fashion with use of 3-0 Vicryl.  And skin was closed with 4-0 Monocryl running subcuticular stitch burying the knots.  Skin and subcutaneous tissue was locally infiltrated with quarter percent Marcaine without epinephrine.  Wound was cleaned and dried and sterilely dressed.  3 Steri-Strips to the On-Q pump tubing at the skin exit site.  OpSite to the incision and a second OpSite to the looped On-Q pump  tubing to the skin exit site.  On-Q pump tubing was hooked to his reservoir.  Right testicle was pulled well down in the hemiscrotum.  The patient tolerated the procedure well was transferred to the recovery room in stable condition.  When he is awake alert stable tolerating p.o. and has voided will be discharged home to follow-up in my office next week call for problems.  Diet as tolerated.  No lifting more than 5 pounds x 6 weeks.  Remove the On-Q pump tubing in 48 hours.  He may shower at that time.  If he has problems voiding he should report to the emergency room.  Ice pack to his right groin x48 hours.  He was sent a prescription for Percocet 5/325 1 p.o. every 4 hours as needed pain 30 these were dispensed without refills.  He should call for problems and call for an appointment

## 2022-01-12 RX ORDER — TRAMADOL HYDROCHLORIDE 50 MG/1
TABLET ORAL
Qty: 60 TABLET | Refills: 0 | Status: SHIPPED | OUTPATIENT
Start: 2022-01-12 | End: 2022-02-10

## 2022-01-13 ENCOUNTER — PATIENT ROUNDING (BHMG ONLY) (OUTPATIENT)
Dept: NEUROLOGY | Facility: CLINIC | Age: 72
End: 2022-01-13

## 2022-01-18 ENCOUNTER — HOSPITAL ENCOUNTER (OUTPATIENT)
Dept: MRI IMAGING | Facility: HOSPITAL | Age: 72
Discharge: HOME | End: 2022-01-18
Admitting: PSYCHIATRY & NEUROLOGY

## 2022-01-18 DIAGNOSIS — G20 PARKINSON'S DISEASE: ICD-10-CM

## 2022-01-18 LAB — CREAT BLDA-MCNC: 1.6 MG/DL (ref 0.6–1.3)

## 2022-01-18 PROCEDURE — 0 GADOBENATE DIMEGLUMINE 529 MG/ML SOLUTION: Performed by: PSYCHIATRY & NEUROLOGY

## 2022-01-18 PROCEDURE — 82565 ASSAY OF CREATININE: CPT

## 2022-01-18 PROCEDURE — A9577 INJ MULTIHANCE: HCPCS | Performed by: PSYCHIATRY & NEUROLOGY

## 2022-01-18 PROCEDURE — 70553 MRI BRAIN STEM W/O & W/DYE: CPT

## 2022-01-18 RX ADMIN — GADOBENATE DIMEGLUMINE 16 ML: 529 INJECTION, SOLUTION INTRAVENOUS at 15:35

## 2022-01-19 ENCOUNTER — OFFICE VISIT (OUTPATIENT)
Dept: SURGERY | Facility: CLINIC | Age: 72
End: 2022-01-19

## 2022-01-19 DIAGNOSIS — Z09 SURGICAL FOLLOW-UP CARE: Primary | ICD-10-CM

## 2022-01-19 PROCEDURE — 99024 POSTOP FOLLOW-UP VISIT: CPT | Performed by: SURGERY

## 2022-01-19 NOTE — PROGRESS NOTES
PATIENT PRESENTS FOR 9 DAY PO INGUINAL HERNIA REPAIR. HE HAS NO COMPLAINTS  Is without complaints other than some chronic back pain. He is eating well passing his water moving his bowels. His incision is healing well there is no impulse there is mild swelling and mild ecchymosis. Activity level was discussed. I will see him back in the office in 1 month

## 2022-01-20 ENCOUNTER — TELEPHONE (OUTPATIENT)
Dept: NEUROLOGY | Facility: CLINIC | Age: 72
End: 2022-01-20

## 2022-01-20 NOTE — TELEPHONE ENCOUNTER
Caller: Cori Burgess    Relationship to patient: Emergency Contact    Best call back number: [phone]  Patient is needing: CALLED BACK, ANAND ADVISED THAT DR. GAMEZ WAS UNAVAILABLE

## 2022-01-20 NOTE — TELEPHONE ENCOUNTER
DR. VERA HO    WIFE    [phone]    CALLED FOR MRI RESULTS. SCANNED UNDER MEDIA. SHE WOULD LIKE A CALL INSTEAD OF [employer] MESSAGE.

## 2022-01-26 RX ORDER — FUROSEMIDE 20 MG/1
TABLET ORAL
Qty: 30 TABLET | Refills: 2 | Status: SHIPPED | OUTPATIENT
Start: 2022-01-26 | End: 2022-02-14 | Stop reason: SDUPTHER

## 2022-01-31 ENCOUNTER — TELEPHONE (OUTPATIENT)
Dept: INTERNAL MEDICINE | Facility: CLINIC | Age: 72
End: 2022-01-31

## 2022-01-31 NOTE — TELEPHONE ENCOUNTER
Caller: Hu Burgess    Relationship: Self    Best call back number: [phone]     What is the best time to reach you: ANY TIME    Who are you requesting to speak with (clinical staff, provider,  specific staff member): CLINICAL    What was the call regarding: PATIENT CALLED TO SCHEDULE A FOLLOW UP VISIT FROM HIS HERNIA REPAIR AND MRI. THERE WERE NO APPOINTMENTS AVAILABLE UNTIL MARCH AND HE IS NEEDING TO BE SEEN SOONER. HE WANTED TO SEE IF HE CAN BE WORKED IN BEFORE MARCH.     Do you require a callback: YES

## 2022-02-10 DIAGNOSIS — G89.29 CHRONIC LOW BACK PAIN, UNSPECIFIED BACK PAIN LATERALITY, UNSPECIFIED WHETHER SCIATICA PRESENT: ICD-10-CM

## 2022-02-10 DIAGNOSIS — M54.50 CHRONIC LOW BACK PAIN, UNSPECIFIED BACK PAIN LATERALITY, UNSPECIFIED WHETHER SCIATICA PRESENT: ICD-10-CM

## 2022-02-10 RX ORDER — TRAMADOL HYDROCHLORIDE 50 MG/1
TABLET ORAL
Qty: 60 TABLET | Refills: 0 | Status: SHIPPED | OUTPATIENT
Start: 2022-02-10 | End: 2022-03-22 | Stop reason: SDUPTHER

## 2022-02-10 NOTE — TELEPHONE ENCOUNTER
Rx Refill Note  Requested Prescriptions     Pending Prescriptions Disp Refills   • traMADol (ULTRAM) 50 MG tablet [Pharmacy Med Name: TRAMADOL HCL 50 MG TABLET] 60 tablet 0     Sig: TAKE 1 TABLET BY MOUTH TWICE A DAY      Last office visit with prescribing clinician: 12/13/2021      Next office visit with prescribing clinician: 2/21/2022            Ashanti Ralph MA  02/10/22, 15:45 EST

## 2022-02-14 RX ORDER — FUROSEMIDE 20 MG/1
TABLET ORAL
Qty: 30 TABLET | Refills: 2 | Status: SHIPPED | OUTPATIENT
Start: 2022-02-14 | End: 2022-02-22 | Stop reason: ALTCHOICE

## 2022-02-16 ENCOUNTER — OFFICE VISIT (OUTPATIENT)
Dept: SURGERY | Facility: CLINIC | Age: 72
End: 2022-02-16

## 2022-02-16 DIAGNOSIS — Z09 SURGICAL FOLLOW-UP CARE: Primary | ICD-10-CM

## 2022-02-16 PROCEDURE — 99024 POSTOP FOLLOW-UP VISIT: CPT | Performed by: SURGERY

## 2022-02-16 RX ORDER — PRIMIDONE 50 MG/1
TABLET ORAL
COMMUNITY
Start: 2022-02-10 | End: 2022-02-16

## 2022-02-16 NOTE — PROGRESS NOTES
Patient presents for 36 day po INGUINAL HERNIA REPAIR. He has no complaints.  He is without complaints.  His incision is healing well.  There is no impulse.  He can resume normal activities in 1 week.  I will see him back as needed

## 2022-02-21 ENCOUNTER — OFFICE VISIT (OUTPATIENT)
Dept: INTERNAL MEDICINE | Facility: CLINIC | Age: 72
End: 2022-02-21

## 2022-02-21 ENCOUNTER — TELEPHONE (OUTPATIENT)
Dept: NEUROLOGY | Facility: CLINIC | Age: 72
End: 2022-02-21

## 2022-02-21 VITALS
BODY MASS INDEX: 24.51 KG/M2 | TEMPERATURE: 96.9 F | WEIGHT: 191 LBS | SYSTOLIC BLOOD PRESSURE: 112 MMHG | DIASTOLIC BLOOD PRESSURE: 76 MMHG | HEART RATE: 79 BPM | OXYGEN SATURATION: 98 % | RESPIRATION RATE: 16 BRPM | HEIGHT: 74 IN

## 2022-02-21 DIAGNOSIS — G20 PARKINSONIAN TREMOR: ICD-10-CM

## 2022-02-21 DIAGNOSIS — K74.60 CIRRHOSIS OF LIVER WITHOUT ASCITES, UNSPECIFIED HEPATIC CIRRHOSIS TYPE: Primary | ICD-10-CM

## 2022-02-21 DIAGNOSIS — G20 PARKINSON'S DISEASE: Primary | ICD-10-CM

## 2022-02-21 DIAGNOSIS — I10 ESSENTIAL HYPERTENSION: ICD-10-CM

## 2022-02-21 DIAGNOSIS — G89.29 CHRONIC LOW BACK PAIN, UNSPECIFIED BACK PAIN LATERALITY, UNSPECIFIED WHETHER SCIATICA PRESENT: ICD-10-CM

## 2022-02-21 DIAGNOSIS — M54.50 CHRONIC LOW BACK PAIN, UNSPECIFIED BACK PAIN LATERALITY, UNSPECIFIED WHETHER SCIATICA PRESENT: ICD-10-CM

## 2022-02-21 PROCEDURE — 99214 OFFICE O/P EST MOD 30 MIN: CPT | Performed by: INTERNAL MEDICINE

## 2022-02-21 RX ORDER — SPIRONOLACTONE 50 MG/1
50 TABLET, FILM COATED ORAL DAILY
Qty: 90 TABLET | Refills: 1 | Status: SHIPPED | OUTPATIENT
Start: 2022-02-21 | End: 2022-05-17 | Stop reason: HOSPADM

## 2022-02-21 RX ORDER — PANTOPRAZOLE SODIUM 40 MG/1
40 TABLET, DELAYED RELEASE ORAL DAILY
Qty: 30 TABLET | Refills: 1 | Status: SHIPPED | OUTPATIENT
Start: 2022-02-21 | End: 2022-03-17

## 2022-02-21 NOTE — PROGRESS NOTES
"Chief Complaint  Follow-up (from hernia surgery ) and Heartburn    Subjective          Hu Burgess presents to [facility]  History of Present Illness  Many different issues but mainly bloating and constipation with some reflux at times at night. He took some Tums the other night with some relief. He thinks maybe the carbidopa levodopa may have made that worse. He is going to talk with neurology about changing it. We reviewed his MRI of the brain from previous which does show a meningioma and some sinus disease  Cirrhosis noted previously on CT scan. Bilirubin was mildly elevated previously and some ascites. He is still taking the Lasix daily. We talked about adding the Aldactone    Objective   Vital Signs:   /76 (BP Location: Left arm, Patient Position: Sitting, Cuff Size: Large Adult)   Pulse 79   Temp 96.9 °F (36.1 °C) (Temporal)   Resp 16   Ht 188 cm (74\")   Wt 86.6 kg (191 lb)   SpO2 98%   BMI 24.52 kg/m²     Physical Exam  Vitals and nursing note reviewed.   Constitutional:       Appearance: Normal appearance.   HENT:      Head: Normocephalic and atraumatic.   Cardiovascular:      Rate and Rhythm: Normal rate and regular rhythm.   Pulmonary:      Effort: Pulmonary effort is normal.      Breath sounds: Normal breath sounds.   Abdominal:      General: Abdomen is flat. There is distension.      Palpations: Abdomen is soft.      Tenderness: There is no abdominal tenderness. There is no guarding.   Musculoskeletal:         General: No swelling or deformity.      Cervical back: Neck supple.      Right lower leg: No edema.      Left lower leg: No edema.   Skin:     General: Skin is warm.      Capillary Refill: Capillary refill takes less than 2 seconds.      Findings: No rash.   Neurological:      General: No focal deficit present.      Mental Status: He is alert and oriented to person, place, and time.        Result Review : Previous labs           "      Assessment and Plan cirrhosis with mild ascites. Add Aldactone 50 mg daily. Overall fairly stable. He is also complaining of constipation which we talked about MiraLAX daily. Reflux symptoms more recently. He thought was related to his Sinemet? Start the pantoprazole and recheck in 4 weeks. He needs an EGD anyway at some point but we will see how he does on the pantoprazole. He is just getting released from his hernia repair from the surgeon  Parkinson's disease followed by neurology. He is going to call them about maybe changing his Sinemet  He and his wife also mentioned this back pain. Is been ongoing and they talk to someone else about kyphoplasty which certainly would be reasonable to consider and talk with neurosurgery about. Not ready to do that just yet  Repeat lab work and follow-up in 4 weeks or pending results  Diagnoses and all orders for this visit:    1. Cirrhosis of liver without ascites, unspecified hepatic cirrhosis type (HCC) (Primary)  -     CBC & Differential  -     Comprehensive Metabolic Panel  -     C-reactive Protein    Other orders  -     pantoprazole (Protonix) 40 MG EC tablet; Take 1 tablet by mouth Daily.  Dispense: 30 tablet; Refill: 1  -     spironolactone (ALDACTONE) 50 MG tablet; Take 1 tablet by mouth Daily.  Dispense: 90 tablet; Refill: 1        Follow Up   Return in about 4 weeks (around 3/21/2022).  Patient was given instructions and counseling regarding his condition or for health maintenance advice. Please see specific information pulled into the AVS if appropriate.

## 2022-02-21 NOTE — TELEPHONE ENCOUNTER
Caller: CHACHO Rosenbaum call back number: [phone]    What medications are you currently taking:   Current Outpatient Medications on File Prior to Visit   Medication Sig Dispense Refill   • carbidopa-levodopa (Sinemet)  MG per tablet Take 1 tablet by mouth 3 (Three) Times a Day for 30 days. 90 tablet 3   • furosemide (LASIX) 20 MG tablet TAKE 1 TABLET BY MOUTH TWICE A DAY 30 tablet 2   • NIFEdipine XL (PROCARDIA XL) 30 MG 24 hr tablet TAKE 1 TABLET BY MOUTH EVERY DAY 90 tablet 3   • pantoprazole (Protonix) 40 MG EC tablet Take 1 tablet by mouth Daily. 30 tablet 1   • spironolactone (ALDACTONE) 50 MG tablet Take 1 tablet by mouth Daily. 90 tablet 1   • traMADol (ULTRAM) 50 MG tablet TAKE 1 TABLET BY MOUTH TWICE A DAY 60 tablet 0     No current facility-administered medications on file prior to visit.        When did you start taking these medications: 01.03.22     Which medication are you concerned about: CARBIDOPA-LEFODOPA       Who prescribed you this medication: DR GAMEZ     What are your concerns: RX IS MAKING ME QUIZZY     How long have you had these concerns: SINCE APPT .     IS THERE ANOTHER RX I CAN TAKE.     PLEASE ADVISE.     [facility]/pharmacy #[id number] - [place] - [address] [address] [address] [address] - [phone] [facility] [phone]   [phone]

## 2022-02-22 DIAGNOSIS — K74.60 CIRRHOSIS OF LIVER WITHOUT ASCITES, UNSPECIFIED HEPATIC CIRRHOSIS TYPE: Primary | ICD-10-CM

## 2022-02-22 LAB
ALBUMIN SERPL-MCNC: 3.2 G/DL (ref 3.7–4.7)
ALBUMIN/GLOB SERPL: 0.8 {RATIO} (ref 1.2–2.2)
ALP SERPL-CCNC: 375 IU/L (ref 44–121)
ALT SERPL-CCNC: 37 IU/L (ref 0–44)
AST SERPL-CCNC: 82 IU/L (ref 0–40)
BASOPHILS # BLD AUTO: 0.1 X10E3/UL (ref 0–0.2)
BASOPHILS NFR BLD AUTO: 1 %
BILIRUB SERPL-MCNC: 4.2 MG/DL (ref 0–1.2)
BUN SERPL-MCNC: 19 MG/DL (ref 8–27)
BUN/CREAT SERPL: 9 (ref 10–24)
CALCIUM SERPL-MCNC: 8.8 MG/DL (ref 8.6–10.2)
CHLORIDE SERPL-SCNC: 100 MMOL/L (ref 96–106)
CO2 SERPL-SCNC: 23 MMOL/L (ref 20–29)
CREAT SERPL-MCNC: 2.1 MG/DL (ref 0.76–1.27)
CRP SERPL-MCNC: 13 MG/L (ref 0–10)
EOSINOPHIL # BLD AUTO: 0.2 X10E3/UL (ref 0–0.4)
EOSINOPHIL NFR BLD AUTO: 3 %
ERYTHROCYTE [DISTWIDTH] IN BLOOD BY AUTOMATED COUNT: 13.1 % (ref 11.6–15.4)
GLOBULIN SER CALC-MCNC: 4.1 G/DL (ref 1.5–4.5)
GLUCOSE SERPL-MCNC: 101 MG/DL (ref 65–99)
HCT VFR BLD AUTO: 40.5 % (ref 37.5–51)
HGB BLD-MCNC: 14 G/DL (ref 13–17.7)
IMM GRANULOCYTES # BLD AUTO: 0 X10E3/UL (ref 0–0.1)
IMM GRANULOCYTES NFR BLD AUTO: 0 %
LYMPHOCYTES # BLD AUTO: 1.5 X10E3/UL (ref 0.7–3.1)
LYMPHOCYTES NFR BLD AUTO: 22 %
MCH RBC QN AUTO: 34.5 PG (ref 26.6–33)
MCHC RBC AUTO-ENTMCNC: 34.6 G/DL (ref 31.5–35.7)
MCV RBC AUTO: 100 FL (ref 79–97)
MONOCYTES # BLD AUTO: 0.9 X10E3/UL (ref 0.1–0.9)
MONOCYTES NFR BLD AUTO: 13 %
NEUTROPHILS # BLD AUTO: 4.1 X10E3/UL (ref 1.4–7)
NEUTROPHILS NFR BLD AUTO: 61 %
PLATELET # BLD AUTO: 162 X10E3/UL (ref 150–450)
POTASSIUM SERPL-SCNC: 2.9 MMOL/L (ref 3.5–5.2)
PROT SERPL-MCNC: 7.3 G/DL (ref 6–8.5)
RBC # BLD AUTO: 4.06 X10E6/UL (ref 4.14–5.8)
SODIUM SERPL-SCNC: 140 MMOL/L (ref 134–144)
WBC # BLD AUTO: 6.8 X10E3/UL (ref 3.4–10.8)

## 2022-02-22 RX ORDER — FUROSEMIDE 20 MG/1
TABLET ORAL
Qty: 30 TABLET | Refills: 2 | OUTPATIENT
Start: 2022-02-22

## 2022-02-23 RX ORDER — PRAMIPEXOLE DIHYDROCHLORIDE 0.25 MG/1
0.25 TABLET ORAL 3 TIMES DAILY
Qty: 90 TABLET | Refills: 4 | Status: SHIPPED | OUTPATIENT
Start: 2022-02-23 | End: 2022-03-22

## 2022-02-23 NOTE — TELEPHONE ENCOUNTER
Caller: Cori Burgess    Relationship: Emergency Contact    Best call back number: [phone]    What is the best time to reach you: ANY    Who are you requesting to speak with (clinical staff, provider,  specific staff member): CLINICAL    What was the call regarding: PT'S WIFE IS CALLING TO CHECK THE STATUS OF THIS.  PT'S WIFE THINKS THAT THE QUEASINESS IS DUE TO PT NOT EATING WHEN TAKING HIS MEDICATION, BUT HE TAKES ALL OF HIS OTHER MEDICATIONS ON AN EMPTY STOMACH AS WELL.      Do you require a callback: YES

## 2022-02-23 NOTE — TELEPHONE ENCOUNTER
Spoke to patient.  We are going to discontinue the prescription for Sinemet.  We will try pramipexole 0.25 mg 3 times daily.

## 2022-02-27 ENCOUNTER — HOSPITAL ENCOUNTER (EMERGENCY)
Facility: HOSPITAL | Age: 72
Discharge: HOME | End: 2022-02-27
Attending: EMERGENCY MEDICINE | Admitting: EMERGENCY MEDICINE

## 2022-02-27 VITALS
DIASTOLIC BLOOD PRESSURE: 79 MMHG | HEART RATE: 74 BPM | SYSTOLIC BLOOD PRESSURE: 135 MMHG | OXYGEN SATURATION: 97 % | TEMPERATURE: 98.7 F | WEIGHT: 196.4 LBS | BODY MASS INDEX: 25.21 KG/M2 | RESPIRATION RATE: 16 BRPM | HEIGHT: 74 IN

## 2022-02-27 DIAGNOSIS — Z87.448 HISTORY OF HEMATURIA: Primary | ICD-10-CM

## 2022-02-27 LAB
ALBUMIN SERPL-MCNC: 2.9 G/DL (ref 3.5–5.2)
ALBUMIN/GLOB SERPL: 0.7 G/DL
ALP SERPL-CCNC: 310 U/L (ref 39–117)
ALT SERPL W P-5'-P-CCNC: 26 U/L (ref 1–41)
ANION GAP SERPL CALCULATED.3IONS-SCNC: 11.4 MMOL/L (ref 5–15)
AST SERPL-CCNC: 60 U/L (ref 1–40)
BACTERIA UR QL AUTO: ABNORMAL /HPF
BASOPHILS # BLD AUTO: 0.06 10*3/MM3 (ref 0–0.2)
BASOPHILS NFR BLD AUTO: 0.9 % (ref 0–1.5)
BILIRUB SERPL-MCNC: 4.4 MG/DL (ref 0–1.2)
BILIRUB UR QL STRIP: ABNORMAL
BUN SERPL-MCNC: 19 MG/DL (ref 8–23)
BUN/CREAT SERPL: 8.7 (ref 7–25)
CALCIUM SPEC-SCNC: 8.9 MG/DL (ref 8.6–10.5)
CHLORIDE SERPL-SCNC: 101 MMOL/L (ref 98–107)
CLARITY UR: CLEAR
CO2 SERPL-SCNC: 26.6 MMOL/L (ref 22–29)
COLOR UR: ABNORMAL
CREAT SERPL-MCNC: 2.19 MG/DL (ref 0.76–1.27)
DEPRECATED RDW RBC AUTO: 58.6 FL (ref 37–54)
EOSINOPHIL # BLD AUTO: 0.19 10*3/MM3 (ref 0–0.4)
EOSINOPHIL NFR BLD AUTO: 2.7 % (ref 0.3–6.2)
ERYTHROCYTE [DISTWIDTH] IN BLOOD BY AUTOMATED COUNT: 15.1 % (ref 12.3–15.4)
GFR SERPL CREATININE-BSD FRML MDRD: 30 ML/MIN/1.73
GLOBULIN UR ELPH-MCNC: 3.9 GM/DL
GLUCOSE SERPL-MCNC: 130 MG/DL (ref 65–99)
GLUCOSE UR STRIP-MCNC: ABNORMAL MG/DL
HCT VFR BLD AUTO: 39.4 % (ref 37.5–51)
HGB BLD-MCNC: 13.2 G/DL (ref 13–17.7)
HGB UR QL STRIP.AUTO: ABNORMAL
HYALINE CASTS UR QL AUTO: ABNORMAL /LPF
IMM GRANULOCYTES # BLD AUTO: 0.02 10*3/MM3 (ref 0–0.05)
IMM GRANULOCYTES NFR BLD AUTO: 0.3 % (ref 0–0.5)
KETONES UR QL STRIP: NEGATIVE
LEUKOCYTE ESTERASE UR QL STRIP.AUTO: NEGATIVE
LYMPHOCYTES # BLD AUTO: 1.36 10*3/MM3 (ref 0.7–3.1)
LYMPHOCYTES NFR BLD AUTO: 19.5 % (ref 19.6–45.3)
MACROCYTES BLD QL SMEAR: NORMAL
MCH RBC QN AUTO: 35.4 PG (ref 26.6–33)
MCHC RBC AUTO-ENTMCNC: 33.5 G/DL (ref 31.5–35.7)
MCV RBC AUTO: 105.6 FL (ref 79–97)
MONOCYTES # BLD AUTO: 0.85 10*3/MM3 (ref 0.1–0.9)
MONOCYTES NFR BLD AUTO: 12.2 % (ref 5–12)
NEUTROPHILS NFR BLD AUTO: 4.51 10*3/MM3 (ref 1.7–7)
NEUTROPHILS NFR BLD AUTO: 64.4 % (ref 42.7–76)
NITRITE UR QL STRIP: NEGATIVE
NRBC BLD AUTO-RTO: 0 /100 WBC (ref 0–0.2)
PH UR STRIP.AUTO: 6 [PH] (ref 4.5–8)
PLAT MORPH BLD: NORMAL
PLATELET # BLD AUTO: 165 10*3/MM3 (ref 140–450)
PMV BLD AUTO: 12.6 FL (ref 6–12)
POTASSIUM SERPL-SCNC: 3.1 MMOL/L (ref 3.5–5.2)
PROT SERPL-MCNC: 6.8 G/DL (ref 6–8.5)
PROT UR QL STRIP: NEGATIVE
RBC # BLD AUTO: 3.73 10*6/MM3 (ref 4.14–5.8)
RBC # UR STRIP: ABNORMAL /HPF
REF LAB TEST METHOD: ABNORMAL
SODIUM SERPL-SCNC: 139 MMOL/L (ref 136–145)
SP GR UR STRIP: 1.02 (ref 1–1.03)
SQUAMOUS #/AREA URNS HPF: ABNORMAL /HPF
UROBILINOGEN UR QL STRIP: ABNORMAL
WBC # UR STRIP: ABNORMAL /HPF
WBC MORPH BLD: NORMAL
WBC NRBC COR # BLD: 6.99 10*3/MM3 (ref 3.4–10.8)

## 2022-02-27 PROCEDURE — 85025 COMPLETE CBC W/AUTO DIFF WBC: CPT | Performed by: EMERGENCY MEDICINE

## 2022-02-27 PROCEDURE — 36415 COLL VENOUS BLD VENIPUNCTURE: CPT

## 2022-02-27 PROCEDURE — 99283 EMERGENCY DEPT VISIT LOW MDM: CPT

## 2022-02-27 PROCEDURE — 99282 EMERGENCY DEPT VISIT SF MDM: CPT | Performed by: EMERGENCY MEDICINE

## 2022-02-27 PROCEDURE — 80053 COMPREHEN METABOLIC PANEL: CPT | Performed by: EMERGENCY MEDICINE

## 2022-02-27 PROCEDURE — 81001 URINALYSIS AUTO W/SCOPE: CPT | Performed by: EMERGENCY MEDICINE

## 2022-02-27 PROCEDURE — 85007 BL SMEAR W/DIFF WBC COUNT: CPT | Performed by: EMERGENCY MEDICINE

## 2022-03-04 ENCOUNTER — TELEPHONE (OUTPATIENT)
Dept: NEUROLOGY | Facility: CLINIC | Age: 72
End: 2022-03-04

## 2022-03-04 ENCOUNTER — LAB (OUTPATIENT)
Dept: LAB | Facility: HOSPITAL | Age: 72
End: 2022-03-04

## 2022-03-04 ENCOUNTER — PREP FOR SURGERY (OUTPATIENT)
Dept: OTHER | Facility: HOSPITAL | Age: 72
End: 2022-03-04

## 2022-03-04 ENCOUNTER — TELEPHONE (OUTPATIENT)
Dept: GASTROENTEROLOGY | Facility: CLINIC | Age: 72
End: 2022-03-04

## 2022-03-04 ENCOUNTER — OFFICE VISIT (OUTPATIENT)
Dept: GASTROENTEROLOGY | Facility: CLINIC | Age: 72
End: 2022-03-04

## 2022-03-04 VITALS
TEMPERATURE: 97.8 F | SYSTOLIC BLOOD PRESSURE: 130 MMHG | HEIGHT: 74 IN | DIASTOLIC BLOOD PRESSURE: 80 MMHG | OXYGEN SATURATION: 100 % | BODY MASS INDEX: 25.36 KG/M2 | HEART RATE: 74 BPM | WEIGHT: 197.6 LBS

## 2022-03-04 DIAGNOSIS — K59.00 CONSTIPATION, UNSPECIFIED CONSTIPATION TYPE: ICD-10-CM

## 2022-03-04 DIAGNOSIS — R74.8 ELEVATED LIVER ENZYMES: ICD-10-CM

## 2022-03-04 DIAGNOSIS — R17 JAUNDICE: ICD-10-CM

## 2022-03-04 DIAGNOSIS — R11.0 NAUSEA: ICD-10-CM

## 2022-03-04 DIAGNOSIS — R18.8 CIRRHOSIS OF LIVER WITH ASCITES, UNSPECIFIED HEPATIC CIRRHOSIS TYPE: Primary | ICD-10-CM

## 2022-03-04 DIAGNOSIS — R60.0 PEDAL EDEMA: ICD-10-CM

## 2022-03-04 DIAGNOSIS — K21.9 GASTROESOPHAGEAL REFLUX DISEASE, UNSPECIFIED WHETHER ESOPHAGITIS PRESENT: ICD-10-CM

## 2022-03-04 DIAGNOSIS — K74.60 CIRRHOSIS OF LIVER WITH ASCITES, UNSPECIFIED HEPATIC CIRRHOSIS TYPE: Primary | ICD-10-CM

## 2022-03-04 DIAGNOSIS — Z12.11 COLON CANCER SCREENING: ICD-10-CM

## 2022-03-04 LAB
ALBUMIN SERPL-MCNC: 3.4 G/DL (ref 3.5–5.2)
ALBUMIN/GLOB SERPL: 0.8 G/DL
ALP SERPL-CCNC: 323 U/L (ref 39–117)
ALPHA-FETOPROTEIN: 4.38 NG/ML (ref 0–8.3)
ALT SERPL W P-5'-P-CCNC: 31 U/L (ref 1–41)
AMMONIA BLD-SCNC: 102 UMOL/L (ref 16–60)
ANION GAP SERPL CALCULATED.3IONS-SCNC: 11.9 MMOL/L (ref 5–15)
ANISOCYTOSIS BLD QL: NORMAL
AST SERPL-CCNC: 76 U/L (ref 1–40)
BASOPHILS # BLD AUTO: 0.07 10*3/MM3 (ref 0–0.2)
BASOPHILS NFR BLD AUTO: 0.9 % (ref 0–1.5)
BILIRUB SERPL-MCNC: 5.4 MG/DL (ref 0–1.2)
BUN SERPL-MCNC: 20 MG/DL (ref 8–23)
BUN/CREAT SERPL: 7.4 (ref 7–25)
CALCIUM SPEC-SCNC: 9.4 MG/DL (ref 8.6–10.5)
CHLORIDE SERPL-SCNC: 96 MMOL/L (ref 98–107)
CO2 SERPL-SCNC: 24.1 MMOL/L (ref 22–29)
CREAT SERPL-MCNC: 2.69 MG/DL (ref 0.76–1.27)
DEPRECATED RDW RBC AUTO: 58 FL (ref 37–54)
EGFRCR SERPLBLD CKD-EPI 2021: 24.2 ML/MIN/1.73
EOSINOPHIL # BLD AUTO: 0.07 10*3/MM3 (ref 0–0.4)
EOSINOPHIL NFR BLD AUTO: 0.9 % (ref 0.3–6.2)
ERYTHROCYTE [DISTWIDTH] IN BLOOD BY AUTOMATED COUNT: 15.1 % (ref 12.3–15.4)
GLOBULIN UR ELPH-MCNC: 4.5 GM/DL
GLUCOSE SERPL-MCNC: 128 MG/DL (ref 65–99)
HCT VFR BLD AUTO: 41.7 % (ref 37.5–51)
HGB BLD-MCNC: 14 G/DL (ref 13–17.7)
IMM GRANULOCYTES # BLD AUTO: 0.02 10*3/MM3 (ref 0–0.05)
IMM GRANULOCYTES NFR BLD AUTO: 0.2 % (ref 0–0.5)
INR PPP: 1.41 (ref 0.9–1.1)
LYMPHOCYTES # BLD AUTO: 1.27 10*3/MM3 (ref 0.7–3.1)
LYMPHOCYTES NFR BLD AUTO: 15.8 % (ref 19.6–45.3)
MACROCYTES BLD QL SMEAR: NORMAL
MCH RBC QN AUTO: 34.9 PG (ref 26.6–33)
MCHC RBC AUTO-ENTMCNC: 33.6 G/DL (ref 31.5–35.7)
MCV RBC AUTO: 104 FL (ref 79–97)
MONOCYTES # BLD AUTO: 1.03 10*3/MM3 (ref 0.1–0.9)
MONOCYTES NFR BLD AUTO: 12.8 % (ref 5–12)
NEUTROPHILS NFR BLD AUTO: 5.6 10*3/MM3 (ref 1.7–7)
NEUTROPHILS NFR BLD AUTO: 69.4 % (ref 42.7–76)
NRBC BLD AUTO-RTO: 0 /100 WBC (ref 0–0.2)
PLATELET # BLD AUTO: 201 10*3/MM3 (ref 140–450)
PMV BLD AUTO: 12.4 FL (ref 6–12)
POTASSIUM SERPL-SCNC: 3 MMOL/L (ref 3.5–5.2)
PROT SERPL-MCNC: 7.9 G/DL (ref 6–8.5)
PROTHROMBIN TIME: 17.6 SECONDS (ref 12.1–15)
RBC # BLD AUTO: 4.01 10*6/MM3 (ref 4.14–5.8)
SMALL PLATELETS BLD QL SMEAR: ADEQUATE
SODIUM SERPL-SCNC: 132 MMOL/L (ref 136–145)
WBC MORPH BLD: NORMAL
WBC NRBC COR # BLD: 8.06 10*3/MM3 (ref 3.4–10.8)

## 2022-03-04 PROCEDURE — 80053 COMPREHEN METABOLIC PANEL: CPT | Performed by: NURSE PRACTITIONER

## 2022-03-04 PROCEDURE — 82140 ASSAY OF AMMONIA: CPT | Performed by: NURSE PRACTITIONER

## 2022-03-04 PROCEDURE — 85610 PROTHROMBIN TIME: CPT | Performed by: NURSE PRACTITIONER

## 2022-03-04 PROCEDURE — 99214 OFFICE O/P EST MOD 30 MIN: CPT | Performed by: NURSE PRACTITIONER

## 2022-03-04 PROCEDURE — 36415 COLL VENOUS BLD VENIPUNCTURE: CPT | Performed by: NURSE PRACTITIONER

## 2022-03-04 PROCEDURE — 85007 BL SMEAR W/DIFF WBC COUNT: CPT | Performed by: NURSE PRACTITIONER

## 2022-03-04 PROCEDURE — 85025 COMPLETE CBC W/AUTO DIFF WBC: CPT | Performed by: NURSE PRACTITIONER

## 2022-03-04 PROCEDURE — 82105 ALPHA-FETOPROTEIN SERUM: CPT | Performed by: NURSE PRACTITIONER

## 2022-03-04 NOTE — PATIENT INSTRUCTIONS
1.  For treatment of ascites, we will schedule an ultrasound-guided paracentesis.    2.  Continue spironolactone 50 mg once daily.    3.  For ongoing monitoring of cirrhosis we will obtain some routine lab work today and calculate a meld score to determine how well compensated her cirrhosis is.  We will contact you with results once available.    4.  For further evaluation of possible esophageal varices as well as evaluation for chronic GERD we will proceed with EGD.    5.  For colon cancer screening we will schedule a colonoscopy.    6.  Please follow a low-sodium diet of 2 g/day or less.  Avoid raw shellfish.  We recommend that you obtain your hepatitis A and hepatitis B vaccines if you have not already done so.    7.  For constipation, continue MiraLAX and titrate dosing accordingly to help produce more regular daily bowel movements.    8.  For GERD, continue pantoprazole daily.    9.  Next office follow-up to be determined based upon EGD and colonoscopy findings, lab work findings, and results from your paracentesis.  We will work in conjunction with your nephrologist, Dr. Upton to determine the best diuretic therapy for management of your lower extremity edema and to minimize ascites while protecting your kidneys.

## 2022-03-04 NOTE — TELEPHONE ENCOUNTER
----- Message from JULIUS Raza sent at 3/4/2022  2:57 PM EST -----  Hi Cassandra,I forgot to mention in his lab work that his ammonia level was also elevated.  For this finding we will need to place him on Xifaxan 550 mg take 1 tablet twice daily.  I will send this prescription to his pharmacy.  If you could just relay this information to the patient and his wife when you call about the other lab work that I had sent to you.  Thank you.  JULIUS Stearns    Discussed recommendations with patent's wife. pk

## 2022-03-04 NOTE — PROGRESS NOTES
Chief Complaint   Patient presents with   • Cirrhosis   • Heartburn   • Colon Cancer Screening         History of Present Illness  71-year-old male presents the office today for evaluation of cirrhosis.  His wife has accompanied him on his office visit today.  CT scan on 11/24/2021 revealed findings of cirrhosis, portal hypertension and moderate amount of ascites and a right inguinal hernia containing fat and ascites, as well as gallstones.    The patient reports a history of rhabdomyolysis in 2017.  He reports that he may have 2 beers per month.  He reports an increase in his weight gradually over the past several months and now is the heaviest he has ever been.  He reports an increase in abdominal girth.  He has never undergone paracentesis.  He reports having dark urine at times.  He made a visit to the ER on 2/27/2022 as noted below.  He reports significant bilateral lower extremity swelling.  He take spironolactone 50 mg once daily and follows with nephrologist, .  He does not follow a low-sodium diet.    He has a history of Parkinson's disease.    He also has a history of GERD and takes pantoprazole 40 mg once daily with good control of symptoms.  He does report nausea and emesis with activity.  He denies any hematemesis.  He denies any melena.    He reports that it has been greater than 10 years since his last colonoscopy.  He denies any personal history of colon polyps.  He denies any family history of colon cancer.  He does report having constipation for which he takes MiraLAX.  Prior to use of MiraLAX he was having a bowel movement once per week.  He states that if he takes MiraLAX too frequently he will have very loose stool with some anal leakage, which is just started to occur over the past couple of weeks.    Review of Systems   Constitutional: Positive for unexpected weight change. Negative for fever.   HENT: Negative for trouble swallowing.    Cardiovascular: Positive for leg swelling.  "Negative for chest pain.   Gastrointestinal: Positive for abdominal distention, abdominal pain, constipation and nausea. Negative for anal bleeding, blood in stool, diarrhea, rectal pain and vomiting.      Result Review :       CT Abdomen Pelvis With Contrast (11/24/2021 14:56)  Comprehensive Metabolic Panel (02/27/2022 13:49)  Ammonia (12/13/2021 13:46)  CBC & Differential (02/27/2022 13:49)  ED Provider Notes by Leon Nathan MD (02/27/2022 13:35)    Vital Signs:   /80   Pulse 74   Temp 97.8 °F (36.6 °C)   Ht 188 cm (74\")   Wt 89.6 kg (197 lb 9.6 oz)   SpO2 100%   BMI 25.37 kg/m²     Body mass index is 25.37 kg/m².     Physical Exam  Vitals reviewed.   Constitutional:       Appearance: Normal appearance.   HENT:      Head: Normocephalic.      Nose: Nose normal.      Mouth/Throat:      Mouth: Mucous membranes are moist.   Eyes:      General: Scleral icterus present.      Extraocular Movements: Extraocular movements intact.      Pupils: Pupils are equal, round, and reactive to light.   Cardiovascular:      Rate and Rhythm: Normal rate and regular rhythm.      Pulses: Normal pulses.      Heart sounds: Normal heart sounds.   Pulmonary:      Effort: Pulmonary effort is normal. No respiratory distress.      Breath sounds: Normal breath sounds.   Abdominal:      General: Bowel sounds are normal. There is distension.      Palpations: There is no mass.      Tenderness: There is abdominal tenderness. There is no guarding.      Comments: Thickened amount of ascites noted on palpation of abdomen.  Abdomen was firm to the touch.   Musculoskeletal:         General: Normal range of motion.      Cervical back: Normal range of motion and neck supple.   Skin:     General: Skin is warm and dry.      Coloration: Skin is jaundiced.   Neurological:      General: No focal deficit present.      Mental Status: He is alert and oriented to person, place, and time.   Psychiatric:         Mood and Affect: Mood normal.         " Behavior: Behavior normal.         Thought Content: Thought content normal.         Judgment: Judgment normal.       Assessment and Plan    Diagnoses and all orders for this visit:    1. Cirrhosis of liver with ascites, unspecified hepatic cirrhosis type (HCC) (Primary)  -     CBC & Differential  -     Comprehensive Metabolic Panel  -     AFP Tumor Marker  -     Protime-INR  -     Ammonia    2. Pedal edema  -     CBC & Differential  -     Comprehensive Metabolic Panel  -     AFP Tumor Marker  -     Protime-INR  -     Ammonia    3. Jaundice  -     CBC & Differential  -     Comprehensive Metabolic Panel  -     AFP Tumor Marker  -     Protime-INR  -     Ammonia    4. Elevated liver enzymes  -     CBC & Differential  -     Comprehensive Metabolic Panel  -     AFP Tumor Marker  -     Protime-INR  -     Ammonia    5. Constipation, unspecified constipation type    6. Gastroesophageal reflux disease, unspecified whether esophagitis present           Patient Instructions   1.  For treatment of ascites, we will schedule an ultrasound-guided paracentesis.    2.  Continue spironolactone 50 mg once daily.    3.  For ongoing monitoring of cirrhosis we will obtain some routine lab work today and calculate a meld score to determine how well compensated her cirrhosis is.  We will contact you with results once available.    4.  For further evaluation of possible esophageal varices as well as evaluation for chronic GERD we will proceed with EGD.    5.  For colon cancer screening we will schedule a colonoscopy.    6.  Please follow a low-sodium diet of 2 g/day or less.  Avoid raw shellfish.  We recommend that you obtain your hepatitis A and hepatitis B vaccines if you have not already done so.    7.  For constipation, continue MiraLAX and titrate dosing accordingly to help produce more regular daily bowel movements.    8.  For GERD, continue pantoprazole daily.    9.  Next office follow-up to be determined based upon EGD and  colonoscopy findings, lab work findings, and results from your paracentesis.  We will work in conjunction with your nephrologist, Dr. Upton to determine the best diuretic therapy for management of your lower extremity edema and to minimize ascites while protecting your kidneys.     Discussion:    We will proceed with ultrasound-guided paracentesis for management of the patient's ascites.  We will obtain some routine lab work today to calculate a meld score.  We will keep him on his spironolactone 50 mg for now, as his creatinine is significantly elevated.  He does treat with nephrologist Dr. Upton; however, based upon his upcoming lab work we note may need to place a call to Dr. Upton to determine how to manage his diuretics moving forward due to his significant edema. We will plan for EGD to assess for esophageal varices as well as due to his chronic history of GERD.  We will plan to perform a screening colonoscopy as it has been greater than 10 years since his last procedure.  For constipation, patient to continue MiraLAX and titrate dosing accordingly.  Patient to continue daily PPI therapy for GERD.  Additional recommendations pending outcome of lab work, endoscopic procedures, and paracentesis results as well as clinical course.  Patient ultimately may require referral to  hepatology for ongoing management.  Patient and his wife both verbalized understanding of above plan of care and are in agreement.  All questions answered and support provided.    EMR Dragon/Transcription Disclaimer:  This document has been Dictated utilizing Dragon dictation.

## 2022-03-04 NOTE — TELEPHONE ENCOUNTER
"Caller: Domenica Burgesse    Relationship: Emergency Contact; SPOUSE    Best call back number: [phone]    Preferred pharmacy: [facility]/pharmacy #[id number] [facility] [address] [address] AT [address] [address] - [phone] [facility] [phone]     What medications are you currently taking:   Current Outpatient Medications on File Prior to Visit   Medication Sig Dispense Refill   • carbidopa-levodopa (Sinemet)  MG per tablet Take 1 tablet by mouth 3 (Three) Times a Day for 30 days. 90 tablet 3   • NIFEdipine XL (PROCARDIA XL) 30 MG 24 hr tablet TAKE 1 TABLET BY MOUTH EVERY DAY 90 tablet 3   • pantoprazole (Protonix) 40 MG EC tablet Take 1 tablet by mouth Daily. 30 tablet 1   • pramipexole (Mirapex) 0.25 MG tablet Take 1 tablet by mouth 3 (Three) Times a Day for 30 days. 90 tablet 4   • spironolactone (ALDACTONE) 50 MG tablet Take 1 tablet by mouth Daily. 90 tablet 1   • traMADol (ULTRAM) 50 MG tablet TAKE 1 TABLET BY MOUTH TWICE A DAY 60 tablet 0     No current facility-administered medications on file prior to visit.      Which medication are you concerned about: pramipexole (Mirapex) 0.25 MG tablet- Take 1 tablet by mouth 3 (Three) Times a Day for 30 days.    Who prescribed you this medication: DR. GAMEZ    When did you start taking these medications: 2/23/22    What are your concerns: PT'S WIFE CALLED TO ASK DR. GAMEZ IF HE WOULD ADVISE PT RETURN BACK TO TAKING SINEMET MEDICATION OR ASK IF THERE ARE ANY OTHER MEDICATION OPTIONS AVAILABLE.    PT'S WIFE REPORTS SIDE EFFECTS OF PRAMIPEXOLE MEDICATION INCLUDING PT FEELING \"DISORIENTED\" AFTER STANDING UP AFTER SITTING FOR LONG PERIODS OF TIME. PT'S WIFE STATES SHE ISN'T SURE IF BY \"DISORIENTED\" IF PT MEANS DIZZY, AS SHE STATES HE IS AWARE OF WHERE IS HE. SHE BELIEVES HE MEANS THAT WHEN HE STANDS UP, HE ISN'T QUITE SURE OF WHICH DIRECTION HE WAS WANTING TO GO NEXT.    PT'S WIFE ALSO NOTES SHE FEELS PT HAS BEEN SLIGHTLY AGITATED. PT & WIFE HAVE A SPECIAL " NEEDS DAUGHTER AND PT HIMSELF IF USUALLY VERY PATIENT WITH HER. PT'S WIFE REPORTS THAT SHE HAS NOTICED LESS PATIENCE IN PT WITH THEIR DAUGHTER SINCE STARTING THE NEW MEDICATION.    How long have you had these concerns: SINCE STARTING MEDICATION.    PLEASE REVIEW AND ADVISE.

## 2022-03-07 ENCOUNTER — HOSPITAL ENCOUNTER (OUTPATIENT)
Dept: ULTRASOUND IMAGING | Facility: HOSPITAL | Age: 72
Discharge: HOME | End: 2022-03-07
Admitting: RADIOLOGY

## 2022-03-07 ENCOUNTER — APPOINTMENT (OUTPATIENT)
Dept: LAB | Facility: HOSPITAL | Age: 72
End: 2022-03-07

## 2022-03-07 VITALS
SYSTOLIC BLOOD PRESSURE: 139 MMHG | HEIGHT: 74 IN | OXYGEN SATURATION: 98 % | DIASTOLIC BLOOD PRESSURE: 72 MMHG | RESPIRATION RATE: 16 BRPM | TEMPERATURE: 97.7 F | HEART RATE: 62 BPM | WEIGHT: 187 LBS | BODY MASS INDEX: 24 KG/M2

## 2022-03-07 DIAGNOSIS — K74.60 CIRRHOSIS OF LIVER WITH ASCITES, UNSPECIFIED HEPATIC CIRRHOSIS TYPE: ICD-10-CM

## 2022-03-07 DIAGNOSIS — R18.8 CIRRHOSIS OF LIVER WITH ASCITES, UNSPECIFIED HEPATIC CIRRHOSIS TYPE: ICD-10-CM

## 2022-03-07 LAB
ALBUMIN FLD-MCNC: 0.5 G/DL
APPEARANCE FLD: CLEAR
COLOR FLD: YELLOW
MONOS+MACROS NFR FLD: 84 %
NEUTROPHILS NFR FLD MANUAL: 16 %
PROT FLD-MCNC: 1 G/DL
RBC # FLD AUTO: 0 /MM3
WBC # FLD AUTO: 107 /MM3

## 2022-03-07 PROCEDURE — 0 LIDOCAINE 1 % SOLUTION: Performed by: NURSE PRACTITIONER

## 2022-03-07 PROCEDURE — 76942 ECHO GUIDE FOR BIOPSY: CPT

## 2022-03-07 PROCEDURE — 89051 BODY FLUID CELL COUNT: CPT | Performed by: NURSE PRACTITIONER

## 2022-03-07 PROCEDURE — 87075 CULTR BACTERIA EXCEPT BLOOD: CPT | Performed by: NURSE PRACTITIONER

## 2022-03-07 PROCEDURE — 84157 ASSAY OF PROTEIN OTHER: CPT | Performed by: NURSE PRACTITIONER

## 2022-03-07 PROCEDURE — 82042 OTHER SOURCE ALBUMIN QUAN EA: CPT | Performed by: NURSE PRACTITIONER

## 2022-03-07 PROCEDURE — 87205 SMEAR GRAM STAIN: CPT | Performed by: NURSE PRACTITIONER

## 2022-03-07 PROCEDURE — 87070 CULTURE OTHR SPECIMN AEROBIC: CPT | Performed by: NURSE PRACTITIONER

## 2022-03-07 RX ORDER — LIDOCAINE HYDROCHLORIDE 10 MG/ML
5 INJECTION, SOLUTION INFILTRATION; PERINEURAL ONCE
Status: COMPLETED | OUTPATIENT
Start: 2022-03-07 | End: 2022-03-07

## 2022-03-07 RX ADMIN — LIDOCAINE HYDROCHLORIDE 5 ML: 10 INJECTION, SOLUTION INFILTRATION; PERINEURAL at 09:33

## 2022-03-07 RX ADMIN — SODIUM BICARBONATE 0.12 MEQ: 0.2 INJECTION, SOLUTION INTRAVENOUS at 09:33

## 2022-03-07 NOTE — EXTERNAL PATIENT INSTRUCTIONS
Patient Education   Table of Contents       Ascites       Paracentesis, Care After       Paracentesis     To view videos and all your education online visit,   [email or web address]   or scan this QR code with your smartphone.                  Ascites        Ascites is a collection of too much fluid in the abdomen. Ascites can range from mild to severe. If ascites is not treated, it can get worse.     What are the causes?    This condition may be caused by:       A liver condition called cirrhosis. This is the most common cause of ascites.       Long-term (chronic) or alcoholic hepatitis.       Infection or inflammation in the abdomen.       Cancer in the abdomen.       Heart failure.       Kidney disease.       Inflammation of the pancreas.       Clots in the veins of the liver.     What are the signs or symptoms?    Symptoms of this condition include:       A feeling of fullness in the abdomen. This is common.       An increase in the size of the abdomen or waist.       Swelling in the legs.       Swelling of the scrotum.       Difficulty breathing.       Pain in the abdomen.       Sudden weight gain.     If the condition is mild, you may not have symptoms.   How is this diagnosed?   This condition is diagnosed based on your medical history and a physical exam. Your health care provider may order imaging tests, such as an ultrasound or CT scan of your abdomen.   How is this treated?    Treatment for this condition depends on the cause of the ascites. It may include:       Taking a pill to make you urinate. This is called a water pill (diuretic pill).       Strictly reducing your salt (sodium) intake. Salt can cause extra fluid to be kept (retained) in the body, and this makes ascites worse.       Having a procedure to remove fluid from your abdomen (paracentesis).       Having a procedure that connects two of the major veins within your liver and relieves pressure on your liver. This is called a TIPS  procedure (transjugular intrahepatic portosystemic shunt procedure).       Placement of a drainage catheter (peritoneovenous shunt) to manage the extra fluid in the abdomen.     Ascites may go away or improve when the condition that caused it is treated.   Follow these instructions at home:   Eating and drinking         Keep track of your weight. To do this, weigh yourself at the same time every day and write down your weight.       Try not to eat salty (high-sodium) foods.       Follow any instructions that your health care provider gives you about how much to drink.       Keep track of how much you drink and any changes in how much or how often you urinate.     General instructions         Report any changes in your health to your health care provider, especially if you develop new symptoms or your symptoms get worse.       Take over-the-counter and prescription medicines only as told by your health care provider.       Keep all follow-up visits. This is important.     Contact a health care provider if:         You gain more than 3 lb (1.36 kg) in 3 days.       Your waist size increases.       You have new swelling in your legs.       The swelling in your legs gets worse.     Get help right away if:         You have a fever or chills.       You are confused.       You have new or worsening breathing trouble.       You have new or worsening pain in your abdomen.       You have new or worsening swelling in the scrotum.     Summary         Ascites is a collection of too much fluid in the abdomen.       Ascites may be caused by various conditions, such as cirrhosis, hepatitis, cancer, or congestive heart failure.       Symptoms may include swelling of the abdomen and other areas due to extra fluid in the body.       Treatments may involve dietary changes, medicines, or procedures.     This information is not intended to replace advice given to you by your health care provider. Make sure you discuss any questions you  have with your health care provider.     Document Released: 12/18/2006Document Revised: 08/31/2021Document Reviewed: 08/31/2021     [employer] Patient Education ? 2021 [employer]         Paracentesis, Care After     This sheet gives you information about how to care for yourself after your procedure. Your health care provider may also give you more specific instructions. If you have problems or questions, contact your health care provider.   What can I expect after the procedure?   After the procedure, it is common to have a small amount of clear fluid coming from the puncture site.   Follow these instructions at home:   Puncture site care           Follow instructions from your health care provider about how to take care of your puncture site. Make sure you:       Wash your hands with soap and water before and after you change your bandage (dressing). If soap and water are not available, use hand .       Change your dressing as told by your health care provider.      Check your puncture area every day for signs of infection. Check for:       Redness, swelling, or pain.       More fluid or blood.       Warmth.       Pus or a bad smell.     General instructions         Return to your normal activities as told by your health care provider. Ask your health care provider what activities are safe for you.       Take over-the-counter and prescription medicines only as told by your health care provider.      Do not  take baths, swim, or use a hot tub until your health care provider approves. Ask your health care provider if you may take showers. You may only be allowed to take sponge baths.       Keep all follow-up visits as told by your health care provider. This is important.       Contact a health care provider if:         You have redness, swelling, or pain at your puncture site.       You have more fluid or blood coming from your puncture site.       Your puncture site feels warm to the touch.       You have  pus or a bad smell coming from your puncture site.       You have a fever.     Get help right away if:         You have chest pain or shortness of breath.       You develop increasing pain, discomfort, or swelling in your abdomen.       You feel dizzy or light-headed or you faint.     Summary         After the procedure, it is common to have a small amount of clear fluid coming from the puncture site.       Follow instructions from your health care provider about how to take care of your puncture site.       Check your puncture area every day signs of infection.       Keep all follow-up visits as told by your health care provider.     This information is not intended to replace advice given to you by your health care provider. Make sure you discuss any questions you have with your health care provider.     Document Released: 05/03/2016Document Revised: 06/30/2020Document Reviewed: 10/08/2019     [employer] Patient Education ? 2021 [employer]

## 2022-03-07 NOTE — NURSING NOTE
NURSING PROGRESS NOTE      0800 Pt to [facility] per  scheduled appointment.  Pt ID verified by (2) identifiers. Allergies, medications and medical history reviewed and verified. No labs needed per Dr. Matthews Explained procedure, pt verbalized understanding. Awaiting procedures. Natasha Murdock RN

## 2022-03-08 ENCOUNTER — ANESTHESIA EVENT (OUTPATIENT)
Dept: PERIOP | Facility: HOSPITAL | Age: 72
End: 2022-03-08

## 2022-03-08 RX ORDER — NIFEDIPINE 30 MG/1
30 TABLET, EXTENDED RELEASE ORAL DAILY
COMMUNITY
End: 2022-03-30 | Stop reason: ALTCHOICE

## 2022-03-08 NOTE — TELEPHONE ENCOUNTER
Spoke to patient's wife today.  Patient currently is in the hospital and required a paracentesis.  Ammonia levels are elevated.  We will continue current prescription for now.

## 2022-03-09 ENCOUNTER — APPOINTMENT (OUTPATIENT)
Dept: ULTRASOUND IMAGING | Facility: HOSPITAL | Age: 72
End: 2022-03-09

## 2022-03-09 ENCOUNTER — ANESTHESIA (OUTPATIENT)
Dept: PERIOP | Facility: HOSPITAL | Age: 72
End: 2022-03-09

## 2022-03-09 ENCOUNTER — HOSPITAL ENCOUNTER (OUTPATIENT)
Facility: HOSPITAL | Age: 72
Setting detail: HOSPITAL OUTPATIENT SURGERY
Discharge: HOME | End: 2022-03-09
Attending: INTERNAL MEDICINE | Admitting: INTERNAL MEDICINE

## 2022-03-09 VITALS
RESPIRATION RATE: 15 BRPM | HEART RATE: 61 BPM | TEMPERATURE: 97.8 F | BODY MASS INDEX: 23.92 KG/M2 | WEIGHT: 186.4 LBS | DIASTOLIC BLOOD PRESSURE: 76 MMHG | HEIGHT: 74 IN | SYSTOLIC BLOOD PRESSURE: 136 MMHG | OXYGEN SATURATION: 97 %

## 2022-03-09 DIAGNOSIS — Z12.11 COLON CANCER SCREENING: ICD-10-CM

## 2022-03-09 PROCEDURE — 25010000002 PROPOFOL 10 MG/ML EMULSION: Performed by: NURSE ANESTHETIST, CERTIFIED REGISTERED

## 2022-03-09 PROCEDURE — 45380 COLONOSCOPY AND BIOPSY: CPT | Performed by: INTERNAL MEDICINE

## 2022-03-09 PROCEDURE — 43235 EGD DIAGNOSTIC BRUSH WASH: CPT | Performed by: INTERNAL MEDICINE

## 2022-03-09 PROCEDURE — 45385 COLONOSCOPY W/LESION REMOVAL: CPT | Performed by: INTERNAL MEDICINE

## 2022-03-09 PROCEDURE — 88305 TISSUE EXAM BY PATHOLOGIST: CPT | Performed by: INTERNAL MEDICINE

## 2022-03-09 RX ORDER — PROPOFOL 10 MG/ML
VIAL (ML) INTRAVENOUS AS NEEDED
Status: DISCONTINUED | OUTPATIENT
Start: 2022-03-09 | End: 2022-03-09 | Stop reason: SURG

## 2022-03-09 RX ORDER — DIPHENHYDRAMINE HYDROCHLORIDE 50 MG/ML
12.5 INJECTION INTRAMUSCULAR; INTRAVENOUS
Status: DISCONTINUED | OUTPATIENT
Start: 2022-03-09 | End: 2022-03-09 | Stop reason: HOSPADM

## 2022-03-09 RX ORDER — SODIUM CHLORIDE 0.9 % (FLUSH) 0.9 %
10 SYRINGE (ML) INJECTION EVERY 12 HOURS SCHEDULED
Status: DISCONTINUED | OUTPATIENT
Start: 2022-03-09 | End: 2022-03-09 | Stop reason: HOSPADM

## 2022-03-09 RX ORDER — ONDANSETRON 2 MG/ML
4 INJECTION INTRAMUSCULAR; INTRAVENOUS ONCE AS NEEDED
Status: DISCONTINUED | OUTPATIENT
Start: 2022-03-09 | End: 2022-03-09 | Stop reason: HOSPADM

## 2022-03-09 RX ORDER — SODIUM CHLORIDE 0.9 % (FLUSH) 0.9 %
10 SYRINGE (ML) INJECTION AS NEEDED
Status: DISCONTINUED | OUTPATIENT
Start: 2022-03-09 | End: 2022-03-09 | Stop reason: HOSPADM

## 2022-03-09 RX ORDER — SODIUM CHLORIDE 9 MG/ML
40 INJECTION, SOLUTION INTRAVENOUS AS NEEDED
Status: DISCONTINUED | OUTPATIENT
Start: 2022-03-09 | End: 2022-03-09 | Stop reason: HOSPADM

## 2022-03-09 RX ORDER — LIDOCAINE HYDROCHLORIDE 10 MG/ML
0.5 INJECTION, SOLUTION EPIDURAL; INFILTRATION; INTRACAUDAL; PERINEURAL ONCE AS NEEDED
Status: DISCONTINUED | OUTPATIENT
Start: 2022-03-09 | End: 2022-03-09 | Stop reason: HOSPADM

## 2022-03-09 RX ORDER — MAGNESIUM HYDROXIDE 1200 MG/15ML
LIQUID ORAL AS NEEDED
Status: DISCONTINUED | OUTPATIENT
Start: 2022-03-09 | End: 2022-03-09 | Stop reason: HOSPADM

## 2022-03-09 RX ORDER — SODIUM CHLORIDE, SODIUM LACTATE, POTASSIUM CHLORIDE, CALCIUM CHLORIDE 600; 310; 30; 20 MG/100ML; MG/100ML; MG/100ML; MG/100ML
9 INJECTION, SOLUTION INTRAVENOUS CONTINUOUS
Status: DISCONTINUED | OUTPATIENT
Start: 2022-03-09 | End: 2022-03-09 | Stop reason: HOSPADM

## 2022-03-09 RX ORDER — SODIUM CHLORIDE, SODIUM LACTATE, POTASSIUM CHLORIDE, CALCIUM CHLORIDE 600; 310; 30; 20 MG/100ML; MG/100ML; MG/100ML; MG/100ML
100 INJECTION, SOLUTION INTRAVENOUS CONTINUOUS
Status: DISCONTINUED | OUTPATIENT
Start: 2022-03-09 | End: 2022-03-09 | Stop reason: HOSPADM

## 2022-03-09 RX ORDER — LIDOCAINE HYDROCHLORIDE 20 MG/ML
INJECTION, SOLUTION INFILTRATION; PERINEURAL AS NEEDED
Status: DISCONTINUED | OUTPATIENT
Start: 2022-03-09 | End: 2022-03-09 | Stop reason: SURG

## 2022-03-09 RX ADMIN — LIDOCAINE HYDROCHLORIDE 100 MG: 20 INJECTION, SOLUTION INFILTRATION; PERINEURAL at 12:18

## 2022-03-09 RX ADMIN — PROPOFOL 50 MG: 10 INJECTION, EMULSION INTRAVENOUS at 12:25

## 2022-03-09 RX ADMIN — SODIUM CHLORIDE, POTASSIUM CHLORIDE, SODIUM LACTATE AND CALCIUM CHLORIDE 9 ML/HR: 600; 310; 30; 20 INJECTION, SOLUTION INTRAVENOUS at 11:36

## 2022-03-09 RX ADMIN — PROPOFOL 100 MG: 10 INJECTION, EMULSION INTRAVENOUS at 12:18

## 2022-03-09 RX ADMIN — PROPOFOL 50 MG: 10 INJECTION, EMULSION INTRAVENOUS at 12:30

## 2022-03-09 NOTE — ANESTHESIA PREPROCEDURE EVALUATION
Anesthesia Evaluation     Patient summary reviewed and Nursing notes reviewed   no history of anesthetic complications:  NPO Solid Status: > 8 hours  NPO Liquid Status: > 8 hours           Airway   Mallampati: II  TM distance: >3 FB  Neck ROM: full  No difficulty expected  Dental - normal exam     Pulmonary - normal exam    breath sounds clear to auscultation  (+) a smoker Former,   Sleep apnea: snores a bit.  Cardiovascular - normal exam  Exercise tolerance: good (4-7 METS)    ECG reviewed  Rhythm: regular  Rate: normal    (+) hypertension well controlled less than 2 medications,   (-) past MI    ROS comment: atherosclerosis     · Calculated left ventricular EF = 59.7% Estimated left ventricular EF was in agreement with the calculated left ventricular EF. Left ventricular systolic function is normal.  · Left ventricular diastolic function is consistent with (grade I) impaired relaxation.  · Mild tricuspid valve regurgitation is present.  · Estimated right ventricular systolic pressure from tricuspid regurgitation is normal (<35 mmHg). Calculated right ventricular systolic pressure from tricuspid regurgitation is 28 mmHg.    Holter 9/7/21  · A relatively benign monitor study.  · Predominant underlying rhythm is sinus with average heart rate of 59 bpm: Range 46-94 bpm  · Total PVC burden of 0.4%. A total of 8 runs reported with longest being 8 beats. Total PVC burden of 0.2% without any ventricular run.  · No patient diary submitted.        Neuro/Psych    ROS Comment: Parkinsons   GI/Hepatic/Renal/Endo    (+)  GERD (OTC. Rare.) well controlled,  liver disease (Usually elevated but stable he says,) history of elevated LFT cirrhosis, renal disease (history of renal failure 5 years ago) CRI,     ROS Comment: Protal hypertension, s/p paracentesis yesterday.    Musculoskeletal     (+) back pain (DDD), radiculopathy Left lower extremity and Right lower extremity  Abdominal  - normal exam   Substance History - negative  use     OB/GYN          Other - negative ROS                       Anesthesia Plan    ASA 3     MAC     intravenous induction     Anesthetic plan, all risks, benefits, and alternatives have been provided, discussed and informed consent has been obtained with: patient.  Use of blood products discussed with patient  Consented to blood products.

## 2022-03-09 NOTE — ANESTHESIA POSTPROCEDURE EVALUATION
Patient: Hu Burgess    Procedure Summary     Date: 03/09/22 Room / Location: [facility] ENDOSCOPY 2 /  [facility]    Anesthesia Start: 1214 Anesthesia Stop: 1250    Procedures:       COLONOSCOPY WITH POLYPECTOMY (N/A )      ESOPHAGOGASTRODUODENOSCOPY (N/A Esophagus) Diagnosis:       Cirrhosis of liver with ascites, unspecified hepatic cirrhosis type (HCC)      Gastroesophageal reflux disease, unspecified whether esophagitis present      Colon cancer screening      Nausea      (Cirrhosis of liver with ascites, unspecified hepatic cirrhosis type (HCC) [K74.60, R18.8])      (Gastroesophageal reflux disease, unspecified whether esophagitis present [K21.9])      (Colon cancer screening [Z12.11])      (Nausea [R11.0])    Surgeons: Justin Underwood MD Provider: Markel Herzog CRNA    Anesthesia Type: MAC ASA Status: 3          Anesthesia Type: MAC    Vitals  Vitals Value Taken Time   /73 03/09/22 1300   Temp     Pulse 60 03/09/22 1300   Resp 12 03/09/22 1300   SpO2 97 % 03/09/22 1300           Post Anesthesia Care and Evaluation    Patient location during evaluation: PHASE II  Patient participation: complete - patient participated  Level of consciousness: awake and alert  Pain score: 0  Pain management: satisfactory to patient  Airway patency: patent  Anesthetic complications: No anesthetic complications  PONV Status: none  Cardiovascular status: acceptable  Respiratory status: acceptable  Hydration status: acceptable

## 2022-03-09 NOTE — H&P
No chief complaint on file.      HPI  Patient here for an EGD to evaluate GERD also to screen for varices.  He also is here for colonoscopy for screening.         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   • Anemia in chronic kidney disease   • Right inguinal hernia   • Cirrhosis of liver with ascites (HCC)   • Gastroesophageal reflux disease   • Colon cancer screening   • Nausea       Medical History:    Past Medical History:   Diagnosis Date   • Arteriosclerotic cardiovascular disease    • Back pain    • Cirrhosis (HCC)    • Elevated liver enzymes    • GERD (gastroesophageal reflux disease)    • History of transfusion    • Hyperlipidemia    • Hypertension    • Kidney failure    • Rhabdomyolysis     due to crestor        Social History:    Social History     Socioeconomic History   • Marital status:    Tobacco Use   • Smoking status: Former Smoker     Years: 50.00     Types: Cigarettes     Quit date: 2016     Years since quittin.3   • Smokeless tobacco: Never Used   • Tobacco comment: no caffiene   Vaping Use   • Vaping Use: Never used   Substance and Sexual Activity   • Alcohol use: Not Currently     Alcohol/week: 1.0 standard drink     Types: 1 Cans of beer per week   • Drug use: No   • Sexual activity: Defer       Family History:   Family History   Problem Relation Age of Onset   • Cancer Father         laryngeal cancer   • Colon cancer Neg Hx    • Colon polyps Neg Hx        Surgical History:   Past Surgical History:   Procedure Laterality Date   • ENDOSCOPY N/A 2018    Procedure: ESOPHAGOGASTRODUODENOSCOPY;  Surgeon: Marcelino Samuel MD;  Location: [facility];  Service:    • INGUINAL HERNIA REPAIR Right 1/10/2022     "Procedure: INGUINAL HERNIA REPAIR;  Surgeon: Kishan Puga MD;  Location: [facility] OR;  Service: General;  Laterality: Right;       • INSERTION HEMODIALYSIS CATHETER Right 11/14/2017    Procedure: PALINDRONE CATHETERE PLACEMENT;  Surgeon: Gareth Sánchez MD;  Location: [facility] OR;  Service:          Current Facility-Administered Medications:   •  lactated ringers infusion, 9 mL/hr, Intravenous, Continuous, Rema Black MD, Last Rate: 9 mL/hr at 03/09/22 1136, 9 mL/hr at 03/09/22 1136  •  lidocaine PF 1% (XYLOCAINE) injection 0.5 mL, 0.5 mL, Injection, Once PRN, Rema Black MD  •  sodium chloride 0.9 % flush 10 mL, 10 mL, Intravenous, PRN, Rema Black MD  •  sodium chloride 0.9 % flush 10 mL, 10 mL, Intravenous, Q12H, Rema Black MD  •  sodium chloride 0.9 % infusion 40 mL, 40 mL, Intravenous, PRN, Rema Black MD    Allergies:   Allergies   Allergen Reactions   • Statins Other (See Comments)     Liver failure  Liver failure          Review of Systems   Pertinent items are noted in HPI      The following portions of the patient's history were reviewed by me and updated as appropriate: review of systems, allergies, current medications, past family history, past medical history, past social history, past surgical history and problem list.    /96   Pulse 72   Temp 97.8 °F (36.6 °C) (Oral)   Resp 12   Ht 188 cm (74\")   Wt 84.6 kg (186 lb 6.4 oz)   SpO2 99%   BMI 23.93 kg/m²     PHYSICAL EXAM:    CONSTITUTIONAL:  today's vital signs reviewed by me  GASTROINTESTINAL: abdomen is soft nontender nondistended with normal active bowel sounds, no masses are appreciated    Debilities: None  Emotional Behavior: Appropriate    Assessment/ Plan  We will proceed today with EGD and colonoscopy.    Risks and benefits as well as alternatives to endoscopic evaluation were explained to the patient and they voiced understanding and wish to proceed.  These risks include but are not limited to the risk of bleeding, " perforation, adverse reaction to sedation, and missed lesions.  The patient was given the opportunity to ask questions prior to the endoscopic procedure.

## 2022-03-10 ENCOUNTER — TELEPHONE (OUTPATIENT)
Dept: GASTROENTEROLOGY | Facility: CLINIC | Age: 72
End: 2022-03-10

## 2022-03-10 NOTE — TELEPHONE ENCOUNTER
JULIUS spoke with Dr. Moreau, patient's nephrologist, regarding his elevated creatinine level as well as his need for diuretic management due to significant ascites secondary to cirrhosis.  Dr. Moreau to contact patient to schedule lab work for tomorrow and possible office visit.  Patient is currently only taking spironolactone 50 mg once daily and will need adjustments to his diuretic therapy in order to better manage his decompensated cirrhosis with ascites.  Yoan MADRID

## 2022-03-11 ENCOUNTER — TRANSCRIBE ORDERS (OUTPATIENT)
Dept: ADMINISTRATIVE | Facility: HOSPITAL | Age: 72
End: 2022-03-11

## 2022-03-11 ENCOUNTER — LAB (OUTPATIENT)
Dept: LAB | Facility: HOSPITAL | Age: 72
End: 2022-03-11

## 2022-03-11 DIAGNOSIS — N18.31 CHRONIC KIDNEY DISEASE (CKD) STAGE G3A/A1, MODERATELY DECREASED GLOMERULAR FILTRATION RATE (GFR) BETWEEN 45-59 ML/MIN/1.73 SQUARE METER AND ALBUMINURIA CREATININE RATIO LESS THAN 30 MG/G (CMS/H*: ICD-10-CM

## 2022-03-11 DIAGNOSIS — N18.31 CHRONIC KIDNEY DISEASE (CKD) STAGE G3A/A1, MODERATELY DECREASED GLOMERULAR FILTRATION RATE (GFR) BETWEEN 45-59 ML/MIN/1.73 SQUARE METER AND ALBUMINURIA CREATININE RATIO LESS THAN 30 MG/G (CMS/H*: Primary | ICD-10-CM

## 2022-03-11 LAB
ANION GAP SERPL CALCULATED.3IONS-SCNC: 11.2 MMOL/L (ref 5–15)
BACTERIA FLD CULT: NORMAL
BACTERIA SPEC ANAEROBE CULT: NORMAL
BUN SERPL-MCNC: 22 MG/DL (ref 8–23)
BUN/CREAT SERPL: 10.4 (ref 7–25)
CALCIUM SPEC-SCNC: 9 MG/DL (ref 8.6–10.5)
CHLORIDE SERPL-SCNC: 101 MMOL/L (ref 98–107)
CO2 SERPL-SCNC: 22.8 MMOL/L (ref 22–29)
CREAT SERPL-MCNC: 2.11 MG/DL (ref 0.76–1.27)
EGFRCR SERPLBLD CKD-EPI 2021: 32.4 ML/MIN/1.73
GLUCOSE SERPL-MCNC: 106 MG/DL (ref 65–99)
GRAM STN SPEC: NORMAL
GRAM STN SPEC: NORMAL
LAB AP CASE REPORT: NORMAL
PATH REPORT.FINAL DX SPEC: NORMAL
PATH REPORT.GROSS SPEC: NORMAL
POTASSIUM SERPL-SCNC: 4.9 MMOL/L (ref 3.5–5.2)
SODIUM SERPL-SCNC: 135 MMOL/L (ref 136–145)

## 2022-03-11 PROCEDURE — 36415 COLL VENOUS BLD VENIPUNCTURE: CPT

## 2022-03-11 PROCEDURE — 80048 BASIC METABOLIC PNL TOTAL CA: CPT

## 2022-03-17 DIAGNOSIS — G20 PARKINSON'S DISEASE: ICD-10-CM

## 2022-03-17 RX ORDER — PANTOPRAZOLE SODIUM 40 MG/1
TABLET, DELAYED RELEASE ORAL
Qty: 30 TABLET | Refills: 1 | Status: SHIPPED | OUTPATIENT
Start: 2022-03-17 | End: 2022-04-11

## 2022-03-17 RX ORDER — PRAMIPEXOLE DIHYDROCHLORIDE 0.25 MG/1
TABLET ORAL
Qty: 270 TABLET | Refills: 1 | OUTPATIENT
Start: 2022-03-17

## 2022-03-17 NOTE — TELEPHONE ENCOUNTER
Rx Refill Note  Requested Prescriptions     Pending Prescriptions Disp Refills   • pantoprazole (PROTONIX) 40 MG EC tablet [Pharmacy Med Name: PANTOPRAZOLE SOD DR 40 MG TAB] 30 tablet 1     Sig: TAKE 1 TABLET BY MOUTH EVERY DAY      Last office visit with prescribing clinician: 2/21/2022      Next office visit with prescribing clinician: 3/22/2022            Ashanti Ralph MA  03/17/22, 11:47 EDT

## 2022-03-22 ENCOUNTER — ANTICOAGULATION VISIT (OUTPATIENT)
Dept: INTERNAL MEDICINE | Facility: CLINIC | Age: 72
End: 2022-03-22

## 2022-03-22 ENCOUNTER — OFFICE VISIT (OUTPATIENT)
Dept: INTERNAL MEDICINE | Facility: CLINIC | Age: 72
End: 2022-03-22

## 2022-03-22 VITALS
HEART RATE: 73 BPM | BODY MASS INDEX: 24.9 KG/M2 | RESPIRATION RATE: 16 BRPM | SYSTOLIC BLOOD PRESSURE: 128 MMHG | WEIGHT: 194 LBS | TEMPERATURE: 96.8 F | DIASTOLIC BLOOD PRESSURE: 76 MMHG | HEIGHT: 74 IN

## 2022-03-22 DIAGNOSIS — K74.60 CIRRHOSIS OF LIVER WITHOUT ASCITES, UNSPECIFIED HEPATIC CIRRHOSIS TYPE: Primary | ICD-10-CM

## 2022-03-22 DIAGNOSIS — G20 PARKINSON'S DISEASE: ICD-10-CM

## 2022-03-22 DIAGNOSIS — N18.9 CHRONIC KIDNEY DISEASE, UNSPECIFIED CKD STAGE: ICD-10-CM

## 2022-03-22 DIAGNOSIS — M54.50 CHRONIC LOW BACK PAIN, UNSPECIFIED BACK PAIN LATERALITY, UNSPECIFIED WHETHER SCIATICA PRESENT: ICD-10-CM

## 2022-03-22 DIAGNOSIS — I85.00 ESOPHAGEAL VARICES WITHOUT BLEEDING, UNSPECIFIED ESOPHAGEAL VARICES TYPE: ICD-10-CM

## 2022-03-22 DIAGNOSIS — G89.29 CHRONIC LOW BACK PAIN, UNSPECIFIED BACK PAIN LATERALITY, UNSPECIFIED WHETHER SCIATICA PRESENT: ICD-10-CM

## 2022-03-22 DIAGNOSIS — G20 PARKINSONIAN TREMOR: ICD-10-CM

## 2022-03-22 LAB — INR PPP: 1.4 (ref 0.9–1.1)

## 2022-03-22 PROCEDURE — 90746 HEPB VACCINE 3 DOSE ADULT IM: CPT | Performed by: INTERNAL MEDICINE

## 2022-03-22 PROCEDURE — 99214 OFFICE O/P EST MOD 30 MIN: CPT | Performed by: INTERNAL MEDICINE

## 2022-03-22 PROCEDURE — G0010 ADMIN HEPATITIS B VACCINE: HCPCS | Performed by: INTERNAL MEDICINE

## 2022-03-22 PROCEDURE — 90632 HEPA VACCINE ADULT IM: CPT | Performed by: INTERNAL MEDICINE

## 2022-03-22 PROCEDURE — 36416 COLLJ CAPILLARY BLOOD SPEC: CPT | Performed by: INTERNAL MEDICINE

## 2022-03-22 PROCEDURE — 90471 IMMUNIZATION ADMIN: CPT | Performed by: INTERNAL MEDICINE

## 2022-03-22 PROCEDURE — 85610 PROTHROMBIN TIME: CPT | Performed by: INTERNAL MEDICINE

## 2022-03-22 RX ORDER — TRAMADOL HYDROCHLORIDE 50 MG/1
50 TABLET ORAL 2 TIMES DAILY
Qty: 60 TABLET | Refills: 0 | Status: SHIPPED | OUTPATIENT
Start: 2022-03-22 | End: 2022-04-18

## 2022-03-22 RX ORDER — FUROSEMIDE 20 MG/1
60 TABLET ORAL
Status: SHIPPED | COMMUNITY
Start: 2022-03-22 | End: 2022-04-01 | Stop reason: SDUPTHER

## 2022-03-22 RX ORDER — FUROSEMIDE 20 MG/1
40 TABLET ORAL
COMMUNITY
Start: 2022-03-21 | End: 2022-03-22

## 2022-03-22 RX ORDER — PRAMIPEXOLE DIHYDROCHLORIDE 0.25 MG/1
0.25 TABLET ORAL 2 TIMES DAILY
Qty: 90 TABLET | Refills: 4 | Status: SHIPPED | OUTPATIENT
Start: 2022-03-22 | End: 2022-03-30 | Stop reason: ALTCHOICE

## 2022-03-22 NOTE — PROGRESS NOTES
"Chief Complaint  Follow-up    Subjective          Hu Burgess presents to [facility]  History of Present Illness  They are monitoring his weight every day after his large volume paracentesis he has gotten better but has had some weight gain back.  Has been on Lasix 40 mg for about 6 days and Aldactone 50 mg for longer.  Saw gastroenterology.  Paracentesis fluid benign.  Upper endoscopy shows some grade 2 varices and some portal hypertensive gastropathy.  Mild ammonia elevation was started on Xifaxan.  Objective   Vital Signs:   /76 (BP Location: Left arm, Patient Position: Sitting, Cuff Size: Large Adult)   Pulse 73   Temp 96.8 °F (36 °C) (Temporal)   Resp 16   Ht 188 cm (74\")   Wt 88 kg (194 lb)   BMI 24.91 kg/m²     BMI is within normal parameters. No follow-up required.      Physical Exam  Vitals and nursing note reviewed.   Constitutional:       Appearance: Normal appearance.   HENT:      Head: Normocephalic and atraumatic.   Cardiovascular:      Rate and Rhythm: Normal rate and regular rhythm.   Pulmonary:      Effort: Pulmonary effort is normal.      Breath sounds: Normal breath sounds.   Abdominal:      General: Abdomen is flat. There is distension.      Palpations: Abdomen is soft.   Musculoskeletal:         General: No swelling or deformity.      Cervical back: Neck supple.      Right lower leg: Edema present.      Left lower leg: Edema present.   Skin:     General: Skin is warm.      Capillary Refill: Capillary refill takes less than 2 seconds.      Findings: No rash.   Neurological:      General: No focal deficit present.      Mental Status: He is alert and oriented to person, place, and time.        Result Review : Previous labs                Assessment and Plan cirrhosis with recent large-volume paracentesis.  Still quite a bit of edema and clinical ascites.  Increase Lasix to 60 mg daily and continue the Aldactone 50 mg daily.  May " titrate that with next time.  Monitor weights daily and recheck next week 1 make sure his kidney function is stable.  PT/INR 1.4 and await ammonia level and additional labs.  Meld score at that time.  He does have a [facility] appointment.  Hepatitis B and a vaccines today and complete the series over the next 6 months  Diagnoses and all orders for this visit:    1. Cirrhosis of liver without ascites, unspecified hepatic cirrhosis type (HCC) (Primary)  -     Comprehensive metabolic panel  -     Ammonia  -     Protime-INR    2. Chronic kidney disease, unspecified CKD stage  -     Ammonia    3. Esophageal varices without bleeding, unspecified esophageal varices type (HCC)  Comments:  EGD March 2022.  Varices and portal gastropathy    4. Parkinsonian tremor (HCC)    5. Parkinson's disease (HCC)  -     pramipexole (Mirapex) 0.25 MG tablet; Take 1 tablet by mouth 2 (Two) Times a Day for 30 days.  Dispense: 90 tablet; Refill: 4    6. Chronic low back pain, unspecified back pain laterality, unspecified whether sciatica present  -     traMADol (ULTRAM) 50 MG tablet; Take 1 tablet by mouth 2 (Two) Times a Day.  Dispense: 60 tablet; Refill: 0    Other orders  -     Hepatitis A Vaccine Pediatric / Adolescent (HAVRIX) - Today  -     Hepatitis B Vaccine Adult IM        Follow Up   Return in about 1 week (around 3/29/2022).  Patient was given instructions and counseling regarding his condition or for health maintenance advice. Please see specific information pulled into the AVS if appropriate.

## 2022-03-23 ENCOUNTER — TELEPHONE (OUTPATIENT)
Dept: GASTROENTEROLOGY | Facility: CLINIC | Age: 72
End: 2022-03-23

## 2022-03-23 LAB
ALBUMIN SERPL-MCNC: 2.9 G/DL (ref 3.7–4.7)
ALBUMIN/GLOB SERPL: 0.7 {RATIO} (ref 1.2–2.2)
ALP SERPL-CCNC: 236 IU/L (ref 44–121)
ALT SERPL-CCNC: 27 IU/L (ref 0–44)
AMMONIA PLAS-MCNC: 137 UG/DL (ref 31–169)
AST SERPL-CCNC: 64 IU/L (ref 0–40)
BILIRUB SERPL-MCNC: 5.1 MG/DL (ref 0–1.2)
BUN SERPL-MCNC: 30 MG/DL (ref 8–27)
BUN/CREAT SERPL: 10 (ref 10–24)
CALCIUM SERPL-MCNC: 9 MG/DL (ref 8.6–10.2)
CHLORIDE SERPL-SCNC: 100 MMOL/L (ref 96–106)
CO2 SERPL-SCNC: 20 MMOL/L (ref 20–29)
CREAT SERPL-MCNC: 2.86 MG/DL (ref 0.76–1.27)
EGFRCR SERPLBLD CKD-EPI 2021: 23 ML/MIN/1.73
GLOBULIN SER CALC-MCNC: 4.1 G/DL (ref 1.5–4.5)
GLUCOSE SERPL-MCNC: 100 MG/DL (ref 65–99)
POTASSIUM SERPL-SCNC: 4.2 MMOL/L (ref 3.5–5.2)
PROT SERPL-MCNC: 7 G/DL (ref 6–8.5)
SODIUM SERPL-SCNC: 136 MMOL/L (ref 134–144)

## 2022-03-25 ENCOUNTER — TELEPHONE (OUTPATIENT)
Dept: GASTROENTEROLOGY | Facility: CLINIC | Age: 72
End: 2022-03-25

## 2022-03-25 ENCOUNTER — TELEPHONE (OUTPATIENT)
Dept: NEUROLOGY | Facility: CLINIC | Age: 72
End: 2022-03-25

## 2022-03-25 DIAGNOSIS — G20 PARKINSON'S DISEASE: Primary | ICD-10-CM

## 2022-03-25 NOTE — TELEPHONE ENCOUNTER
PT CALLED IN STATING THE NEW MEDICATION VERA STARTED HIM ON LAST WEEK IS NOT WORKING OUT AND PT IS WANTING TO GO BACK ON    PT WOULD LIKE   carbidopa-levodopa (Sinemet)  MG per tablet; Take 1 tablet by mouth 3 (Three) Times a Day for 30 days.  Dispense: 90 tablet; Refill: 3    PT DOES NOT WANT TO TAKE   pramipexole (Mirapex) 0.25 MG tablet- Take 1 tablet by mouth 3 (Three) Times a Day for 30 days.  ANYMORE HE STATES IT MAKES HIM FEEL WEIRD.       [facility]/pharmacy #[id number] - [place], [place] - [address] [address] AT INTERSECTION OF [address] [place] [address] - [phone] [facility] [phone]   [phone]      PT CALL BACK   [phone]

## 2022-03-30 ENCOUNTER — OFFICE VISIT (OUTPATIENT)
Dept: INTERNAL MEDICINE | Facility: CLINIC | Age: 72
End: 2022-03-30

## 2022-03-30 VITALS
WEIGHT: 192 LBS | HEART RATE: 67 BPM | RESPIRATION RATE: 16 BRPM | DIASTOLIC BLOOD PRESSURE: 60 MMHG | SYSTOLIC BLOOD PRESSURE: 118 MMHG | TEMPERATURE: 96.9 F | HEIGHT: 74 IN | BODY MASS INDEX: 24.64 KG/M2 | OXYGEN SATURATION: 95 %

## 2022-03-30 DIAGNOSIS — N18.9 CHRONIC KIDNEY DISEASE, UNSPECIFIED CKD STAGE: ICD-10-CM

## 2022-03-30 DIAGNOSIS — K74.60 CIRRHOSIS OF LIVER WITH ASCITES, UNSPECIFIED HEPATIC CIRRHOSIS TYPE: ICD-10-CM

## 2022-03-30 DIAGNOSIS — K74.60 CIRRHOSIS OF LIVER WITHOUT ASCITES, UNSPECIFIED HEPATIC CIRRHOSIS TYPE: Primary | ICD-10-CM

## 2022-03-30 DIAGNOSIS — R18.8 CIRRHOSIS OF LIVER WITH ASCITES, UNSPECIFIED HEPATIC CIRRHOSIS TYPE: ICD-10-CM

## 2022-03-30 PROCEDURE — 99214 OFFICE O/P EST MOD 30 MIN: CPT | Performed by: INTERNAL MEDICINE

## 2022-03-30 NOTE — PROGRESS NOTES
"Chief Complaint  Follow-up    Subjective          Hu Burgess presents to [facility]  History of Present Illness  Weight is down a couple of pounds.  He thinks he feels a little bit better.  He is doing a little bit of exercise on a stationary bicycle.  Cirrhosis with portal hypertension and chronic kidney disease.  Trying to maximize diuretic therapy.  Doing better with diet and sodium restriction.  He is on the nifedipine and we talked that exacerbating edema  Objective   Vital Signs:   /60 (BP Location: Left arm, Patient Position: Sitting, Cuff Size: Large Adult)   Pulse 67   Temp 96.9 °F (36.1 °C) (Temporal)   Resp 16   Ht 188 cm (74\")   Wt 87.1 kg (192 lb)   SpO2 95%   BMI 24.65 kg/m²     BMI is within normal parameters. No follow-up required.      Physical Exam  Vitals and nursing note reviewed.   Constitutional:       Appearance: Normal appearance.   HENT:      Head: Normocephalic and atraumatic.      Comments: Jaundice  Cardiovascular:      Rate and Rhythm: Normal rate and regular rhythm.   Pulmonary:      Effort: Pulmonary effort is normal.      Breath sounds: Normal breath sounds.   Abdominal:      General: Abdomen is flat. There is distension.      Palpations: Abdomen is soft.      Tenderness: There is no abdominal tenderness. There is no guarding.   Musculoskeletal:         General: No swelling or deformity.      Cervical back: Neck supple.      Right lower leg: No edema.      Left lower leg: No edema.   Skin:     General: Skin is warm.      Capillary Refill: Capillary refill takes less than 2 seconds.      Findings: No rash.   Neurological:      General: No focal deficit present.      Mental Status: He is alert and oriented to person, place, and time.        Result Review : Previous notes                 Assessment and Plan cirrhosis and portal hypertension.  A little bit better.  Still quite a bit of peripheral edema and ascites " clinically.  Schedule paracentesis and continue diuretic as long as his kidney function is appropriate.  Discontinue the nifedipine which probably contributing to his peripheral edema and his blood pressure looks great.  Parkinson's disease.  Sounds like neurology is changed back to the carbidopa levodopa and discontinue the Mirapex.  Cautioned on blood pressure with the carbidopa levodopa.  Check lab work and follow-up in 3 weeks or sooner if problems  Diagnoses and all orders for this visit:    1. Cirrhosis of liver without ascites, unspecified hepatic cirrhosis type (HCC) (Primary)    2. Chronic kidney disease, unspecified CKD stage    3. Cirrhosis of liver with ascites, unspecified hepatic cirrhosis type (HCC)  -     Comprehensive Metabolic Panel  -     Ammonia        Follow Up   Return in about 3 weeks (around 4/20/2022).  Patient was given instructions and counseling regarding his condition or for health maintenance advice. Please see specific information pulled into the AVS if appropriate.

## 2022-03-31 LAB
ALBUMIN SERPL-MCNC: 2.7 G/DL (ref 3.7–4.7)
ALBUMIN/GLOB SERPL: 0.7 {RATIO} (ref 1.2–2.2)
ALP SERPL-CCNC: 221 IU/L (ref 44–121)
ALT SERPL-CCNC: 30 IU/L (ref 0–44)
AMMONIA PLAS-MCNC: 107 UG/DL (ref 31–169)
AST SERPL-CCNC: 68 IU/L (ref 0–40)
BILIRUB SERPL-MCNC: 4.5 MG/DL (ref 0–1.2)
BUN SERPL-MCNC: 30 MG/DL (ref 8–27)
BUN/CREAT SERPL: 12 (ref 10–24)
CALCIUM SERPL-MCNC: 8.6 MG/DL (ref 8.6–10.2)
CHLORIDE SERPL-SCNC: 99 MMOL/L (ref 96–106)
CO2 SERPL-SCNC: 24 MMOL/L (ref 20–29)
CREAT SERPL-MCNC: 2.58 MG/DL (ref 0.76–1.27)
EGFRCR SERPLBLD CKD-EPI 2021: 25 ML/MIN/1.73
GLOBULIN SER CALC-MCNC: 4 G/DL (ref 1.5–4.5)
GLUCOSE SERPL-MCNC: 111 MG/DL (ref 65–99)
POTASSIUM SERPL-SCNC: 3.7 MMOL/L (ref 3.5–5.2)
PROT SERPL-MCNC: 6.7 G/DL (ref 6–8.5)
SODIUM SERPL-SCNC: 135 MMOL/L (ref 134–144)

## 2022-04-01 RX ORDER — FUROSEMIDE 20 MG/1
60 TABLET ORAL DAILY
Qty: 90 TABLET | Refills: 3 | Status: SHIPPED | OUTPATIENT
Start: 2022-04-01 | End: 2022-05-17 | Stop reason: HOSPADM

## 2022-04-06 ENCOUNTER — TELEPHONE (OUTPATIENT)
Dept: INTERNAL MEDICINE | Facility: CLINIC | Age: 72
End: 2022-04-06

## 2022-04-06 DIAGNOSIS — K74.60 CIRRHOSIS OF LIVER WITHOUT ASCITES, UNSPECIFIED HEPATIC CIRRHOSIS TYPE: Primary | ICD-10-CM

## 2022-04-06 NOTE — TELEPHONE ENCOUNTER
Caller: Cori Burgess    Relationship: Emergency Contact    Best call back number: [phone]    What is the best time to reach you: ANY    Who are you requesting to speak with (clinical staff, provider,  specific staff member): CLINICAL STAFF    Do you know the name of the person who called:WIFE    What was the call regarding: PATIENT ADVISED THAT DR. LOTT WAS SUPPOSED TO ORDER SOME APPOINTMENT TO TAKE FLUID FROM HIS STOMACH AND SHE HAS NOT HEARD ANYTHING YET.  PLEASE CALL AND ADVISE    Do you require a callback: YES

## 2022-04-11 ENCOUNTER — TRANSCRIBE ORDERS (OUTPATIENT)
Dept: ADMINISTRATIVE | Facility: HOSPITAL | Age: 72
End: 2022-04-11

## 2022-04-11 DIAGNOSIS — R18.8 CIRRHOSIS OF LIVER WITH ASCITES, UNSPECIFIED HEPATIC CIRRHOSIS TYPE: Primary | ICD-10-CM

## 2022-04-11 DIAGNOSIS — K74.60 CIRRHOSIS OF LIVER WITH ASCITES, UNSPECIFIED HEPATIC CIRRHOSIS TYPE: Primary | ICD-10-CM

## 2022-04-11 RX ORDER — HYDROCORTISONE ACETATE PRAMOXINE HCL 2.5; 1 G/100G; G/100G
CREAM TOPICAL 3 TIMES DAILY
Qty: 30 G | Refills: 6 | Status: SHIPPED | OUTPATIENT
Start: 2022-04-11 | End: 2022-05-17 | Stop reason: HOSPADM

## 2022-04-11 RX ORDER — PANTOPRAZOLE SODIUM 40 MG/1
TABLET, DELAYED RELEASE ORAL
Qty: 90 TABLET | Refills: 1 | Status: SHIPPED | OUTPATIENT
Start: 2022-04-11 | End: 2022-05-17 | Stop reason: HOSPADM

## 2022-04-13 ENCOUNTER — APPOINTMENT (OUTPATIENT)
Dept: INTERVENTIONAL RADIOLOGY/VASCULAR | Facility: HOSPITAL | Age: 72
End: 2022-04-13

## 2022-04-18 DIAGNOSIS — M54.50 CHRONIC LOW BACK PAIN, UNSPECIFIED BACK PAIN LATERALITY, UNSPECIFIED WHETHER SCIATICA PRESENT: ICD-10-CM

## 2022-04-18 DIAGNOSIS — G89.29 CHRONIC LOW BACK PAIN, UNSPECIFIED BACK PAIN LATERALITY, UNSPECIFIED WHETHER SCIATICA PRESENT: ICD-10-CM

## 2022-04-18 DIAGNOSIS — G20 PARKINSON'S DISEASE: ICD-10-CM

## 2022-04-18 RX ORDER — TRAMADOL HYDROCHLORIDE 50 MG/1
TABLET ORAL
Qty: 60 TABLET | Refills: 0 | Status: SHIPPED | OUTPATIENT
Start: 2022-04-18 | End: 2022-05-17 | Stop reason: HOSPADM

## 2022-04-18 RX ORDER — PRAMIPEXOLE DIHYDROCHLORIDE 0.25 MG/1
TABLET ORAL
Qty: 270 TABLET | Refills: 1 | Status: SHIPPED | OUTPATIENT
Start: 2022-04-18 | End: 2022-04-22 | Stop reason: ALTCHOICE

## 2022-04-19 ENCOUNTER — TELEPHONE (OUTPATIENT)
Dept: INTERNAL MEDICINE | Facility: CLINIC | Age: 72
End: 2022-04-19

## 2022-04-19 NOTE — TELEPHONE ENCOUNTER
There are actually 2 orders in for paracentesis.  I would say there is a 50-50 chance that is the correct order and they might need a different order which I have no idea which 1 they would need.  I tried to call ultrasound and got a voicemail so what ever that is worth

## 2022-04-20 ENCOUNTER — HOSPITAL ENCOUNTER (OUTPATIENT)
Dept: ULTRASOUND IMAGING | Facility: HOSPITAL | Age: 72
Discharge: HOME | End: 2022-04-20
Admitting: INTERNAL MEDICINE

## 2022-04-20 ENCOUNTER — APPOINTMENT (OUTPATIENT)
Dept: ULTRASOUND IMAGING | Facility: HOSPITAL | Age: 72
End: 2022-04-20

## 2022-04-20 VITALS
HEIGHT: 74 IN | WEIGHT: 188.6 LBS | TEMPERATURE: 97.6 F | HEART RATE: 58 BPM | BODY MASS INDEX: 24.2 KG/M2 | SYSTOLIC BLOOD PRESSURE: 130 MMHG | RESPIRATION RATE: 1 BRPM | DIASTOLIC BLOOD PRESSURE: 74 MMHG | OXYGEN SATURATION: 100 %

## 2022-04-20 DIAGNOSIS — K74.60 CIRRHOSIS OF LIVER WITH ASCITES, UNSPECIFIED HEPATIC CIRRHOSIS TYPE: ICD-10-CM

## 2022-04-20 DIAGNOSIS — R18.8 CIRRHOSIS OF LIVER WITH ASCITES, UNSPECIFIED HEPATIC CIRRHOSIS TYPE: ICD-10-CM

## 2022-04-20 PROCEDURE — 76942 ECHO GUIDE FOR BIOPSY: CPT

## 2022-04-20 PROCEDURE — 0 LIDOCAINE 1 % SOLUTION: Performed by: INTERNAL MEDICINE

## 2022-04-20 RX ORDER — LIDOCAINE HYDROCHLORIDE 10 MG/ML
5 INJECTION, SOLUTION INFILTRATION; PERINEURAL ONCE
Status: DISCONTINUED | OUTPATIENT
Start: 2022-04-20 | End: 2022-04-21 | Stop reason: HOSPADM

## 2022-04-20 RX ORDER — LIDOCAINE HYDROCHLORIDE 10 MG/ML
5 INJECTION, SOLUTION INFILTRATION; PERINEURAL ONCE
Status: COMPLETED | OUTPATIENT
Start: 2022-04-20 | End: 2022-04-20

## 2022-04-20 RX ADMIN — SODIUM BICARBONATE 0.5 MEQ: 0.2 INJECTION, SOLUTION INTRAVENOUS at 12:28

## 2022-04-20 RX ADMIN — LIDOCAINE HYDROCHLORIDE 5 ML: 10 INJECTION, SOLUTION INFILTRATION; PERINEURAL at 11:25

## 2022-04-20 NOTE — NURSING NOTE
NURSING PROGRESS NOTE: Patient arrived to [facility] per W/c at 1000.  Here for scheduled Radiology procedure.  No labs needed per Radiologist.  To Radiology vis stretcher at 1101, returning to [facility] at 1205.  VSS.  Band aid to right abdominal area is C/D/I.  VSS remained stable throughout a 1 hour recovery in [facility].  AVS reviewed with patient and his wife.  [facility] and discharged home. JEREMY Lyons

## 2022-04-20 NOTE — DISCHARGE INSTRUCTIONS
"  Call Dr. Pedro Guajardo at [phone] if you have any problems or concerns.    We know you have a Choice in healthcare and appreciate you using [facility].  Our purpose is to provide you \"Excellent Care\".  We hope that you will always choose us in the future and continue to recommend us to your family and friends.             "

## 2022-04-22 ENCOUNTER — OFFICE VISIT (OUTPATIENT)
Dept: INTERNAL MEDICINE | Facility: CLINIC | Age: 72
End: 2022-04-22

## 2022-04-22 VITALS
SYSTOLIC BLOOD PRESSURE: 126 MMHG | HEIGHT: 74 IN | DIASTOLIC BLOOD PRESSURE: 88 MMHG | OXYGEN SATURATION: 97 % | TEMPERATURE: 96.9 F | HEART RATE: 72 BPM | RESPIRATION RATE: 16 BRPM | BODY MASS INDEX: 24.13 KG/M2 | WEIGHT: 188 LBS

## 2022-04-22 DIAGNOSIS — I10 ESSENTIAL HYPERTENSION: ICD-10-CM

## 2022-04-22 DIAGNOSIS — R18.8 CIRRHOSIS OF LIVER WITH ASCITES, UNSPECIFIED HEPATIC CIRRHOSIS TYPE: ICD-10-CM

## 2022-04-22 DIAGNOSIS — N18.9 CHRONIC KIDNEY DISEASE, UNSPECIFIED CKD STAGE: Primary | ICD-10-CM

## 2022-04-22 DIAGNOSIS — I85.00 ESOPHAGEAL VARICES WITHOUT BLEEDING, UNSPECIFIED ESOPHAGEAL VARICES TYPE: ICD-10-CM

## 2022-04-22 DIAGNOSIS — G20 PARKINSON'S DISEASE: ICD-10-CM

## 2022-04-22 DIAGNOSIS — K74.60 CIRRHOSIS OF LIVER WITH ASCITES, UNSPECIFIED HEPATIC CIRRHOSIS TYPE: ICD-10-CM

## 2022-04-22 PROCEDURE — 99214 OFFICE O/P EST MOD 30 MIN: CPT | Performed by: INTERNAL MEDICINE

## 2022-04-22 NOTE — PROGRESS NOTES
"Chief Complaint  Follow-up    Subjective          Hu Burgess presents to [facility]  History of Present Illness  History of cirrhosis and portal hypertension and ascites and chronic kidney disease.  He had a 5 L paracentesis removal of fluid earlier this week.  He has not felt as well after the paracentesis just a little less than appetite.  He has been taking the diuretics with improvement in the lower extremity edema.  Still feels like he has abdominal distention but better  Objective   Vital Signs:   /88 (BP Location: Left arm, Patient Position: Sitting, Cuff Size: Large Adult)   Pulse 72   Temp 96.9 °F (36.1 °C) (Temporal)   Resp 16   Ht 188 cm (74\")   Wt 85.3 kg (188 lb)   SpO2 97%   BMI 24.14 kg/m²     BMI is within normal parameters. No follow-up required.      Physical Exam  Vitals and nursing note reviewed.   Constitutional:       Comments: Jaundice   HENT:      Head: Normocephalic and atraumatic.   Cardiovascular:      Rate and Rhythm: Normal rate and regular rhythm.   Pulmonary:      Effort: Pulmonary effort is normal.      Breath sounds: Normal breath sounds.   Abdominal:      General: Abdomen is flat.      Palpations: Abdomen is soft.   Musculoskeletal:         General: No swelling or deformity.      Cervical back: Neck supple.      Right lower leg: Edema present.      Left lower leg: Edema present.   Skin:     General: Skin is warm.      Capillary Refill: Capillary refill takes less than 2 seconds.      Findings: No rash.   Neurological:      General: No focal deficit present.      Mental Status: He is alert and oriented to person, place, and time.        Result Review : Previous notes                Assessment and Plan cirrhosis with portal hypertension and ascites and esophageal varices and chronic kidney disease.  He had 5 L paracentesis earlier in the week.  Seems somewhat responsive to his diuretics.  Repeat lab work.  Would like " to see a little bit more improvement in his ascites with the diuretics but will continue current course.  He has follow-up in July with [facility] hepatologist.  He is getting ready to go to [place] tomorrow so keep a close eye on symptoms and weight.  Parkinson's disease.  He was restarted on the Sinemet per neurology.  Need to monitor blood pressure closely  Diagnoses and all orders for this visit:    1. Chronic kidney disease, unspecified CKD stage (Primary)  -     CBC & Differential  -     Comprehensive Metabolic Panel  -     Ammonia    2. Esophageal varices without bleeding, unspecified esophageal varices type (HCC)  -     CBC & Differential  -     Comprehensive Metabolic Panel  -     Ammonia    3. Essential hypertension    4. Cirrhosis of liver with ascites, unspecified hepatic cirrhosis type (HCC)    5. Parkinson's disease (HCC)        Follow Up   Return in about 2 months (around 6/22/2022).  Patient was given instructions and counseling regarding his condition or for health maintenance advice. Please see specific information pulled into the AVS if appropriate.

## 2022-04-23 LAB
ALBUMIN SERPL-MCNC: 2.4 G/DL (ref 3.7–4.7)
ALBUMIN/GLOB SERPL: 0.5 {RATIO} (ref 1.2–2.2)
ALP SERPL-CCNC: 355 IU/L (ref 44–121)
ALT SERPL-CCNC: 32 IU/L (ref 0–44)
AMMONIA PLAS-MCNC: 100 UG/DL (ref 31–169)
AST SERPL-CCNC: 103 IU/L (ref 0–40)
BASOPHILS # BLD AUTO: 0.1 X10E3/UL (ref 0–0.2)
BASOPHILS NFR BLD AUTO: 1 %
BILIRUB SERPL-MCNC: 10.1 MG/DL (ref 0–1.2)
BUN SERPL-MCNC: 23 MG/DL (ref 8–27)
BUN/CREAT SERPL: 11 (ref 10–24)
CALCIUM SERPL-MCNC: 8.6 MG/DL (ref 8.6–10.2)
CHLORIDE SERPL-SCNC: 100 MMOL/L (ref 96–106)
CO2 SERPL-SCNC: 24 MMOL/L (ref 20–29)
CREAT SERPL-MCNC: 2.14 MG/DL (ref 0.76–1.27)
EGFRCR SERPLBLD CKD-EPI 2021: 32 ML/MIN/1.73
EOSINOPHIL # BLD AUTO: 0.2 X10E3/UL (ref 0–0.4)
EOSINOPHIL NFR BLD AUTO: 2 %
ERYTHROCYTE [DISTWIDTH] IN BLOOD BY AUTOMATED COUNT: 14.2 % (ref 11.6–15.4)
GLOBULIN SER CALC-MCNC: 4.4 G/DL (ref 1.5–4.5)
GLUCOSE SERPL-MCNC: 101 MG/DL (ref 65–99)
HCT VFR BLD AUTO: 35 % (ref 37.5–51)
HGB BLD-MCNC: 12.4 G/DL (ref 13–17.7)
IMM GRANULOCYTES # BLD AUTO: 0.1 X10E3/UL (ref 0–0.1)
IMM GRANULOCYTES NFR BLD AUTO: 1 %
LYMPHOCYTES # BLD AUTO: 0.8 X10E3/UL (ref 0.7–3.1)
LYMPHOCYTES NFR BLD AUTO: 9 %
MCH RBC QN AUTO: 35.3 PG (ref 26.6–33)
MCHC RBC AUTO-ENTMCNC: 35.4 G/DL (ref 31.5–35.7)
MCV RBC AUTO: 100 FL (ref 79–97)
MONOCYTES # BLD AUTO: 1.1 X10E3/UL (ref 0.1–0.9)
MONOCYTES NFR BLD AUTO: 13 %
NEUTROPHILS # BLD AUTO: 6.5 X10E3/UL (ref 1.4–7)
NEUTROPHILS NFR BLD AUTO: 74 %
PLATELET # BLD AUTO: 189 X10E3/UL (ref 150–450)
POTASSIUM SERPL-SCNC: 3.8 MMOL/L (ref 3.5–5.2)
PROT SERPL-MCNC: 6.8 G/DL (ref 6–8.5)
RBC # BLD AUTO: 3.51 X10E6/UL (ref 4.14–5.8)
SODIUM SERPL-SCNC: 138 MMOL/L (ref 134–144)
WBC # BLD AUTO: 8.7 X10E3/UL (ref 3.4–10.8)

## 2022-04-25 ENCOUNTER — TELEPHONE (OUTPATIENT)
Dept: INTERNAL MEDICINE | Facility: CLINIC | Age: 72
End: 2022-04-25

## 2022-04-25 NOTE — TELEPHONE ENCOUNTER
Talk to the patient and his wife about the lab work.  His bilirubin level is up some and albumin down.  Hepatic function profile essentially the same but he just had a large-volume paracentesis prior to these labs.  We did not do a PT/INR but clinically he is feeling a little better now.  His weight is stable and actually down 4 more pounds his wife thinks from the other visit.  We may need to make adjustments in that.  He does have an appointment with [facility] hepatology upcoming but I will call and try to see if we get him in sooner.

## 2022-04-25 NOTE — TELEPHONE ENCOUNTER
HUB TO READ: LEFT VOICEMAIL FOR PATIENT LETTING HIM KNOW DR. LOTT WANTED TO SEE HIM AGAIN IN FOUR WEEKS. REQUESTED CALL BACK TO SCHEDULE.

## 2022-05-03 ENCOUNTER — OFFICE VISIT (OUTPATIENT)
Dept: INTERNAL MEDICINE | Facility: CLINIC | Age: 72
End: 2022-05-03

## 2022-05-03 VITALS
RESPIRATION RATE: 16 BRPM | BODY MASS INDEX: 24.38 KG/M2 | SYSTOLIC BLOOD PRESSURE: 108 MMHG | WEIGHT: 190 LBS | HEIGHT: 74 IN | DIASTOLIC BLOOD PRESSURE: 52 MMHG | OXYGEN SATURATION: 100 % | TEMPERATURE: 96.6 F | HEART RATE: 69 BPM

## 2022-05-03 DIAGNOSIS — N18.9 CHRONIC KIDNEY DISEASE, UNSPECIFIED CKD STAGE: Primary | ICD-10-CM

## 2022-05-03 DIAGNOSIS — I85.00 ESOPHAGEAL VARICES WITHOUT BLEEDING, UNSPECIFIED ESOPHAGEAL VARICES TYPE: ICD-10-CM

## 2022-05-03 DIAGNOSIS — K74.60 CIRRHOSIS OF LIVER WITH ASCITES, UNSPECIFIED HEPATIC CIRRHOSIS TYPE: ICD-10-CM

## 2022-05-03 DIAGNOSIS — G20 PARKINSON'S DISEASE: ICD-10-CM

## 2022-05-03 DIAGNOSIS — R18.8 CIRRHOSIS OF LIVER WITH ASCITES, UNSPECIFIED HEPATIC CIRRHOSIS TYPE: ICD-10-CM

## 2022-05-03 PROCEDURE — 99214 OFFICE O/P EST MOD 30 MIN: CPT | Performed by: INTERNAL MEDICINE

## 2022-05-03 RX ORDER — PRAMIPEXOLE DIHYDROCHLORIDE 0.25 MG/1
TABLET ORAL
COMMUNITY
Start: 2022-05-02 | End: 2022-05-03 | Stop reason: ALTCHOICE

## 2022-05-03 RX ORDER — CHOLESTYRAMINE 4 G/9G
1 POWDER, FOR SUSPENSION ORAL 2 TIMES DAILY WITH MEALS
Qty: 60 PACKET | Refills: 12 | Status: SHIPPED | OUTPATIENT
Start: 2022-05-03 | End: 2022-05-17 | Stop reason: HOSPADM

## 2022-05-03 RX ORDER — HYDROXYZINE HYDROCHLORIDE 25 MG/1
25 TABLET, FILM COATED ORAL 3 TIMES DAILY PRN
Qty: 90 TABLET | Refills: 2 | Status: SHIPPED | OUTPATIENT
Start: 2022-05-03 | End: 2022-05-17 | Stop reason: HOSPADM

## 2022-05-03 NOTE — PROGRESS NOTES
Chief Complaint  No chief complaint on file.    Subjective          Hu Burgess presents to [facility]  History of Present Illness  Cirrhosis with refractory ascites and fluid retention.  He just got back from [place] recently.  Complaining of pruritus.  Currently is scheduled and with hepatology at [facility] but not till July.  Weight is about the same.  His wife reduced his furosemide dose to 20 mg.  He does complains of a lot of weakness and malaise.  Jaundice may be a little worse.  Pruritus is biggest complaint  Objective   Vital Signs:   There were no vitals taken for this visit.    Physical Exam  Vitals and nursing note reviewed.   Constitutional:       Appearance: Normal appearance.   HENT:      Head: Normocephalic and atraumatic.   Eyes:      General: Scleral icterus present.   Cardiovascular:      Rate and Rhythm: Normal rate and regular rhythm.   Pulmonary:      Effort: Pulmonary effort is normal.      Breath sounds: Normal breath sounds.   Abdominal:      General: Abdomen is flat. There is distension.      Palpations: Abdomen is soft.      Tenderness: There is no abdominal tenderness. There is no guarding.   Musculoskeletal:         General: No swelling or deformity.      Cervical back: Neck supple.      Right lower leg: Edema present.      Left lower leg: Edema present.   Skin:     General: Skin is warm.      Capillary Refill: Capillary refill takes less than 2 seconds.      Findings: No rash.   Neurological:      General: No focal deficit present.      Mental Status: He is alert and oriented to person, place, and time.        Result Review : Cirrhosis with ascites and portal hypertension.  Repeat lab work.  If his bilirubin is higher we will try to get him into [facility] sooner.  Cholestyramine twice a day for pruritus and Atarax as needed.  Tentative follow-up in 1 month or pending lab results                 Assessment and Plan    Diagnoses and all orders for this visit:    1. Chronic kidney disease, unspecified CKD stage (Primary)    2. Cirrhosis of liver with ascites, unspecified hepatic cirrhosis type (HCC)    3. Esophageal varices without bleeding, unspecified esophageal varices type (HCC)    4. Parkinson's disease (HCC)      BMI is within normal parameters. No follow-up required.         Follow Up   No follow-ups on file.  Patient was given instructions and counseling regarding his condition or for health maintenance advice. Please see specific information pulled into the AVS if appropriate.

## 2022-05-04 LAB
ALBUMIN SERPL-MCNC: 2.5 G/DL (ref 3.7–4.7)
ALBUMIN/GLOB SERPL: 0.6 {RATIO} (ref 1.2–2.2)
ALP SERPL-CCNC: 379 IU/L (ref 44–121)
ALT SERPL-CCNC: 33 IU/L (ref 0–44)
AMMONIA PLAS-MCNC: 62 UG/DL (ref 31–169)
AST SERPL-CCNC: 111 IU/L (ref 0–40)
BASOPHILS # BLD AUTO: 0.2 X10E3/UL (ref 0–0.2)
BASOPHILS NFR BLD AUTO: 2 %
BILIRUB SERPL-MCNC: 14.2 MG/DL (ref 0–1.2)
BUN SERPL-MCNC: 44 MG/DL (ref 8–27)
BUN/CREAT SERPL: 15 (ref 10–24)
CALCIUM SERPL-MCNC: 9 MG/DL (ref 8.6–10.2)
CHLORIDE SERPL-SCNC: 96 MMOL/L (ref 96–106)
CO2 SERPL-SCNC: 20 MMOL/L (ref 20–29)
CREAT SERPL-MCNC: 2.86 MG/DL (ref 0.76–1.27)
EGFRCR SERPLBLD CKD-EPI 2021: 23 ML/MIN/1.73
EOSINOPHIL # BLD AUTO: 0.1 X10E3/UL (ref 0–0.4)
EOSINOPHIL NFR BLD AUTO: 1 %
ERYTHROCYTE [DISTWIDTH] IN BLOOD BY AUTOMATED COUNT: 14.7 % (ref 11.6–15.4)
GLOBULIN SER CALC-MCNC: 4.2 G/DL (ref 1.5–4.5)
GLUCOSE SERPL-MCNC: 103 MG/DL (ref 65–99)
HCT VFR BLD AUTO: 32.3 % (ref 37.5–51)
HGB BLD-MCNC: 12.2 G/DL (ref 13–17.7)
INR PPP: 1.3 (ref 0.9–1.2)
LYMPHOCYTES # BLD AUTO: 1.4 X10E3/UL (ref 0.7–3.1)
LYMPHOCYTES NFR BLD AUTO: 13 %
MCH RBC QN AUTO: 35.1 PG (ref 26.6–33)
MCHC RBC AUTO-ENTMCNC: 37.8 G/DL (ref 31.5–35.7)
MCV RBC AUTO: 93 FL (ref 79–97)
MONOCYTES # BLD AUTO: 1.3 X10E3/UL (ref 0.1–0.9)
MONOCYTES NFR BLD AUTO: 12 %
MORPHOLOGY BLD-IMP: ABNORMAL
NEUTROPHILS # BLD AUTO: 8 X10E3/UL (ref 1.4–7)
NEUTROPHILS NFR BLD AUTO: 72 %
PLATELET # BLD AUTO: 221 X10E3/UL (ref 150–450)
POTASSIUM SERPL-SCNC: 3.9 MMOL/L (ref 3.5–5.2)
PROT SERPL-MCNC: 6.7 G/DL (ref 6–8.5)
PROTHROMBIN TIME: 13.5 SEC (ref 9.1–12)
RBC # BLD AUTO: 3.48 X10E6/UL (ref 4.14–5.8)
SODIUM SERPL-SCNC: 133 MMOL/L (ref 134–144)
WBC # BLD AUTO: 11.1 X10E3/UL (ref 3.4–10.8)

## 2022-05-09 ENCOUNTER — APPOINTMENT (OUTPATIENT)
Dept: CT IMAGING | Facility: HOSPITAL | Age: 72
End: 2022-05-09

## 2022-05-09 ENCOUNTER — APPOINTMENT (OUTPATIENT)
Dept: GENERAL RADIOLOGY | Facility: HOSPITAL | Age: 72
End: 2022-05-09

## 2022-05-09 ENCOUNTER — HOSPITAL ENCOUNTER (INPATIENT)
Facility: HOSPITAL | Age: 72
LOS: 8 days | Discharge: ANOTHER HOSPITAL | End: 2022-05-17
Attending: EMERGENCY MEDICINE | Admitting: HOSPITALIST

## 2022-05-09 DIAGNOSIS — K76.82 HEPATIC ENCEPHALOPATHY: Primary | ICD-10-CM

## 2022-05-09 DIAGNOSIS — K72.90 LIVER FAILURE WITHOUT HEPATIC COMA, UNSPECIFIED CHRONICITY: ICD-10-CM

## 2022-05-09 DIAGNOSIS — D18.02 BRAIN HEMANGIOMA: ICD-10-CM

## 2022-05-09 DIAGNOSIS — N18.9 CHRONIC KIDNEY DISEASE, UNSPECIFIED CKD STAGE: ICD-10-CM

## 2022-05-09 LAB
ALBUMIN SERPL-MCNC: 2.7 G/DL (ref 3.5–5.2)
ALBUMIN/GLOB SERPL: 0.7 G/DL
ALP SERPL-CCNC: 326 U/L (ref 39–117)
ALT SERPL W P-5'-P-CCNC: 27 U/L (ref 1–41)
AMMONIA BLD-SCNC: 80 UMOL/L (ref 16–60)
ANION GAP SERPL CALCULATED.3IONS-SCNC: 13.9 MMOL/L (ref 5–15)
APTT PPP: 37.3 SECONDS (ref 22.7–35.4)
AST SERPL-CCNC: 71 U/L (ref 1–40)
BASOPHILS # BLD AUTO: 0.11 10*3/MM3 (ref 0–0.2)
BASOPHILS NFR BLD AUTO: 1 % (ref 0–1.5)
BILIRUB SERPL-MCNC: 18.3 MG/DL (ref 0–1.2)
BUN SERPL-MCNC: 45 MG/DL (ref 8–23)
BUN/CREAT SERPL: 15.2 (ref 7–25)
CALCIUM SPEC-SCNC: 9.2 MG/DL (ref 8.6–10.5)
CHLORIDE SERPL-SCNC: 99 MMOL/L (ref 98–107)
CO2 SERPL-SCNC: 20.1 MMOL/L (ref 22–29)
CREAT SERPL-MCNC: 2.97 MG/DL (ref 0.76–1.27)
DEPRECATED RDW RBC AUTO: 52.2 FL (ref 37–54)
EGFRCR SERPLBLD CKD-EPI 2021: 21.5 ML/MIN/1.73
EOSINOPHIL # BLD AUTO: 0.16 10*3/MM3 (ref 0–0.4)
EOSINOPHIL NFR BLD AUTO: 1.5 % (ref 0.3–6.2)
ERYTHROCYTE [DISTWIDTH] IN BLOOD BY AUTOMATED COUNT: 14.9 % (ref 12.3–15.4)
GLOBULIN UR ELPH-MCNC: 4 GM/DL
GLUCOSE SERPL-MCNC: 114 MG/DL (ref 65–99)
HCT VFR BLD AUTO: 30.9 % (ref 37.5–51)
HGB BLD-MCNC: 11.3 G/DL (ref 13–17.7)
HOLD SPECIMEN: NORMAL
HOLD SPECIMEN: NORMAL
IMM GRANULOCYTES # BLD AUTO: 0.26 10*3/MM3 (ref 0–0.05)
IMM GRANULOCYTES NFR BLD AUTO: 2.4 % (ref 0–0.5)
INR PPP: 1.39 (ref 0.9–1.1)
LYMPHOCYTES # BLD AUTO: 0.99 10*3/MM3 (ref 0.7–3.1)
LYMPHOCYTES NFR BLD AUTO: 9.3 % (ref 19.6–45.3)
MAGNESIUM SERPL-MCNC: 2.3 MG/DL (ref 1.6–2.4)
MCH RBC QN AUTO: 35.5 PG (ref 26.6–33)
MCHC RBC AUTO-ENTMCNC: 36.6 G/DL (ref 31.5–35.7)
MCV RBC AUTO: 97.2 FL (ref 79–97)
MONOCYTES # BLD AUTO: 1.47 10*3/MM3 (ref 0.1–0.9)
MONOCYTES NFR BLD AUTO: 13.8 % (ref 5–12)
NEUTROPHILS NFR BLD AUTO: 7.7 10*3/MM3 (ref 1.7–7)
NEUTROPHILS NFR BLD AUTO: 72 % (ref 42.7–76)
NRBC BLD AUTO-RTO: 0.1 /100 WBC (ref 0–0.2)
PLATELET # BLD AUTO: 179 10*3/MM3 (ref 140–450)
PMV BLD AUTO: 10.6 FL (ref 6–12)
POTASSIUM SERPL-SCNC: 4.3 MMOL/L (ref 3.5–5.2)
PROT SERPL-MCNC: 6.7 G/DL (ref 6–8.5)
PROTHROMBIN TIME: 17 SECONDS (ref 11.7–14.2)
QT INTERVAL: 444 MS
RBC # BLD AUTO: 3.18 10*6/MM3 (ref 4.14–5.8)
SARS-COV-2 ORF1AB RESP QL NAA+PROBE: NOT DETECTED
SODIUM SERPL-SCNC: 133 MMOL/L (ref 136–145)
TROPONIN T SERPL-MCNC: <0.01 NG/ML (ref 0–0.03)
WBC NRBC COR # BLD: 10.69 10*3/MM3 (ref 3.4–10.8)
WHOLE BLOOD HOLD SPECIMEN: NORMAL

## 2022-05-09 PROCEDURE — 82140 ASSAY OF AMMONIA: CPT | Performed by: EMERGENCY MEDICINE

## 2022-05-09 PROCEDURE — 93010 ELECTROCARDIOGRAM REPORT: CPT | Performed by: INTERNAL MEDICINE

## 2022-05-09 PROCEDURE — 83735 ASSAY OF MAGNESIUM: CPT

## 2022-05-09 PROCEDURE — 85610 PROTHROMBIN TIME: CPT | Performed by: EMERGENCY MEDICINE

## 2022-05-09 PROCEDURE — 70450 CT HEAD/BRAIN W/O DYE: CPT

## 2022-05-09 PROCEDURE — 99285 EMERGENCY DEPT VISIT HI MDM: CPT

## 2022-05-09 PROCEDURE — 85025 COMPLETE CBC W/AUTO DIFF WBC: CPT

## 2022-05-09 PROCEDURE — 84484 ASSAY OF TROPONIN QUANT: CPT

## 2022-05-09 PROCEDURE — 93005 ELECTROCARDIOGRAM TRACING: CPT | Performed by: EMERGENCY MEDICINE

## 2022-05-09 PROCEDURE — 71045 X-RAY EXAM CHEST 1 VIEW: CPT

## 2022-05-09 PROCEDURE — U0004 COV-19 TEST NON-CDC HGH THRU: HCPCS | Performed by: EMERGENCY MEDICINE

## 2022-05-09 PROCEDURE — U0005 INFEC AGEN DETEC AMPLI PROBE: HCPCS | Performed by: EMERGENCY MEDICINE

## 2022-05-09 PROCEDURE — 85730 THROMBOPLASTIN TIME PARTIAL: CPT | Performed by: EMERGENCY MEDICINE

## 2022-05-09 PROCEDURE — 80053 COMPREHEN METABOLIC PANEL: CPT

## 2022-05-09 RX ORDER — ACETAMINOPHEN 325 MG/1
650 TABLET ORAL EVERY 4 HOURS PRN
Status: DISCONTINUED | OUTPATIENT
Start: 2022-05-09 | End: 2022-05-17 | Stop reason: HOSPADM

## 2022-05-09 RX ORDER — SODIUM CHLORIDE 0.9 % (FLUSH) 0.9 %
10 SYRINGE (ML) INJECTION AS NEEDED
Status: DISCONTINUED | OUTPATIENT
Start: 2022-05-09 | End: 2022-05-17 | Stop reason: HOSPADM

## 2022-05-09 RX ORDER — LACTULOSE 10 G/15ML
30 SOLUTION ORAL 3 TIMES DAILY
Status: DISCONTINUED | OUTPATIENT
Start: 2022-05-09 | End: 2022-05-15

## 2022-05-09 RX ORDER — FUROSEMIDE 20 MG/1
20 TABLET ORAL 3 TIMES DAILY
Status: DISCONTINUED | OUTPATIENT
Start: 2022-05-09 | End: 2022-05-10

## 2022-05-09 RX ORDER — SPIRONOLACTONE 50 MG/1
50 TABLET, FILM COATED ORAL DAILY
Status: DISCONTINUED | OUTPATIENT
Start: 2022-05-10 | End: 2022-05-10

## 2022-05-09 RX ORDER — PANTOPRAZOLE SODIUM 40 MG/1
40 TABLET, DELAYED RELEASE ORAL
Status: DISCONTINUED | OUTPATIENT
Start: 2022-05-10 | End: 2022-05-10

## 2022-05-09 RX ORDER — LACTULOSE 10 G/15ML
30 SOLUTION ORAL ONCE
Status: COMPLETED | OUTPATIENT
Start: 2022-05-09 | End: 2022-05-09

## 2022-05-09 RX ADMIN — LACTULOSE 30 G: 10 SOLUTION ORAL at 17:51

## 2022-05-09 RX ADMIN — CARBIDOPA AND LEVODOPA 1 TABLET: 10; 100 TABLET ORAL at 23:13

## 2022-05-09 RX ADMIN — ACETAMINOPHEN 650 MG: 325 TABLET ORAL at 22:20

## 2022-05-09 RX ADMIN — RIFAXIMIN 550 MG: 550 TABLET ORAL at 23:13

## 2022-05-09 RX ADMIN — FUROSEMIDE 20 MG: 20 TABLET ORAL at 23:13

## 2022-05-09 NOTE — ED PROVIDER NOTES
" EMERGENCY DEPARTMENT ENCOUNTER    Room Number:  26/26  Date of encounter:  5/9/2022  PCP: Aldo Guajardo MD (Tony)  Historian: Patient and family      HPI:  Chief Complaint: Generalized weakness, falls, confusion  A complete HPI/ROS/PMH/PSH/SH/FH are unobtainable due to: Confusion    Context: Hu Burgess is a 71 y.o. male who presents to the ED accompanied by his wife and daughter.  He reports that he feels lousy in general and family confirms.  He has had a few falls over the last couple of days due to weakness-he states that he will just be standing there and his legs \"get real weak\" and he falls.  He did not lose consciousness during these episodes.  He states he has been compliant with his medications as prescribed.  No fevers or chills.  No nausea, vomiting or diarrhea.      Duration: Several days  Onset: Several days  Timing: Constant  Location: Generalized  Radiation: N/A  Quality: Weakness  Intensity/Severity: Severe  Progression: Worsening  Associated Symptoms: Falls  Aggravating Factors: Exertion, standing  Alleviating Factors: None  Previous Episodes: Not to this extent  Treatment before arrival: None.  Primary care doctor recommended ER evaluation.    The patient was placed in a mask in triage, hand hygiene was performed before and after my interaction with the patient.  I wore a mask, safety glasses and gloves during my entire interaction with the patient.    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   • Anemia in chronic kidney disease 12/11/2017   • Right inguinal hernia 11/17/2021   • Cirrhosis of liver with ascites (HCC) " 03/04/2022   • Gastroesophageal reflux disease 03/04/2022   • Colon cancer screening 03/04/2022   • Nausea 03/04/2022     Resolved Ambulatory Problems     Diagnosis Date Noted   • No Resolved Ambulatory Problems     Past Medical History:   Diagnosis Date   • Arteriosclerotic cardiovascular disease    • Back pain    • Cirrhosis (HCC)    • Elevated liver enzymes    • GERD (gastroesophageal reflux disease)    • History of transfusion    • Kidney failure    • Rhabdomyolysis          PAST SURGICAL HISTORY  Past Surgical History:   Procedure Laterality Date   • COLONOSCOPY W/ POLYPECTOMY N/A 3/9/2022    Procedure: COLONOSCOPY WITH POLYPECTOMY;  Surgeon: Justin Underwood MD;  Location: [facility];  Service: Gastroenterology;  Laterality: N/A;  ascending colon polyps x2 (cold snare x1)  transverse colon polyp x1 (cold snare)  sigmoid colon polyp x1 (cold snare)  hemorrhoids   • ENDOSCOPY N/A 2/9/2018    Procedure: ESOPHAGOGASTRODUODENOSCOPY;  Surgeon: Marcelino Samuel MD;  Location: [facility] OR;  Service:    • ENDOSCOPY N/A 3/9/2022    Procedure: ESOPHAGOGASTRODUODENOSCOPY;  Surgeon: Justin Underwood MD;  Location: [facility];  Service: Gastroenterology;  Laterality: N/A;  Esophageal varisces  Portal gastropathy   • INGUINAL HERNIA REPAIR Right 1/10/2022    Procedure: INGUINAL HERNIA REPAIR;  Surgeon: Kishan Puga MD;  Location: [facility];  Service: General;  Laterality: Right;       • INSERTION HEMODIALYSIS CATHETER Right 11/14/2017    Procedure: PALINDRONE CATHETERE PLACEMENT;  Surgeon: Gareth Sánchez MD;  Location: [facility];  Service:          FAMILY HISTORY  Family History   Problem Relation Age of Onset   • Cancer Father         laryngeal cancer   • Colon cancer Neg Hx    • Colon polyps Neg Hx          SOCIAL HISTORY  Social History     Socioeconomic History   • Marital status:    Tobacco Use   • Smoking status: Former Smoker     Years: 50.00     Types: Cigarettes     Quit date:  2016     Years since quittin.5   • Smokeless tobacco: Never Used   • Tobacco comment: no caffiene   Vaping Use   • Vaping Use: Never used   Substance and Sexual Activity   • Alcohol use: Not Currently     Alcohol/week: 1.0 standard drink     Types: 1 Cans of beer per week   • Drug use: No   • Sexual activity: Defer         ALLERGIES  Statins        REVIEW OF SYSTEMS  Review of Systems   Constitutional: Positive for fatigue. Negative for diaphoresis and fever.   Respiratory: Negative for chest tightness.    Cardiovascular: Negative for chest pain.   Gastrointestinal: Positive for abdominal pain and nausea. Negative for blood in stool.   Neurological: Positive for syncope and weakness.        All systems reviewed and negative except for those discussed in HPI.       PHYSICAL EXAM    I have reviewed the triage vital signs and nursing notes.    ED Triage Vitals [22 1152]   Temp Heart Rate Resp BP SpO2   96.3 °F (35.7 °C) 68 20 -- 96 %      Temp src Heart Rate Source Patient Position BP Location FiO2 (%)   Tympanic -- -- -- --       Physical Exam   Constitutional: Pt. is sleeping but awakens easily to voice.  His speech is quiet but intelligible.  He appears chronically ill but is in no acute distress.   HENT: Normocephalic and atraumatic.   Neck: Normal range of motion. Neck supple. No JVD present.   Cardiovascular: Normal rate, regular rhythm and normal heart sounds. No murmur heard.  Pulmonary/Chest: Effort normal and breath sounds normal. No stridor. No respiratory distress. No wheezes, no rales.   Abdominal: Soft. Bowel sounds are normal. No distension. There is no tenderness. There is no rebound and no guarding.   Musculoskeletal: Normal range of motion. No edema, tenderness or deformity.   Neurological: Pt. is sleeping but awakens easily to voice.  He is a bit slow to respond but does respond appropriately.  He has no focal neurologic deficits.  Skin: Skin is warm and dry. No rash noted. Pt. is not  diaphoretic. No erythema.   Psychiatric: Mood, affect normal.  He is pleasant and cooperative.  Nursing note and vitals reviewed.        LAB RESULTS  Recent Results (from the past 24 hour(s))   Comprehensive Metabolic Panel    Collection Time: 05/09/22  2:13 PM    Specimen: Blood   Result Value Ref Range    Glucose 114 (H) 65 - 99 mg/dL    BUN 45 (H) 8 - 23 mg/dL    Creatinine 2.97 (H) 0.76 - 1.27 mg/dL    Sodium 133 (L) 136 - 145 mmol/L    Potassium 4.3 3.5 - 5.2 mmol/L    Chloride 99 98 - 107 mmol/L    CO2 20.1 (L) 22.0 - 29.0 mmol/L    Calcium 9.2 8.6 - 10.5 mg/dL    Total Protein 6.7 6.0 - 8.5 g/dL    Albumin 2.70 (L) 3.50 - 5.20 g/dL    ALT (SGPT) 27 1 - 41 U/L    AST (SGOT) 71 (H) 1 - 40 U/L    Alkaline Phosphatase 326 (H) 39 - 117 U/L    Total Bilirubin 18.3 (H) 0.0 - 1.2 mg/dL    Globulin 4.0 gm/dL    A/G Ratio 0.7 g/dL    BUN/Creatinine Ratio 15.2 7.0 - 25.0    Anion Gap 13.9 5.0 - 15.0 mmol/L    eGFR 21.5 (L) >60.0 mL/min/1.73   Troponin    Collection Time: 05/09/22  2:13 PM    Specimen: Blood   Result Value Ref Range    Troponin T <0.010 0.000 - 0.030 ng/mL   Magnesium    Collection Time: 05/09/22  2:13 PM    Specimen: Blood   Result Value Ref Range    Magnesium 2.3 1.6 - 2.4 mg/dL   Green Top (Gel)    Collection Time: 05/09/22  2:13 PM   Result Value Ref Range    Extra Tube Hold for add-ons.    Lavender Top    Collection Time: 05/09/22  2:13 PM   Result Value Ref Range    Extra Tube hold for add-on    Gold Top - SST    Collection Time: 05/09/22  2:13 PM   Result Value Ref Range    Extra Tube Hold for add-ons.    CBC Auto Differential    Collection Time: 05/09/22  2:13 PM    Specimen: Blood   Result Value Ref Range    WBC 10.69 3.40 - 10.80 10*3/mm3    RBC 3.18 (L) 4.14 - 5.80 10*6/mm3    Hemoglobin 11.3 (L) 13.0 - 17.7 g/dL    Hematocrit 30.9 (L) 37.5 - 51.0 %    MCV 97.2 (H) 79.0 - 97.0 fL    MCH 35.5 (H) 26.6 - 33.0 pg    MCHC 36.6 (H) 31.5 - 35.7 g/dL    RDW 14.9 12.3 - 15.4 %    RDW-SD 52.2 37.0 -  54.0 fl    MPV 10.6 6.0 - 12.0 fL    Platelets 179 140 - 450 10*3/mm3    Neutrophil % 72.0 42.7 - 76.0 %    Lymphocyte % 9.3 (L) 19.6 - 45.3 %    Monocyte % 13.8 (H) 5.0 - 12.0 %    Eosinophil % 1.5 0.3 - 6.2 %    Basophil % 1.0 0.0 - 1.5 %    Immature Grans % 2.4 (H) 0.0 - 0.5 %    Neutrophils, Absolute 7.70 (H) 1.70 - 7.00 10*3/mm3    Lymphocytes, Absolute 0.99 0.70 - 3.10 10*3/mm3    Monocytes, Absolute 1.47 (H) 0.10 - 0.90 10*3/mm3    Eosinophils, Absolute 0.16 0.00 - 0.40 10*3/mm3    Basophils, Absolute 0.11 0.00 - 0.20 10*3/mm3    Immature Grans, Absolute 0.26 (H) 0.00 - 0.05 10*3/mm3    nRBC 0.1 0.0 - 0.2 /100 WBC   aPTT    Collection Time: 05/09/22  2:13 PM    Specimen: Blood   Result Value Ref Range    PTT 37.3 (H) 22.7 - 35.4 seconds   Protime-INR    Collection Time: 05/09/22  2:13 PM    Specimen: Blood   Result Value Ref Range    Protime 17.0 (H) 11.7 - 14.2 Seconds    INR 1.39 (H) 0.90 - 1.10   ECG 12 Lead    Collection Time: 05/09/22  3:51 PM   Result Value Ref Range    QT Interval 444 ms   Ammonia    Collection Time: 05/09/22  3:52 PM    Specimen: Blood   Result Value Ref Range    Ammonia 80 (H) 16 - 60 umol/L       Ordered the above labs and independently reviewed the results.        RADIOLOGY  CT Head Without Contrast    Result Date: 5/9/2022  CT HEAD WO CONTRAST-  CLINICAL HISTORY: Patient falling. Confusion.  TECHNIQUE: Transverse 3 mm thick images were obtained from the base of the skull to the vertex without IV contrast.  Radiation dose reduction techniques were utilized, including automated exposure control and exposure modulation based on body size.  COMPARISON: None  FINDINGS: The brain and ventricular system appear structurally within normal limits for a patient of this age. There is no evidence of recent or old intracranial hemorrhage or infarction. There is an approximately 2.8 x 2.2 cm diameter well-circumscribed solid dural based mass in the right parafalcine region at the vertex that is  consistent with a meningioma. This contains a single punctate calcification within its periphery. No edema is evident within the adjacent brain parenchyma. No other masses are identified. Bone window images demonstrate no evidence of skull fracture. The left maxillary sinus is hypoplastic and completely filled with thickened mucosa and/or fluid. This is quite likely chronic. The right maxillary sinus is well-developed and widely patent. The sphenoid sinus is also well aerated. There is thickened mucosa and/or fluid filling the majority of the frontal sinus.      Solitary hyperdense dural based mass in the right parafalcine region at the vertex consistent with a meningioma as described. This produces no significant mass effect on the adjacent frontal lobe and there is no edema within the brain or adjacent to the lesion. Paranasal Sinus disease as noted. Otherwise unremarkable CT scan of the head. No acute intracranial abnormality is identified.  This report was finalized on 5/9/2022 5:23 PM by Dr. Gaetano Kim M.D.      XR Chest 1 View    Result Date: 5/9/2022  PORTABLE CHEST X-RAY  HISTORY: Weakness, dizziness, altered mental status.  TECHNIQUE: Portable chest x-ray is correlated with chest x-ray November 14, 2017.  FINDINGS: There is linear atelectasis at the right lung base. Cardiac mediastinal silhouette is normal and the lungs are otherwise clear. Costophrenic sulci are dry.      Right base atelectasis.  This report was finalized on 5/9/2022 12:43 PM by Dr. Cliff Clemens M.D.        I ordered the above noted radiological studies. Reviewed by me and discussed with radiologist.  See dictation for official radiology interpretation.      PROCEDURES    Procedures      MEDICATIONS GIVEN IN ER    Medications   sodium chloride 0.9 % flush 10 mL (has no administration in time range)   lactulose (CHRONULAC) 10 GM/15ML solution 30 g (30 g Oral Given 5/9/22 7131)         PROGRESS, DATA ANALYSIS, CONSULTS, AND MEDICAL  DECISION MAKING    Any/all labs have been independently reviewed by me.  Any/all radiology studies have been reviewed by me and discussed with radiologist dictating the report.   EKG's independently viewed and interpreted by me.  Discussion below represents my analysis of pertinent findings related to patient's condition, differential diagnosis, treatment plan and final disposition.    Number of Diagnoses or Management Options     Amount and/or Complexity of Data Reviewed  Clinical lab tests:  Yes  Tests in the radiology section of CPT®:  No  Tests in the medicine section of CPT®:  Yes  Review and summarize past medical records:  (No recent ER visits/hospitalizations at [facility])  Independent visualization of images, tracings, or specimens: (N/A)      ED Course as of 05/09/22 1819   Mon May 09, 2022   1539 BUN(!): 45 [WC]   1539 Creatinine(!): 2.97  This is roughly his baseline. [WC]   1539 Total Bilirubin(!): 18.3  Increasing [WC]   1621 Ammonia(!): 80 [WC]   1723 CT of the brain was independently visualized by me and discussed with/interpreted by Dr. Kim (radiology)-there are no acute processes identified.  There is a hemangioma in the right cerebral hemisphere that does not produce any mass-effect consult in place with no edema.  For official interpretation, see dictated report. [WC]   1731 Case discussed with Dr. Varela ([facility])-he agrees to admit the patient to a telemetry bed. [WC]   1818 Case discussed with Dr. Augustine (gastroenterology)-he agrees to see patient in consultation. [WC]      ED Course User Index  [WC] Jovanni Guerra MD       AS OF 18:19 EDT VITALS:    BP - 109/52  HR - 64  TEMP - 96.3 °F (35.7 °C) (Tympanic)  02 SATS - 97%        DIAGNOSIS  Final diagnoses:   Hepatic encephalopathy (HCC)   Liver failure without hepatic coma, unspecified chronicity (HCC)   Chronic kidney disease, unspecified CKD stage   Brain hemangioma (HCC)          DISPOSITION  Admitted           Jovanni Guerra MD  05/09/22 9550

## 2022-05-09 NOTE — ED NOTES
Patient sent from PCP for hospital admission. Patient has cirrhosis of the liver. Has a a few falls over the last couple of days due to feeling more weak than normal.

## 2022-05-10 ENCOUNTER — APPOINTMENT (OUTPATIENT)
Dept: ULTRASOUND IMAGING | Facility: HOSPITAL | Age: 72
End: 2022-05-10

## 2022-05-10 PROBLEM — D32.0 CEREBRAL MENINGIOMA (HCC): Status: ACTIVE | Noted: 2022-05-10

## 2022-05-10 LAB
ALBUMIN SERPL-MCNC: 2 G/DL (ref 3.5–5.2)
ALBUMIN/GLOB SERPL: 0.5 G/DL
ALP SERPL-CCNC: 296 U/L (ref 39–117)
ALT SERPL W P-5'-P-CCNC: 11 U/L (ref 1–41)
AMMONIA BLD-SCNC: 40 UMOL/L (ref 16–60)
ANION GAP SERPL CALCULATED.3IONS-SCNC: 12.8 MMOL/L (ref 5–15)
AST SERPL-CCNC: 62 U/L (ref 1–40)
BACTERIA UR QL AUTO: NORMAL /HPF
BASOPHILS # BLD AUTO: 0.09 10*3/MM3 (ref 0–0.2)
BASOPHILS NFR BLD AUTO: 0.9 % (ref 0–1.5)
BILIRUB SERPL-MCNC: 17.7 MG/DL (ref 0–1.2)
BILIRUB UR QL STRIP: ABNORMAL
BUN SERPL-MCNC: 44 MG/DL (ref 8–23)
BUN/CREAT SERPL: 13.8 (ref 7–25)
CALCIUM SPEC-SCNC: 9.1 MG/DL (ref 8.6–10.5)
CHLORIDE SERPL-SCNC: 99 MMOL/L (ref 98–107)
CHLORIDE UR-SCNC: 54 MMOL/L
CK SERPL-CCNC: 34 U/L (ref 20–200)
CLARITY UR: CLEAR
CO2 SERPL-SCNC: 21.2 MMOL/L (ref 22–29)
COLOR UR: ABNORMAL
CREAT SERPL-MCNC: 3.19 MG/DL (ref 0.76–1.27)
CREAT UR-MCNC: 92.4 MG/DL
DEPRECATED RDW RBC AUTO: 51 FL (ref 37–54)
EGFRCR SERPLBLD CKD-EPI 2021: 19.8 ML/MIN/1.73
EOSINOPHIL # BLD AUTO: 0.24 10*3/MM3 (ref 0–0.4)
EOSINOPHIL NFR BLD AUTO: 2.5 % (ref 0.3–6.2)
EOSINOPHIL SPEC QL MICRO: 0 % EOS/100 CELLS (ref 0–0)
ERYTHROCYTE [DISTWIDTH] IN BLOOD BY AUTOMATED COUNT: 14.6 % (ref 12.3–15.4)
GLOBULIN UR ELPH-MCNC: 4.2 GM/DL
GLUCOSE SERPL-MCNC: 110 MG/DL (ref 65–99)
GLUCOSE UR STRIP-MCNC: NEGATIVE MG/DL
HCT VFR BLD AUTO: 28.1 % (ref 37.5–51)
HGB BLD-MCNC: 10.4 G/DL (ref 13–17.7)
HGB UR QL STRIP.AUTO: NEGATIVE
HYALINE CASTS UR QL AUTO: NORMAL /LPF
IMM GRANULOCYTES # BLD AUTO: 0.22 10*3/MM3 (ref 0–0.05)
IMM GRANULOCYTES NFR BLD AUTO: 2.3 % (ref 0–0.5)
KETONES UR QL STRIP: NEGATIVE
LEUKOCYTE ESTERASE UR QL STRIP.AUTO: ABNORMAL
LYMPHOCYTES # BLD AUTO: 1.28 10*3/MM3 (ref 0.7–3.1)
LYMPHOCYTES NFR BLD AUTO: 13.5 % (ref 19.6–45.3)
MCH RBC QN AUTO: 36 PG (ref 26.6–33)
MCHC RBC AUTO-ENTMCNC: 37 G/DL (ref 31.5–35.7)
MCV RBC AUTO: 97.2 FL (ref 79–97)
MONOCYTES # BLD AUTO: 1.35 10*3/MM3 (ref 0.1–0.9)
MONOCYTES NFR BLD AUTO: 14.2 % (ref 5–12)
NEUTROPHILS NFR BLD AUTO: 6.33 10*3/MM3 (ref 1.7–7)
NEUTROPHILS NFR BLD AUTO: 66.6 % (ref 42.7–76)
NITRITE UR QL STRIP: POSITIVE
NRBC BLD AUTO-RTO: 0 /100 WBC (ref 0–0.2)
NT-PROBNP SERPL-MCNC: 597 PG/ML (ref 0–900)
PH UR STRIP.AUTO: 5.5 [PH] (ref 5–8)
PLATELET # BLD AUTO: 148 10*3/MM3 (ref 140–450)
PMV BLD AUTO: 10.7 FL (ref 6–12)
POTASSIUM SERPL-SCNC: 4.4 MMOL/L (ref 3.5–5.2)
PROT ?TM UR-MCNC: 10.5 MG/DL
PROT SERPL-MCNC: 6.2 G/DL (ref 6–8.5)
PROT UR QL STRIP: NEGATIVE
RBC # BLD AUTO: 2.89 10*6/MM3 (ref 4.14–5.8)
RBC # UR STRIP: NORMAL /HPF
REF LAB TEST METHOD: NORMAL
SODIUM SERPL-SCNC: 133 MMOL/L (ref 136–145)
SODIUM UR-SCNC: 67 MMOL/L
SP GR UR STRIP: 1.01 (ref 1–1.03)
SQUAMOUS #/AREA URNS HPF: NORMAL /HPF
URINE MYOGLOBIN, QUALITATIVE: NEGATIVE
UROBILINOGEN UR QL STRIP: ABNORMAL
UUN 24H UR-MCNC: 487 MG/DL
WBC # UR STRIP: NORMAL /HPF
WBC NRBC COR # BLD: 9.51 10*3/MM3 (ref 3.4–10.8)

## 2022-05-10 PROCEDURE — P9047 ALBUMIN (HUMAN), 25%, 50ML: HCPCS | Performed by: INTERNAL MEDICINE

## 2022-05-10 PROCEDURE — 82436 ASSAY OF URINE CHLORIDE: CPT | Performed by: INTERNAL MEDICINE

## 2022-05-10 PROCEDURE — 84156 ASSAY OF PROTEIN URINE: CPT | Performed by: INTERNAL MEDICINE

## 2022-05-10 PROCEDURE — 83880 ASSAY OF NATRIURETIC PEPTIDE: CPT | Performed by: HOSPITALIST

## 2022-05-10 PROCEDURE — 97530 THERAPEUTIC ACTIVITIES: CPT

## 2022-05-10 PROCEDURE — 97162 PT EVAL MOD COMPLEX 30 MIN: CPT

## 2022-05-10 PROCEDURE — 85025 COMPLETE CBC W/AUTO DIFF WBC: CPT | Performed by: HOSPITALIST

## 2022-05-10 PROCEDURE — 82550 ASSAY OF CK (CPK): CPT | Performed by: INTERNAL MEDICINE

## 2022-05-10 PROCEDURE — 99222 1ST HOSP IP/OBS MODERATE 55: CPT | Performed by: INTERNAL MEDICINE

## 2022-05-10 PROCEDURE — 99221 1ST HOSP IP/OBS SF/LOW 40: CPT | Performed by: NURSE PRACTITIONER

## 2022-05-10 PROCEDURE — 87205 SMEAR GRAM STAIN: CPT | Performed by: INTERNAL MEDICINE

## 2022-05-10 PROCEDURE — 84540 ASSAY OF URINE/UREA-N: CPT | Performed by: INTERNAL MEDICINE

## 2022-05-10 PROCEDURE — 84300 ASSAY OF URINE SODIUM: CPT | Performed by: INTERNAL MEDICINE

## 2022-05-10 PROCEDURE — 25010000002 ALBUMIN HUMAN 25% PER 50 ML: Performed by: INTERNAL MEDICINE

## 2022-05-10 PROCEDURE — 80053 COMPREHEN METABOLIC PANEL: CPT | Performed by: HOSPITALIST

## 2022-05-10 PROCEDURE — 97535 SELF CARE MNGMENT TRAINING: CPT

## 2022-05-10 PROCEDURE — 76775 US EXAM ABDO BACK WALL LIM: CPT

## 2022-05-10 PROCEDURE — 82570 ASSAY OF URINE CREATININE: CPT | Performed by: INTERNAL MEDICINE

## 2022-05-10 PROCEDURE — 97166 OT EVAL MOD COMPLEX 45 MIN: CPT

## 2022-05-10 PROCEDURE — 82140 ASSAY OF AMMONIA: CPT | Performed by: HOSPITALIST

## 2022-05-10 PROCEDURE — 83874 ASSAY OF MYOGLOBIN: CPT | Performed by: INTERNAL MEDICINE

## 2022-05-10 PROCEDURE — 81001 URINALYSIS AUTO W/SCOPE: CPT | Performed by: EMERGENCY MEDICINE

## 2022-05-10 PROCEDURE — 25010000002 OCTREOTIDE PER 25 MCG: Performed by: INTERNAL MEDICINE

## 2022-05-10 RX ORDER — ONDANSETRON 2 MG/ML
4 INJECTION INTRAMUSCULAR; INTRAVENOUS EVERY 6 HOURS PRN
Status: DISCONTINUED | OUTPATIENT
Start: 2022-05-10 | End: 2022-05-17 | Stop reason: HOSPADM

## 2022-05-10 RX ORDER — MIDODRINE HYDROCHLORIDE 5 MG/1
5 TABLET ORAL
Status: DISCONTINUED | OUTPATIENT
Start: 2022-05-10 | End: 2022-05-17

## 2022-05-10 RX ORDER — FUROSEMIDE 40 MG/1
40 TABLET ORAL
Status: DISCONTINUED | OUTPATIENT
Start: 2022-05-10 | End: 2022-05-10

## 2022-05-10 RX ORDER — PANTOPRAZOLE SODIUM 40 MG/1
40 TABLET, DELAYED RELEASE ORAL
Status: DISCONTINUED | OUTPATIENT
Start: 2022-05-10 | End: 2022-05-17 | Stop reason: HOSPADM

## 2022-05-10 RX ORDER — ALBUMIN (HUMAN) 12.5 G/50ML
50 SOLUTION INTRAVENOUS EVERY 6 HOURS
Status: COMPLETED | OUTPATIENT
Start: 2022-05-10 | End: 2022-05-11

## 2022-05-10 RX ORDER — OCTREOTIDE ACETATE 500 UG/ML
100 INJECTION, SOLUTION INTRAVENOUS; SUBCUTANEOUS 3 TIMES DAILY
Status: DISCONTINUED | OUTPATIENT
Start: 2022-05-10 | End: 2022-05-11

## 2022-05-10 RX ADMIN — CARBIDOPA AND LEVODOPA 1 TABLET: 10; 100 TABLET ORAL at 16:21

## 2022-05-10 RX ADMIN — LACTULOSE 30 G: 10 SOLUTION ORAL at 08:45

## 2022-05-10 RX ADMIN — SPIRONOLACTONE 50 MG: 50 TABLET, FILM COATED ORAL at 08:46

## 2022-05-10 RX ADMIN — PANTOPRAZOLE SODIUM 40 MG: 40 TABLET, DELAYED RELEASE ORAL at 05:20

## 2022-05-10 RX ADMIN — CARBIDOPA AND LEVODOPA 1 TABLET: 10; 100 TABLET ORAL at 20:57

## 2022-05-10 RX ADMIN — LACTULOSE 30 G: 10 SOLUTION ORAL at 21:14

## 2022-05-10 RX ADMIN — LACTULOSE 30 G: 10 SOLUTION ORAL at 16:21

## 2022-05-10 RX ADMIN — OCTREOTIDE ACETATE 100 MCG: 500 INJECTION, SOLUTION INTRAVENOUS; SUBCUTANEOUS at 16:21

## 2022-05-10 RX ADMIN — CARBIDOPA AND LEVODOPA 1 TABLET: 10; 100 TABLET ORAL at 08:46

## 2022-05-10 RX ADMIN — ALBUMIN HUMAN 50 G: 0.25 SOLUTION INTRAVENOUS at 17:28

## 2022-05-10 RX ADMIN — OCTREOTIDE ACETATE 100 MCG: 500 INJECTION, SOLUTION INTRAVENOUS; SUBCUTANEOUS at 21:14

## 2022-05-10 RX ADMIN — RIFAXIMIN 550 MG: 550 TABLET ORAL at 08:46

## 2022-05-10 RX ADMIN — MIDODRINE HYDROCHLORIDE 5 MG: 5 TABLET ORAL at 14:09

## 2022-05-10 RX ADMIN — MIDODRINE HYDROCHLORIDE 5 MG: 5 TABLET ORAL at 16:22

## 2022-05-10 RX ADMIN — PANTOPRAZOLE SODIUM 40 MG: 40 TABLET, DELAYED RELEASE ORAL at 16:22

## 2022-05-10 RX ADMIN — RIFAXIMIN 550 MG: 550 TABLET ORAL at 20:57

## 2022-05-10 RX ADMIN — FUROSEMIDE 20 MG: 20 TABLET ORAL at 08:46

## 2022-05-10 NOTE — PLAN OF CARE
Goal Outcome Evaluation:  Plan of Care Reviewed With: patient    Outcome Evaluation: Pt is a 70 y/o male who presents to [facility] d/t falls, weakness and AMS. Found to have hepatic encephalopathy, h/o brain hemangioma and liver cirrhosis. He lives with his wife and has been req'ing assist for ADLs recently d/t weakness. He ambulates w/o AD but has had some recent falls. He presents today confused during session and req's frequent cues t/o for environmental navigation and safety. SBA for bed mobility, CGA STS, Rosana for fxl ambulation using RW to sink  for simple ADL with set-upA. TFs to bedside chair and is left with all needs in reach. He will benefit from OT services in the acute care setting to address fxl deficits, recommend d/c home with family and HH services pending progress.    Hand hygiene completed before and after session. Appropriate PPE worn t/o

## 2022-05-10 NOTE — PLAN OF CARE
Goal Outcome Evaluation:  Plan of Care Reviewed With: patient        Progress: improving  Outcome Evaluation: VSS. Started albumin and octreotide iv. Renal us done. Will continue to monitor.

## 2022-05-10 NOTE — CONSULTS
[facility]  Initial Inpatient Consult Note    Referring Provider: Dr. Guerra    Reason for Consultation: Cirrhosis    Subjective     History of present illness:    71 y.o. male admitted with weakness falls fatigue.  Follows with Dr. Hardin found to have cirrhosis etiology unknown.  Colonoscopy 2 months ago with removal of 4 small polyps tubular adenomas, EGD was done same day found grade 2 varices not banded.  He has never had a GI bleed.  No history of encephalopathy.   he has had 2 paracentesis in the last 6 weeks, most recent 3 weeks ago with removal of 5 L.  No evidence of SBP.  He endorses jaundice, tea colored urine and acholic stools    Previous EGD 2018 Dr. Samuel reviewed below    He does take PPI and Lasix Aldactone at home.  Most recently visited with nephrology as an outpatient.    Has a history of rhabdo admitted to the hospital 3 years ago      Past Medical History:  Past Medical History:   Diagnosis Date   • Arteriosclerotic cardiovascular disease    • Back pain    • Cirrhosis (HCC)    • Elevated liver enzymes    • GERD (gastroesophageal reflux disease)    • History of transfusion    • Hyperlipidemia    • Hypertension    • Kidney failure    • Rhabdomyolysis     due to crestor     Past Surgical History:  Past Surgical History:   Procedure Laterality Date   • COLONOSCOPY W/ POLYPECTOMY N/A 3/9/2022    Procedure: COLONOSCOPY WITH POLYPECTOMY;  Surgeon: Justin Underwood MD;  Location: [facility];  Service: Gastroenterology;  Laterality: N/A;  ascending colon polyps x2 (cold snare x1)  transverse colon polyp x1 (cold snare)  sigmoid colon polyp x1 (cold snare)  hemorrhoids   • ENDOSCOPY N/A 2/9/2018    Procedure: ESOPHAGOGASTRODUODENOSCOPY;  Surgeon: Marcelino Samuel MD;  Location: [facility] OR;  Service:    • ENDOSCOPY N/A 3/9/2022    Procedure: ESOPHAGOGASTRODUODENOSCOPY;  Surgeon: Justin Underwood MD;  Location: [facility] OR;  Service: Gastroenterology;   Laterality: N/A;  Esophageal varisces  Portal gastropathy   • INGUINAL HERNIA REPAIR Right 1/10/2022    Procedure: INGUINAL HERNIA REPAIR;  Surgeon: Kishan Puga MD;  Location: [facility] OR;  Service: General;  Laterality: Right;       • INSERTION HEMODIALYSIS CATHETER Right 2017    Procedure: PALINDRONE CATHETERE PLACEMENT;  Surgeon: Gareth Sánchez MD;  Location: [facility] OR;  Service:       Social History:   Social History     Tobacco Use   • Smoking status: Former Smoker     Years: 50.00     Types: Cigarettes     Quit date: 2016     Years since quittin.5   • Smokeless tobacco: Never Used   • Tobacco comment: no caffiene   Substance Use Topics   • Alcohol use: Not Currently     Alcohol/week: 1.0 standard drink     Types: 1 Cans of beer per week      Family History:  Family History   Problem Relation Age of Onset   • Cancer Father         laryngeal cancer   • Colon cancer Neg Hx    • Colon polyps Neg Hx        Home Meds:  Medications Prior to Admission   Medication Sig Dispense Refill Last Dose   • carbidopa-levodopa (SINEMET)  MG per tablet TAKE 1 TABLET BY MOUTH 3 TIMES A DAY FOR 30 DAYS. (Patient taking differently: Patient only taking 2x day) 270 tablet 1 Patient Taking Differently at Unknown time   • cholestyramine (QUESTRAN) 4 g packet Take 1 packet by mouth 2 (Two) Times a Day With Meals. 60 packet 12 2022 at Unknown time   • furosemide (LASIX) 20 MG tablet Take 3 tablets by mouth Daily. (Patient taking differently: Take 60 mg by mouth Daily. Per PCP, wife can regulate dose per leg swelling) 90 tablet 3 Patient Taking Differently at Unknown time   • Hydrocort-Pramoxine, Perianal, (ANALPRAM-HC) 2.5-1 % rectal cream Insert  into the rectum 3 (Three) Times a Day. 30 g 6 Past Month at Unknown time   • hydrOXYzine (ATARAX) 25 MG tablet Take 1 tablet by mouth 3 (Three) Times a Day As Needed for Itching. 90 tablet 2 2022 at Unknown time   • pantoprazole (PROTONIX) 40 MG EC tablet  TAKE 1 TABLET BY MOUTH EVERY DAY 90 tablet 1 5/8/2022 at Unknown time   • riFAXIMin (Xifaxan) 550 MG tablet Take 1 tablet by mouth Every 12 (Twelve) Hours for 180 days. 60 tablet 5 5/8/2022 at Unknown time   • spironolactone (ALDACTONE) 50 MG tablet Take 1 tablet by mouth Daily. 90 tablet 1 5/8/2022 at Unknown time   • traMADol (ULTRAM) 50 MG tablet TAKE 1 TABLET BY MOUTH TWICE A DAY 60 tablet 0 5/8/2022 at Unknown time     Current Meds:   carbidopa-levodopa, 1 tablet, Oral, TID  furosemide, 20 mg, Oral, TID  lactulose, 30 g, Oral, TID  pantoprazole, 40 mg, Oral, Q AM  rifAXIMin, 550 mg, Oral, Q12H  spironolactone, 50 mg, Oral, Daily      Allergies:  Allergies   Allergen Reactions   • Statins Other (See Comments)     Liver failure  Liver failure and rhabdomyolysis     Review of Systems  He has weakness of fatigue all other systems reviewed and negative     Objective     Vital Signs  Temp:  [96.3 °F (35.7 °C)-98.3 °F (36.8 °C)] 98.3 °F (36.8 °C)  Heart Rate:  [63-68] 63  Resp:  [18-20] 18  BP: (109-145)/(52-75) 125/59  Physical Exam:  General Appearance:    Alert, cooperative, in no acute distress   Head:    Normocephalic, without obvious abnormality, atraumatic   Eyes:          conjunctivae and sclerae normal, no   icterus   Throat:   no thrush, oral mucosa moist   Neck:   Supple, no adenopathy   Lungs:     Clear to auscultation bilaterally    Heart:    Regular rhythm and normal rate    Chest Wall:    No abnormalities observed   Abdomen:     Soft, nondistended, nontender; normal bowel sounds   Extremities:   no edema, no redness   Skin:   No bruising or rash   Psychiatric:  normal mood and insight     Results Review:   I reviewed the patient's new clinical results.    Results from last 7 days   Lab Units 05/10/22  0456 05/09/22  1413 05/03/22  1507   WBC 10*3/mm3 9.51 10.69 11.1*   HEMOGLOBIN g/dL 10.4* 11.3* 12.2*   HEMATOCRIT % 28.1* 30.9* 32.3*   PLATELETS 10*3/mm3 148 179 221     Results from last 7 days   Lab  Units 05/10/22  0456 05/09/22  1413 05/03/22  1507   SODIUM mmol/L 133* 133* 133*   POTASSIUM mmol/L 4.4 4.3 3.9   CHLORIDE mmol/L 99 99 96   TOTAL CO2 mmol/L  --   --  20   CO2 mmol/L 21.2* 20.1*  --    BUN mg/dL 44* 45* 44*   CREATININE mg/dL 3.19* 2.97* 2.86*   CALCIUM mg/dL 9.1 9.2 9.0   BILIRUBIN mg/dL 17.7* 18.3* 14.2*   ALK PHOS U/L 296* 326* 379*   ALT (SGPT) U/L 11 27 33   AST (SGOT) U/L 62* 71* 111*   GLUCOSE mg/dL 110* 114* 103*     Results from last 7 days   Lab Units 05/09/22  1413 05/03/22  1507   INR  1.39* 1.3*     No results found for: LIPASE    Radiology:  CT Head Without Contrast   Final Result   Solitary hyperdense dural based mass in the right   parafalcine region at the vertex consistent with a meningioma as   described. This produces no significant mass effect on the adjacent   frontal lobe and there is no edema within the brain or adjacent to the   lesion. Paranasal Sinus disease as noted. Otherwise unremarkable CT scan   of the head. No acute intracranial abnormality is identified.       This report was finalized on 5/9/2022 5:23 PM by Dr. Gaetano Kim M.D.          XR Chest 1 View   Final Result   Right base atelectasis.       This report was finalized on 5/9/2022 12:43 PM by Dr. Cliff Clemens M.D.              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 Cirrhosis etiology unknown  2. Jaundice  3. Elevated ammonia  4. Colon polyps  5. Grade 2 varices found 2 months ago    Plan:  · Hold  diuretics, patient with worsening creatinine  · Consider nephrology consult  · Lactulose 20 3 times daily  · No indication for EGD or colonoscopy at this time  · Patient should have paracentesis, please send fluid for cell count, differential, gram stain, culture albumin and total protein  · Albumin should be replaced at 5 g/L removed above 3 L      I discussed the patients findings and my recommendations with patient and nursing staff.    Lavell Augustine MD

## 2022-05-10 NOTE — H&P
History and physical    Primary care physician      Chief complaint  Mental status changes  Denies weakness  Recurrent falls    History of present illness  71-year-old white male who is well-known to our service with history of hypertension hyperlipidemia coronary artery disease chronic kidney disease and cirrhosis with ascites followed by nephrology gastroenterology presented to [facility] emergency room with mental status changes generalized weakness and recurrent falls.  Patient unable to give detailed history most of the history obtained from the wife and daughter who reported that he has been feeling lousy due to weakness and not making sense at times but no fever chills chest pain shortness of breath abdominal pain vomiting diarrhea.  Patient does have nausea fatigue tiredness poor appetite and increased distention.  Patient has been getting paracentesis and the last one was 5 weeks ago.  Patient work-up in ER revealed hepatorenal syndrome with acute hepatic encephalopathy admit for management.  Patient also found to have right brain mass consistent with meningioma with no history of meningioma in the past.    PAST MEDICAL HISTORY  • Arteriosclerotic cardiovascular disease     • Back pain     • Cirrhosis (HCC)     • Elevated liver enzymes     • GERD (gastroesophageal reflux disease)     • History of transfusion     • Kidney failure     • Rhabdomyolysis       PAST SURGICAL HISTORY              Procedure Laterality Date   • COLONOSCOPY W/ POLYPECTOMY N/A 3/9/2022     Procedure: COLONOSCOPY WITH POLYPECTOMY;  Surgeon: Justin Underwood MD;  Location: [facility] OR;  Service: Gastroenterology;  Laterality: N/A;  ascending colon polyps x2 (cold snare x1)  transverse colon polyp x1 (cold snare)  sigmoid colon polyp x1 (cold snare)  hemorrhoids   • ENDOSCOPY N/A 2/9/2018     Procedure: ESOPHAGOGASTRODUODENOSCOPY;  Surgeon: Marcelino Samuel MD;  Location: [facility] OR;  Service:    • ENDOSCOPY  "N/A 3/9/2022     Procedure: ESOPHAGOGASTRODUODENOSCOPY;  Surgeon: Justin Underwood MD;  Location:  [facility] OR;  Service: Gastroenterology;  Laterality: N/A;  Esophageal varisces  Portal gastropathy   • INGUINAL HERNIA REPAIR Right 1/10/2022     Procedure: INGUINAL HERNIA REPAIR;  Surgeon: Kishan Puga MD;  Location:  [facility] OR;  Service: General;  Laterality: Right;        • INSERTION HEMODIALYSIS CATHETER Right 2017     Procedure: PALINDRONE CATHETERE PLACEMENT;  Surgeon: Gareth Sánchez MD;  Location: [facility] MAIN OR;  Service:          FAMILY HISTORY           Problem Relation Age of Onset   • Cancer Father           laryngeal cancer   • Colon cancer Neg Hx     • Colon polyps Neg Hx        SOCIAL HISTORY                 Socioeconomic History   • Marital status:    Tobacco Use   • Smoking status: Former Smoker       Years: 50.00       Types: Cigarettes       Quit date: 2016       Years since quittin.5   • Smokeless tobacco: Never Used   • Tobacco comment: no caffiene   Vaping Use   • Vaping Use: Never used   Substance and Sexual Activity   • Alcohol use: Not Currently       Alcohol/week: 1.0 standard drink       Types: 1 Cans of beer per week   • Drug use: No   • Sexual activity: Defer         ALLERGIES  Statins  Home medications reviewed     REVIEW OF SYSTEMS  Constitutional: Positive for fatigue. Negative for diaphoresis and fever.   Respiratory: Negative for chest tightness.    Cardiovascular: Negative for chest pain.   Gastrointestinal: Positive for abdominal pain and nausea. Negative for blood in stool.   Neurological: Positive for syncope and weakness.      PHYSICAL EXAM   Blood pressure 125/59, pulse 63, temperature 98.3 °F (36.8 °C), temperature source Oral, resp. rate 18, height 188 cm (74\"), weight 90.2 kg (198 lb 14.4 oz), SpO2 95 %.    Constitutional:  Awake and alert and no respiratory distress  HEENT:  Unremarkable  Neck: Normal range of motion. Neck supple. No JVD present. "   Cardiovascular: Normal rate, regular rhythm and normal heart sounds. No murmur heard.  Pulmonary/Chest: Effort normal and decreased breath sounds bilaterally  Abdominal: Soft.  Distended and mild tenderness bowel sounds positive  Musculoskeletal: Normal range of motion. No edema, tenderness or deformity.   Neurological:  Pleasantly confused but follow commands  Skin: Skin is warm and dry. No rash noted. Pt. is not diaphoretic. No erythema.   Psychiatric: Mood, affect normal.  He is pleasant and cooperative.     LAB RESULTS  Lab Results (last 24 hours)     Procedure Component Value Units Date/Time    CK [[id number]] Collected: 05/10/22 0456    Specimen: Blood Updated: 05/10/22 1052    Sodium, Urine, Random - Urine, Clean Catch [[id number]] Collected: 05/10/22 0651    Specimen: Urine, Clean Catch Updated: 05/10/22 1047    Creatinine, Urine, Random - Urine, Clean Catch [[id number]] Collected: 05/10/22 0651    Specimen: Urine, Clean Catch Updated: 05/10/22 1047    Protein, Urine, Random - Urine, Clean Catch [[id number]] Collected: 05/10/22 0651    Specimen: Urine, Clean Catch Updated: 05/10/22 1047    Chloride, Urine, Random - Urine, Clean Catch [[id number]] Collected: 05/10/22 0651    Specimen: Urine, Clean Catch Updated: 05/10/22 1047    Urea Nitrogen, Urine - Urine, Clean Catch [[id number]] Collected: 05/10/22 0651    Specimen: Urine, Clean Catch Updated: 05/10/22 1047    Urinalysis, Microscopic Only - Urine, Clean Catch [[id number]] Collected: 05/10/22 0651    Specimen: Urine, Clean Catch Updated: 05/10/22 0750     RBC, UA 0-2 /HPF      WBC, UA 0-2 /HPF      Bacteria, UA None Seen /HPF      Squamous Epithelial Cells, UA 0-2 /HPF      Hyaline Casts, UA 7-12 /LPF      Methodology Automated Microscopy    Urinalysis With Microscopic If Indicated (No Culture) - Urine, Clean Catch [[id number]]  (Abnormal) Collected: 05/10/22 0651    Specimen: Urine, Clean Catch Updated: 05/10/22 0750     Color, UA Dark Yellow     Appearance, UA  Clear     pH, UA 5.5     Specific Gravity, UA 1.013     Glucose, UA Negative     Ketones, UA Negative     Bilirubin, UA Large (3+)     Blood, UA Negative     Protein, UA Negative     Leuk Esterase, UA Trace     Nitrite, UA Positive     Urobilinogen, UA 1.0 E.U./dL    Comprehensive Metabolic Panel [[id number]]  (Abnormal) Collected: 05/10/22 0456    Specimen: Blood Updated: 05/10/22 0714     Glucose 110 mg/dL      BUN 44 mg/dL      Creatinine 3.19 mg/dL      Sodium 133 mmol/L      Potassium 4.4 mmol/L      Chloride 99 mmol/L      CO2 21.2 mmol/L      Calcium 9.1 mg/dL      Total Protein 6.2 g/dL      Albumin 2.00 g/dL      ALT (SGPT) 11 U/L      AST (SGOT) 62 U/L      Alkaline Phosphatase 296 U/L      Total Bilirubin 17.7 mg/dL      Globulin 4.2 gm/dL      A/G Ratio 0.5 g/dL      BUN/Creatinine Ratio 13.8     Anion Gap 12.8 mmol/L      eGFR 19.8 mL/min/1.73      Comment: National Kidney Foundation and American Society of Nephrology (ASN) Task Force recommended calculation based on the Chronic Kidney Disease Epidemiology Collaboration (CKD-EPI) equation refit without adjustment for race.       Narrative:      GFR Normal >60  Chronic Kidney Disease <60  Kidney Failure <15      Ammonia [[id number]]  (Normal) Collected: 05/10/22 0456    Specimen: Blood Updated: 05/10/22 0705     Ammonia 40 umol/L     CBC & Differential [[id number]]  (Abnormal) Collected: 05/10/22 0456    Specimen: Blood Updated: 05/10/22 0635    Narrative:      The following orders were created for panel order CBC & Differential.  Procedure                               Abnormality         Status                     ---------                               -----------         ------                     CBC Auto Differential[[id number]]        Abnormal            Final result                 Please view results for these tests on the individual orders.    CBC Auto Differential [[id number]]  (Abnormal) Collected: 05/10/22 0456    Specimen: Blood Updated:  05/10/22 0635     WBC 9.51 10*3/mm3      RBC 2.89 10*6/mm3      Hemoglobin 10.4 g/dL      Hematocrit 28.1 %      MCV 97.2 fL      MCH 36.0 pg      MCHC 37.0 g/dL      RDW 14.6 %      RDW-SD 51.0 fl      MPV 10.7 fL      Platelets 148 10*3/mm3      Neutrophil % 66.6 %      Lymphocyte % 13.5 %      Monocyte % 14.2 %      Eosinophil % 2.5 %      Basophil % 0.9 %      Immature Grans % 2.3 %      Neutrophils, Absolute 6.33 10*3/mm3      Lymphocytes, Absolute 1.28 10*3/mm3      Monocytes, Absolute 1.35 10*3/mm3      Eosinophils, Absolute 0.24 10*3/mm3      Basophils, Absolute 0.09 10*3/mm3      Immature Grans, Absolute 0.22 10*3/mm3      nRBC 0.0 /100 WBC     COVID PRE-OP / PRE-PROCEDURE SCREENING ORDER (NO ISOLATION) - Swab, Nasopharynx [[id number]]  (Normal) Collected: 05/09/22 1752    Specimen: Swab from Nasopharynx Updated: 05/09/22 2317    Narrative:      The following orders were created for panel order COVID PRE-OP / PRE-PROCEDURE SCREENING ORDER (NO ISOLATION) - Swab, Nasopharynx.  Procedure                               Abnormality         Status                     ---------                               -----------         ------                     COVID-19,APTIMA PANTHER(...[[id number]]  Normal              Final result                 Please view results for these tests on the individual orders.    COVID-19,APTIMA PANTHER(ANÍBAL),[facility], NP/OP SWAB IN UTM/VTM/SALINE TRANSPORT MEDIA,24 HR TAT - Swab, Nasopharynx [[id number]]  (Normal) Collected: 05/09/22 1752    Specimen: Swab from Nasopharynx Updated: 05/09/22 2317     COVID19 Not Detected    Narrative:      Fact sheet for providers: https://www.fda.gov/media/063441/download     Fact sheet for patients: https://www.fda.gov/media/565264/download    Test performed by RT PCR.    Ammonia [[id number]]  (Abnormal) Collected: 05/09/22 1552    Specimen: Blood Updated: 05/09/22 1620     Ammonia 80 umol/L      Comment: Specimen hemolyzed.  Results may be affected.        aPTT [[id number]]  (Abnormal) Collected: 05/09/22 1413    Specimen: Blood Updated: 05/09/22 1551     PTT 37.3 seconds     Protime-INR [[id number]]  (Abnormal) Collected: 05/09/22 1413    Specimen: Blood Updated: 05/09/22 1551     Protime 17.0 Seconds      INR 1.39    Harris Draw [[id number]] Collected: 05/09/22 1413    Specimen: Blood Updated: 05/09/22 1518    Narrative:      The following orders were created for panel order Harris Draw.  Procedure                               Abnormality         Status                     ---------                               -----------         ------                     Green Top (Gel)[[id number]]                                  Final result               Lavender Top[[id number]]                                     Final result               Gold Top - SST[[id number]]                                   Final result               Light Blue Top[[id number]]                                   In process                   Please view results for these tests on the individual orders.    Gold Top - SST [[id number]] Collected: 05/09/22 1413    Specimen: Blood Updated: 05/09/22 1518     Extra Tube Hold for add-ons.     Comment: Auto resulted.       Green Top (Gel) [[id number]] Collected: 05/09/22 1413    Specimen: Blood Updated: 05/09/22 1518     Extra Tube Hold for add-ons.     Comment: Auto resulted.       Lavender Top [[id number]] Collected: 05/09/22 1413    Specimen: Blood Updated: 05/09/22 1518     Extra Tube hold for add-on     Comment: Auto resulted       Comprehensive Metabolic Panel [[id number]]  (Abnormal) Collected: 05/09/22 1413    Specimen: Blood Updated: 05/09/22 1452     Glucose 114 mg/dL      BUN 45 mg/dL      Creatinine 2.97 mg/dL      Sodium 133 mmol/L      Potassium 4.3 mmol/L      Chloride 99 mmol/L      CO2 20.1 mmol/L      Calcium 9.2 mg/dL      Total Protein 6.7 g/dL      Albumin 2.70 g/dL      ALT (SGPT) 27 U/L      AST (SGOT) 71 U/L      Alkaline Phosphatase 326 U/L       Total Bilirubin 18.3 mg/dL      Globulin 4.0 gm/dL      A/G Ratio 0.7 g/dL      BUN/Creatinine Ratio 15.2     Anion Gap 13.9 mmol/L      eGFR 21.5 mL/min/1.73      Comment: National Kidney Foundation and American Society of Nephrology (ASN) Task Force recommended calculation based on the Chronic Kidney Disease Epidemiology Collaboration (CKD-EPI) equation refit without adjustment for race.       Narrative:      GFR Normal >60  Chronic Kidney Disease <60  Kidney Failure <15      Troponin [[id number]]  (Normal) Collected: 05/09/22 1413    Specimen: Blood Updated: 05/09/22 1450     Troponin T <0.010 ng/mL     Narrative:      Troponin T Reference Range:  <= 0.03 ng/mL-   Negative for AMI  >0.03 ng/mL-     Abnormal for myocardial necrosis.  Clinicians would have to utilize clinical acumen, EKG, Troponin and serial changes to determine if it is an Acute Myocardial Infarction or myocardial injury due to an underlying chronic condition.       Results may be falsely decreased if patient taking Biotin.      Magnesium [[id number]]  (Normal) Collected: 05/09/22 1413    Specimen: Blood Updated: 05/09/22 1450     Magnesium 2.3 mg/dL     CBC & Differential [[id number]]  (Abnormal) Collected: 05/09/22 1413    Specimen: Blood Updated: 05/09/22 1429    Narrative:      The following orders were created for panel order CBC & Differential.  Procedure                               Abnormality         Status                     ---------                               -----------         ------                     CBC Auto Differential[[id number]]        Abnormal            Final result                 Please view results for these tests on the individual orders.    CBC Auto Differential [[id number]]  (Abnormal) Collected: 05/09/22 1413    Specimen: Blood Updated: 05/09/22 1429     WBC 10.69 10*3/mm3      RBC 3.18 10*6/mm3      Hemoglobin 11.3 g/dL      Hematocrit 30.9 %      MCV 97.2 fL      MCH 35.5 pg      MCHC 36.6 g/dL      RDW 14.9 %       RDW-SD 52.2 fl      MPV 10.6 fL      Platelets 179 10*3/mm3      Neutrophil % 72.0 %      Lymphocyte % 9.3 %      Monocyte % 13.8 %      Eosinophil % 1.5 %      Basophil % 1.0 %      Immature Grans % 2.4 %      Neutrophils, Absolute 7.70 10*3/mm3      Lymphocytes, Absolute 0.99 10*3/mm3      Monocytes, Absolute 1.47 10*3/mm3      Eosinophils, Absolute 0.16 10*3/mm3      Basophils, Absolute 0.11 10*3/mm3      Immature Grans, Absolute 0.26 10*3/mm3      nRBC 0.1 /100 WBC     Light Blue Top [[id number]] Collected: 05/09/22 1413    Specimen: Blood Updated: 05/09/22 1417        Imaging Results (Last 24 Hours)     Procedure Component Value Units Date/Time    CT Head Without Contrast [[id number]] Collected: 05/09/22 1714     Updated: 05/09/22 1726    Narrative:      CT HEAD WO CONTRAST-     CLINICAL HISTORY: Patient falling. Confusion.     TECHNIQUE: Transverse 3 mm thick images were obtained from the base of  the skull to the vertex without IV contrast.     Radiation dose reduction techniques were utilized, including automated  exposure control and exposure modulation based on body size.     COMPARISON: None     FINDINGS: The brain and ventricular system appear structurally within  normal limits for a patient of this age. There is no evidence of recent  or old intracranial hemorrhage or infarction. There is an approximately  2.8 x 2.2 cm diameter well-circumscribed solid dural based mass in the  right parafalcine region at the vertex that is consistent with a  meningioma. This contains a single punctate calcification within its  periphery. No edema is evident within the adjacent brain parenchyma. No  other masses are identified. Bone window images demonstrate no evidence  of skull fracture. The left maxillary sinus is hypoplastic and  completely filled with thickened mucosa and/or fluid. This is quite  likely chronic. The right maxillary sinus is well-developed and widely  patent. The sphenoid sinus is also well  aerated. There is thickened  mucosa and/or fluid filling the majority of the frontal sinus.       Impression:      Solitary hyperdense dural based mass in the right  parafalcine region at the vertex consistent with a meningioma as  described. This produces no significant mass effect on the adjacent  frontal lobe and there is no edema within the brain or adjacent to the  lesion. Paranasal Sinus disease as noted. Otherwise unremarkable CT scan  of the head. No acute intracranial abnormality is identified.     This report was finalized on 5/9/2022 5:23 PM by Dr. Gaetano Kim M.D.           ECG 12 Lead  Component   Ref Range & Units 1 d ago    QT Interval   ms 444    Resulting Agency  ECG             HEART RATE= 69  bpm  RR Interval= 892  ms  AZ Interval= 164  ms  P Horizontal Axis= 4  deg  P Front Axis= 29  deg  QRSD Interval= 107  ms  QT Interval= 444  ms  QRS Axis= -30  deg  T Wave Axis= 111  deg  - BORDERLINE ECG -  Sinus rhythm  Ventricular premature complex  Left axis deviation  No change from previous             Current Facility-Administered Medications:   •  acetaminophen (TYLENOL) tablet 650 mg, 650 mg, Oral, Q4H PRN, Memo Varela MD, 650 mg at 05/09/22 2220  •  albumin human 25 % IV SOLN 50 g, 50 g, Intravenous, Q6H, Franky Moreau MD  •  carbidopa-levodopa (SINEMET)  MG per tablet 1 tablet, 1 tablet, Oral, TID, Memo Varela MD, 1 tablet at 05/10/22 0846  •  lactulose (CHRONULAC) 10 GM/15ML solution 30 g, 30 g, Oral, TID, Memo Varela MD, 30 g at 05/10/22 0845  •  midodrine (PROAMATINE) tablet 5 mg, 5 mg, Oral, TID ACLizzeth Andrew James, MD  •  octreotide (sandoSTATIN) injection 100 mcg, 100 mcg, Intravenous, TID, Franky Moreau MD  •  pantoprazole (PROTONIX) EC tablet 40 mg, 40 mg, Oral, BID Avery JAVIER Aftab, MD  •  riFAXIMin (XIFAXAN) tablet 550 mg, 550 mg, Oral, Q12H, Memo Varela MD, 550 mg at 05/10/22 0846  •  sodium chloride 0.9 % flush 10 mL, 10 mL, Intravenous, PRN,  Jovanni Guerra MD    ASSESSMENT  Hepatorenal syndrome  Cirrhosis with ascites cryptogenic  Jaundice  Hepatic encephalopathy  Acute kidney injury  Chronic kidney disease stage III  Brain tumor consistent with meningioma  Hypotension  Hyperlipidemia  Coronary artery disease  Chronic anemia  Parkinson's disease  Esophageal varices and colon polyps  Gastroesophageal reflux disease    PLAN  Admit  Careful hydration  Hold diuretics  Lactulose  Continue Protonix rifaximin Proamatineand Sinemet  Stress ulcer DVT prophylaxis  Nephrology and neurosurgery consult  GI to follow patient  Adjust home medications  Supportive care  Patient is full code  Discussed with family including wife daughter nursing staff and nephrology  Follow closely further recommendation current hospital course    KEITH BEJARANO MD

## 2022-05-10 NOTE — PLAN OF CARE
Goal Outcome Evaluation:  Plan of Care Reviewed With: patient           Outcome Evaluation: Pt is a 72 yo male admitted with falls, generalized weakness and AMS. Workup revealed hepatic encephalopathy. Pt with hx of liver cirrhosis and brain hemangioma. Pt lives with hsi spouse but has required increased assist lately due to weakness. Pt typically ambulates without assistive device but has had several falls per wife report. Upon exam, pt presents with confusion, decreased safety awareness, generalized weakness, minor balance deficits, and decreased activity tolerance. Pt completed transfers with cga/min A and ambulated in room with Margot  at walker. Pt may benefit from skilled PT services while inpatient to address deficits listed above. Hopeful for dc home with 24/7 assist and HH pending progress.

## 2022-05-10 NOTE — THERAPY EVALUATION
Patient Name: Hu Burgess  : 1950    MRN: [id number]                              Today's Date: 5/10/2022       Admit Date: 2022    Visit Dx:     ICD-10-CM ICD-9-CM   1. Hepatic encephalopathy (HCC)  K72.90 572.2   2. Liver failure without hepatic coma, unspecified chronicity (HCC)  K72.90 572.8   3. Chronic kidney disease, unspecified CKD stage  N18.9 585.9   4. 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   • Anemia in chronic kidney disease   • Right inguinal hernia   • Cirrhosis of liver with ascites (HCC)   • Gastroesophageal reflux disease   • Colon cancer screening   • Nausea   • Hepatic encephalopathy (HCC)     Past Medical History:   Diagnosis Date   • Arteriosclerotic cardiovascular disease    • Back pain    • Cirrhosis (HCC)    • Elevated liver enzymes    • GERD (gastroesophageal reflux disease)    • History of transfusion    • Hyperlipidemia    • Hypertension    • Kidney failure    • Rhabdomyolysis     due to crestor     Past Surgical History:   Procedure Laterality Date   • COLONOSCOPY W/ POLYPECTOMY N/A 3/9/2022    Procedure: COLONOSCOPY WITH POLYPECTOMY;  Surgeon: Justin Underwood MD;  Location: [facility];  Service: Gastroenterology;  Laterality: N/A;  ascending colon polyps x2 (cold snare x1)  transverse colon polyp x1 (cold snare)  sigmoid colon polyp x1 (cold snare)  hemorrhoids   • ENDOSCOPY N/A 2018    Procedure: ESOPHAGOGASTRODUODENOSCOPY;  Surgeon: Marcelino Samuel MD;  Location: [facility] OR;  Service:    • ENDOSCOPY N/A 3/9/2022    Procedure: ESOPHAGOGASTRODUODENOSCOPY;  Surgeon: Justin Underwood MD;  Location: [facility] OR;  Service: Gastroenterology;  Laterality: N/A;   Esophageal varisces  Portal gastropathy   • INGUINAL HERNIA REPAIR Right 1/10/2022    Procedure: INGUINAL HERNIA REPAIR;  Surgeon: Kishan Puga MD;  Location:  [facility] OR;  Service: General;  Laterality: Right;       • INSERTION HEMODIALYSIS CATHETER Right 11/14/2017    Procedure: PALINDRONE CATHETERE PLACEMENT;  Surgeon: Gareth Sánchez MD;  Location: [facility] MAIN OR;  Service:       General Information     Row Name 05/10/22 0924          OT Time and Intention    Document Type evaluation  -     Mode of Treatment co-treatment;occupational therapy;physical therapy  -     Row Name 05/10/22 0924          General Information    Patient Profile Reviewed yes  -JW     Prior Level of Function min assist:;ADL's;all household mobility  recently req's assist for LB ADLs, indep with fxl ambulation w/o AD but has multiple recent falls  -     Existing Precautions/Restrictions fall  -     Barriers to Rehab cognitive status  -     Row Name 05/10/22 0924          Living Environment    People in Home spouse  -     Row Name 05/10/22 0924          Cognition    Orientation Status (Cognition) oriented to;person;place  not oriented to year. Easily distracted during session and req's frequent verbal cues  -     Row Name 05/10/22 0924          Safety Issues, Functional Mobility    Safety Issues Affecting Function (Mobility) insight into deficits/self-awareness;judgment;problem-solving;positioning of assistive device;safety precaution awareness  -     Impairments Affecting Function (Mobility) cognition;endurance/activity tolerance;strength  -     Cognitive Impairments, Mobility Safety/Performance insight into deficits/self-awareness;judgment;problem-solving/reasoning;safety precaution awareness  -           User Key  (r) = Recorded By, (t) = Taken By, (c) = Cosigned By    Initials Name Provider Type    JW Mirna Henson OT Occupational Therapist                 Mobility/ADL's     Row Name 05/10/22 0926          Bed  Mobility    Bed Mobility supine-sit  -     Supine-Sit [place] (Bed Mobility) standby assist;verbal cues  -     Assistive Device (Bed Mobility) bed rails;head of bed elevated  -[facility] Name 05/10/22 0926          Transfers    Transfers sit-stand transfer  -     Sit-Stand [place] (Transfers) contact guard;verbal cues  -JW     Row Name 05/10/22 0926          Sit-Stand Transfer    Assistive Device (Sit-Stand Transfers) walker, front-wheeled  -[facility] Name 05/10/22 0926          Functional Mobility    Functional Mobility- Ind. Level contact guard assist;verbal cues required;minimum assist (75% patient effort)  -     Functional Mobility- Device walker, front-wheeled  -     Functional Mobility- Comment frequent VCs for environmental navigation. Physical assist to move RW at times  -[facility] Name 05/10/22 0926          Activities of Daily Living    BADL Assessment/Intervention lower body dressing;grooming  -JW     Row Name 05/10/22 0926          Lower Body Dressing Assessment/Training    [place] Level (Lower Body Dressing) doff;don;socks;moderate assist (50% patient effort)  -     Position (Lower Body Dressing) edge of bed sitting  -     Comment, (Lower Body Dressing) dec fxl reach to feet to don socks  -[facility] Name 05/10/22 0926          Grooming Assessment/Training    [place] Level (Grooming) wash face, hands;set up;standby assist;verbal cues;grooming skills  -     Position (Grooming) sink side;unsupported standing  SBA  -           User Key  (r) = Recorded By, (t) = Taken By, (c) = Cosigned By    Initials Name Provider Type     Mirna Henson OT Occupational Therapist               Obj/Interventions     [facility] Name 05/10/22 0928          Sensory Assessment (Somatosensory)    Sensory Assessment (Somatosensory) sensation intact  -[facility] Name 05/10/22 0928          Vision Assessment/Intervention    Visual Impairment/Limitations WNL  -[facility] Name 05/10/22 0928           Range of Motion Comprehensive    General Range of Motion bilateral upper extremity ROM WNL  -     Row Name 05/10/22 0928          Strength Comprehensive (MMT)    Comment, General Manual Muscle Testing (MMT) Assessment UB strength WFL however with generalized weakness in BLEs  -     Row Name 05/10/22 0928          Motor Skills    Motor Skills functional endurance  -     Functional Endurance fair  -     Row Name 05/10/22 0928          Balance    Balance Assessment sitting static balance;sitting dynamic balance;standing static balance;standing dynamic balance  -     Static Sitting Balance supervision  -     Dynamic Sitting Balance standby assist  -     Position, Sitting Balance sitting edge of bed;unsupported  -     Static Standing Balance contact guard;verbal cues  -     Dynamic Standing Balance minimal assist;verbal cues  -     Position/Device Used, Standing Balance supported;walker, rolling  -     Balance Interventions sitting;standing;occupation based/functional task  -           User Key  (r) = Recorded By, (t) = Taken By, (c) = Cosigned By    Initials Name Provider Type    JW Mirna Henson OT Occupational Therapist               Goals/Plan     [facility] Name 05/10/22 0933          Transfer Goal 1 (OT)    Activity/Assistive Device (Transfer Goal 1, OT) transfers, all;bed-to-chair/chair-to-bed;toilet;shower chair  -     [place] Level/Cues Needed (Transfer Goal 1, OT) modified independence  -     Time Frame (Transfer Goal 1, OT) short term goal (STG);2 weeks  -     Progress/Outcome (Transfer Goal 1, OT) goal ongoing  -[facility] Name 05/10/22 0933          Dressing Goal 1 (OT)    Activity/Device (Dressing Goal 1, OT) dressing skills, all;lower body dressing;sock-aid  -     [place]/Cues Needed (Dressing Goal 1, OT) verbal cues required;standby assist  -     Time Frame (Dressing Goal 1, OT) short term goal (STG);2 weeks  -     Progress/Outcome (Dressing Goal 1, OT) goal ongoing   -     Row Name 05/10/22 0933          Toileting Goal 1 (OT)    Activity/Device (Toileting Goal 1, OT) toileting skills, all  -     [place] Level/Cues Needed (Toileting Goal 1, OT) modified independence  -     Time Frame (Toileting Goal 1, OT) short term goal (STG);2 weeks  -     Progress/Outcome (Toileting Goal 1, OT) goal ongoing  -     Row Name 05/10/22 0933          Therapy Assessment/Plan (OT)    Planned Therapy Interventions (OT) activity tolerance training;adaptive equipment training;BADL retraining;cognitive/visual perception retraining;functional balance retraining;occupation/activity based interventions;patient/caregiver education/training;strengthening exercise;transfer/mobility retraining  -           User Key  (r) = Recorded By, (t) = Taken By, (c) = Cosigned By    Initials Name Provider Type    Mirna Delgadillo OT Occupational Therapist               Clinical Impression     Row Name 05/10/22 0929          Pain Assessment    Pretreatment Pain Rating 0/10 - no pain  -     Posttreatment Pain Rating 0/10 - no pain  -     Row Name 05/10/22 0929          Plan of Care Review    Plan of Care Reviewed With patient  -     Outcome Evaluation Pt is a 70 y/o male who presents to [facility] d/t falls, weakness and AMS. Found to have hepatic encephalopathy, h/o brain hemangioma and liver cirrhosis. He lives with his wife and has been req'ing assist for ADLs recently d/t weakness. He ambulates w/o AD but has had some recent falls. He presents today confused during session and req's frequent cues t/o for environmental navigation and safety. SBA for bed mobility, CGA STS, Rosana for fxl ambulation using RW to sink  for simple ADL with set-upA. TFs to bedside chair and is left with all needs in reach. He will benefit from OT services in the acute care setting to address fxl deficits, recommend d/c home with family and  services pending progress.  -     Row Name 05/10/22 0929          Therapy  Assessment/Plan (OT)    Rehab Potential (OT) good, to achieve stated therapy goals  -     Criteria for Skilled Therapeutic Interventions Met (OT) yes;skilled treatment is necessary  -     Therapy Frequency (OT) 5 times/wk  -     Row Name 05/10/22 0929          Therapy Plan Review/Discharge Plan (OT)    Anticipated Discharge Disposition (OT) home with assist;home with home health  -     Row Name 05/10/22 0929          Positioning and Restraints    Pre-Treatment Position in bed  -     Post Treatment Position chair  -JW     In Chair notified nsg;sitting;call light within reach;exit alarm on;encouraged to call for assist;with family/caregiver  -           User Key  (r) = Recorded By, (t) = Taken By, (c) = Cosigned By    Initials Name Provider Type    Mirna Delgadillo OT Occupational Therapist               Outcome Measures     Row Name 05/10/22 0934          How much help from another is currently needed...    Putting on and taking off regular lower body clothing? 2  -JW     Bathing (including washing, rinsing, and drying) 2  -JW     Toileting (which includes using toilet bed pan or urinal) 2  -JW     Putting on and taking off regular upper body clothing 3  -JW     Taking care of personal grooming (such as brushing teeth) 3  -JW     Eating meals 3  -JW     AM-PAC 6 Clicks Score (OT) 15  -     Row Name 05/10/22 0934          Functional Assessment    Outcome Measure Options AM-PAC 6 Clicks Daily Activity (OT)  -           User Key  (r) = Recorded By, (t) = Taken By, (c) = Cosigned By    Initials Name Provider Type    Mirna Delgadillo OT Occupational Therapist                Occupational Therapy Education                 Title: PT OT SLP Therapies (In Progress)     Topic: Occupational Therapy (In Progress)     Point: ADL training (Done)     Description:   Instruct learner(s) on proper safety adaptation and remediation techniques during self care or transfers.   Instruct in proper use of assistive devices.               Learning Progress Summary           Patient Acceptance, E, VU,NR by  at 5/10/2022 0935    Comment: role of OT ,plan of care, safety                   Point: Home exercise program (Not Started)     Description:   Instruct learner(s) on appropriate technique for monitoring, assisting and/or progressing therapeutic exercises/activities.              Learner Progress:  Not documented in this visit.          Point: Precautions (Done)     Description:   Instruct learner(s) on prescribed precautions during self-care and functional transfers.              Learning Progress Summary           Patient Acceptance, E, VU,NR by GARY at 5/10/2022 0935    Comment: role of OT ,plan of care, safety                   Point: Body mechanics (Done)     Description:   Instruct learner(s) on proper positioning and spine alignment during self-care, functional mobility activities and/or exercises.              Learning Progress Summary           Patient Acceptance, E, VU,NR by  at 5/10/2022 0935    Comment: role of OT ,plan of care, safety                               User Key     Initials Effective Dates Name Provider Type Discipline     06/10/21 -  Mirna Henson OT Occupational Therapist OT              OT Recommendation and Plan  Planned Therapy Interventions (OT): activity tolerance training, adaptive equipment training, BADL retraining, cognitive/visual perception retraining, functional balance retraining, occupation/activity based interventions, patient/caregiver education/training, strengthening exercise, transfer/mobility retraining  Therapy Frequency (OT): 5 times/wk  Plan of Care Review  Plan of Care Reviewed With: patient  Outcome Evaluation: Pt is a 72 y/o male who presents to [facility] d/t falls, weakness and AMS. Found to have hepatic encephalopathy, h/o brain hemangioma and liver cirrhosis. He lives with his wife and has been req'ing assist for ADLs recently d/t weakness. He ambulates w/o AD but has had some recent  falls. He presents today confused during session and req's frequent cues t/o for environmental navigation and safety. SBA for bed mobility, CGA STS, Rosana for fxl ambulation using RW to sink  for simple ADL with set-upA. TFs to bedside chair and is left with all needs in reach. He will benefit from OT services in the acute care setting to address fxl deficits, recommend d/c home with family and  services pending progress.     Time Calculation:    Time Calculation- OT     Row Name 05/10/22 0935             Time Calculation- OT    OT Start Time 0841  -      OT Stop Time 0902  -      OT Time Calculation (min) 21 min  -      Total Timed Code Minutes- OT 11 minute(s)  -      OT Received On 05/10/22  -      OT - Next Appointment 05/11/22  -      OT Goal Re-Cert Due Date 05/24/22  -              Timed Charges    08435 - OT Self Care/Mgmt Minutes 11  -              Untimed Charges    OT Eval/Re-eval Minutes 10  -              Total Minutes    Timed Charges Total Minutes 11  -      Untimed Charges Total Minutes 10  -       Total Minutes 21  -            User Key  (r) = Recorded By, (t) = Taken By, (c) = Cosigned By    Initials Name Provider Type     Mirna Henson OT Occupational Therapist              Therapy Charges for Today     Code Description Service Date Service Provider Modifiers Qty    [id number]  OT SELF CARE/MGMT/TRAIN EA 15 MIN 5/10/2022 Mirna Henson OT GO 1    [id number] HC OT EVAL MOD COMPLEXITY 3 5/10/2022 Mirna Henson OT GO 1               Mirna Henson OT  5/10/2022

## 2022-05-10 NOTE — CONSULTS
[facility]                                                                                             Nephrology Initial Consult Note    Patient Identification:  Name: Hu Burgess MRN: [id number]  Age: 71 y.o. : 1950  Sex: male  Date:5/10/2022    Requesting Physician: As per consult order.  Reason for Consultation: Acute renal failure on CKD  Information from:patient/ family/ chart      History of Present Illness: This is a 71 y.o. year old male very well-known to me from previous admission and outpatient dialysis.  He developed renal failure in late , biopsy-proven rhabdomyolysis with false positive ANCA at that time.  He required dialysis for a few months in 2018 eventually came off renal function stabilized and he was last seen in my office in [place] last July creatinine was stable at that time, 1.4 with a BUN of 15.  He was being worked up at that time for elevated LFTs and according to his family has developed fairly rapid worsening of liver failure/cirrhosis over that time.  He was admitted to the ER yesterday with increasing weakness poor appetite and fatigue.  He has cryptogenic cirrhosis, varices were found on EGD but no prior GI bleed, no previous history of encephalopathy, has required 2 paracentesis most recent was about 6 weeks ago and 5 L of ascites was removed at that time.  His family reports decreased urine output, dark-colored urine and Lasix dose was recently decreased.  His ammonia was found to be elevated and he was started on lactulose.  He is somewhat of a poor historian due to his confusion but most the history is per his family member at bedside and chart review.  He is not on any NSAIDs no recent IV contrast and no hypotension.      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]    • Anemia in chronic kidney disease [N18.9, D63.1]    • Acute kidney injury (nontraumatic) (HCC) [N17.9]    • Arteriosclerotic vascular disease [I70.90]    • Hyperlipidemia [E78.5]    • Hypertension [I10]        Past Medical History:  Past Medical History:   Diagnosis Date   • Arteriosclerotic cardiovascular disease    • Back pain    • Cirrhosis (HCC)    • Elevated liver enzymes    • GERD (gastroesophageal reflux disease)    • History of transfusion    • Hyperlipidemia    • Hypertension    • Kidney failure    • Rhabdomyolysis     due to crestor       Past Surgical History:  Past Surgical History:   Procedure Laterality Date   • COLONOSCOPY W/ POLYPECTOMY N/A 3/9/2022    Procedure: COLONOSCOPY WITH POLYPECTOMY;  Surgeon: Justin Underwood MD;  Location: [facility] OR;  Service: Gastroenterology;  Laterality: N/A;  ascending colon polyps x2 (cold snare x1)  transverse colon polyp x1 (cold snare)  sigmoid colon polyp x1 (cold snare)  hemorrhoids   • ENDOSCOPY N/A 2/9/2018    Procedure: ESOPHAGOGASTRODUODENOSCOPY;  Surgeon: Marcelino Samuel MD;  Location: [facility] OR;  Service:    • ENDOSCOPY N/A 3/9/2022    Procedure: ESOPHAGOGASTRODUODENOSCOPY;  Surgeon: Justin Underwood MD;  Location: [facility] OR;  Service: Gastroenterology;  Laterality: N/A;  Esophageal varisces  Portal gastropathy   • INGUINAL HERNIA REPAIR Right 1/10/2022    Procedure: INGUINAL HERNIA REPAIR;  Surgeon: Kishan Puga MD;  Location: [facility] OR;  Service: General;  Laterality: Right;       • INSERTION HEMODIALYSIS CATHETER Right 11/14/2017     Procedure: PALINDRONE CATHETERE PLACEMENT;  Surgeon: Gareth Sánchez MD;  Location: [facility];  Service:         Home Meds:   Medications Prior to Admission   Medication Sig Dispense Refill Last Dose   • carbidopa-levodopa (SINEMET)  MG per tablet TAKE 1 TABLET BY MOUTH 3 TIMES A DAY FOR 30 DAYS. (Patient taking differently: Patient only taking 2x day) 270 tablet 1 Patient Taking Differently at Unknown time   • cholestyramine (QUESTRAN) 4 g packet Take 1 packet by mouth 2 (Two) Times a Day With Meals. 60 packet 12 5/8/2022 at Unknown time   • furosemide (LASIX) 20 MG tablet Take 3 tablets by mouth Daily. (Patient taking differently: Take 60 mg by mouth Daily. Per PCP, wife can regulate dose per leg swelling) 90 tablet 3 Patient Taking Differently at Unknown time   • Hydrocort-Pramoxine, Perianal, (ANALPRAM-HC) 2.5-1 % rectal cream Insert  into the rectum 3 (Three) Times a Day. 30 g 6 Past Month at Unknown time   • hydrOXYzine (ATARAX) 25 MG tablet Take 1 tablet by mouth 3 (Three) Times a Day As Needed for Itching. 90 tablet 2 5/8/2022 at Unknown time   • pantoprazole (PROTONIX) 40 MG EC tablet TAKE 1 TABLET BY MOUTH EVERY DAY 90 tablet 1 5/8/2022 at Unknown time   • riFAXIMin (Xifaxan) 550 MG tablet Take 1 tablet by mouth Every 12 (Twelve) Hours for 180 days. 60 tablet 5 5/8/2022 at Unknown time   • spironolactone (ALDACTONE) 50 MG tablet Take 1 tablet by mouth Daily. 90 tablet 1 5/8/2022 at Unknown time   • traMADol (ULTRAM) 50 MG tablet TAKE 1 TABLET BY MOUTH TWICE A DAY 60 tablet 0 5/8/2022 at Unknown time       Current Meds:   Current Facility-Administered Medications   Medication Dose Route Frequency Provider Last Rate Last Admin   • acetaminophen (TYLENOL) tablet 650 mg  650 mg Oral Q4H PRN Memo Varela MD   650 mg at 05/09/22 2220   • carbidopa-levodopa (SINEMET)  MG per tablet 1 tablet  1 tablet Oral TID Memo Varela MD   1 tablet at 05/10/22 0846   • lactulose (CHRONULAC) 10 GM/15ML  solution 30 g  30 g Oral TID Memo Varela MD   30 g at 05/10/22 0845   • pantoprazole (PROTONIX) EC tablet 40 mg  40 mg Oral BID AC Memo Varela MD       • riFAXIMin (XIFAXAN) tablet 550 mg  550 mg Oral Q12H Memo Varela MD   550 mg at 05/10/22 0846   • sodium chloride 0.9 % flush 10 mL  10 mL Intravenous PRN Jovanni Guerra MD           Allergies:  Allergies   Allergen Reactions   • Statins Other (See Comments)     Liver failure  Liver failure and rhabdomyolysis       Social History:   Social History     Socioeconomic History   • Marital status:    Tobacco Use   • Smoking status: Former Smoker     Years: 50.00     Types: Cigarettes     Quit date: 2016     Years since quittin.5   • Smokeless tobacco: Never Used   • Tobacco comment: no caffiene   Vaping Use   • Vaping Use: Never used   Substance and Sexual Activity   • Alcohol use: Not Currently     Alcohol/week: 1.0 standard drink     Types: 1 Cans of beer per week   • Drug use: No   • Sexual activity: Defer        Family History:  Family History   Problem Relation Age of Onset   • Cancer Father         laryngeal cancer   • Colon cancer Neg Hx    • Colon polyps Neg Hx         Review of Systems: as per HPI, in addition:    General:      Complains of weakness / fatigue,                       No fevers / chills                       + Weight loss  HEENT:       no dysphagia / odynophagia  Neck:           normal range of motion, no swelling  Respiratory: no cough / congestion                      No shortness of air                       No wheezing  CV:              No chest pain                       No palpitations  Abdomen/GI: no nausea / vomiting, complains of abdominal distention/ascites                      No diarrhea / constipation                      No abdominal pain  :             no dysuria / urinary frequency                       No urgency, normal output  Endocrine:   no polyuria / polydipsia,                      No heat or cold  "intolerance  Skin:           no rashes or skin breakdown   Vascular:   Minimal edema                     No claudication  Psych:        no depression/ anxiety  Neuro:        no focal weakness, no seizures  Musculoskeletal: no joint pain or deformities      Physical Exam:  Vitals:   Temp (24hrs), Av.9 °F (36.6 °C), Min:97.6 °F (36.4 °C), Max:98.3 °F (36.8 °C)    /59 (BP Location: Left arm, Patient Position: Lying)   Pulse 63   Temp 98.3 °F (36.8 °C) (Oral)   Resp 18   Ht 188 cm (74\")   Wt 90.2 kg (198 lb 14.4 oz)   SpO2 95%   BMI 25.54 kg/m²   Intake/Output:     Intake/Output Summary (Last 24 hours) at 5/10/2022 1209  Last data filed at 5/10/2022 0952  Gross per 24 hour   Intake 360 ml   Output 375 ml   Net -15 ml        Wt Readings from Last 1 Encounters:   22 90.2 kg (198 lb 14.4 oz)     Exam:    General Appearance:  Awake, alert, ill-appearing, slightly confused somewhat cachectic appearing   Head and Face:  Normocephalic, atraumatic, mucus membranes moist, oropharynx clear   Eyes:  No icterus, pupils equal round and reactive to light, extraocular movements intact    ENMT: Moist mucosa, tongue symmetric    Neck: Supple  no jugular venous distention  no thyromegaly   Pulmonary:  Respiratory effort: Normal  Auscultation of lungs: Clear bilaterally  No wheezes  No rhonchi  Good air movement, good expansion   Chest wall:  No tenderness or deformity   Cardiovascular:  Auscultation of the heart: Normal rhythm, no murmurs  Trace edema of bilateral lower extremities   Abdomen:  Abdomen: soft, non-tender, normal bowel sounds all four quadrants, no masses, distended with ascites  Liver and spleen: no hepatosplenomegaly   Musculoskeletal: Digits and nails: normal  Normal range of motion  No joint swelling or gross deformities    Skin: Skin inspection: color normal, no visible rashes or lesions  Skin palpation: texture, turgor normal, no palpable lesions   Lymphatic:  no cervical lymphadenopathy  "   Psychiatric: Judgement and insight: normal  Orientation to person place and time: normal  Mood and affect: normal       DATA:  Radiology and Labs:  The following labs independently reviewed by me, additional AM labs ordered  Old records independently reviewed showing CKD previous renal failure from rhabdo as described above  The following radiologic studies independently viewed by me, findings CT head showed right meningioma with no mass-effect  Interval notes, chart personally reviewed by me.  I have reviewed and summarized old records as detailed above  Plan of care discussed with patient and his wife at bedside  New problems include acute renal failure on CKD      Risk/ complexity of medical care/ medical decision making: High risk, new acute renal failure on CKD  Chronic illness with severe exacerbation or progression      Labs:   Recent Results (from the past 24 hour(s))   Comprehensive Metabolic Panel    Collection Time: 05/09/22  2:13 PM    Specimen: Blood   Result Value Ref Range    Glucose 114 (H) 65 - 99 mg/dL    BUN 45 (H) 8 - 23 mg/dL    Creatinine 2.97 (H) 0.76 - 1.27 mg/dL    Sodium 133 (L) 136 - 145 mmol/L    Potassium 4.3 3.5 - 5.2 mmol/L    Chloride 99 98 - 107 mmol/L    CO2 20.1 (L) 22.0 - 29.0 mmol/L    Calcium 9.2 8.6 - 10.5 mg/dL    Total Protein 6.7 6.0 - 8.5 g/dL    Albumin 2.70 (L) 3.50 - 5.20 g/dL    ALT (SGPT) 27 1 - 41 U/L    AST (SGOT) 71 (H) 1 - 40 U/L    Alkaline Phosphatase 326 (H) 39 - 117 U/L    Total Bilirubin 18.3 (H) 0.0 - 1.2 mg/dL    Globulin 4.0 gm/dL    A/G Ratio 0.7 g/dL    BUN/Creatinine Ratio 15.2 7.0 - 25.0    Anion Gap 13.9 5.0 - 15.0 mmol/L    eGFR 21.5 (L) >60.0 mL/min/1.73   Troponin    Collection Time: 05/09/22  2:13 PM    Specimen: Blood   Result Value Ref Range    Troponin T <0.010 0.000 - 0.030 ng/mL   Magnesium    Collection Time: 05/09/22  2:13 PM    Specimen: Blood   Result Value Ref Range    Magnesium 2.3 1.6 - 2.4 mg/dL   Green Top (Gel)    Collection Time:  05/09/22  2:13 PM   Result Value Ref Range    Extra Tube Hold for add-ons.    Lavender Top    Collection Time: 05/09/22  2:13 PM   Result Value Ref Range    Extra Tube hold for add-on    Gold Top - SST    Collection Time: 05/09/22  2:13 PM   Result Value Ref Range    Extra Tube Hold for add-ons.    CBC Auto Differential    Collection Time: 05/09/22  2:13 PM    Specimen: Blood   Result Value Ref Range    WBC 10.69 3.40 - 10.80 10*3/mm3    RBC 3.18 (L) 4.14 - 5.80 10*6/mm3    Hemoglobin 11.3 (L) 13.0 - 17.7 g/dL    Hematocrit 30.9 (L) 37.5 - 51.0 %    MCV 97.2 (H) 79.0 - 97.0 fL    MCH 35.5 (H) 26.6 - 33.0 pg    MCHC 36.6 (H) 31.5 - 35.7 g/dL    RDW 14.9 12.3 - 15.4 %    RDW-SD 52.2 37.0 - 54.0 fl    MPV 10.6 6.0 - 12.0 fL    Platelets 179 140 - 450 10*3/mm3    Neutrophil % 72.0 42.7 - 76.0 %    Lymphocyte % 9.3 (L) 19.6 - 45.3 %    Monocyte % 13.8 (H) 5.0 - 12.0 %    Eosinophil % 1.5 0.3 - 6.2 %    Basophil % 1.0 0.0 - 1.5 %    Immature Grans % 2.4 (H) 0.0 - 0.5 %    Neutrophils, Absolute 7.70 (H) 1.70 - 7.00 10*3/mm3    Lymphocytes, Absolute 0.99 0.70 - 3.10 10*3/mm3    Monocytes, Absolute 1.47 (H) 0.10 - 0.90 10*3/mm3    Eosinophils, Absolute 0.16 0.00 - 0.40 10*3/mm3    Basophils, Absolute 0.11 0.00 - 0.20 10*3/mm3    Immature Grans, Absolute 0.26 (H) 0.00 - 0.05 10*3/mm3    nRBC 0.1 0.0 - 0.2 /100 WBC   aPTT    Collection Time: 05/09/22  2:13 PM    Specimen: Blood   Result Value Ref Range    PTT 37.3 (H) 22.7 - 35.4 seconds   Protime-INR    Collection Time: 05/09/22  2:13 PM    Specimen: Blood   Result Value Ref Range    Protime 17.0 (H) 11.7 - 14.2 Seconds    INR 1.39 (H) 0.90 - 1.10   ECG 12 Lead    Collection Time: 05/09/22  3:51 PM   Result Value Ref Range    QT Interval 444 ms   Ammonia    Collection Time: 05/09/22  3:52 PM    Specimen: Blood   Result Value Ref Range    Ammonia 80 (H) 16 - 60 umol/L   COVID-19,APTIMA PANTHER(AN[facility]BAL),[facility], NP/OP SWAB IN UTM/VTM/[facility] TRANSPORT MEDIA,24 HR TAT - Swab,  Nasopharynx    Collection Time: 05/09/22  5:52 PM    Specimen: Nasopharynx; Swab   Result Value Ref Range    COVID19 Not Detected Not Detected - Ref. Range   Comprehensive Metabolic Panel    Collection Time: 05/10/22  4:56 AM    Specimen: Blood   Result Value Ref Range    Glucose 110 (H) 65 - 99 mg/dL    BUN 44 (H) 8 - 23 mg/dL    Creatinine 3.19 (H) 0.76 - 1.27 mg/dL    Sodium 133 (L) 136 - 145 mmol/L    Potassium 4.4 3.5 - 5.2 mmol/L    Chloride 99 98 - 107 mmol/L    CO2 21.2 (L) 22.0 - 29.0 mmol/L    Calcium 9.1 8.6 - 10.5 mg/dL    Total Protein 6.2 6.0 - 8.5 g/dL    Albumin 2.00 (L) 3.50 - 5.20 g/dL    ALT (SGPT) 11 1 - 41 U/L    AST (SGOT) 62 (H) 1 - 40 U/L    Alkaline Phosphatase 296 (H) 39 - 117 U/L    Total Bilirubin 17.7 (H) 0.0 - 1.2 mg/dL    Globulin 4.2 gm/dL    A/G Ratio 0.5 g/dL    BUN/Creatinine Ratio 13.8 7.0 - 25.0    Anion Gap 12.8 5.0 - 15.0 mmol/L    eGFR 19.8 (L) >60.0 mL/min/1.73   Ammonia    Collection Time: 05/10/22  4:56 AM    Specimen: Blood   Result Value Ref Range    Ammonia 40 16 - 60 umol/L   CBC Auto Differential    Collection Time: 05/10/22  4:56 AM    Specimen: Blood   Result Value Ref Range    WBC 9.51 3.40 - 10.80 10*3/mm3    RBC 2.89 (L) 4.14 - 5.80 10*6/mm3    Hemoglobin 10.4 (L) 13.0 - 17.7 g/dL    Hematocrit 28.1 (L) 37.5 - 51.0 %    MCV 97.2 (H) 79.0 - 97.0 fL    MCH 36.0 (H) 26.6 - 33.0 pg    MCHC 37.0 (H) 31.5 - 35.7 g/dL    RDW 14.6 12.3 - 15.4 %    RDW-SD 51.0 37.0 - 54.0 fl    MPV 10.7 6.0 - 12.0 fL    Platelets 148 140 - 450 10*3/mm3    Neutrophil % 66.6 42.7 - 76.0 %    Lymphocyte % 13.5 (L) 19.6 - 45.3 %    Monocyte % 14.2 (H) 5.0 - 12.0 %    Eosinophil % 2.5 0.3 - 6.2 %    Basophil % 0.9 0.0 - 1.5 %    Immature Grans % 2.3 (H) 0.0 - 0.5 %    Neutrophils, Absolute 6.33 1.70 - 7.00 10*3/mm3    Lymphocytes, Absolute 1.28 0.70 - 3.10 10*3/mm3    Monocytes, Absolute 1.35 (H) 0.10 - 0.90 10*3/mm3    Eosinophils, Absolute 0.24 0.00 - 0.40 10*3/mm3    Basophils, Absolute 0.09  0.00 - 0.20 10*3/mm3    Immature Grans, Absolute 0.22 (H) 0.00 - 0.05 10*3/mm3    nRBC 0.0 0.0 - 0.2 /100 WBC   Urinalysis With Microscopic If Indicated (No Culture) - Urine, Clean Catch    Collection Time: 05/10/22  6:51 AM    Specimen: Urine, Clean Catch   Result Value Ref Range    Color, UA Dark Yellow (A) Yellow, Straw    Appearance, UA Clear Clear    pH, UA 5.5 5.0 - 8.0    Specific Gravity, UA 1.013 1.005 - 1.030    Glucose, UA Negative Negative    Ketones, UA Negative Negative    Bilirubin, UA Large (3+) (A) Negative    Blood, UA Negative Negative    Protein, UA Negative Negative    Leuk Esterase, UA Trace (A) Negative    Nitrite, UA Positive (A) Negative    Urobilinogen, UA 1.0 E.U./dL 0.2 - 1.0 E.U./dL   Urinalysis, Microscopic Only - Urine, Clean Catch    Collection Time: 05/10/22  6:51 AM    Specimen: Urine, Clean Catch   Result Value Ref Range    RBC, UA 0-2 None Seen, 0-2 /HPF    WBC, UA 0-2 None Seen, 0-2 /HPF    Bacteria, UA None Seen None Seen /HPF    Squamous Epithelial Cells, UA 0-2 None Seen, 0-2 /HPF    Hyaline Casts, UA 7-12 None Seen /LPF    Methodology Automated Microscopy        Radiology:  Imaging Results (Last 24 Hours)     Procedure Component Value Units Date/Time    CT Head Without Contrast [[id number]] Collected: 05/09/22 1714     Updated: 05/09/22 1726    Narrative:      CT HEAD WO CONTRAST-     CLINICAL HISTORY: Patient falling. Confusion.     TECHNIQUE: Transverse 3 mm thick images were obtained from the base of  the skull to the vertex without IV contrast.     Radiation dose reduction techniques were utilized, including automated  exposure control and exposure modulation based on body size.     COMPARISON: None     FINDINGS: The brain and ventricular system appear structurally within  normal limits for a patient of this age. There is no evidence of recent  or old intracranial hemorrhage or infarction. There is an approximately  2.8 x 2.2 cm diameter well-circumscribed solid dural  based mass in the  right parafalcine region at the vertex that is consistent with a  meningioma. This contains a single punctate calcification within its  periphery. No edema is evident within the adjacent brain parenchyma. No  other masses are identified. Bone window images demonstrate no evidence  of skull fracture. The left maxillary sinus is hypoplastic and  completely filled with thickened mucosa and/or fluid. This is quite  likely chronic. The right maxillary sinus is well-developed and widely  patent. The sphenoid sinus is also well aerated. There is thickened  mucosa and/or fluid filling the majority of the frontal sinus.       Impression:      Solitary hyperdense dural based mass in the right  parafalcine region at the vertex consistent with a meningioma as  described. This produces no significant mass effect on the adjacent  frontal lobe and there is no edema within the brain or adjacent to the  lesion. Paranasal Sinus disease as noted. Otherwise unremarkable CT scan  of the head. No acute intracranial abnormality is identified.     This report was finalized on 5/9/2022 5:23 PM by Dr. Gaetano Kim M.D.       XR Chest 1 View [[id number]] Collected: 05/09/22 1231     Updated: 05/09/22 1246    Narrative:      PORTABLE CHEST X-RAY     HISTORY: Weakness, dizziness, altered mental status.     TECHNIQUE: Portable chest x-ray is correlated with chest x-ray November 14, 2017.     FINDINGS: There is linear atelectasis at the right lung base. Cardiac  mediastinal silhouette is normal and the lungs are otherwise clear.  Costophrenic sulci are dry.       Impression:      Right base atelectasis.     This report was finalized on 5/9/2022 12:43 PM by Dr. Cliff Clemens M.D.                  ASSESSMENT:   New acute renal failure.  Had previous severe renal failure due to rhabdomyolysis in 2018 but that resolved and his baseline creatinine is 1.4  CKD stage IIIa, baseline creatinine 1.4, had been seeing me yearly with  stable renal function  Cryptogenic cirrhosis    Hepatic encephalopathy (HCC)  Ascites requiring paracentesis  Metabolic encephalopathy, likely from elevated ammonia levels  Hyponatremia secondary to cirrhosis/STEVEN  Mild metabolic acidosis  Hypoalbuminemia secondary to poor nutrition and cirrhosis  Anemia of chronic disease      PLAN:   Will  gently hydrate with normal saline  Will also give albumin IV x2  Check urine electrolytes  Hopefully he is just prerenal but this certainly could be a presentation for hepatorenal syndrome.  Will empirically start octreotide and midodrine as well in addition to IV albumin  Obtain renal ultrasound to rule out obstruction  Recheck CK level given his history of rhabdomyolysis requiring dialysis  If paracentesis is required he will need IV albumin 1 g/kg dosing after the procedure    Continue to monitor electrolytes and volume closely, avoid IV contrast and nephrotoxic medications     I appreciate the consult request, please call if any questions      Franky Moreau M.D  [facility]  Office phone number: [phone]  Answering service phone number: [phone]        5/10/2022        Dictation via Dragon dictation software

## 2022-05-10 NOTE — PLAN OF CARE
Goal Outcome Evaluation:  Plan of Care Reviewed With: patient        Progress: no change  Outcome Evaluation: New admit to 4E tonight for hepatic encephalopathy. Oriented x4 but sleepy. Consults placed for neurosurgery, nephrology, and gastroenterology. Had 1 BM tonight. Lactulose ordered 3x/day. Home meds for cirrhosis restarted. AM labs to be done. Fall precautions in place. Patient turns independently. Poor sleep tonight. Will continue to monitor.

## 2022-05-10 NOTE — CONSULTS
[facility]   Consult Note    Patient Name: Hu Burgess  : 1950  MRN: [id number]  Primary Care Physician:  Aldo Guajardo MD (Tony)  Referring Physician: Memo Varela MD  Date of admission: 2022    Inpatient Neurosurgery Consult  Consult performed by: Cornelia Che APRN  Consult ordered by: Memo Varela MD  Reason for consult: Cerebral meningioma        Subjective   Subjective     Reason for Consult/ Chief Complaint: Cerebral meningioma    History of Present Illness  Hu Burgess is a 71 y.o. male with a history of multiple medical problems.  He is a current patient of Dr. Gokul Restrepo's with neurology.  Dr. Restrepo had seen the patient for complaints of tremor back in January of this year.  His exam findings pointed towards idiopathic Parkinson's disease and an MRI of the brain with and without contrast was performed.  The patient was started on Sinemet at that time.  The brain MRI with and without contrast was performed 2022 and incidentally revealed a 2.6 cm posterior right frontal lobe parafalcine meningioma with an area of punctate calcification.  Review of the chart does not indicate any neurosurgical evaluation for this.  The patient has continued to work with Dr. Restrepo over telephone visits and has had some changes to his medication for parkinsonism.  As of 2022, the Sinemet was discontinued and the patient has been taking pramipexole 0.25 mg 3 times daily.  According to his wife, he did not tolerate this medication well and then was switched back to the Sinemet.  She states that he is not tolerating the Sinemet well either.      In addition to the above, the patient has been following with his primary care provider Dr. Aldo Guajardo.  He has liver cirrhosis with ascites requiring paracentesis back in mid April.  5 L of fluid was removed.  According to the wife, the cirrhosis is believed to have been caused by long-term statin therapy. He is  scheduled for evaluation with [facility] hepatology in July 2022.  He also has a history of portal hypertension, esophageal varices and chronic kidney disease.  He was referred to the emergency department by his primary care physician yesterday due to some increasing confusion and falls.  He has been admitted to the hospital for hepatic encephalopathy.  CT imaging of the head was performed without contrast yesterday.  There was no evidence of acute blood or infarct however the right frontal lobe parafalcine meningioma was read as stable.        Neurosurgery has been asked to evaluate and make further recommendations regarding the meningioma.    Review of Systems   Constitutional: Positive for activity change, appetite change, fatigue and unexpected weight change.   HENT: Negative.    Eyes: Negative.    Respiratory: Negative.    Cardiovascular: Positive for palpitations and leg swelling.   Gastrointestinal: Positive for abdominal distention, abdominal pain and nausea. Negative for vomiting.   Genitourinary: Negative for difficulty urinating.   Musculoskeletal: Negative.    Skin: Positive for color change (jaundice).   Neurological: Positive for dizziness, weakness (generalized) and light-headedness. Negative for seizures.   Hematological: Bruises/bleeds easily.   Psychiatric/Behavioral: Positive for decreased concentration.        Prolonged sleeping.         Personal History     Past Medical History:   Diagnosis Date   • Arteriosclerotic cardiovascular disease    • Back pain    • Cirrhosis (HCC)    • Elevated liver enzymes    • GERD (gastroesophageal reflux disease)    • History of transfusion    • Hyperlipidemia    • Hypertension    • Kidney failure    • Rhabdomyolysis     due to crestor       Past Surgical History:   Procedure Laterality Date   • COLONOSCOPY W/ POLYPECTOMY N/A 3/9/2022    Procedure: COLONOSCOPY WITH POLYPECTOMY;  Surgeon: Justin Underwood MD;  Location: [facility];  Service: Gastroenterology;   Laterality: N/A;  ascending colon polyps x2 (cold snare x1)  transverse colon polyp x1 (cold snare)  sigmoid colon polyp x1 (cold snare)  hemorrhoids   • ENDOSCOPY N/A 2/9/2018    Procedure: ESOPHAGOGASTRODUODENOSCOPY;  Surgeon: Marcelino Samuel MD;  Location: [facility] OR;  Service:    • ENDOSCOPY N/A 3/9/2022    Procedure: ESOPHAGOGASTRODUODENOSCOPY;  Surgeon: Justin Underwood MD;  Location: [facility] OR;  Service: Gastroenterology;  Laterality: N/A;  Esophageal varisces  Portal gastropathy   • INGUINAL HERNIA REPAIR Right 1/10/2022    Procedure: INGUINAL HERNIA REPAIR;  Surgeon: Kishan Puga MD;  Location: [facility] OR;  Service: General;  Laterality: Right;       • INSERTION HEMODIALYSIS CATHETER Right 11/14/2017    Procedure: PALINDRONE CATHETERE PLACEMENT;  Surgeon: Gareth Sánchez MD;  Location: [facility] OR;  Service:        Family History: family history includes Cancer in his father. Otherwise pertinent FHx was reviewed and not pertinent to current issue.    Social History:  reports that he quit smoking about 5 years ago. His smoking use included cigarettes. He quit after 50.00 years of use. He has never used smokeless tobacco. He reports previous alcohol use of about 1.0 standard drink of alcohol per week. He reports that he does not use drugs.    Home Medications:   Hydrocort-Pramoxine (Perianal), carbidopa-levodopa, cholestyramine, furosemide, hydrOXYzine, pantoprazole, riFAXIMin, spironolactone, and traMADol    Allergies:  Allergies   Allergen Reactions   • Statins Other (See Comments)     Liver failure  Liver failure and rhabdomyolysis       Objective    Objective     Vitals:  Temp:  [97.4 °F (36.3 °C)-98.3 °F (36.8 °C)] 97.4 °F (36.3 °C)  Heart Rate:  [63-68] 65  Resp:  [18] 18  BP: (115-145)/(57-75) 126/72    Physical Exam  Vitals reviewed.   Constitutional:       General: He is not in acute distress.     Appearance: He is well-developed. He is ill-appearing and toxic-appearing. He is not  diaphoretic.   HENT:      Head: Normocephalic and atraumatic.      Nose: Nose normal.      Mouth/Throat:      Mouth: Mucous membranes are moist.   Eyes:      General:         Right eye: No discharge.         Left eye: No discharge.      Extraocular Movements: Extraocular movements intact.      Pupils: Pupils are equal, round, and reactive to light.      Comments: Eyes are jaundiced.    Neck:      Trachea: No tracheal deviation.   Cardiovascular:      Rate and Rhythm: Normal rate.   Pulmonary:      Effort: Pulmonary effort is normal. No respiratory distress.   Abdominal:      General: There is distension.      Tenderness: There is abdominal tenderness.      Comments: Ascites   Musculoskeletal:         General: No swelling or tenderness.      Cervical back: Normal range of motion and neck supple. No rigidity or tenderness.      Right lower leg: No edema.      Left lower leg: No edema.      Comments: Left lower leg more swollen that right due to an old [place] war injury.    Skin:     General: Skin is warm and dry.      Findings: No erythema.      Comments: Severely jaundiced bilateral sclera, face appears de souza brown.    Neurological:      Mental Status: He is oriented to person, place, and time.      GCS: GCS eye subscore is 4. GCS verbal subscore is 5. GCS motor subscore is 6.      Sensory: No sensory deficit.      Motor: No abnormal muscle tone.      Coordination: Coordination normal.      Comments: AA&O x 3.  Speech is normal.  Converses appropriately.  PERRL. EOM's intact. Face symmetric. Tongue midline without fasiculations or atrophy. Sensation equal and intact throughout the face.  Hearing is intact bilaterally to finger rustle. Negative pronator drift.  Gait not testing due to concern for fall given patient's current condition. Negative Servin's; negative clonus.    Psychiatric:         Behavior: Behavior is cooperative.         Thought Content: Thought content normal.       Result Review    Result  Review:  I have personally reviewed the results from the time of this admission to 5/10/2022 18:30 EDT and agree with these findings:  [x]  Laboratory  []  Microbiology  [x]  Radiology  []  EKG/Telemetry   []  Cardiology/Vascular   []  Pathology  []  Old records  []  Other:  Most notable findings include:     CT HEAD WO CONTRAST 5/9/2022    No acute finding such as hemorrhage or infarct within the brain.  Ventricular system is normal.  There is a 2.8 x 2.2 cm dural based mass in the right parafalcine region at the vertex which is consistent with meningioma.  There is a punctate calcification in the periphery of this meningioma.  There is no associated edema.  No other masses appreciated.    MRI BRAIN W/WO CONTRAST 1/18/2022    2.6 X 2.6 X 2.2 cm extradural enhancing right parafalcine posterior right frontal lobe region meningioma noted.  An area of punctate calcification with in the extra-axial mass appreciated.  No significant mass-effect or associated vasogenic edema.    .  Results from last 7 days   Lab Units 05/10/22  0456 05/09/22  1413   WBC 10*3/mm3 9.51 10.69   HEMOGLOBIN g/dL 10.4* 11.3*   HEMATOCRIT % 28.1* 30.9*   PLATELETS 10*3/mm3 148 179     .  Results from last 7 days   Lab Units 05/10/22  0456 05/09/22  1413   SODIUM mmol/L 133* 133*   POTASSIUM mmol/L 4.4 4.3   CHLORIDE mmol/L 99 99   CO2 mmol/L 21.2* 20.1*   BUN mg/dL 44* 45*   CREATININE mg/dL 3.19* 2.97*   GLUCOSE mg/dL 110* 114*   CALCIUM mg/dL 9.1 9.2         Assessment / Plan     Brief Patient Summary:  Hu Burgess is a 71 y.o. male who presented to [facility] emergency department yesterday for increasing confusion, falls.  He has a history of liver cirrhosis and portal hypertension.  He had a CT scan performed in the emergency department yesterday which revealed a right frontal parafalcine meningioma.  This was also evidenced on a prior MRI brain performed January 18, 2022.  The patient is currently followed by Dr. Hodgson  Lauro for idiopathic Parkinson's disease.  The patient presents to the hospital on for decline in functioning, increased jaundice, weakness.  Given the findings of meningioma on the CT scan, neurosurgery was asked to evaluate the patient and make further recommendations.      Active Hospital Problems:  Active Hospital Problems    Diagnosis    • Cerebral meningioma (HCC)    • Hepatic encephalopathy (HCC)      Plan:     Explained to the patient and his wife that the meningioma is stable and is low on the list of priorities for this patient.  There is no surgery needed at this time.  Surgery is not offered and less the size of the meningioma expands to the point that it causes significant mass-effect related to swelling and or pressure on the brain.     This is something that can be followed and observed.  I will speak with Dr. Stubbs regarding timing for follow-up.  It will require repeat brain imaging with and without contrast.  Hopefully by that time, his kidney function will have improved and he will be able to tolerate contrast.    We will be available for any further questions or concerns moving forward.  Our office will contact the patient with neurosurgery follow-up appointment.      Cornelia Che, JULIUS      ADDENDUM 5/11/22    I spoke with Dr. Stubbs this morning about the above history, exam findings and reviewed radiographic images including MRI brain w/wo contrast from January 2022 and the most recent head CT. There is no significant change in the meningioma but this will need to be followed. Dr. Stubbs will see the patient in his office with repeat MRI brain w/wo contrast as well as MRV of brain just prior to f/u appointment in 4-6 months.     Neurosurgery will be available for any further questions or concerns that may arise.

## 2022-05-10 NOTE — THERAPY EVALUATION
Patient Name: Hu Burgess  : 1950    MRN: [id number]                              Today's Date: 5/10/2022       Admit Date: 2022    Visit Dx:     ICD-10-CM ICD-9-CM   1. Hepatic encephalopathy (HCC)  K72.90 572.2   2. Liver failure without hepatic coma, unspecified chronicity (HCC)  K72.90 572.8   3. Chronic kidney disease, unspecified CKD stage  N18.9 585.9   4. 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   • Anemia in chronic kidney disease   • Right inguinal hernia   • Cirrhosis of liver with ascites (HCC)   • Gastroesophageal reflux disease   • Colon cancer screening   • Nausea   • Hepatic encephalopathy (HCC)     Past Medical History:   Diagnosis Date   • Arteriosclerotic cardiovascular disease    • Back pain    • Cirrhosis (HCC)    • Elevated liver enzymes    • GERD (gastroesophageal reflux disease)    • History of transfusion    • Hyperlipidemia    • Hypertension    • Kidney failure    • Rhabdomyolysis     due to crestor     Past Surgical History:   Procedure Laterality Date   • COLONOSCOPY W/ POLYPECTOMY N/A 3/9/2022    Procedure: COLONOSCOPY WITH POLYPECTOMY;  Surgeon: Justin Underwood MD;  Location: [facility];  Service: Gastroenterology;  Laterality: N/A;  ascending colon polyps x2 (cold snare x1)  transverse colon polyp x1 (cold snare)  sigmoid colon polyp x1 (cold snare)  hemorrhoids   • ENDOSCOPY N/A 2018    Procedure: ESOPHAGOGASTRODUODENOSCOPY;  Surgeon: Marcelino Samuel MD;  Location: [facility] OR;  Service:    • ENDOSCOPY N/A 3/9/2022    Procedure: ESOPHAGOGASTRODUODENOSCOPY;  Surgeon: Justin Underwood MD;  Location: [facility] OR;  Service: Gastroenterology;  Laterality: N/A;   "Esophageal varisces  Portal gastropathy   • INGUINAL HERNIA REPAIR Right 1/10/2022    Procedure: INGUINAL HERNIA REPAIR;  Surgeon: Kishan Puga MD;  Location:  [facility] OR;  Service: General;  Laterality: Right;       • INSERTION HEMODIALYSIS CATHETER Right 11/14/2017    Procedure: PALINDRONE CATHETERE PLACEMENT;  Surgeon: Gareth Sánchez MD;  Location: [facility] MAIN OR;  Service:       General Information     Row Name 05/10/22 1205          Physical Therapy Time and Intention    Document Type evaluation  -CF     Mode of Treatment co-treatment;physical therapy  -CF     Row Name 05/10/22 1205          General Information    Patient Profile Reviewed yes  -CF     Prior Level of Function independent:;transfer;all household mobility;bed mobility  IND with mobility, no AD use per wife but a few falls  -CF     Existing Precautions/Restrictions fall  -CF     Barriers to Rehab cognitive status  -CF     Row Name 05/10/22 1205          Living Environment    People in Home spouse  -CF     Row Name 05/10/22 1205          Cognition    Orientation Status (Cognition) oriented to;person;place  states \"may\" but unable to give the year  -CF     Row Name 05/10/22 1205          Safety Issues, Functional Mobility    Safety Issues Affecting Function (Mobility) insight into deficits/self-awareness;awareness of need for assistance;judgment;problem-solving;safety precautions follow-through/compliance;safety precaution awareness  -CF     Impairments Affecting Function (Mobility) cognition;endurance/activity tolerance;strength;balance  -CF           User Key  (r) = Recorded By, (t) = Taken By, (c) = Cosigned By    Initials Name Provider Type    CF Felipa Alford, PT Physical Therapist               Mobility     Row Name 05/10/22 1607          Bed Mobility    Bed Mobility supine-sit  -CF     Supine-Sit [place] (Bed Mobility) standby assist;verbal cues  -CF     Assistive Device (Bed Mobility) bed rails;head of bed elevated  -CF     Row " Name 05/10/22 1607          Bed-Chair Transfer    Bed-Chair [place] (Transfers) minimum assist (75% patient effort);1 person assist  -CF     Assistive Device (Bed-Chair Transfers) walker, front-wheeled  -CF     Row Name 05/10/22 1607          Sit-Stand Transfer    Sit-Stand [place] (Transfers) contact guard;verbal cues  -CF     Assistive Device (Sit-Stand Transfers) walker, front-wheeled  -CF     Row Name 05/10/22 1607          Gait/Stairs (Locomotion)    [place] Level (Gait) minimum assist (75% patient effort);1 person assist  -CF     Assistive Device (Gait) walker, front-wheeled  -CF     Distance in Feet (Gait) 15' then 20'  -CF     Deviations/Abnormal Patterns (Gait) sandhya decreased;gait speed decreased  -CF     Bilateral Gait Deviations forward flexed posture  -CF     Comment, (Gait/Stairs) Assist with walker management, especially with turns. Cues to keep feet inside walker for safety  -CF           User Key  (r) = Recorded By, (t) = Taken By, (c) = Cosigned By    Initials Name Provider Type    CF Felipa Alford, PT Physical Therapist               Obj/Interventions     Row Name 05/10/22 1608          Range of Motion Comprehensive    General Range of Motion bilateral lower extremity ROM WFL  -CF     Row Name 05/10/22 1608          Strength Comprehensive (MMT)    Comment, General Manual Muscle Testing (MMT) Assessment BLE grossly 3+/5  -CF     Row Name 05/10/22 1608          Balance    Balance Assessment sitting static balance;sitting dynamic balance;standing static balance;standing dynamic balance  -CF     Static Sitting Balance supervision  -CF     Dynamic Sitting Balance standby assist  -CF     Position, Sitting Balance unsupported;sitting edge of bed  -CF     Static Standing Balance contact guard  -CF     Dynamic Standing Balance minimal assist  -CF     Position/Device Used, Standing Balance supported;walker, front-wheeled  -CF     Row Name 05/10/22 1608          Sensory Assessment  (Somatosensory)    Sensory Assessment (Somatosensory) LE sensation intact  -CF           User Key  (r) = Recorded By, (t) = Taken By, (c) = Cosigned By    Initials Name Provider Type    Felipa Sinha PT Physical Therapist               Goals/Plan     Row Name 05/10/22 1612          Bed Mobility Goal 1 (PT)    Activity/Assistive Device (Bed Mobility Goal 1, PT) bed mobility activities, all  -CF     [place] Level/Cues Needed (Bed Mobility Goal 1, PT) modified independence  -CF     Time Frame (Bed Mobility Goal 1, PT) 2 weeks  -CF     Row Name 05/10/22 1612          Transfer Goal 1 (PT)    Activity/Assistive Device (Transfer Goal 1, PT) sit-to-stand/stand-to-sit;bed-to-chair/chair-to-bed  -CF     [place] Level/Cues Needed (Transfer Goal 1, PT) supervision required  -CF     Time Frame (Transfer Goal 1, PT) 2 weeks  -CF     Row Name 05/10/22 1612          Gait Training Goal 1 (PT)    Activity/Assistive Device (Gait Training Goal 1, PT) gait (walking locomotion)  LRAD  -CF     [place] Level (Gait Training Goal 1, PT) supervision required  -CF     Distance (Gait Training Goal 1, PT) 100'  -CF     Time Frame (Gait Training Goal 1, PT) 2 weeks  -CF     Row Name 05/10/22 1612          Therapy Assessment/Plan (PT)    Planned Therapy Interventions (PT) balance training;bed mobility training;gait training;ROM (range of motion);strengthening;patient/family education;transfer training  -CF           User Key  (r) = Recorded By, (t) = Taken By, (c) = Cosigned By    Initials Name Provider Type    CF Felipa Alford PT Physical Therapist               Clinical Impression     Row Name 05/10/22 1602          Pain    Pretreatment Pain Rating 0/10 - no pain  -CF     Posttreatment Pain Rating 0/10 - no pain  -CF     Row Name 05/10/22 1609          Plan of Care Review    Plan of Care Reviewed With patient  -CF     Outcome Evaluation Pt is a 72 yo male admitted with falls, generalized weakness and AMS. Workup  revealed hepatic encephalopathy. Pt with hx of liver cirrhosis and brain hemangioma. Pt lives with hsi spouse but has required increased assist lately due to weakness. Pt typically ambulates without assistive device but has had several falls per wife report. Upon exam, pt presents with confusion, decreased safety awareness, generalized weakness, minor balance deficits, and decreased activity tolerance. Pt completed transfers with cga/min A and ambulated in room with Margot  at walker. Pt may benefit from skilled PT services while inpatient to address deficits listed above. Hopeful for dc home with 24/7 assist and HH pending progress.  -CF     Row Name 05/10/22 1601          Therapy Assessment/Plan (PT)    Rehab Potential (PT) good, to achieve stated therapy goals  -CF     Criteria for Skilled Interventions Met (PT) yes  -CF     Therapy Frequency (PT) 5 times/wk  -CF     Row Name 05/10/22 1609          Vital Signs    O2 Delivery Pre Treatment room air  -CF     Row Name 05/10/22 1609          Positioning and Restraints    Pre-Treatment Position in bed  -CF     Post Treatment Position chair  -CF     In Chair notified nsg;reclined;call light within reach;encouraged to call for assist;exit alarm on;legs elevated;with family/caregiver  -CF           User Key  (r) = Recorded By, (t) = Taken By, (c) = Cosigned By    Initials Name Provider Type    CF Felipa Alford, PT Physical Therapist               Outcome Measures     Row Name 05/10/22 1612          How much help from another person do you currently need...    Turning from your back to your side while in flat bed without using bedrails? 3  -CF     Moving from lying on back to sitting on the side of a flat bed without bedrails? 3  -CF     Moving to and from a bed to a chair (including a wheelchair)? 3  -CF     Standing up from a chair using your arms (e.g., wheelchair, bedside chair)? 3  -CF     Climbing 3-5 steps with a railing? 2  -CF     To walk in hospital room? 3   -     AM-PAC 6 Clicks Score (PT) 17  -     Highest level of mobility 5 --> Static standing  -     Row Name 05/10/22 1612          Modified Walden Scale    Modified Bo Scale 4 - Moderately severe disability.  Unable to walk without assistance, and unable to attend to own bodily needs without assistance.  -     Row Name 05/10/22 1612 05/10/22 0934       Functional Assessment    Outcome Measure Options AM-PAC 6 Clicks Basic Mobility (PT);Modified Walden  -CF AM-PAC 6 Clicks Daily Activity (OT)  -          User Key  (r) = Recorded By, (t) = Taken By, (c) = Cosigned By    Initials Name Provider Type    CF Felipa Alford, PT Physical Therapist    Mirna Delgadillo OT Occupational Therapist                             Physical Therapy Education                 Title: PT OT SLP Therapies (In Progress)     Topic: Physical Therapy (In Progress)     Point: Mobility training (Done)     Learning Progress Summary           Patient Acceptance, E, VU,NR by  at 5/10/2022 1613                   Point: Home exercise program (Not Started)     Learner Progress:  Not documented in this visit.          Point: Body mechanics (Done)     Learning Progress Summary           Patient Acceptance, E, VU,NR by  at 5/10/2022 1613                   Point: Precautions (Done)     Learning Progress Summary           Patient Acceptance, E, VU,NR by  at 5/10/2022 1613                               User Key     Initials Effective Dates Name Provider Type Discipline     06/16/21 -  Felipa Alford, PT Physical Therapist PT              PT Recommendation and Plan  Planned Therapy Interventions (PT): balance training, bed mobility training, gait training, ROM (range of motion), strengthening, patient/family education, transfer training  Plan of Care Reviewed With: patient  Outcome Evaluation: Pt is a 70 yo male admitted with falls, generalized weakness and AMS. Workup revealed hepatic encephalopathy. Pt with hx of liver cirrhosis and  brain hemangioma. Pt lives with hsi spouse but has required increased assist lately due to weakness. Pt typically ambulates without assistive device but has had several falls per wife report. Upon exam, pt presents with confusion, decreased safety awareness, generalized weakness, minor balance deficits, and decreased activity tolerance. Pt completed transfers with cga/min A and ambulated in room with Margot  at walker. Pt may benefit from skilled PT services while inpatient to address deficits listed above. Hopeful for dc home with 24/7 assist and HH pending progress.     Time Calculation:    PT Charges     Row Name 05/10/22 1204             Time Calculation    Start Time 0841  -CF      Stop Time 0903  -CF      Time Calculation (min) 22 min  -CF      PT Received On 05/10/22  -CF      PT - Next Appointment 05/11/22  -CF      PT Goal Re-Cert Due Date 05/24/22  -CF              Time Calculation- PT    Total Timed Code Minutes- PT 13 minute(s)  -CF              Timed Charges    28307 - PT Therapeutic Activity Minutes 13  -CF              Total Minutes    Timed Charges Total Minutes 13  -CF       Total Minutes 13  -CF            User Key  (r) = Recorded By, (t) = Taken By, (c) = Cosigned By    Initials Name Provider Type    CF Felipa Alford, PT Physical Therapist              Therapy Charges for Today     Code Description Service Date Service Provider Modifiers Qty    [id number]  PT EVAL MOD COMPLEXITY 3 5/10/2022 Felipa Alford, PT GP 1    [id number]  PT THERAPEUTIC ACT EA 15 MIN 5/10/2022 Felipa Alford, PT GP 1          PT G-Codes  Outcome Measure Options: AM-PAC 6 Clicks Basic Mobility (PT), Modified Bo  AM-PAC 6 Clicks Score (PT): 17  AM-PAC 6 Clicks Score (OT): 15  Modified Bo Scale: 4 - Moderately severe disability.  Unable to walk without assistance, and unable to attend to own bodily needs without assistance.    Felipa Alford PT  5/10/2022

## 2022-05-11 DIAGNOSIS — D32.0 CEREBRAL MENINGIOMA: Primary | ICD-10-CM

## 2022-05-11 LAB
ALBUMIN SERPL-MCNC: 3.6 G/DL (ref 3.5–5.2)
ALBUMIN/GLOB SERPL: 1.3 G/DL
ALP SERPL-CCNC: 237 U/L (ref 39–117)
ALPHA-FETOPROTEIN: 1.82 NG/ML (ref 0–8.3)
ALPHA1 GLOB MFR UR ELPH: 111.3 MG/DL (ref 90–200)
ALT SERPL W P-5'-P-CCNC: 25 U/L (ref 1–41)
AMMONIA BLD-SCNC: 26 UMOL/L (ref 16–60)
ANION GAP SERPL CALCULATED.3IONS-SCNC: 17 MMOL/L (ref 5–15)
ANISOCYTOSIS BLD QL: ABNORMAL
AST SERPL-CCNC: 56 U/L (ref 1–40)
BASOPHILS # BLD MANUAL: 0.13 10*3/MM3 (ref 0–0.2)
BASOPHILS NFR BLD MANUAL: 1 % (ref 0–1.5)
BILIRUB SERPL-MCNC: 20.9 MG/DL (ref 0–1.2)
BUN SERPL-MCNC: 43 MG/DL (ref 8–23)
BUN/CREAT SERPL: 15.2 (ref 7–25)
CALCIUM SPEC-SCNC: 9.4 MG/DL (ref 8.6–10.5)
CHLORIDE SERPL-SCNC: 100 MMOL/L (ref 98–107)
CHOLEST SERPL-MCNC: 71 MG/DL (ref 0–200)
CO2 SERPL-SCNC: 20 MMOL/L (ref 22–29)
CREAT SERPL-MCNC: 2.82 MG/DL (ref 0.76–1.27)
DEPRECATED RDW RBC AUTO: 51.2 FL (ref 37–54)
EGFRCR SERPLBLD CKD-EPI 2021: 22.9 ML/MIN/1.73
EOSINOPHIL # BLD MANUAL: 0.26 10*3/MM3 (ref 0–0.4)
EOSINOPHIL NFR BLD MANUAL: 2 % (ref 0.3–6.2)
ERYTHROCYTE [DISTWIDTH] IN BLOOD BY AUTOMATED COUNT: 14.8 % (ref 12.3–15.4)
FERRITIN SERPL-MCNC: 1205 NG/ML (ref 30–400)
FOLATE SERPL-MCNC: 3.59 NG/ML (ref 4.78–24.2)
GLOBULIN UR ELPH-MCNC: 2.8 GM/DL
GLUCOSE SERPL-MCNC: 112 MG/DL (ref 65–99)
HAV IGM SERPL QL IA: NORMAL
HBA1C MFR BLD: <4.3 % (ref 4.8–5.6)
HBV CORE IGM SERPL QL IA: NORMAL
HBV SURFACE AG SERPL QL IA: NORMAL
HCT VFR BLD AUTO: 25 % (ref 37.5–51)
HCV AB SER DONR QL: NORMAL
HDLC SERPL-MCNC: 10 MG/DL (ref 40–60)
HGB BLD-MCNC: 9.2 G/DL (ref 13–17.7)
INR PPP: 1.73 (ref 0.9–1.1)
IRON 24H UR-MRATE: 101 MCG/DL (ref 59–158)
IRON SATN MFR SERPL: 87 % (ref 20–50)
LDLC SERPL CALC-MCNC: 41 MG/DL (ref 0–100)
LDLC/HDLC SERPL: 4.02 {RATIO}
LYMPHOCYTES # BLD MANUAL: 0.52 10*3/MM3 (ref 0.7–3.1)
LYMPHOCYTES NFR BLD MANUAL: 4 % (ref 5–12)
MACROCYTES BLD QL SMEAR: ABNORMAL
MCH RBC QN AUTO: 36 PG (ref 26.6–33)
MCHC RBC AUTO-ENTMCNC: 37.6 G/DL (ref 31.5–35.7)
MCV RBC AUTO: 95.8 FL (ref 79–97)
METAMYELOCYTES NFR BLD MANUAL: 1 % (ref 0–0)
MONOCYTES # BLD: 0.52 10*3/MM3 (ref 0.1–0.9)
NEUTROPHILS # BLD AUTO: 11.41 10*3/MM3 (ref 1.7–7)
NEUTROPHILS NFR BLD MANUAL: 88 % (ref 42.7–76)
PHOSPHATE SERPL-MCNC: 3.8 MG/DL (ref 2.5–4.5)
PLAT MORPH BLD: NORMAL
PLATELET # BLD AUTO: 129 10*3/MM3 (ref 140–450)
PMV BLD AUTO: 10.5 FL (ref 6–12)
POLYCHROMASIA BLD QL SMEAR: ABNORMAL
POTASSIUM SERPL-SCNC: 4.2 MMOL/L (ref 3.5–5.2)
PROT SERPL-MCNC: 6.4 G/DL (ref 6–8.5)
PROTHROMBIN TIME: 20.2 SECONDS (ref 11.7–14.2)
RBC # BLD AUTO: 2.61 10*6/MM3 (ref 4.14–5.8)
SODIUM SERPL-SCNC: 137 MMOL/L (ref 136–145)
TIBC SERPL-MCNC: 116 MCG/DL (ref 298–536)
TRANSFERRIN SERPL-MCNC: 78 MG/DL (ref 200–360)
TRIGL SERPL-MCNC: 104 MG/DL (ref 0–150)
TSH SERPL DL<=0.05 MIU/L-ACNC: 0.54 UIU/ML (ref 0.27–4.2)
URATE SERPL-MCNC: 8.8 MG/DL (ref 3.4–7)
VARIANT LYMPHS NFR BLD MANUAL: 4 % (ref 19.6–45.3)
VLDLC SERPL-MCNC: 20 MG/DL (ref 5–40)
WBC MORPH BLD: NORMAL
WBC NRBC COR # BLD: 12.97 10*3/MM3 (ref 3.4–10.8)

## 2022-05-11 PROCEDURE — 82105 ALPHA-FETOPROTEIN SERUM: CPT | Performed by: INTERNAL MEDICINE

## 2022-05-11 PROCEDURE — 82746 ASSAY OF FOLIC ACID SERUM: CPT | Performed by: INTERNAL MEDICINE

## 2022-05-11 PROCEDURE — 84100 ASSAY OF PHOSPHORUS: CPT | Performed by: INTERNAL MEDICINE

## 2022-05-11 PROCEDURE — 86381 MITOCHONDRIAL ANTIBODY EACH: CPT | Performed by: INTERNAL MEDICINE

## 2022-05-11 PROCEDURE — 25010000002 CEFTRIAXONE PER 250 MG: Performed by: HOSPITALIST

## 2022-05-11 PROCEDURE — 82728 ASSAY OF FERRITIN: CPT | Performed by: INTERNAL MEDICINE

## 2022-05-11 PROCEDURE — 25010000002 ONDANSETRON PER 1 MG: Performed by: HOSPITALIST

## 2022-05-11 PROCEDURE — 25010000002 OCTREOTIDE PER 25 MCG: Performed by: INTERNAL MEDICINE

## 2022-05-11 PROCEDURE — 85025 COMPLETE CBC W/AUTO DIFF WBC: CPT | Performed by: INTERNAL MEDICINE

## 2022-05-11 PROCEDURE — 80074 ACUTE HEPATITIS PANEL: CPT | Performed by: INTERNAL MEDICINE

## 2022-05-11 PROCEDURE — 99232 SBSQ HOSP IP/OBS MODERATE 35: CPT | Performed by: INTERNAL MEDICINE

## 2022-05-11 PROCEDURE — 84165 PROTEIN E-PHORESIS SERUM: CPT | Performed by: INTERNAL MEDICINE

## 2022-05-11 PROCEDURE — 82103 ALPHA-1-ANTITRYPSIN TOTAL: CPT | Performed by: INTERNAL MEDICINE

## 2022-05-11 PROCEDURE — 84466 ASSAY OF TRANSFERRIN: CPT | Performed by: INTERNAL MEDICINE

## 2022-05-11 PROCEDURE — 25010000002 ALBUMIN HUMAN 25% PER 50 ML: Performed by: INTERNAL MEDICINE

## 2022-05-11 PROCEDURE — 83540 ASSAY OF IRON: CPT | Performed by: INTERNAL MEDICINE

## 2022-05-11 PROCEDURE — 85007 BL SMEAR W/DIFF WBC COUNT: CPT | Performed by: INTERNAL MEDICINE

## 2022-05-11 PROCEDURE — 80061 LIPID PANEL: CPT | Performed by: HOSPITALIST

## 2022-05-11 PROCEDURE — 85610 PROTHROMBIN TIME: CPT | Performed by: INTERNAL MEDICINE

## 2022-05-11 PROCEDURE — 84443 ASSAY THYROID STIM HORMONE: CPT | Performed by: INTERNAL MEDICINE

## 2022-05-11 PROCEDURE — 86015 ACTIN ANTIBODY EACH: CPT | Performed by: INTERNAL MEDICINE

## 2022-05-11 PROCEDURE — 82140 ASSAY OF AMMONIA: CPT | Performed by: INTERNAL MEDICINE

## 2022-05-11 PROCEDURE — 84550 ASSAY OF BLOOD/URIC ACID: CPT | Performed by: INTERNAL MEDICINE

## 2022-05-11 PROCEDURE — 80053 COMPREHEN METABOLIC PANEL: CPT | Performed by: INTERNAL MEDICINE

## 2022-05-11 PROCEDURE — 83036 HEMOGLOBIN GLYCOSYLATED A1C: CPT | Performed by: HOSPITALIST

## 2022-05-11 PROCEDURE — P9047 ALBUMIN (HUMAN), 25%, 50ML: HCPCS | Performed by: INTERNAL MEDICINE

## 2022-05-11 RX ORDER — SODIUM CHLORIDE 9 MG/ML
75 INJECTION, SOLUTION INTRAVENOUS CONTINUOUS
Status: DISCONTINUED | OUTPATIENT
Start: 2022-05-11 | End: 2022-05-12

## 2022-05-11 RX ORDER — NITROGLYCERIN 0.4 MG/1
0.4 TABLET SUBLINGUAL
Status: DISCONTINUED | OUTPATIENT
Start: 2022-05-11 | End: 2022-05-12

## 2022-05-11 RX ADMIN — LACTULOSE 30 G: 10 SOLUTION ORAL at 16:14

## 2022-05-11 RX ADMIN — LACTULOSE 30 G: 10 SOLUTION ORAL at 08:12

## 2022-05-11 RX ADMIN — PANTOPRAZOLE SODIUM 40 MG: 40 TABLET, DELAYED RELEASE ORAL at 06:39

## 2022-05-11 RX ADMIN — RIFAXIMIN 550 MG: 550 TABLET ORAL at 08:11

## 2022-05-11 RX ADMIN — CARBIDOPA AND LEVODOPA 1 TABLET: 10; 100 TABLET ORAL at 08:12

## 2022-05-11 RX ADMIN — CARBIDOPA AND LEVODOPA 1 TABLET: 10; 100 TABLET ORAL at 20:44

## 2022-05-11 RX ADMIN — CARBIDOPA AND LEVODOPA 1 TABLET: 10; 100 TABLET ORAL at 16:14

## 2022-05-11 RX ADMIN — OCTREOTIDE ACETATE 100 MCG: 500 INJECTION, SOLUTION INTRAVENOUS; SUBCUTANEOUS at 08:12

## 2022-05-11 RX ADMIN — SODIUM CHLORIDE 75 ML/HR: 9 INJECTION, SOLUTION INTRAVENOUS at 12:17

## 2022-05-11 RX ADMIN — ACETAMINOPHEN 650 MG: 325 TABLET ORAL at 04:22

## 2022-05-11 RX ADMIN — LACTULOSE 30 G: 10 SOLUTION ORAL at 20:42

## 2022-05-11 RX ADMIN — MIDODRINE HYDROCHLORIDE 5 MG: 5 TABLET ORAL at 06:39

## 2022-05-11 RX ADMIN — PANTOPRAZOLE SODIUM 40 MG: 40 TABLET, DELAYED RELEASE ORAL at 18:11

## 2022-05-11 RX ADMIN — CEFTRIAXONE 1 G: 1 INJECTION, POWDER, FOR SOLUTION INTRAMUSCULAR; INTRAVENOUS at 16:14

## 2022-05-11 RX ADMIN — ALBUMIN HUMAN 50 G: 0.25 SOLUTION INTRAVENOUS at 00:10

## 2022-05-11 RX ADMIN — ONDANSETRON 4 MG: 2 INJECTION INTRAMUSCULAR; INTRAVENOUS at 00:10

## 2022-05-11 RX ADMIN — RIFAXIMIN 550 MG: 550 TABLET ORAL at 20:44

## 2022-05-11 NOTE — CASE MANAGEMENT/SOCIAL WORK
Continued Stay Note  [facility]     Patient Name: Hu Burgess  MRN: [id number]  Today's Date: 5/11/2022    Admit Date: 5/9/2022     Discharge Plan     Row Name 05/11/22 2607       Plan    Plan Home with wife and [facility] (referral pending). Family to transport at D/C.    Patient/Family in Agreement with Plan yes    Plan Comments Met with pt. and wife at bedside. Explained roll of . Face sheet and pharmacy verified. Pt lives with Cori Burgess, spouse, [phone] and their 29 your old special needs daughter.  There are 3 steps with bilateral rails to enter home.  DME equipment includes a walker, shower chair, and wheelchair.  Pt requires some assist with ADLs. Pt has been to [facility] for Rehab in the past. Has used HH in the past but unable to recall name of agency.  Wife requests referral for [facility] to see at D/C.  States they have used [facility] with their daughter in the past.  Pt’s PCP is Dr. Guajardo. Uses [facility] on [address] [address] in [facility] [place] [facility]. Pt no longer drives. At discharge, family will transport. Explained that CCP would follow to assess for discharge needs.  Robert Adamson RN-BC               Discharge Codes    No documentation.               Expected Discharge Date and Time     Expected Discharge Date Expected Discharge Time    May 13, 2022             Robert Adamson RN

## 2022-05-11 NOTE — CASE MANAGEMENT/SOCIAL WORK
Discharge Planning Assessment  [facility]     Patient Name: Hu Burgess  MRN: [id number]  Today's Date: 5/11/2022    Admit Date: 5/9/2022     Discharge Needs Assessment     Row Name 05/11/22 1706       Living Environment    People in Home spouse    Name(s) of People in Home Cori Burgess, spouse, [phone] and their 29 your old special needs daughter    Current Living Arrangements home    Primary Care Provided by spouse/significant other;self    Provides Primary Care For no one, unable/limited ability to care for self    Family Caregiver if Needed spouse;child(brenda), adult    Family Caregiver Names Cori Burgess, spouse, [phone]    Quality of Family Relationships involved;helpful;supportive    Able to Return to Prior Arrangements yes       Resource/Environmental Concerns    Resource/Environmental Concerns none       Transition Planning    Patient/Family Anticipates Transition to home with family    Patient/Family Anticipated Services at Transition home health care    Transportation Anticipated family or friend will provide       Discharge Needs Assessment    Readmission Within the Last 30 Days no previous admission in last 30 days    Equipment Currently Used at Home walker, rolling;shower chair;wheelchair    Concerns to be Addressed care coordination/care conferences;discharge planning    Anticipated Changes Related to Illness none    Equipment Needed After Discharge none    Discharge Facility/Level of Care Needs home with home health    Provided Post Acute Provider List? Refused    Refused Provider List Comment States they have used [facility]  with their daughter in the past and requests they see pt at D/C.    Provided Post Acute Provider Quality & Resource List? Refused    Current Discharge Risk chronically ill;cognitively impaired;dependent with mobility/activities of daily living               Discharge Plan     Row Name 05/11/22 1717       Plan    Plan Home with wife and [facility]  (referral  pending). Family to transport at D/C.    Patient/Family in Agreement with Plan yes    Plan Comments Met with pt. and wife at bedside. Explained roll of . Face sheet and pharmacy verified. Pt lives with Cori Burgess, spouse, [phone] and their 29 your old special needs daughter.  There are 3 steps with bilateral rails to enter home.  DME equipment includes a walker, shower chair, and wheelchair.  Pt requires some assist with ADLs. Pt has been to [facility] for Rehab in the past. Has used HH in the past but unable to recall name of agency.  Wife requests referral for [facility] to see at D/C.  States they have used [facility] with their daughter in the past.  Pt’s PCP is Dr. Guajardo. Uses [facility] on [address] [facility] [address] in [facility]. Pt no longer drives. At discharge, family will transport. Explained that CCP would follow to assess for discharge needs.  Robert Adamson RN-BC              Continued Care and Services - Admitted Since 5/9/2022     Home Medical Care     Service Provider Request Status Selected Services Address Phone Fax Patient Preferred    [facility]  Pending - Request Sent N/A [address] [address] [phone] --              Expected Discharge Date and Time     Expected Discharge Date Expected Discharge Time    May 13, 2022          Demographic Summary     Row Name 05/11/22 1703       General Information    Admission Type inpatient    Arrived From emergency department    Required Notices Provided Important Message from Medicare    Preferred Language English               Functional Status     Row Name 05/11/22 170       Functional Status    Usual Activity Tolerance moderate    Current Activity Tolerance fair       Functional Status, IADL    Medications assistive equipment and person    Meal Preparation assistive equipment and person    Housekeeping completely dependent    Laundry completely dependent    Shopping completely  dependent       Mental Status    General Appearance WDL WDL       Mental Status Summary    Recent Changes in Mental Status/Cognitive Functioning mental status       Employment/    Employment Status retired                      Robert Adamson RN

## 2022-05-11 NOTE — PLAN OF CARE
Goal Outcome Evaluation:    Progress: no change  Outcome Evaluation: Vitals stable. Ammonia - 26 this am, much improved; lactulose continued, 3 BMs today. Patient alert and oriented x3. NS started @ 75 mL/hr. IV abx started. Plan is to have paracentesis, awaiting orders. Ambulating to the bathroom with walker and assistance. Patient stable and needs met at this time.

## 2022-05-11 NOTE — PROGRESS NOTES
LOS: 2 days     Chief Complaint/ Reason for encounter: Acute kidney injury      Subjective   05/11/22 : Seems to be a bit more alert today but still very ill-appearing, remains confused, very tangential thinking  Having regular bowel movements on the lactulose, denies any shortness of breath  Does complain of increasing abdominal distention      Medical history reviewed:  History of Present Illness    Subjective    History taken from: Patient and chart    Vital Signs  Temp:  [97.4 °F (36.3 °C)-98.2 °F (36.8 °C)] 98.2 °F (36.8 °C)  Heart Rate:  [65-70] 70  Resp:  [18] 18  BP: (104-126)/(43-73) 104/43       Wt Readings from Last 1 Encounters:   05/11/22 0609 90.3 kg (199 lb)   05/09/22 2020 90.2 kg (198 lb 14.4 oz)       Objective    Objective:  General Appearance:  Comfortable, chronically ill-appearing, in no acute distress and not in pain.  Awake, alert, oriented, somewhat jaundiced  HEENT: Mucous membranes moist, no injury, oropharynx clear, sclera icteric  Lungs:  Normal effort and normal respiratory rate.  Breath sounds clear to auscultation.  No  respiratory distress.  No rales, decreased breath sounds or rhonchi.    Heart: Normal rate.  Regular rhythm.  S1 normal.  No murmur.   Abdomen: Abdomen is distended with fluid, bowel sounds are diminished, no abdominal tenderness.  There is no rebound or guarding  Extremities: Normal range of motion.  Trace edema of bilateral lower extremities, distal pulses intact  Neurological: No focal motor or sensory deficits, pupils reactive  Skin:  Warm and dry.  No rash or cyanosis.       Results Review:    Intake/Output:     Intake/Output Summary (Last 24 hours) at 5/11/2022 1202  Last data filed at 5/11/2022 0812  Gross per 24 hour   Intake 720 ml   Output 100 ml   Net 620 ml         DATA:  Radiology and Labs:  The following labs independently reviewed by me. Additional labs ordered for tomorrow a.m.  Interval notes, chart personally reviewed by me.   Old records  independently reviewed showing CKD 3  The following radiologic studies independently viewed by me, findings renal ultrasound showed no hydronephrosis, large ascites volume was demonstrated, kidney somewhat atrophic  New problems include ATN  Discussed with patient and his son at bedside    Risk/ complexity of medical care/ medical decision making high complexity, severe renal failure, advanced cirrhosis of undetermined etiology    Labs:   Recent Results (from the past 24 hour(s))   BNP    Collection Time: 05/10/22  1:02 PM    Specimen: Blood   Result Value Ref Range    proBNP 597.0 0.0 - 900.0 pg/mL   Eosinophil Smear - Urine, Urine, Clean Catch    Collection Time: 05/10/22  1:30 PM    Specimen: Urine, Clean Catch   Result Value Ref Range    Eosinophil Smear 0 0 - 0 % EOS/100 Cells   Myoglobin Screen, Urine - Urine, Clean Catch    Collection Time: 05/10/22  1:30 PM    Specimen: Urine, Clean Catch   Result Value Ref Range    Myoglobin, Qualitative Negative Negative   Uric Acid    Collection Time: 05/11/22  4:15 AM    Specimen: Blood   Result Value Ref Range    Uric Acid 8.8 (H) 3.4 - 7.0 mg/dL   Alpha - 1 - Antitrypsin    Collection Time: 05/11/22  4:15 AM    Specimen: Blood   Result Value Ref Range    ALPHA -1 ANTITRYPSIN 111.3 90 - 200 mg/dL   Hepatitis Panel, Acute    Collection Time: 05/11/22  4:15 AM    Specimen: Blood   Result Value Ref Range    Hepatitis B Surface Ag Non-Reactive Non-Reactive    Hep A IgM Non-Reactive Non-Reactive    Hep B C IgM Non-Reactive Non-Reactive    Hepatitis C Ab Non-Reactive Non-Reactive   Ferritin    Collection Time: 05/11/22  4:15 AM    Specimen: Blood   Result Value Ref Range    Ferritin 1,205.00 (H) 30.00 - 400.00 ng/mL   Folate    Collection Time: 05/11/22  4:15 AM    Specimen: Blood   Result Value Ref Range    Folate 3.59 (L) 4.78 - 24.20 ng/mL   Iron Profile    Collection Time: 05/11/22  4:15 AM    Specimen: Blood   Result Value Ref Range    Iron 101 59 - 158 mcg/dL    Iron  Saturation 87 (H) 20 - 50 %    Transferrin 78 (L) 200 - 360 mg/dL    TIBC 116 (L) 298 - 536 mcg/dL   TSH    Collection Time: 05/11/22  4:15 AM    Specimen: Blood   Result Value Ref Range    TSH 0.536 0.270 - 4.200 uIU/mL   Comprehensive Metabolic Panel    Collection Time: 05/11/22  4:15 AM    Specimen: Blood   Result Value Ref Range    Glucose 112 (H) 65 - 99 mg/dL    BUN 43 (H) 8 - 23 mg/dL    Creatinine 2.82 (H) 0.76 - 1.27 mg/dL    Sodium 137 136 - 145 mmol/L    Potassium 4.2 3.5 - 5.2 mmol/L    Chloride 100 98 - 107 mmol/L    CO2 20.0 (L) 22.0 - 29.0 mmol/L    Calcium 9.4 8.6 - 10.5 mg/dL    Total Protein 6.4 6.0 - 8.5 g/dL    Albumin 3.60 3.50 - 5.20 g/dL    ALT (SGPT) 25 1 - 41 U/L    AST (SGOT) 56 (H) 1 - 40 U/L    Alkaline Phosphatase 237 (H) 39 - 117 U/L    Total Bilirubin 20.9 (H) 0.0 - 1.2 mg/dL    Globulin 2.8 gm/dL    A/G Ratio 1.3 g/dL    BUN/Creatinine Ratio 15.2 7.0 - 25.0    Anion Gap 17.0 (H) 5.0 - 15.0 mmol/L    eGFR 22.9 (L) >60.0 mL/min/1.73   Protime-INR    Collection Time: 05/11/22  4:15 AM    Specimen: Blood   Result Value Ref Range    Protime 20.2 (H) 11.7 - 14.2 Seconds    INR 1.73 (H) 0.90 - 1.10   Ammonia    Collection Time: 05/11/22  4:15 AM    Specimen: Blood   Result Value Ref Range    Ammonia 26 16 - 60 umol/L   AFP Tumor Marker    Collection Time: 05/11/22  4:15 AM    Specimen: Blood   Result Value Ref Range    ALPHA-FETOPROTEIN 1.82 0 - 8.3 ng/mL   Lipid Panel    Collection Time: 05/11/22  4:15 AM    Specimen: Blood   Result Value Ref Range    Total Cholesterol 71 0 - 200 mg/dL    Triglycerides 104 0 - 150 mg/dL    HDL Cholesterol 10 (L) 40 - 60 mg/dL    LDL Cholesterol  41 0 - 100 mg/dL    VLDL Cholesterol 20 5 - 40 mg/dL    LDL/HDL Ratio 4.02    Hemoglobin A1c    Collection Time: 05/11/22  4:15 AM    Specimen: Blood   Result Value Ref Range    Hemoglobin A1C <4.30 (L) 4.80 - 5.60 %   CBC Auto Differential    Collection Time: 05/11/22  4:15 AM    Specimen: Blood   Result Value Ref  Range    WBC 12.97 (H) 3.40 - 10.80 10*3/mm3    RBC 2.61 (L) 4.14 - 5.80 10*6/mm3    Hemoglobin 9.2 (L) 13.0 - 17.7 g/dL    Hematocrit 25.0 (L) 37.5 - 51.0 %    MCV 95.8 79.0 - 97.0 fL    MCH 36.0 (H) 26.6 - 33.0 pg    MCHC 37.6 (H) 31.5 - 35.7 g/dL    RDW 14.8 12.3 - 15.4 %    RDW-SD 51.2 37.0 - 54.0 fl    MPV 10.5 6.0 - 12.0 fL    Platelets 129 (L) 140 - 450 10*3/mm3   Phosphorus    Collection Time: 05/11/22  4:15 AM    Specimen: Blood   Result Value Ref Range    Phosphorus 3.8 2.5 - 4.5 mg/dL   Manual Differential    Collection Time: 05/11/22  4:15 AM    Specimen: Blood   Result Value Ref Range    Neutrophil % 88.0 (H) 42.7 - 76.0 %    Lymphocyte % 4.0 (L) 19.6 - 45.3 %    Monocyte % 4.0 (L) 5.0 - 12.0 %    Eosinophil % 2.0 0.3 - 6.2 %    Basophil % 1.0 0.0 - 1.5 %    Metamyelocyte % 1.0 (H) 0.0 - 0.0 %    Neutrophils Absolute 11.41 (H) 1.70 - 7.00 10*3/mm3    Lymphocytes Absolute 0.52 (L) 0.70 - 3.10 10*3/mm3    Monocytes Absolute 0.52 0.10 - 0.90 10*3/mm3    Eosinophils Absolute 0.26 0.00 - 0.40 10*3/mm3    Basophils Absolute 0.13 0.00 - 0.20 10*3/mm3    Anisocytosis Slight/1+ None Seen    Macrocytes Slight/1+ None Seen    Polychromasia Mod/2+ None Seen    WBC Morphology Normal Normal    Platelet Morphology Normal Normal       Radiology:  Imaging Results (Last 24 Hours)     Procedure Component Value Units Date/Time    US Renal Bilateral [[id number]] Collected: 05/10/22 1906     Updated: 05/10/22 1926    Narrative:      BILATERAL RENAL SONOGRAM     HISTORY: Acute renal failure.     TECHNIQUE: Bilateral sonogram was performed in standard fashion and is  correlated with renal sonogram from November 2017 and right upper  quadrant sonogram 08/10/2020.      FINDINGS: There is a large volume of ascites, very large. There is no  hydronephrosis. Renal parenchyma appears grossly normal. The right  kidney measures 7.8 x 3.9 x 2.7 cm and the left kidney measures 8 x 3.9  x 3.1 cm. The urinary bladder is not optimally  demonstrated due to the  very large volume of ascites in the pelvis. What appears to be the  bladder appears grossly normal.       Impression:      There is no evidence of hydronephrosis. Very large volume of  ascites is observed.     This report was finalized on 5/10/2022 7:23 PM by Dr. Cliff Clemens M.D.                Medications have been reviewed:  Current Facility-Administered Medications   Medication Dose Route Frequency Provider Last Rate Last Admin   • acetaminophen (TYLENOL) tablet 650 mg  650 mg Oral Q4H PRN Memo Varela MD   650 mg at 05/11/22 0422   • carbidopa-levodopa (SINEMET)  MG per tablet 1 tablet  1 tablet Oral TID Memo Varela MD   1 tablet at 05/11/22 0812   • lactulose (CHRONULAC) 10 GM/15ML solution 30 g  30 g Oral TID Memo Varela MD   30 g at 05/11/22 0812   • midodrine (PROAMATINE) tablet 5 mg  5 mg Oral TID AC Franky Moreau MD   5 mg at 05/11/22 0639   • octreotide (sandoSTATIN) injection 100 mcg  100 mcg Intravenous TID Franky Moreau MD   100 mcg at 05/11/22 0812   • ondansetron (ZOFRAN) injection 4 mg  4 mg Intravenous Q6H PRN Memo Varela MD   4 mg at 05/11/22 0010   • pantoprazole (PROTONIX) EC tablet 40 mg  40 mg Oral BID AC Memo Varela MD   40 mg at 05/11/22 0639   • riFAXIMin (XIFAXAN) tablet 550 mg  550 mg Oral Q12H Memo Varela MD   550 mg at 05/11/22 0811   • sodium chloride 0.9 % flush 10 mL  10 mL Intravenous PRN Jovanni Guerra MD       • sodium chloride 0.9 % infusion  75 mL/hr Intravenous Continuous Franky Moreau MD           ASSESSMENT:   acute renal failure.  Had previous severe renal failure due to rhabdomyolysis in 2018 but that resolved and his baseline creatinine was 1.4.  No obstruction on renal ultrasound, urine studies not suggestive of hepatorenal, seem to be more indicative of ATN.  CK level was normal, BNP slightly elevated, likely from STEVEN.  CKD stage IIIa, baseline creatinine 1.4, had been stable prior to  admission  Cryptogenic cirrhosis    Hepatic encephalopathy (HCC)  Ascites requiring paracentesis  Metabolic encephalopathy, likely from elevated ammonia levels  Hyponatremia secondary to cirrhosis/STEVEN  Mild metabolic acidosis  Hypoalbuminemia secondary to poor nutrition and cirrhosis, improved after IV albumin  Anemia of chronic disease, iron studies show no iron deficiency, elevated ferritin        PLAN:   Renal function slightly improved today but still oliguric  Urine studies not suggestive of hepatorenal syndrome with high urine sodium and chloride  We will continue midodrine but okay to stop octreotide  Good response to IV albumin  Hold any additional IV albumin for now but will give with any planned paracentesis  Renal ultrasound no obstruction, urine studies not suggestive of any glomerulonephritis  CK level was normal  Continue to hold diuretics.  Gentle hydration with IV fluids     Continue to monitor electrolytes and volume closely, avoid IV contrast and nephrotoxic medications     Guarded prognosis.  Discussed with Dr. Avery Moreau MD   [facility]   Office phone number: [phone]  Answering service phone number: [phone]    05/11/22  12:02 EDT    Dictation performed using Dragon dictation software

## 2022-05-11 NOTE — PLAN OF CARE
Goal Outcome Evaluation:  Plan of Care Reviewed With: patient        Progress: no change  Outcome Evaluation: Vitals stable. A&Ox4. Both albumin infusions completed. IV replaced. Fall precautions in place. Had 2 BMs tonight. Lactulose continued. Poor sleep tonight. Will continue to monitor.

## 2022-05-11 NOTE — PROGRESS NOTES
"Daily progress note    Chief complaint  Doing same  No new complaints  Still with abdominal pain but no nausea vomiting diarrhea  Family at bedside    History of present illness  71-year-old white male who is well-known to our service with history of hypertension hyperlipidemia coronary artery disease chronic kidney disease and cirrhosis with ascites followed by nephrology gastroenterology presented to [facility] emergency room with mental status changes generalized weakness and recurrent falls.  Patient unable to give detailed history most of the history obtained from the wife and daughter who reported that he has been feeling lousy due to weakness and not making sense at times but no fever chills chest pain shortness of breath abdominal pain vomiting diarrhea.  Patient does have nausea fatigue tiredness poor appetite and increased distention.  Patient has been getting paracentesis and the last one was 5 weeks ago.  Patient work-up in ER revealed hepatorenal syndrome with acute hepatic encephalopathy admit for management.  Patient also found to have right brain mass consistent with meningioma with no history of meningioma in the past.     REVIEW OF SYSTEMS  Constitutional: Positive for fatigue. Negative for diaphoresis and fever.   Respiratory: Negative for chest tightness.    Cardiovascular: Negative for chest pain.   Gastrointestinal: Positive for abdominal pain and nausea. Negative for blood in stool.   Neurological: Positive for syncope and weakness.      PHYSICAL EXAM   Blood pressure 126/65, pulse 61, temperature 97.2 °F (36.2 °C), temperature source Oral, resp. rate 18, height 188 cm (74\"), weight 90.3 kg (199 lb), SpO2 96 %.    Constitutional:  Awake and alert and no respiratory distress  HEENT:  Unremarkable  Neck: Normal range of motion. Neck supple. No JVD present.   Cardiovascular: Normal rate, regular rhythm and normal heart sounds. No murmur heard.  Pulmonary/Chest: Effort normal and " decreased breath sounds bilaterally  Abdominal: Soft.  Distended and mild tenderness bowel sounds positive  Musculoskeletal: Normal range of motion. No edema, tenderness or deformity.   Neurological:  Pleasantly confused but follow commands  Skin: Skin is warm and dry. No rash noted. Pt. is not diaphoretic. No erythema.   Psychiatric: Mood, affect normal.  He is pleasant and cooperative.     LAB RESULTS  Lab Results (last 24 hours)     Procedure Component Value Units Date/Time    AFP Tumor Marker [[id number]]  (Normal) Collected: 05/11/22 0415    Specimen: Blood Updated: 05/11/22 0830     ALPHA-FETOPROTEIN 1.82 ng/mL     Narrative:      Alpha Fetoprotein Tumor Marker Reference Range:    0.0-8.3 ng/mL    Note: Normal values apply only to males and nonpregnant females. These results are not interpretable for pregnant females.    Results may be falsely decreased if patient taking Biotin.      Alpha - 1 - Antitrypsin [[id number]]  (Normal) Collected: 05/11/22 0415    Specimen: Blood Updated: 05/11/22 0821     ALPHA -1 ANTITRYPSIN 111.3 mg/dL     Hemoglobin A1c [[id number]]  (Abnormal) Collected: 05/11/22 0415    Specimen: Blood Updated: 05/11/22 0747     Hemoglobin A1C <4.30 %     Narrative:      Hemoglobin A1C Ranges:    Increased Risk for Diabetes  5.7% to 6.4%  Diabetes                     >= 6.5%  Diabetic Goal                < 7.0%    Lipid Panel [[id number]]  (Abnormal) Collected: 05/11/22 0415    Specimen: Blood Updated: 05/11/22 0539     Total Cholesterol 71 mg/dL      Triglycerides 104 mg/dL      HDL Cholesterol 10 mg/dL      LDL Cholesterol  41 mg/dL      VLDL Cholesterol 20 mg/dL      LDL/HDL Ratio 4.02    Narrative:      Cholesterol Reference Ranges  (U.S. Department of Health and Human Services ATP III Classifications)    Desirable          <200 mg/dL  Borderline High    200-239 mg/dL  High Risk          >240 mg/dL      Triglyceride Reference Ranges  (U.S. Department of Health and Human Services ATP III  Classifications)    Normal           <150 mg/dL  Borderline High  150-199 mg/dL  High             200-499 mg/dL  Very High        >500 mg/dL    HDL Reference Ranges  (U.S. Department of Health and Human Services ATP III Classifications)    Low     <40 mg/dl (major risk factor for CHD)  High    >60 mg/dl ('negative' risk factor for CHD)        LDL Reference Ranges  (U.S. Department of Health and Human Services ATP III Classifications)    Optimal          <100 mg/dL  Near Optimal     100-129 mg/dL  Borderline High  130-159 mg/dL  High             160-189 mg/dL  Very High        >189 mg/dL    Comprehensive Metabolic Panel [[id number]]  (Abnormal) Collected: 05/11/22 0415    Specimen: Blood Updated: 05/11/22 0529     Glucose 112 mg/dL      BUN 43 mg/dL      Creatinine 2.82 mg/dL      Sodium 137 mmol/L      Potassium 4.2 mmol/L      Chloride 100 mmol/L      CO2 20.0 mmol/L      Calcium 9.4 mg/dL      Total Protein 6.4 g/dL      Albumin 3.60 g/dL      ALT (SGPT) 25 U/L      AST (SGOT) 56 U/L      Alkaline Phosphatase 237 U/L      Total Bilirubin 20.9 mg/dL      Globulin 2.8 gm/dL      A/G Ratio 1.3 g/dL      BUN/Creatinine Ratio 15.2     Anion Gap 17.0 mmol/L      eGFR 22.9 mL/min/1.73      Comment: National Kidney Foundation and American Society of Nephrology (ASN) Task Force recommended calculation based on the Chronic Kidney Disease Epidemiology Collaboration (CKD-EPI) equation refit without adjustment for race.       Narrative:      GFR Normal >60  Chronic Kidney Disease <60  Kidney Failure <15      Ammonia [[id number]]  (Normal) Collected: 05/11/22 0415    Specimen: Blood Updated: 05/11/22 0528     Ammonia 26 umol/L     Manual Differential [[id number]]  (Abnormal) Collected: 05/11/22 0415    Specimen: Blood Updated: 05/11/22 0528     Neutrophil % 88.0 %      Lymphocyte % 4.0 %      Monocyte % 4.0 %      Eosinophil % 2.0 %      Basophil % 1.0 %      Metamyelocyte % 1.0 %      Neutrophils Absolute 11.41 10*3/mm3       Lymphocytes Absolute 0.52 10*3/mm3      Monocytes Absolute 0.52 10*3/mm3      Eosinophils Absolute 0.26 10*3/mm3      Basophils Absolute 0.13 10*3/mm3      Anisocytosis Slight/1+     Macrocytes Slight/1+     Polychromasia Mod/2+     WBC Morphology Normal     Platelet Morphology Normal    CBC & Differential [[id number]]  (Abnormal) Collected: 05/11/22 0415    Specimen: Blood Updated: 05/11/22 0528    Narrative:      The following orders were created for panel order CBC & Differential.  Procedure                               Abnormality         Status                     ---------                               -----------         ------                     CBC Auto Differential[[id number]]        Abnormal            Final result                 Please view results for these tests on the individual orders.    CBC Auto Differential [[id number]]  (Abnormal) Collected: 05/11/22 0415    Specimen: Blood Updated: 05/11/22 0528     WBC 12.97 10*3/mm3      RBC 2.61 10*6/mm3      Hemoglobin 9.2 g/dL      Hematocrit 25.0 %      MCV 95.8 fL      MCH 36.0 pg      MCHC 37.6 g/dL      RDW 14.8 %      RDW-SD 51.2 fl      MPV 10.5 fL      Platelets 129 10*3/mm3     Iron Profile [[id number]]  (Abnormal) Collected: 05/11/22 0415    Specimen: Blood Updated: 05/11/22 0527     Iron 101 mcg/dL      Iron Saturation 87 %      Transferrin 78 mg/dL      TIBC 116 mcg/dL     Uric Acid [[id number]]  (Abnormal) Collected: 05/11/22 0415    Specimen: Blood Updated: 05/11/22 0527     Uric Acid 8.8 mg/dL     Phosphorus [[id number]]  (Normal) Collected: 05/11/22 0415    Specimen: Blood Updated: 05/11/22 0527     Phosphorus 3.8 mg/dL     Folate [[id number]]  (Abnormal) Collected: 05/11/22 0415    Specimen: Blood Updated: 05/11/22 0526     Folate 3.59 ng/mL     Narrative:      Results may be falsely increased if patient taking Biotin.      TSH [[id number]]  (Normal) Collected: 05/11/22 0415    Specimen: Blood Updated: 05/11/22 0526     TSH 0.536 uIU/mL      Ferritin [[id number]]  (Abnormal) Collected: 05/11/22 0415    Specimen: Blood Updated: 05/11/22 0526     Ferritin 1,205.00 ng/mL     Narrative:      Results may be falsely decreased if patient taking Biotin.      Hepatitis Panel, Acute [[id number]]  (Normal) Collected: 05/11/22 0415    Specimen: Blood Updated: 05/11/22 0516     Hepatitis B Surface Ag Non-Reactive     Hep A IgM Non-Reactive     Hep B C IgM Non-Reactive     Hepatitis C Ab Non-Reactive    Narrative:      Results may be falsely decreased if patient taking Biotin.     Protime-INR [[id number]]  (Abnormal) Collected: 05/11/22 0415    Specimen: Blood Updated: 05/11/22 0459     Protime 20.2 Seconds      INR 1.73    Protein Electrophoresis, Total [[id number]] Collected: 05/11/22 0415    Specimen: Blood Updated: 05/11/22 0435    Anti-Smooth Muscle Antibody Titer [[id number]] Collected: 05/11/22 0415    Specimen: Blood Updated: 05/11/22 0435    Mitochondrial Antibodies, M2 [[id number]] Collected: 05/11/22 0415    Specimen: Blood Updated: 05/11/22 0435    Eosinophil Smear - Urine, Urine, Clean Catch [[id number]]  (Normal) Collected: 05/10/22 1330    Specimen: Urine, Clean Catch Updated: 05/10/22 1701     Eosinophil Smear 0 % EOS/100 Cells     Myoglobin Screen, Urine - Urine, Clean Catch [[id number]]  (Normal) Collected: 05/10/22 1330    Specimen: Urine, Clean Catch Updated: 05/10/22 1518     Myoglobin, Qualitative Negative    Chloride, Urine, Random - Urine, Clean Catch [[id number]] Collected: 05/10/22 0651    Specimen: Urine, Clean Catch Updated: 05/10/22 1512     Chloride, Urine 54 mmol/L     Narrative:      Reference intervals for random urine have not been established.  Clinical usage is dependent upon physician's interpretation in combination with other laboratory tests.       Sodium, Urine, Random - Urine, Clean Catch [[id number]] Collected: 05/10/22 0651    Specimen: Urine, Clean Catch Updated: 05/10/22 1510     Sodium, Urine 67 mmol/L     Narrative:      Reference  intervals for random urine have not been established.  Clinical usage is dependent upon physician's interpretation in combination with other laboratory tests.       Urea Nitrogen, Urine - Urine, Clean Catch [[id number]] Collected: 05/10/22 0651    Specimen: Urine, Clean Catch Updated: 05/10/22 1510     Urea Nitrogen, Urine 487 mg/dL     Narrative:      Reference intervals for random urine have not been established.  Clinical usage is dependent upon physician's interpretation in combination with other laboratory tests.       Protein, Urine, Random - Urine, Clean Catch [[id number]] Collected: 05/10/22 0651    Specimen: Urine, Clean Catch Updated: 05/10/22 1510     Total Protein, Urine 10.5 mg/dL     Narrative:      Reference intervals for random urine have not been established.  Clinical usage is dependent upon physician's interpretation in combination with other laboratory tests.       Creatinine, Urine, Random - Urine, Clean Catch [[id number]] Collected: 05/10/22 0651    Specimen: Urine, Clean Catch Updated: 05/10/22 1510     Creatinine, Urine 92.4 mg/dL     Narrative:      Reference intervals for random urine have not been established.  Clinical usage is dependent upon physician's interpretation in combination with other laboratory tests.           Imaging Results (Last 24 Hours)     Procedure Component Value Units Date/Time    US Renal Bilateral [[id number]] Collected: 05/10/22 1906     Updated: 05/10/22 1926    Narrative:      BILATERAL RENAL SONOGRAM     HISTORY: Acute renal failure.     TECHNIQUE: Bilateral sonogram was performed in standard fashion and is  correlated with renal sonogram from November 2017 and right upper  quadrant sonogram 08/10/2020.      FINDINGS: There is a large volume of ascites, very large. There is no  hydronephrosis. Renal parenchyma appears grossly normal. The right  kidney measures 7.8 x 3.9 x 2.7 cm and the left kidney measures 8 x 3.9  x 3.1 cm. The urinary bladder is not optimally  demonstrated due to the  very large volume of ascites in the pelvis. What appears to be the  bladder appears grossly normal.       Impression:      There is no evidence of hydronephrosis. Very large volume of  ascites is observed.     This report was finalized on 5/10/2022 7:23 PM by Dr. Cliff Clemens M.D.           ECG 12 Lead  Component   Ref Range & Units 1 d ago    QT Interval   ms 444    Resulting Agency  ECG             HEART RATE= 69  bpm  RR Interval= 892  ms  CA Interval= 164  ms  P Horizontal Axis= 4  deg  P Front Axis= 29  deg  QRSD Interval= 107  ms  QT Interval= 444  ms  QRS Axis= -30  deg  T Wave Axis= 111  deg  - BORDERLINE ECG -  Sinus rhythm  Ventricular premature complex  Left axis deviation  No change from previous             Current Facility-Administered Medications:   •  acetaminophen (TYLENOL) tablet 650 mg, 650 mg, Oral, Q4H PRN, Memo Varela MD, 650 mg at 05/11/22 0422  •  carbidopa-levodopa (SINEMET)  MG per tablet 1 tablet, 1 tablet, Oral, TID, Memo Varela MD, 1 tablet at 05/11/22 0812  •  lactulose (CHRONULAC) 10 GM/15ML solution 30 g, 30 g, Oral, TID, Memo Varela MD, 30 g at 05/11/22 0812  •  midodrine (PROAMATINE) tablet 5 mg, 5 mg, Oral, TID Lizzeth JAVIER Andrew James, MD, 5 mg at 05/11/22 0639  •  ondansetron (ZOFRAN) injection 4 mg, 4 mg, Intravenous, Q6H PRN, Memo Varela MD, 4 mg at 05/11/22 0010  •  pantoprazole (PROTONIX) EC tablet 40 mg, 40 mg, Oral, BID AC, Memo Varela MD, 40 mg at 05/11/22 0639  •  riFAXIMin (XIFAXAN) tablet 550 mg, 550 mg, Oral, Q12H, Memo Varela MD, 550 mg at 05/11/22 0811  •  sodium chloride 0.9 % flush 10 mL, 10 mL, Intravenous, PRN, Jovanni Guerra MD  •  sodium chloride 0.9 % infusion, 75 mL/hr, Intravenous, Continuous, Franky Moreau MD, Last Rate: 75 mL/hr at 05/11/22 1217, 75 mL/hr at 05/11/22 1217    ASSESSMENT  Hepatorenal syndrome  Cirrhosis with ascites cryptogenic  Jaundice  Hepatic encephalopathy  Acute kidney  injury  Acute UTI  Chronic kidney disease stage III  Brain tumor consistent with meningioma  Hypotension  Hyperlipidemia  Coronary artery disease  Chronic anemia  Parkinson's disease  Esophageal varices and colon polyps  Gastroesophageal reflux disease    PLAN  CPM  Discontinue IV fluid  Stop octreotide  Midodrine  Empiric antibiotics  Paracentesis today  Continue to hold diuretics  Continue lactulose Protonix rifaximin Proamatine and Sinemet  Stress ulcer DVT prophylaxis  Neurosurgery consult appreciated  GI and nephrology to follow patient  Adjust home medications  Supportive care  PT/OT  Discussed with family including wife daughter nursing staff and nephrology  Follow closely further recommendation current hospital course    KEITH BEJARANO MD

## 2022-05-11 NOTE — PROGRESS NOTES
[facility]  Inpatient Progress Note    Reason for Follow Up: Cirrhosis    Subjective     Interval History:   Still with abdominal distention    Current Facility-Administered Medications:   •  acetaminophen (TYLENOL) tablet 650 mg, 650 mg, Oral, Q4H PRN, Memo Varela MD, 650 mg at 05/11/22 0422  •  carbidopa-levodopa (SINEMET)  MG per tablet 1 tablet, 1 tablet, Oral, TID, Memo Varela MD, 1 tablet at 05/11/22 0812  •  lactulose (CHRONULAC) 10 GM/15ML solution 30 g, 30 g, Oral, TID, Memo Varela MD, 30 g at 05/11/22 0812  •  midodrine (PROAMATINE) tablet 5 mg, 5 mg, Oral, TID YAYA, Franky Moreau MD, 5 mg at 05/11/22 0639  •  ondansetron (ZOFRAN) injection 4 mg, 4 mg, Intravenous, Q6H PRN, Memo Varela MD, 4 mg at 05/11/22 0010  •  pantoprazole (PROTONIX) EC tablet 40 mg, 40 mg, Oral, BID AC, Memo Varela MD, 40 mg at 05/11/22 0639  •  riFAXIMin (XIFAXAN) tablet 550 mg, 550 mg, Oral, Q12H, Memo Varela MD, 550 mg at 05/11/22 0811  •  sodium chloride 0.9 % flush 10 mL, 10 mL, Intravenous, PRN, Jovanni Guerra MD  •  sodium chloride 0.9 % infusion, 75 mL/hr, Intravenous, Continuous, Franky Moreau MD, Last Rate: 75 mL/hr at 05/11/22 1217, 75 mL/hr at 05/11/22 1217  Review of Systems:    There is weakness and fatigue all other systems reviewed and negative    Objective     Vital Signs  Temp:  [97.4 °F (36.3 °C)-98.2 °F (36.8 °C)] 98.2 °F (36.8 °C)  Heart Rate:  [63-70] 63  Resp:  [18] 18  BP: (104-129)/(43-74) 129/74  Body mass index is 25.55 kg/m².    Intake/Output Summary (Last 24 hours) at 5/11/2022 1249  Last data filed at 5/11/2022 0812  Gross per 24 hour   Intake 720 ml   Output 100 ml   Net 620 ml     I/O this shift:  In: 240 [P.O.:240]  Out: -      Physical Exam:   General: patient awake, alert and cooperative   Eyes: Normal lids and lashes, no scleral icterus   Neck: supple, normal ROM   Skin: warm and dry, not jaundiced   Cardiovascular: regular rhythm and  rate, no murmurs auscultated   Pulm: clear to auscultation bilaterally, regular and unlabored   Abdomen: soft, nontender, nondistended; normal bowel sounds   Extremities: no rash or edema   Psychiatric: Normal mood and behavior; memory intact     Results Review:     I reviewed the patient's new clinical results.    Results from last 7 days   Lab Units 05/11/22  0415 05/10/22  0456 05/09/22  1413   WBC 10*3/mm3 12.97* 9.51 10.69   HEMOGLOBIN g/dL 9.2* 10.4* 11.3*   HEMATOCRIT % 25.0* 28.1* 30.9*   PLATELETS 10*3/mm3 129* 148 179     Results from last 7 days   Lab Units 05/11/22  0415 05/10/22  0456 05/09/22  1413   SODIUM mmol/L 137 133* 133*   POTASSIUM mmol/L 4.2 4.4 4.3   CHLORIDE mmol/L 100 99 99   CO2 mmol/L 20.0* 21.2* 20.1*   BUN mg/dL 43* 44* 45*   CREATININE mg/dL 2.82* 3.19* 2.97*   CALCIUM mg/dL 9.4 9.1 9.2   BILIRUBIN mg/dL 20.9* 17.7* 18.3*   ALK PHOS U/L 237* 296* 326*   ALT (SGPT) U/L 25 11 27   AST (SGOT) U/L 56* 62* 71*   GLUCOSE mg/dL 112* 110* 114*     Results from last 7 days   Lab Units 05/11/22  0415 05/09/22  1413   INR  1.73* 1.39*     No results found for: LIPASE    Radiology:  US Renal Bilateral   Final Result   There is no evidence of hydronephrosis. Very large volume of   ascites is observed.       This report was finalized on 5/10/2022 7:23 PM by Dr. Cliff Clemens M.D.          CT Head Without Contrast   Final Result   Solitary hyperdense dural based mass in the right   parafalcine region at the vertex consistent with a meningioma as   described. This produces no significant mass effect on the adjacent   frontal lobe and there is no edema within the brain or adjacent to the   lesion. Paranasal Sinus disease as noted. Otherwise unremarkable CT scan   of the head. No acute intracranial abnormality is identified.       This report was finalized on 5/9/2022 5:23 PM by Dr. Gaetano Kim M.D.          XR Chest 1 View   Final Result   Right base atelectasis.       This report was finalized  on 5/9/2022 12:43 PM by Dr. Cliff Clemens M.D.              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 Cirrhosis etiology unknown  2. Jaundice  3. Elevated ammonia  4. Colon polyps  5. Grade 2 varices found 2 months ago     Plan:  · Renal function improved  · Nephrology consult reviewed  · Diuretics per nephrology  · Lactulose 20 3 times daily, appropriate response  · No indication for EGD or colonoscopy at this time  · Patient should have paracentesis, please send fluid for cell count, differential, gram stain, culture albumin and total protein  · Albumin should be replaced at 5 g/L removed above 3 L    I discussed the patients findings and my recommendations with patient and nursing staff.    Lavell Augustine MD

## 2022-05-11 NOTE — DISCHARGE PLACEMENT REQUEST
"Hu Burgess (71 y.o. Male)             Date of Birth   1950    Social Security Number       Address   [address] [place] [address]    Home Phone   [phone]    MRN   [id number]       [employer]   S[employer]ism    Marital Status                               Admission Date   5/9/22    Admission Type   Emergency    Admitting Provider   Memo Varela MD    Attending Provider   Memo Varela MD    Department, Room/Bed   [address], E448/1       Discharge Date       Discharge Disposition       Discharge Destination                               Attending Provider: Memo Varela MD    Allergies: Statins    Isolation: None   Infection: None   Code Status: CPR   Advance Care Planning Activity    Ht: 188 cm (74\")   Wt: 90.3 kg (199 lb)    Admission Cmt: None   Principal Problem: None                Active Insurance as of 5/9/2022     Primary Coverage     Payor Plan Insurance Group Employer/Plan Group    MEDICARE MEDICARE A & B      Payor Plan Address Payor Plan Phone Number Payor Plan Fax Number Effective Dates    [address] [phone]  9/1/2013 - None Entered    [facility] [id number]       Subscriber Name Subscriber Birth Date Member ID       HU BURGESS 1950 [id number]           Secondary Coverage     Payor Plan Insurance Group Employer/Plan Group    [place] [id number]     Payor Plan Address Payor Plan Phone Number Payor Plan Fax Number Effective Dates    [address]   1/13/2001 - None Entered    [facility] [id number]       Subscriber Name Subscriber Birth Date Member ID       CORI BURGESS L 8/21/1960 [id number]                 Emergency Contacts      (Rel.) Home Phone Work Phone Mobile Phone    Cori Burgess (Spouse) [phone] -- [phone]    MISSYROSEMARIE (Daughter) -- -- [phone]              "

## 2022-05-12 ENCOUNTER — TELEPHONE (OUTPATIENT)
Dept: INTERNAL MEDICINE | Facility: CLINIC | Age: 72
End: 2022-05-12

## 2022-05-12 LAB
ALBUMIN SERPL ELPH-MCNC: 3.2 G/DL (ref 2.9–4.4)
ALBUMIN SERPL-MCNC: 3 G/DL (ref 3.5–5.2)
ALBUMIN/GLOB SERPL: 0.9 G/DL
ALBUMIN/GLOB SERPL: 1 {RATIO} (ref 0.7–1.7)
ALP SERPL-CCNC: 230 U/L (ref 39–117)
ALPHA1 GLOB SERPL ELPH-MCNC: 0.2 G/DL (ref 0–0.4)
ALPHA2 GLOB SERPL ELPH-MCNC: 0.4 G/DL (ref 0.4–1)
ALT SERPL W P-5'-P-CCNC: 22 U/L (ref 1–41)
AMMONIA BLD-SCNC: 39 UMOL/L (ref 16–60)
ANION GAP SERPL CALCULATED.3IONS-SCNC: 11.2 MMOL/L (ref 5–15)
AST SERPL-CCNC: 56 U/L (ref 1–40)
B-GLOBULIN SERPL ELPH-MCNC: 0.7 G/DL (ref 0.7–1.3)
BASOPHILS # BLD AUTO: 0.08 10*3/MM3 (ref 0–0.2)
BASOPHILS NFR BLD AUTO: 0.7 % (ref 0–1.5)
BILIRUB SERPL-MCNC: 19.5 MG/DL (ref 0–1.2)
BUN SERPL-MCNC: 45 MG/DL (ref 8–23)
BUN/CREAT SERPL: 17 (ref 7–25)
CALCIUM SPEC-SCNC: 9 MG/DL (ref 8.6–10.5)
CHLORIDE SERPL-SCNC: 104 MMOL/L (ref 98–107)
CO2 SERPL-SCNC: 20.8 MMOL/L (ref 22–29)
CREAT SERPL-MCNC: 2.65 MG/DL (ref 0.76–1.27)
DEPRECATED RDW RBC AUTO: 56.5 FL (ref 37–54)
EGFRCR SERPLBLD CKD-EPI 2021: 24.7 ML/MIN/1.73
EOSINOPHIL # BLD AUTO: 0.47 10*3/MM3 (ref 0–0.4)
EOSINOPHIL NFR BLD AUTO: 4.2 % (ref 0.3–6.2)
ERYTHROCYTE [DISTWIDTH] IN BLOOD BY AUTOMATED COUNT: 15.2 % (ref 12.3–15.4)
GAMMA GLOB SERPL ELPH-MCNC: 1.9 G/DL (ref 0.4–1.8)
GLOBULIN SER CALC-MCNC: 3.2 G/DL (ref 2.2–3.9)
GLOBULIN UR ELPH-MCNC: 3.3 GM/DL
GLUCOSE SERPL-MCNC: 118 MG/DL (ref 65–99)
HCT VFR BLD AUTO: 27.9 % (ref 37.5–51)
HGB BLD-MCNC: 10 G/DL (ref 13–17.7)
LABORATORY COMMENT REPORT: ABNORMAL
LYMPHOCYTES # BLD AUTO: 1.04 10*3/MM3 (ref 0.7–3.1)
LYMPHOCYTES NFR BLD AUTO: 9.4 % (ref 19.6–45.3)
M PROTEIN SERPL ELPH-MCNC: ABNORMAL G/DL
MCH RBC QN AUTO: 36.1 PG (ref 26.6–33)
MCHC RBC AUTO-ENTMCNC: 35.8 G/DL (ref 31.5–35.7)
MCV RBC AUTO: 100.7 FL (ref 79–97)
MITOCHONDRIA M2 IGG SER-ACNC: 20.2 UNITS (ref 0–20)
MONOCYTES # BLD AUTO: 1.63 10*3/MM3 (ref 0.1–0.9)
MONOCYTES NFR BLD AUTO: 14.7 % (ref 5–12)
NEUTROPHILS NFR BLD AUTO: 69.2 % (ref 42.7–76)
NEUTROPHILS NFR BLD AUTO: 7.65 10*3/MM3 (ref 1.7–7)
PLATELET # BLD AUTO: 144 10*3/MM3 (ref 140–450)
PMV BLD AUTO: 10.9 FL (ref 6–12)
POTASSIUM SERPL-SCNC: 3.7 MMOL/L (ref 3.5–5.2)
PROT SERPL-MCNC: 6.3 G/DL (ref 6–8.5)
PROT SERPL-MCNC: 6.4 G/DL (ref 6–8.5)
RBC # BLD AUTO: 2.77 10*6/MM3 (ref 4.14–5.8)
SMA IGG SER-ACNC: 33 UNITS (ref 0–19)
SODIUM SERPL-SCNC: 136 MMOL/L (ref 136–145)
WBC NRBC COR # BLD: 11.07 10*3/MM3 (ref 3.4–10.8)

## 2022-05-12 PROCEDURE — 99232 SBSQ HOSP IP/OBS MODERATE 35: CPT | Performed by: NURSE PRACTITIONER

## 2022-05-12 PROCEDURE — P9047 ALBUMIN (HUMAN), 25%, 50ML: HCPCS | Performed by: INTERNAL MEDICINE

## 2022-05-12 PROCEDURE — 25010000002 ALBUMIN HUMAN 25% PER 50 ML: Performed by: INTERNAL MEDICINE

## 2022-05-12 PROCEDURE — 80053 COMPREHEN METABOLIC PANEL: CPT | Performed by: HOSPITALIST

## 2022-05-12 PROCEDURE — 25010000002 CEFTRIAXONE PER 250 MG: Performed by: HOSPITALIST

## 2022-05-12 PROCEDURE — 82140 ASSAY OF AMMONIA: CPT | Performed by: HOSPITALIST

## 2022-05-12 PROCEDURE — 85025 COMPLETE CBC W/AUTO DIFF WBC: CPT | Performed by: HOSPITALIST

## 2022-05-12 RX ORDER — ALBUMIN (HUMAN) 12.5 G/50ML
75 SOLUTION INTRAVENOUS ONCE
Status: COMPLETED | OUTPATIENT
Start: 2022-05-12 | End: 2022-05-12

## 2022-05-12 RX ADMIN — CARBIDOPA AND LEVODOPA 1 TABLET: 10; 100 TABLET ORAL at 17:31

## 2022-05-12 RX ADMIN — ALBUMIN HUMAN 75 G: 0.25 SOLUTION INTRAVENOUS at 18:48

## 2022-05-12 RX ADMIN — MIDODRINE HYDROCHLORIDE 5 MG: 5 TABLET ORAL at 12:46

## 2022-05-12 RX ADMIN — SODIUM CHLORIDE 75 ML/HR: 9 INJECTION, SOLUTION INTRAVENOUS at 06:22

## 2022-05-12 RX ADMIN — LACTULOSE 30 G: 10 SOLUTION ORAL at 21:06

## 2022-05-12 RX ADMIN — SODIUM CHLORIDE 75 ML/HR: 9 INJECTION, SOLUTION INTRAVENOUS at 08:39

## 2022-05-12 RX ADMIN — CEFTRIAXONE 1 G: 1 INJECTION, POWDER, FOR SOLUTION INTRAMUSCULAR; INTRAVENOUS at 17:21

## 2022-05-12 RX ADMIN — MIDODRINE HYDROCHLORIDE 5 MG: 5 TABLET ORAL at 18:56

## 2022-05-12 RX ADMIN — PANTOPRAZOLE SODIUM 40 MG: 40 TABLET, DELAYED RELEASE ORAL at 17:32

## 2022-05-12 RX ADMIN — CARBIDOPA AND LEVODOPA 1 TABLET: 10; 100 TABLET ORAL at 08:28

## 2022-05-12 RX ADMIN — RIFAXIMIN 550 MG: 550 TABLET ORAL at 08:28

## 2022-05-12 RX ADMIN — RIFAXIMIN 550 MG: 550 TABLET ORAL at 21:06

## 2022-05-12 RX ADMIN — PANTOPRAZOLE SODIUM 40 MG: 40 TABLET, DELAYED RELEASE ORAL at 06:18

## 2022-05-12 RX ADMIN — LACTULOSE 30 G: 10 SOLUTION ORAL at 08:30

## 2022-05-12 RX ADMIN — LACTULOSE 30 G: 10 SOLUTION ORAL at 17:33

## 2022-05-12 RX ADMIN — CARBIDOPA AND LEVODOPA 1 TABLET: 10; 100 TABLET ORAL at 21:06

## 2022-05-12 NOTE — PROGRESS NOTES
"Daily progress note    Chief complaint  Doing same  No new complaints  Pleasantly confused  Still with abdominal distention  Family at bedside    History of present illness  71-year-old white male who is well-known to our service with history of hypertension hyperlipidemia coronary artery disease chronic kidney disease and cirrhosis with ascites followed by nephrology gastroenterology presented to [facility] emergency room with mental status changes generalized weakness and recurrent falls.  Patient unable to give detailed history most of the history obtained from the wife and daughter who reported that he has been feeling lousy due to weakness and not making sense at times but no fever chills chest pain shortness of breath abdominal pain vomiting diarrhea.  Patient does have nausea fatigue tiredness poor appetite and increased distention.  Patient has been getting paracentesis and the last one was 5 weeks ago.  Patient work-up in ER revealed hepatorenal syndrome with acute hepatic encephalopathy admit for management.  Patient also found to have right brain mass consistent with meningioma with no history of meningioma in the past.     REVIEW OF SYSTEMS  Unable to obtain     PHYSICAL EXAM   Blood pressure 131/58, pulse 78, temperature 97.7 °F (36.5 °C), temperature source Oral, resp. rate 18, height 188 cm (74\"), weight 87 kg (191 lb 14.4 oz), SpO2 97 %.    Constitutional:  Awake and alert and no respiratory distress  HEENT:  Unremarkable  Neck: Normal range of motion. Neck supple. No JVD present.   Cardiovascular: Normal rate, regular rhythm and normal heart sounds. No murmur heard.  Pulmonary/Chest: Effort normal and decreased breath sounds bilaterally  Abdominal: Soft.  Distended and mild tenderness bowel sounds positive  Musculoskeletal: Normal range of motion. No edema, tenderness or deformity.   Neurological:  Pleasantly confused but follow commands  Skin: Skin is warm and dry. No rash noted. Pt. is " not diaphoretic. No erythema.   Psychiatric: Mood, affect normal.  He is pleasant and cooperative.     LAB RESULTS  Lab Results (last 24 hours)     Procedure Component Value Units Date/Time    Anti-Smooth Muscle Antibody Titer [[id number]]  (Abnormal) Collected: 05/11/22 0415    Specimen: Blood Updated: 05/12/22 1413     Smooth Muscle Ab 33 Units      Comment:                  Negative                     0 - 19                   Weak positive               20 - 30                   Moderate to strong positive     >30   Actin Antibodies are found in 52-85% of patients with   autoimmune hepatitis or chronic active hepatitis and   in 22% of patients with primary biliary cirrhosis.       Narrative:      Performed at:  01 - [facility] [address]  [id number]  : Todd Harper PhD, Phone:  [phone]    Mitochondrial Antibodies, M2 [[id number]]  (Abnormal) Collected: 05/11/22 0415    Specimen: Blood Updated: 05/12/22 1413     Mitochondrial Ab 20.2 Units      Comment:                                 Negative    0.0 - 20.0                                  Equivocal  20.1 - 24.9                                  Positive         >24.9  Mitochondrial (M2) Antibodies are found in 90-96% of  patients with primary biliary cirrhosis.       Narrative:      Performed at:  01 - [facility] [address]  [id number]  : Todd Harper PhD, Phone:  [phone]    CBC & Differential [[id number]]  (Abnormal) Collected: 05/12/22 0514    Specimen: Blood Updated: 05/12/22 0703    Narrative:      The following orders were created for panel order CBC & Differential.  Procedure                               Abnormality         Status                     ---------                               -----------         ------                     CBC Auto Differential[[id number]]        Abnormal            Final result                 Please view results for these tests on the individual  orders.    CBC Auto Differential [[id number]]  (Abnormal) Collected: 05/12/22 0514    Specimen: Blood Updated: 05/12/22 0703     WBC 11.07 10*3/mm3      RBC 2.77 10*6/mm3      Hemoglobin 10.0 g/dL      Hematocrit 27.9 %      .7 fL      MCH 36.1 pg      MCHC 35.8 g/dL      RDW 15.2 %      RDW-SD 56.5 fl      MPV 10.9 fL      Platelets 144 10*3/mm3      Neutrophil % 69.2 %      Lymphocyte % 9.4 %      Monocyte % 14.7 %      Eosinophil % 4.2 %      Basophil % 0.7 %      Neutrophils, Absolute 7.65 10*3/mm3      Lymphocytes, Absolute 1.04 10*3/mm3      Monocytes, Absolute 1.63 10*3/mm3      Eosinophils, Absolute 0.47 10*3/mm3      Basophils, Absolute 0.08 10*3/mm3     Comprehensive Metabolic Panel [[id number]]  (Abnormal) Collected: 05/12/22 0514    Specimen: Blood Updated: 05/12/22 0617     Glucose 118 mg/dL      BUN 45 mg/dL      Creatinine 2.65 mg/dL      Sodium 136 mmol/L      Potassium 3.7 mmol/L      Chloride 104 mmol/L      CO2 20.8 mmol/L      Calcium 9.0 mg/dL      Total Protein 6.3 g/dL      Albumin 3.00 g/dL      ALT (SGPT) 22 U/L      AST (SGOT) 56 U/L      Alkaline Phosphatase 230 U/L      Total Bilirubin 19.5 mg/dL      Globulin 3.3 gm/dL      A/G Ratio 0.9 g/dL      BUN/Creatinine Ratio 17.0     Anion Gap 11.2 mmol/L      eGFR 24.7 mL/min/1.73      Comment: National Kidney Foundation and American Society of Nephrology (ASN) Task Force recommended calculation based on the Chronic Kidney Disease Epidemiology Collaboration (CKD-EPI) equation refit without adjustment for race.       Narrative:      GFR Normal >60  Chronic Kidney Disease <60  Kidney Failure <15      Ammonia [[id number]]  (Normal) Collected: 05/12/22 0514    Specimen: Blood Updated: 05/12/22 0614     Ammonia 39 umol/L         Imaging Results (Last 24 Hours)     ** No results found for the last 24 hours. **        ECG 12 Lead  Component   Ref Range & Units 1 d ago    QT Interval   ms 444    Resulting Agency  ECG             HEART RATE= 69   bpm  RR Interval= 892  ms  UT Interval= 164  ms  P Horizontal Axis= 4  deg  P Front Axis= 29  deg  QRSD Interval= 107  ms  QT Interval= 444  ms  QRS Axis= -30  deg  T Wave Axis= 111  deg  - BORDERLINE ECG -  Sinus rhythm  Ventricular premature complex  Left axis deviation  No change from previous             Current Facility-Administered Medications:   •  acetaminophen (TYLENOL) tablet 650 mg, 650 mg, Oral, Q4H PRN, Memo Varela MD, 650 mg at 05/11/22 0422  •  carbidopa-levodopa (SINEMET)  MG per tablet 1 tablet, 1 tablet, Oral, TID, Memo Varela MD, 1 tablet at 05/12/22 0828  •  cefTRIAXone (ROCEPHIN) 1 g in sodium chloride 0.9 % 100 mL IVPB-VTB, 1 g, Intravenous, Q24H, Memo Varela MD, Last Rate: 200 mL/hr at 05/11/22 1614, 1 g at 05/11/22 1614  •  lactulose (CHRONULAC) 10 GM/15ML solution 30 g, 30 g, Oral, TID, Memo Varela MD, 30 g at 05/12/22 0830  •  midodrine (PROAMATINE) tablet 5 mg, 5 mg, Oral, TID Lizzeth JAVIER Andrew James, MD, 5 mg at 05/12/22 1246  •  nitroglycerin (NITROSTAT) SL tablet 0.4 mg, 0.4 mg, Sublingual, Q5 Min PRN, Memo Varela MD  •  ondansetron (ZOFRAN) injection 4 mg, 4 mg, Intravenous, Q6H PRN, Memo Varela MD, 4 mg at 05/11/22 0010  •  pantoprazole (PROTONIX) EC tablet 40 mg, 40 mg, Oral, BID AC, Memo Varela MD, 40 mg at 05/12/22 0618  •  riFAXIMin (XIFAXAN) tablet 550 mg, 550 mg, Oral, Q12H, Memo Varela MD, 550 mg at 05/12/22 0828  •  sodium chloride 0.9 % flush 10 mL, 10 mL, Intravenous, PRN, Jovanni Guerra MD  •  sodium chloride 0.9 % infusion, 75 mL/hr, Intravenous, Continuous, Franky Moreau MD, Last Rate: 75 mL/hr at 05/12/22 0839, 75 mL/hr at 05/12/22 0839    ASSESSMENT  Hepatorenal syndrome  Cirrhosis with ascites cryptogenic  Jaundice  Hepatic encephalopathy  Acute kidney injury  Acute UTI  Chronic kidney disease stage III  Brain tumor consistent with meningioma  Hypotension  Hyperlipidemia  Coronary artery disease  Chronic anemia  Parkinson's  disease  Esophageal varices and colon polyps  Gastroesophageal reflux disease    PLAN  CPM  Midodrine  Empiric antibiotics  Paracentesis today  Continue to hold diuretics  Continue lactulose Protonix rifaximin Proamatine and Sinemet  Stress ulcer DVT prophylaxis  GI and nephrology to follow patient  Adjust home medications  Supportive care  PT/OT  Patient and family including wife who is POA agreeable for DNR status  Discussed with family including wife daughter nursing staff gastroenterology and nephrology  Transfer to [facility] once bed available    KEITH BEJARANO MD

## 2022-05-12 NOTE — PROGRESS NOTES
"   LOS: 3 days     Chief Complaint/ Reason for encounter: Acute kidney injury      Subjective   05/11/22 : Seems to be a bit more alert today but still very ill-appearing, remains confused, very tangential thinking  Having regular bowel movements on the lactulose, denies any shortness of breath  Does complain of increasing abdominal distention    5/12: More awake and alert today, still somewhat confused  Increased abdominal distention, increased edema of his legs  No shortness of breath or chest pain  Reports good appetite with no nausea or vomiting      Medical history reviewed:  History of Present Illness    Subjective    History taken from: Patient and chart    Vital Signs  Temp:  [97.5 °F (36.4 °C)-97.7 °F (36.5 °C)] 97.7 °F (36.5 °C)  Heart Rate:  [64-78] 78  Resp:  [18] 18  BP: ()/(58-74) 131/58       Wt Readings from Last 1 Encounters:   05/12/22 0505 87 kg (191 lb 14.4 oz)   05/11/22 0609 90.3 kg (199 lb)   05/09/22 2020 90.2 kg (198 lb 14.4 oz)       Objective:  Vital signs: (most recent): Blood pressure 131/58, pulse 78, temperature 97.7 °F (36.5 °C), temperature source Oral, resp. rate 18, height 188 cm (74\"), weight 87 kg (191 lb 14.4 oz), SpO2 97 %.              Objective:  General Appearance:  Comfortable, chronically ill-appearing, in no acute distress and not in pain.  Awake, alert, oriented, somewhat jaundiced  HEENT: Mucous membranes moist, no injury, oropharynx clear, sclera icteric  Lungs:  Normal effort and normal respiratory rate.  Breath sounds clear to auscultation.  No  respiratory distress.  No rales, decreased breath sounds or rhonchi.    Heart: Normal rate.  Regular rhythm.  S1 normal.  No murmur.   Abdomen: Abdomen is distended with fluid, bowel sounds are diminished, no abdominal tenderness.  There is no rebound or guarding  Extremities: Normal range of motion.  Increased, 1-2+ edema of bilateral lower extremities, distal pulses intact  Neurological: No focal motor or sensory " deficits, pupils reactive  Skin:  Warm and dry.  No rash or cyanosis.       Results Review:    Intake/Output:     Intake/Output Summary (Last 24 hours) at 5/12/2022 1453  Last data filed at 5/12/2022 1314  Gross per 24 hour   Intake 1940 ml   Output --   Net 1940 ml         DATA:  Radiology and Labs:  The following labs independently reviewed by me. Additional labs ordered for tomorrow a.m.  Interval notes, chart personally reviewed by me.   Old records independently reviewed showing CKD 3  The following radiologic studies independently viewed by me, findings renal ultrasound showed no hydronephrosis, large ascites volume was demonstrated, kidney somewhat atrophic  New problems include edema  Discussed with patient and his son at bedside    Risk/ complexity of medical care/ medical decision making high complexity, severe renal failure, advanced cirrhosis of undetermined etiology    Labs:   Recent Results (from the past 24 hour(s))   Comprehensive Metabolic Panel    Collection Time: 05/12/22  5:14 AM    Specimen: Blood   Result Value Ref Range    Glucose 118 (H) 65 - 99 mg/dL    BUN 45 (H) 8 - 23 mg/dL    Creatinine 2.65 (H) 0.76 - 1.27 mg/dL    Sodium 136 136 - 145 mmol/L    Potassium 3.7 3.5 - 5.2 mmol/L    Chloride 104 98 - 107 mmol/L    CO2 20.8 (L) 22.0 - 29.0 mmol/L    Calcium 9.0 8.6 - 10.5 mg/dL    Total Protein 6.3 6.0 - 8.5 g/dL    Albumin 3.00 (L) 3.50 - 5.20 g/dL    ALT (SGPT) 22 1 - 41 U/L    AST (SGOT) 56 (H) 1 - 40 U/L    Alkaline Phosphatase 230 (H) 39 - 117 U/L    Total Bilirubin 19.5 (H) 0.0 - 1.2 mg/dL    Globulin 3.3 gm/dL    A/G Ratio 0.9 g/dL    BUN/Creatinine Ratio 17.0 7.0 - 25.0    Anion Gap 11.2 5.0 - 15.0 mmol/L    eGFR 24.7 (L) >60.0 mL/min/1.73   Ammonia    Collection Time: 05/12/22  5:14 AM    Specimen: Blood   Result Value Ref Range    Ammonia 39 16 - 60 umol/L   CBC Auto Differential    Collection Time: 05/12/22  5:14 AM    Specimen: Blood   Result Value Ref Range    WBC 11.07 (H) 3.40  - 10.80 10*3/mm3    RBC 2.77 (L) 4.14 - 5.80 10*6/mm3    Hemoglobin 10.0 (L) 13.0 - 17.7 g/dL    Hematocrit 27.9 (L) 37.5 - 51.0 %    .7 (H) 79.0 - 97.0 fL    MCH 36.1 (H) 26.6 - 33.0 pg    MCHC 35.8 (H) 31.5 - 35.7 g/dL    RDW 15.2 12.3 - 15.4 %    RDW-SD 56.5 (H) 37.0 - 54.0 fl    MPV 10.9 6.0 - 12.0 fL    Platelets 144 140 - 450 10*3/mm3    Neutrophil % 69.2 42.7 - 76.0 %    Lymphocyte % 9.4 (L) 19.6 - 45.3 %    Monocyte % 14.7 (H) 5.0 - 12.0 %    Eosinophil % 4.2 0.3 - 6.2 %    Basophil % 0.7 0.0 - 1.5 %    Neutrophils, Absolute 7.65 (H) 1.70 - 7.00 10*3/mm3    Lymphocytes, Absolute 1.04 0.70 - 3.10 10*3/mm3    Monocytes, Absolute 1.63 (H) 0.10 - 0.90 10*3/mm3    Eosinophils, Absolute 0.47 (H) 0.00 - 0.40 10*3/mm3    Basophils, Absolute 0.08 0.00 - 0.20 10*3/mm3       Radiology:  Imaging Results (Last 24 Hours)     ** No results found for the last 24 hours. **             Medications have been reviewed:  Current Facility-Administered Medications   Medication Dose Route Frequency Provider Last Rate Last Admin   • acetaminophen (TYLENOL) tablet 650 mg  650 mg Oral Q4H PRN Memo Varela MD   650 mg at 05/11/22 0422   • carbidopa-levodopa (SINEMET)  MG per tablet 1 tablet  1 tablet Oral TID Memo Varela MD   1 tablet at 05/12/22 0828   • cefTRIAXone (ROCEPHIN) 1 g in sodium chloride 0.9 % 100 mL IVPB-VTB  1 g Intravenous Q24H Memo Varela  mL/hr at 05/11/22 1614 1 g at 05/11/22 1614   • lactulose (CHRONULAC) 10 GM/15ML solution 30 g  30 g Oral TID Memo Varela MD   30 g at 05/12/22 0830   • midodrine (PROAMATINE) tablet 5 mg  5 mg Oral TID AC Franky Moreau MD   5 mg at 05/12/22 1246   • ondansetron (ZOFRAN) injection 4 mg  4 mg Intravenous Q6H PRN Memo Varela MD   4 mg at 05/11/22 0010   • pantoprazole (PROTONIX) EC tablet 40 mg  40 mg Oral BID AC Memo Varela MD   40 mg at 05/12/22 0618   • riFAXIMin (XIFAXAN) tablet 550 mg  550 mg Oral Q12H Memo Varela MD   550 mg at 05/12/22  0828   • sodium chloride 0.9 % flush 10 mL  10 mL Intravenous PRN Jovanni Guerra MD           ASSESSMENT:   acute renal failure.  Urine studies more suggestive of ATN than hepatorenal, slowly improving.  He is not recording urine output  CKD stage IIIa, baseline creatinine 1.4  Cryptogenic cirrhosis, plan for transfer to [facility] for work-up    Hepatic encephalopathy, improved with lactulose  Ascites requiring paracentesis  Metabolic encephalopathy, likely from elevated ammonia levels  Hyponatremia secondary to cirrhosis/STEVEN  Mild metabolic acidosis  Hypoalbuminemia secondary to poor nutrition and cirrhosis, improved after IV albumin  Anemia of chronic disease, iron studies show no iron deficiency, elevated ferritin        PLAN:   Renal function continues to slowly improve, patient has not been recording urine output, advised to record strict I's and O's  More volume overloaded today: Stop IV fluids  Agree with transfer to [facility] for liver failure/transplant work-up  Urine studies not suggestive of hepatorenal so octreotide was stopped  Continue midodrine for BP support  Agree with paracentesis, will order albumin    Renal ultrasound no obstruction, urine studies not suggestive of any glomerulonephritis  CK level was normal     Continue to monitor electrolytes and volume closely, avoid IV contrast and nephrotoxic medications.  Hold diuretics    Guarded prognosis.  Discussed with Dr. Varela, await transfer       Franky Moreau MD   [facility]   Office phone number: [phone]  Answering service phone number: [phone]    05/12/22  14:53 EDT    Dictation performed using Dragon dictation software

## 2022-05-12 NOTE — PLAN OF CARE
Goal Outcome Evaluation:still confused with periods of hallucinations noted'  Latest amonia level is 26. Abdomen are distended but not tender.Will continue to observe him.

## 2022-05-12 NOTE — TELEPHONE ENCOUNTER
Caller: LEX    Relationship: [facility]    Best call back number: [phone]    What orders are you requesting (i.e. lab or imaging): PHYSICAL THERAPY AND NURSING    Where will you receive your lab/imaging services: IN HOME    Additional notes: LEX WITH [facility] REQUESTS A CALL BACK WITH VERBAL ORDERS FOR INITIAL EVALUATION OF PATIENT FOR HOME HEALTH.

## 2022-05-12 NOTE — PROGRESS NOTES
Gastroenterology   Inpatient Progress Note    Reason for Follow Up:  Cirrhosis    Subjective  Interval History:   Patient confused, ammonia level normal.  Patient's wife at bedside.    They have an appointment scheduled with [facility] hepatology July 2022 and she is wondering about transfer to [facility] for hepatology and liver transplant evaluation.    Current Facility-Administered Medications:   •  acetaminophen (TYLENOL) tablet 650 mg, 650 mg, Oral, Q4H PRN, Memo Varela MD, 650 mg at 05/11/22 0422  •  carbidopa-levodopa (SINEMET)  MG per tablet 1 tablet, 1 tablet, Oral, TID, Memo Varela MD, 1 tablet at 05/12/22 0828  •  cefTRIAXone (ROCEPHIN) 1 g in sodium chloride 0.9 % 100 mL IVPB-VTB, 1 g, Intravenous, Q24H, Memo Varela MD, Last Rate: 200 mL/hr at 05/11/22 1614, 1 g at 05/11/22 1614  •  lactulose (CHRONULAC) 10 GM/15ML solution 30 g, 30 g, Oral, TID, Memo Varela MD, 30 g at 05/12/22 0830  •  midodrine (PROAMATINE) tablet 5 mg, 5 mg, Oral, TID Lizzeth JAVIER Andrew James, MD, 5 mg at 05/11/22 0639  •  nitroglycerin (NITROSTAT) SL tablet 0.4 mg, 0.4 mg, Sublingual, Q5 Min PRN, Memo Varela MD  •  ondansetron (ZOFRAN) injection 4 mg, 4 mg, Intravenous, Q6H PRN, Memo Varela MD, 4 mg at 05/11/22 0010  •  pantoprazole (PROTONIX) EC tablet 40 mg, 40 mg, Oral, BID AC, Memo Varela MD, 40 mg at 05/12/22 0618  •  riFAXIMin (XIFAXAN) tablet 550 mg, 550 mg, Oral, Q12H, Memo Varela MD, 550 mg at 05/12/22 0828  •  sodium chloride 0.9 % flush 10 mL, 10 mL, Intravenous, PRN, Jovanni Guerra MD  •  sodium chloride 0.9 % infusion, 75 mL/hr, Intravenous, Continuous, Franky Moreau MD, Last Rate: 75 mL/hr at 05/12/22 0839, 75 mL/hr at 05/12/22 0839  Review of Systems:               Review of systems could not be obtained due to  patient confusion.    Objective     Vital Signs  Temp:  [97.2 °F (36.2 °C)-97.6 °F (36.4 °C)] 97.5 °F (36.4 °C)  Heart Rate:  [61-78] 78  Resp:  [18] 18  BP: ()/(65-74)  116/72  Body mass index is 24.64 kg/m².                  Physical Exam:              General: patient awake, confused, pleasant, appears chronically ill              Eyes: + scleral icterus              Skin: + jaundiced              Abdomen: soft, nontender, +distended              Psychiatric: Able to assess given confusion                Results Review:                I reviewed the patient's new clinical results.    Results from last 7 days   Lab Units 05/12/22  0514 05/11/22  0415 05/10/22  0456   WBC 10*3/mm3 11.07* 12.97* 9.51   HEMOGLOBIN g/dL 10.0* 9.2* 10.4*   HEMATOCRIT % 27.9* 25.0* 28.1*   PLATELETS 10*3/mm3 144 129* 148     Results from last 7 days   Lab Units 05/12/22  0514 05/11/22  0415 05/10/22  0456   SODIUM mmol/L 136 137 133*   POTASSIUM mmol/L 3.7 4.2 4.4   CHLORIDE mmol/L 104 100 99   CO2 mmol/L 20.8* 20.0* 21.2*   BUN mg/dL 45* 43* 44*   CREATININE mg/dL 2.65* 2.82* 3.19*   CALCIUM mg/dL 9.0 9.4 9.1   BILIRUBIN mg/dL 19.5* 20.9* 17.7*   ALK PHOS U/L 230* 237* 296*   ALT (SGPT) U/L 22 25 11   AST (SGOT) U/L 56* 56* 62*   GLUCOSE mg/dL 118* 112* 110*     Results from last 7 days   Lab Units 05/11/22 0415 05/09/22  1413   INR  1.73* 1.39*     No results found for: LIPASE    Radiology:  US Renal Bilateral   Final Result   There is no evidence of hydronephrosis. Very large volume of   ascites is observed.       This report was finalized on 5/10/2022 7:23 PM by Dr. Cliff Clemens M.D.          CT Head Without Contrast   Final Result   Solitary hyperdense dural based mass in the right   parafalcine region at the vertex consistent with a meningioma as   described. This produces no significant mass effect on the adjacent   frontal lobe and there is no edema within the brain or adjacent to the   lesion. Paranasal Sinus disease as noted. Otherwise unremarkable CT scan   of the head. No acute intracranial abnormality is identified.       This report was finalized on 5/9/2022 5:23 PM by Dr. Salter  ASHLEE Kim.          XR Chest 1 View   Final Result   Right base atelectasis.       This report was finalized on 5/9/2022 12:43 PM by Dr. Cliff Clemens M.D.              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 Cirrhosis etiology unknown  2. Ascites secondary to #1  3. Jaundice  4. Elevated ammonia, resolved, level was 39 this morning  5. Grade 2 varices on EGD March 2022  6. Acute kidney injury      Plan:  · Continue lactulose 3 times daily  · Continue Xifaxan  · Strict low-sodium less than 2 g daily diet  · Continue pantoprazole 40 mg twice daily  · Nephrology following  · Discussed with patient and wife possible transfer to [facility] hepatology inpatient service.  I have contacted them and him awaiting return phone call.  ·   · Patient also needs paracentesis for reaccumulation of ascitic fluid, will send for fluid count, differential, gram stain, culture, albumin and total protein.  · Albumin also needs to be replaced when more than 3 L are removed during paracentesis I have reached out to Dr Moreau to confirm albumin replacement.    I discussed the patients findings and my recommendations with patient, family and nursing staff.           Ning MADRID  [facility] [address] [address]

## 2022-05-12 NOTE — CASE MANAGEMENT/SOCIAL WORK
Continued Stay Note  [facility]     Patient Name: Hu Burgess  MRN: [id number]  Today's Date: 5/12/2022    Admit Date: 5/9/2022     Discharge Plan     Row Name 05/12/22 1402       Plan    Plan Transfer to [facility] hepatology inpatient service once bed available. Pt on waiting list at [facility]    Patient/Family in Agreement with Plan yes    Plan Comments Nurse stated that pt has been setup to transfer to [facility] for Liver transplant workup. Called and verified information. Pt is on waiting list at [facility] and they are currently holding pt's due to no beds for their own pt's. Once bed available at [facility] plan is to transfer. Robert Adamson RN-BC               Discharge Codes    No documentation.               Expected Discharge Date and Time     Expected Discharge Date Expected Discharge Time    May 16, 2022             Robert Adamson, RN

## 2022-05-13 ENCOUNTER — APPOINTMENT (OUTPATIENT)
Dept: ULTRASOUND IMAGING | Facility: HOSPITAL | Age: 72
End: 2022-05-13

## 2022-05-13 LAB
ALBUMIN FLD-MCNC: 0.6 G/DL
ALBUMIN SERPL-MCNC: 3.3 G/DL (ref 3.5–5.2)
ALP SERPL-CCNC: 202 U/L (ref 39–117)
ALT SERPL W P-5'-P-CCNC: 21 U/L (ref 1–41)
ANION GAP SERPL CALCULATED.3IONS-SCNC: 15 MMOL/L (ref 5–15)
APPEARANCE FLD: ABNORMAL
AST SERPL-CCNC: 50 U/L (ref 1–40)
BILIRUB CONJ SERPL-MCNC: >10 MG/DL (ref 0–0.3)
BILIRUB INDIRECT SERPL-MCNC: ABNORMAL MG/DL
BILIRUB SERPL-MCNC: 17.3 MG/DL (ref 0–1.2)
BUN SERPL-MCNC: 39 MG/DL (ref 8–23)
BUN/CREAT SERPL: 18.3 (ref 7–25)
CALCIUM SPEC-SCNC: 9.2 MG/DL (ref 8.6–10.5)
CHLORIDE SERPL-SCNC: 106 MMOL/L (ref 98–107)
CO2 SERPL-SCNC: 20 MMOL/L (ref 22–29)
COLOR FLD: YELLOW
CREAT SERPL-MCNC: 2.13 MG/DL (ref 0.76–1.27)
DEPRECATED RDW RBC AUTO: 52.2 FL (ref 37–54)
EGFRCR SERPLBLD CKD-EPI 2021: 32.1 ML/MIN/1.73
EOSINOPHIL # BLD MANUAL: 0.22 10*3/MM3 (ref 0–0.4)
EOSINOPHIL NFR BLD MANUAL: 4 % (ref 0.3–6.2)
ERYTHROCYTE [DISTWIDTH] IN BLOOD BY AUTOMATED COUNT: 15 % (ref 12.3–15.4)
GLUCOSE SERPL-MCNC: 109 MG/DL (ref 65–99)
HCT VFR BLD AUTO: 22.7 % (ref 37.5–51)
HGB BLD-MCNC: 8.5 G/DL (ref 13–17.7)
INR PPP: 2.17 (ref 0.9–1.1)
LYMPHOCYTES # BLD MANUAL: 0.16 10*3/MM3 (ref 0.7–3.1)
LYMPHOCYTES NFR BLD MANUAL: 9 % (ref 5–12)
LYMPHOCYTES NFR FLD MANUAL: 23 %
MCH RBC QN AUTO: 36.3 PG (ref 26.6–33)
MCHC RBC AUTO-ENTMCNC: 37.4 G/DL (ref 31.5–35.7)
MCV RBC AUTO: 97 FL (ref 79–97)
METHOD: ABNORMAL
MONOCYTES # BLD: 0.49 10*3/MM3 (ref 0.1–0.9)
MONOS+MACROS NFR FLD: 65 %
NEUTROPHILS # BLD AUTO: 4.59 10*3/MM3 (ref 1.7–7)
NEUTROPHILS NFR BLD MANUAL: 84 % (ref 42.7–76)
NEUTROPHILS NFR FLD MANUAL: 12 %
NUC CELL # FLD: 115 /MM3
PLAT MORPH BLD: NORMAL
PLATELET # BLD AUTO: 114 10*3/MM3 (ref 140–450)
PMV BLD AUTO: 10.8 FL (ref 6–12)
POTASSIUM SERPL-SCNC: 3.9 MMOL/L (ref 3.5–5.2)
PROT FLD-MCNC: 1.2 G/DL
PROT SERPL-MCNC: 5.9 G/DL (ref 6–8.5)
PROTHROMBIN TIME: 24.3 SECONDS (ref 11.7–14.2)
RBC # BLD AUTO: 2.34 10*6/MM3 (ref 4.14–5.8)
RBC # FLD AUTO: 56 /MM3
RBC MORPH BLD: NORMAL
SODIUM SERPL-SCNC: 141 MMOL/L (ref 136–145)
VARIANT LYMPHS NFR BLD MANUAL: 3 % (ref 19.6–45.3)
WBC MORPH BLD: NORMAL
WBC NRBC COR # BLD: 5.47 10*3/MM3 (ref 3.4–10.8)

## 2022-05-13 PROCEDURE — 76942 ECHO GUIDE FOR BIOPSY: CPT

## 2022-05-13 PROCEDURE — 0W9G3ZX DRAINAGE OF PERITONEAL CAVITY, PERCUTANEOUS APPROACH, DIAGNOSTIC: ICD-10-PCS | Performed by: RADIOLOGY

## 2022-05-13 PROCEDURE — 87015 SPECIMEN INFECT AGNT CONCNTJ: CPT | Performed by: NURSE PRACTITIONER

## 2022-05-13 PROCEDURE — 0 LIDOCAINE 1 % SOLUTION: Performed by: RADIOLOGY

## 2022-05-13 PROCEDURE — 80076 HEPATIC FUNCTION PANEL: CPT | Performed by: NURSE PRACTITIONER

## 2022-05-13 PROCEDURE — 85025 COMPLETE CBC W/AUTO DIFF WBC: CPT | Performed by: HOSPITALIST

## 2022-05-13 PROCEDURE — 99232 SBSQ HOSP IP/OBS MODERATE 35: CPT | Performed by: NURSE PRACTITIONER

## 2022-05-13 PROCEDURE — 85610 PROTHROMBIN TIME: CPT | Performed by: HOSPITALIST

## 2022-05-13 PROCEDURE — 85007 BL SMEAR W/DIFF WBC COUNT: CPT | Performed by: HOSPITALIST

## 2022-05-13 PROCEDURE — 87070 CULTURE OTHR SPECIMN AEROBIC: CPT | Performed by: NURSE PRACTITIONER

## 2022-05-13 PROCEDURE — 25010000002 CEFTRIAXONE PER 250 MG: Performed by: HOSPITALIST

## 2022-05-13 PROCEDURE — 84157 ASSAY OF PROTEIN OTHER: CPT | Performed by: NURSE PRACTITIONER

## 2022-05-13 PROCEDURE — 87205 SMEAR GRAM STAIN: CPT | Performed by: NURSE PRACTITIONER

## 2022-05-13 PROCEDURE — 97530 THERAPEUTIC ACTIVITIES: CPT

## 2022-05-13 PROCEDURE — 82042 OTHER SOURCE ALBUMIN QUAN EA: CPT | Performed by: NURSE PRACTITIONER

## 2022-05-13 PROCEDURE — 80048 BASIC METABOLIC PNL TOTAL CA: CPT | Performed by: HOSPITALIST

## 2022-05-13 PROCEDURE — 89051 BODY FLUID CELL COUNT: CPT | Performed by: NURSE PRACTITIONER

## 2022-05-13 RX ORDER — LIDOCAINE HYDROCHLORIDE 10 MG/ML
10 INJECTION, SOLUTION INFILTRATION; PERINEURAL ONCE
Status: COMPLETED | OUTPATIENT
Start: 2022-05-13 | End: 2022-05-13

## 2022-05-13 RX ORDER — LIDOCAINE HYDROCHLORIDE 10 MG/ML
10 INJECTION, SOLUTION INFILTRATION; PERINEURAL ONCE
Status: DISCONTINUED | OUTPATIENT
Start: 2022-05-13 | End: 2022-05-17 | Stop reason: HOSPADM

## 2022-05-13 RX ORDER — ALBUMIN (HUMAN) 12.5 G/50ML
75 SOLUTION INTRAVENOUS ONCE
Status: DISCONTINUED | OUTPATIENT
Start: 2022-05-13 | End: 2022-05-14

## 2022-05-13 RX ADMIN — LACTULOSE 30 G: 10 SOLUTION ORAL at 09:42

## 2022-05-13 RX ADMIN — CARBIDOPA AND LEVODOPA 1 TABLET: 10; 100 TABLET ORAL at 16:26

## 2022-05-13 RX ADMIN — RIFAXIMIN 550 MG: 550 TABLET ORAL at 20:28

## 2022-05-13 RX ADMIN — MIDODRINE HYDROCHLORIDE 5 MG: 5 TABLET ORAL at 16:26

## 2022-05-13 RX ADMIN — RIFAXIMIN 550 MG: 550 TABLET ORAL at 09:42

## 2022-05-13 RX ADMIN — LACTULOSE 30 G: 10 SOLUTION ORAL at 20:29

## 2022-05-13 RX ADMIN — CARBIDOPA AND LEVODOPA 1 TABLET: 10; 100 TABLET ORAL at 09:42

## 2022-05-13 RX ADMIN — CEFTRIAXONE 1 G: 1 INJECTION, POWDER, FOR SOLUTION INTRAMUSCULAR; INTRAVENOUS at 15:37

## 2022-05-13 RX ADMIN — PANTOPRAZOLE SODIUM 40 MG: 40 TABLET, DELAYED RELEASE ORAL at 09:42

## 2022-05-13 RX ADMIN — LIDOCAINE HYDROCHLORIDE 10 ML: 10 INJECTION, SOLUTION INFILTRATION; PERINEURAL at 11:23

## 2022-05-13 RX ADMIN — CARBIDOPA AND LEVODOPA 1 TABLET: 10; 100 TABLET ORAL at 20:28

## 2022-05-13 RX ADMIN — LACTULOSE 30 G: 10 SOLUTION ORAL at 16:26

## 2022-05-13 RX ADMIN — MIDODRINE HYDROCHLORIDE 5 MG: 5 TABLET ORAL at 09:43

## 2022-05-13 RX ADMIN — PANTOPRAZOLE SODIUM 40 MG: 40 TABLET, DELAYED RELEASE ORAL at 16:28

## 2022-05-13 NOTE — PLAN OF CARE
Goal Outcome Evaluation:   Vital signs stable. Patient remains confused, trying to stand, thinks he needs to go different places.  Up with assist, safety maintained. IV fluids discontinued, paracentesis ordered for 5/23/22.

## 2022-05-13 NOTE — CASE MANAGEMENT/SOCIAL WORK
Continued Stay Note  [facility]     Patient Name: Hu Burgess  MRN: [id number]  Today's Date: 5/13/2022    Admit Date: 5/9/2022     Discharge Plan     Row Name 05/13/22 1342       Plan    Plan Transfer to [facility] hepatology inpatient service once bed available. Pt on waiting list at [facility] line # [phone]    Patient Needs State Guardianship? Yes    Plan Comments Pt to have paracentesis today. Remains on waiting list for [facility] transfer for liver transplant workup. Robert Adamson RN-BC               Discharge Codes    No documentation.               Expected Discharge Date and Time     Expected Discharge Date Expected Discharge Time    May 16, 2022             Robert Adamson RN

## 2022-05-13 NOTE — PROGRESS NOTES
"   LOS: 4 days     Chief Complaint/ Reason for encounter: Acute kidney injury      Subjective   05/11/22 : Seems to be a bit more alert today but still very ill-appearing, remains confused, very tangential thinking  Having regular bowel movements on the lactulose, denies any shortness of breath  Does complain of increasing abdominal distention    5/12: More awake and alert today, still somewhat confused  Increased abdominal distention, increased edema of his legs  No shortness of breath or chest pain  Reports good appetite with no nausea or vomiting    5/13: Still very confused despite normalizing ammonia levels  No shortness of breath or chest pain, minimal ankle edema  No fevers or chills, intake low, no nausea  He says he is making more urine but it is still not being recorded    Medical history reviewed:  Weakness - Generalized        Subjective    History taken from: Patient and chart    Vital Signs  Temp:  [97.3 °F (36.3 °C)-98.2 °F (36.8 °C)] 98.2 °F (36.8 °C)  Heart Rate:  [69-79] 69  Resp:  [16-18] 16  BP: (112-145)/(53-82) 112/53       Wt Readings from Last 1 Encounters:   05/13/22 0559 87.1 kg (192 lb)   05/12/22 0505 87 kg (191 lb 14.4 oz)   05/11/22 0609 90.3 kg (199 lb)   05/09/22 2020 90.2 kg (198 lb 14.4 oz)       Objective:  Vital signs: (most recent): Blood pressure 112/53, pulse 69, temperature 98.2 °F (36.8 °C), temperature source Oral, resp. rate 16, height 188 cm (74\"), weight 87.1 kg (192 lb), SpO2 95 %.              Objective:  General Appearance:  Comfortable, chronically ill-appearing, in no acute distress and not in pain.  Awake and alert but confused, jaundiced  HEENT: Mucous membranes moist, no injury, oropharynx clear, sclera icteric  Lungs:  Normal effort and normal respiratory rate.  Breath sounds clear to auscultation.  No  respiratory distress.  No rales, decreased breath sounds or rhonchi.    Heart: Normal rate.  Regular rhythm.  S1 normal.  No murmur.   Abdomen: Abdomen is " distended with fluid, bowel sounds are diminished, no abdominal tenderness.  There is no rebound or guarding  Extremities: Normal range of motion.  Decreased, 1+ ankle edema of bilateral lower extremities, distal pulses intact  Neurological: No focal motor or sensory deficits, pupils reactive  Skin:  Warm and dry.  No rash or cyanosis.       Results Review:    Intake/Output:     Intake/Output Summary (Last 24 hours) at 5/13/2022 1237  Last data filed at 5/13/2022 1228  Gross per 24 hour   Intake 540 ml   Output 8400 ml   Net -7860 ml         DATA:  Radiology and Labs:  The following labs independently reviewed by me. Additional labs ordered for tomorrow a.m.  Interval notes, chart personally reviewed by me.   Old records independently reviewed showing CKD 3  Discussed with patient and his family at bedside    Risk/ complexity of medical care/ medical decision making moderate complexity, ongoing management of his cirrhosis and renal failure, surgical paracentesis planned for today with need for IV albumin after the procedure    Labs:   Recent Results (from the past 24 hour(s))   Basic Metabolic Panel    Collection Time: 05/13/22  4:53 AM    Specimen: Blood   Result Value Ref Range    Glucose 109 (H) 65 - 99 mg/dL    BUN 39 (H) 8 - 23 mg/dL    Creatinine 2.13 (H) 0.76 - 1.27 mg/dL    Sodium 141 136 - 145 mmol/L    Potassium 3.9 3.5 - 5.2 mmol/L    Chloride 106 98 - 107 mmol/L    CO2 20.0 (L) 22.0 - 29.0 mmol/L    Calcium 9.2 8.6 - 10.5 mg/dL    BUN/Creatinine Ratio 18.3 7.0 - 25.0    Anion Gap 15.0 5.0 - 15.0 mmol/L    eGFR 32.1 (L) >60.0 mL/min/1.73   Protime-INR    Collection Time: 05/13/22  4:53 AM    Specimen: Blood   Result Value Ref Range    Protime 24.3 (H) 11.7 - 14.2 Seconds    INR 2.17 (H) 0.90 - 1.10   Hepatic Function Panel    Collection Time: 05/13/22  4:53 AM    Specimen: Blood   Result Value Ref Range    Total Protein 5.9 (L) 6.0 - 8.5 g/dL    Albumin 3.30 (L) 3.50 - 5.20 g/dL    ALT (SGPT) 21 1 - 41  U/L    AST (SGOT) 50 (H) 1 - 40 U/L    Alkaline Phosphatase 202 (H) 39 - 117 U/L    Total Bilirubin 17.3 (H) 0.0 - 1.2 mg/dL    Bilirubin, Direct >10.0 (H) 0.0 - 0.3 mg/dL    Bilirubin, Indirect     CBC Auto Differential    Collection Time: 05/13/22  4:53 AM    Specimen: Blood   Result Value Ref Range    WBC 5.47 3.40 - 10.80 10*3/mm3    RBC 2.34 (L) 4.14 - 5.80 10*6/mm3    Hemoglobin 8.5 (L) 13.0 - 17.7 g/dL    Hematocrit 22.7 (L) 37.5 - 51.0 %    MCV 97.0 79.0 - 97.0 fL    MCH 36.3 (H) 26.6 - 33.0 pg    MCHC 37.4 (H) 31.5 - 35.7 g/dL    RDW 15.0 12.3 - 15.4 %    RDW-SD 52.2 37.0 - 54.0 fl    MPV 10.8 6.0 - 12.0 fL    Platelets 114 (L) 140 - 450 10*3/mm3   Manual Differential    Collection Time: 05/13/22  4:53 AM    Specimen: Blood   Result Value Ref Range    Neutrophil % 84.0 (H) 42.7 - 76.0 %    Lymphocyte % 3.0 (L) 19.6 - 45.3 %    Monocyte % 9.0 5.0 - 12.0 %    Eosinophil % 4.0 0.3 - 6.2 %    Neutrophils Absolute 4.59 1.70 - 7.00 10*3/mm3    Lymphocytes Absolute 0.16 (L) 0.70 - 3.10 10*3/mm3    Monocytes Absolute 0.49 0.10 - 0.90 10*3/mm3    Eosinophils Absolute 0.22 0.00 - 0.40 10*3/mm3    RBC Morphology Normal Normal    WBC Morphology Normal Normal    Platelet Morphology Normal Normal       Radiology:  Imaging Results (Last 24 Hours)     Procedure Component Value Units Date/Time     Paracentesis [[id number]] Resulted: 05/13/22 0914    Specimen: Body Fluid Updated: 05/13/22 1212             Medications have been reviewed:  Current Facility-Administered Medications   Medication Dose Route Frequency Provider Last Rate Last Admin   • acetaminophen (TYLENOL) tablet 650 mg  650 mg Oral Q4H PRN Memo Varela MD   650 mg at 05/11/22 0422   • albumin human 25 % IV SOLN 75 g  75 g Intravenous Once Ning Tafoya, JULIUS       • carbidopa-levodopa (SINEMET)  MG per tablet 1 tablet  1 tablet Oral TID Memo Varela MD   1 tablet at 05/13/22 0962   • cefTRIAXone (ROCEPHIN) 1 g in sodium chloride 0.9 % 100 mL  IVPB-VTB  1 g Intravenous Q24H Memo Varela  mL/hr at 05/12/22 1721 1 g at 05/12/22 1721   • lactulose (CHRONULAC) 10 GM/15ML solution 30 g  30 g Oral TID Memo Varela MD   30 g at 05/13/22 0942   • lidocaine (XYLOCAINE) 1 % injection 10 mL  10 mL Injection Once Franky Voss MD       • midodrine (PROAMATINE) tablet 5 mg  5 mg Oral TID AC Franky Moreau MD   5 mg at 05/13/22 0943   • ondansetron (ZOFRAN) injection 4 mg  4 mg Intravenous Q6H PRN Memo Varela MD   4 mg at 05/11/22 0010   • pantoprazole (PROTONIX) EC tablet 40 mg  40 mg Oral BID AC Memo Varela MD   40 mg at 05/13/22 0942   • riFAXIMin (XIFAXAN) tablet 550 mg  550 mg Oral Q12H Memo Varela MD   550 mg at 05/13/22 0942   • sodium chloride 0.9 % flush 10 mL  10 mL Intravenous PRN Jovanni Guerra MD           ASSESSMENT:   acute renal failure.  Urine studies suggesting ATN, slowly improved with conservative treatment.  Renal ultrasound no obstruction, CK normal (history of rhabdo)  CKD stage IIIa, baseline creatinine 1.4  Cryptogenic cirrhosis, plan for transfer to [facility] for work-up, awaiting bed    Hepatic encephalopathy, improved with lactulose but still confused, 8 L removed today  Ascites requiring paracentesis  Metabolic encephalopathy, likely from elevated ammonia levels  Hyponatremia secondary to cirrhosis/STEVEN  Mild metabolic acidosis  Hypoalbuminemia secondary to poor nutrition and cirrhosis, improved after IV albumin  Anemia of chronic disease, iron studies show no iron deficiency, elevated ferritin        PLAN:   Renal function continues to slowly improve, I's and O's still not accurate, does appear to be making urine however  Volume overload about the same, off IV fluids  Paracentesis today, IV albumin ordered for after the procedure  Agree with transfer to [facility] for liver failure/transplant work-up, await bed  Urine studies not suggestive of hepatorenal so octreotide was stopped  Continue midodrine for BP  support    Continue to monitor electrolytes and volume closely, avoid IV contrast and nephrotoxic medications.  Hold diuretics, discussed with family    Guarded prognosis.  Await transfer to Ralph Moreau MD   [facility]   Office phone number: [phone]  Answering service phone number: [phone]    05/13/22  12:37 EDT    Dictation performed using Dragon dictation software

## 2022-05-13 NOTE — PLAN OF CARE
Goal Outcome Evaluation:  Plan of Care Reviewed With: patient        Progress: no change  Outcome Evaluation: Vitals stable. AO to only self. Wife @ bedside, sitting with pt throughout shift. Multiple BMs this shift. UO monitored. IV albumin administered per orders. Daily standing weight obtained. Planning to do a paraentesis today - will have consent signed by wife(POA). Pt became very agitated this shift but eventually calmed down after security called and reassurance provided. Waiting for bed @ [facility] for specialist. Monitoring AM labs. No other changes

## 2022-05-13 NOTE — PLAN OF CARE
Goal Outcome Evaluation:              Outcome Evaluation: 8100mL fluid removed from abdomen during paracentesis. Sample collected and sent to lab. Confusion improved after fluid removed. Multiple fecal incontinence episodes with loose brown stool. On hold for a bed at [facility]. Multiple family members visited throughout the day. VSS. Continue to monitor.

## 2022-05-13 NOTE — PROGRESS NOTES
"Daily progress note    Chief complaint  Doing same  No new complaints  Pleasantly confused  Still with abdominal distention  Family at bedside    History of present illness  71-year-old white male who is well-known to our service with history of hypertension hyperlipidemia coronary artery disease chronic kidney disease and cirrhosis with ascites followed by nephrology gastroenterology presented to [facility] emergency room with mental status changes generalized weakness and recurrent falls.  Patient unable to give detailed history most of the history obtained from the wife and daughter who reported that he has been feeling lousy due to weakness and not making sense at times but no fever chills chest pain shortness of breath abdominal pain vomiting diarrhea.  Patient does have nausea fatigue tiredness poor appetite and increased distention.  Patient has been getting paracentesis and the last one was 5 weeks ago.  Patient work-up in ER revealed hepatorenal syndrome with acute hepatic encephalopathy admit for management.  Patient also found to have right brain mass consistent with meningioma with no history of meningioma in the past.     REVIEW OF SYSTEMS  Unable to obtain     PHYSICAL EXAM   Blood pressure 112/53, pulse 69, temperature 98.2 °F (36.8 °C), temperature source Oral, resp. rate 16, height 188 cm (74\"), weight 87.1 kg (192 lb), SpO2 95 %.    Constitutional:  Awake and alert and no respiratory distress  HEENT:  Unremarkable  Neck: Normal range of motion. Neck supple. No JVD present.   Cardiovascular: Normal rate, regular rhythm and normal heart sounds. No murmur heard.  Pulmonary/Chest: Effort normal and decreased breath sounds bilaterally  Abdominal: Soft.  Distended and mild tenderness bowel sounds positive  Musculoskeletal: Normal range of motion. No edema, tenderness or deformity.   Neurological:  Pleasantly confused but follow commands  Skin: Skin is warm and dry. No rash noted. Pt. is not " diaphoretic. No erythema.   Psychiatric: Mood, affect normal.  He is pleasant and cooperative.     LAB RESULTS  Lab Results (last 24 hours)     Procedure Component Value Units Date/Time    Body Fluid Cell Count With Differential - Body Fluid, Peritoneum [[id number]] Collected: 05/13/22 0925    Specimen: Body Fluid from Peritoneum Updated: 05/13/22 1241    Narrative:      The following orders were created for panel order Body Fluid Cell Count With Differential - Body Fluid, Peritoneum.  Procedure                               Abnormality         Status                     ---------                               -----------         ------                     Body fluid cell count - ...[[id number]]                      In process                   Please view results for these tests on the individual orders.    Body Fluid Culture - Body Fluid, Peritoneum [[id number]] Collected: 05/13/22 0925    Specimen: Body Fluid from Peritoneum Updated: 05/13/22 1241    Body fluid cell count - Body Fluid, Peritoneum [[id number]] Collected: 05/13/22 0925    Specimen: Body Fluid from Peritoneum Updated: 05/13/22 1241    Albumin, Fluid - Body Fluid, Peritoneum [[id number]] Collected: 05/13/22 0925    Specimen: Body Fluid from Peritoneum Updated: 05/13/22 1241    Protein, Body Fluid - Body Fluid, Peritoneum [[id number]] Collected: 05/13/22 0925    Specimen: Body Fluid from Peritoneum Updated: 05/13/22 1241    Manual Differential [[id number]]  (Abnormal) Collected: 05/13/22 0453    Specimen: Blood Updated: 05/13/22 0633     Neutrophil % 84.0 %      Lymphocyte % 3.0 %      Monocyte % 9.0 %      Eosinophil % 4.0 %      Neutrophils Absolute 4.59 10*3/mm3      Lymphocytes Absolute 0.16 10*3/mm3      Monocytes Absolute 0.49 10*3/mm3      Eosinophils Absolute 0.22 10*3/mm3      RBC Morphology Normal     WBC Morphology Normal     Platelet Morphology Normal    CBC & Differential [[id number]]  (Abnormal) Collected: 05/13/22 0453    Specimen: Blood  Updated: 05/13/22 0633    Narrative:      The following orders were created for panel order CBC & Differential.  Procedure                               Abnormality         Status                     ---------                               -----------         ------                     CBC Auto Differential[[id number]]        Abnormal            Final result                 Please view results for these tests on the individual orders.    CBC Auto Differential [[id number]]  (Abnormal) Collected: 05/13/22 0453    Specimen: Blood Updated: 05/13/22 0633     WBC 5.47 10*3/mm3      RBC 2.34 10*6/mm3      Hemoglobin 8.5 g/dL      Hematocrit 22.7 %      MCV 97.0 fL      MCH 36.3 pg      MCHC 37.4 g/dL      RDW 15.0 %      RDW-SD 52.2 fl      MPV 10.8 fL      Platelets 114 10*3/mm3     Hepatic Function Panel [[id number]]  (Abnormal) Collected: 05/13/22 0453    Specimen: Blood Updated: 05/13/22 0620     Total Protein 5.9 g/dL      Albumin 3.30 g/dL      ALT (SGPT) 21 U/L      AST (SGOT) 50 U/L      Alkaline Phosphatase 202 U/L      Total Bilirubin 17.3 mg/dL      Bilirubin, Direct >10.0 mg/dL      Bilirubin, Indirect --     Comment: Unable to calculate       Basic Metabolic Panel [[id number]]  (Abnormal) Collected: 05/13/22 0453    Specimen: Blood Updated: 05/13/22 0611     Glucose 109 mg/dL      BUN 39 mg/dL      Creatinine 2.13 mg/dL      Sodium 141 mmol/L      Potassium 3.9 mmol/L      Chloride 106 mmol/L      CO2 20.0 mmol/L      Calcium 9.2 mg/dL      BUN/Creatinine Ratio 18.3     Anion Gap 15.0 mmol/L      eGFR 32.1 mL/min/1.73      Comment: National Kidney Foundation and American Society of Nephrology (ASN) Task Force recommended calculation based on the Chronic Kidney Disease Epidemiology Collaboration (CKD-EPI) equation refit without adjustment for race.       Narrative:      GFR Normal >60  Chronic Kidney Disease <60  Kidney Failure <15      Protime-INR [[id number]]  (Abnormal) Collected: 05/13/22 0453    Specimen: Blood  Updated: 05/13/22 0547     Protime 24.3 Seconds      INR 2.17    Protein Electrophoresis, Total [[id number]]  (Abnormal) Collected: 05/11/22 0415    Specimen: Blood Updated: 05/12/22 1512     Total Protein 6.4 g/dL      Albumin 3.2 g/dL      Alpha-1-Globulin 0.2 g/dL      Alpha-2-Globulin 0.4 g/dL      Beta Globulin 0.7 g/dL      Gamma Globulin 1.9 g/dL      M-John Not Observed g/dL      Globulin 3.2 g/dL      A/G Ratio 1.0     Please note Comment     Comment: Protein electrophoresis scan will follow via computer, mail, or   delivery.       Narrative:      Performed at:  [address] [place] [address]  [id number]  : Todd Harper PhD, Phone:  [phone]    Anti-Smooth Muscle Antibody Titer [[id number]]  (Abnormal) Collected: 05/11/22 0415    Specimen: Blood Updated: 05/12/22 1413     Smooth Muscle Ab 33 Units      Comment:                  Negative                     0 - 19                   Weak positive               20 - 30                   Moderate to strong positive     >30   Actin Antibodies are found in 52-85% of patients with   autoimmune hepatitis or chronic active hepatitis and   in 22% of patients with primary biliary cirrhosis.       Narrative:      Performed at:  [address]  [id number]  : Todd Harper PhD, Phone:  [phone]    Mitochondrial Antibodies, M2 [[id number]]  (Abnormal) Collected: 05/11/22 0415    Specimen: Blood Updated: 05/12/22 1413     Mitochondrial Ab 20.2 Units      Comment:                                 Negative    0.0 - 20.0                                  Equivocal  20.1 - 24.9                                  Positive         >24.9  Mitochondrial (M2) Antibodies are found in 90-96% of  patients with primary biliary cirrhosis.       Narrative:      Performed at:  [address] [place] [address]  [id number]  : Todd Harper PhD, Phone:  [phone]         Imaging Results (Last 24 Hours)     Procedure Component Value Units Date/Time    US Paracentesis [[id number]] Resulted: 05/13/22 0914    Specimen: Body Fluid Updated: 05/13/22 1212        ECG 12 Lead  Component   Ref Range & Units 1 d ago    QT Interval   ms 444    Resulting Agency  ECG             HEART RATE= 69  bpm  RR Interval= 892  ms  VT Interval= 164  ms  P Horizontal Axis= 4  deg  P Front Axis= 29  deg  QRSD Interval= 107  ms  QT Interval= 444  ms  QRS Axis= -30  deg  T Wave Axis= 111  deg  - BORDERLINE ECG -  Sinus rhythm  Ventricular premature complex  Left axis deviation  No change from previous             Current Facility-Administered Medications:   •  acetaminophen (TYLENOL) tablet 650 mg, 650 mg, Oral, Q4H PRN, Memo Varela MD, 650 mg at 05/11/22 0422  •  albumin human 25 % IV SOLN 75 g, 75 g, Intravenous, Once, Ning Tafoya APRN  •  carbidopa-levodopa (SINEMET)  MG per tablet 1 tablet, 1 tablet, Oral, TID, Memo Varela MD, 1 tablet at 05/13/22 0942  •  cefTRIAXone (ROCEPHIN) 1 g in sodium chloride 0.9 % 100 mL IVPB-VTB, 1 g, Intravenous, Q24H, Memo Varela MD, Last Rate: 200 mL/hr at 05/12/22 1721, 1 g at 05/12/22 1721  •  lactulose (CHRONULAC) 10 GM/15ML solution 30 g, 30 g, Oral, TID, Memo Varela MD, 30 g at 05/13/22 0942  •  lidocaine (XYLOCAINE) 1 % injection 10 mL, 10 mL, Injection, Once, Franky Voss MD  •  midodrine (PROAMATINE) tablet 5 mg, 5 mg, Oral, TID Lizzeth JAVIER Andrew James, MD, 5 mg at 05/13/22 0943  •  ondansetron (ZOFRAN) injection 4 mg, 4 mg, Intravenous, Q6H PRN, Memo Varela MD, 4 mg at 05/11/22 0010  •  pantoprazole (PROTONIX) EC tablet 40 mg, 40 mg, Oral, BID ACAvery Aftab, MD, 40 mg at 05/13/22 0942  •  riFAXIMin (XIFAXAN) tablet 550 mg, 550 mg, Oral, Q12H, Ning Tafoya, APRN, 550 mg at 05/13/22 0942  •  sodium chloride 0.9 % flush 10 mL, 10 mL, Intravenous, PRN, Jovanni Guerra MD    ASSESSMENT  Hepatorenal syndrome  Cirrhosis with  ascites cryptogenic  Jaundice  Hepatic encephalopathy  Acute kidney injury  Acute UTI  Chronic kidney disease stage III  Brain tumor consistent with meningioma  Hypotension  Hyperlipidemia  Coronary artery disease  Chronic anemia  Parkinson's disease  Esophageal varices and colon polyps  Gastroesophageal reflux disease    PLAN  CPM  Midodrine  Empiric antibiotics  Paracentesis today  Continue to hold diuretics  Continue lactulose Protonix rifaximin Proamatine and Sinemet  Stress ulcer DVT prophylaxis  GI and nephrology to follow patient  Adjust home medications  Supportive care  PT/OT  Patient and family including wife who is POA agreeable for DNR status  Discussed with family including wife daughter nursing staff gastroenterology and nephrology  Transfer to [facility] once bed available    KEITH BEJARANO MD

## 2022-05-13 NOTE — PROGRESS NOTES
Gastroenterology   Inpatient Progress Note    Reason for Follow Up:  Cirrhosis    Subjective  Interval History:   Patient confused, ammonia level normal.  Plans for paracentesis this morning.  Patient's wife at bedside.  Patient has been accepted by [facility] hepatology for direct hospital admission, and available bed is not available, transfer is pending.      Current Facility-Administered Medications:   •  acetaminophen (TYLENOL) tablet 650 mg, 650 mg, Oral, Q4H PRN, Memo Varela MD, 650 mg at 05/11/22 0422  •  albumin human 25 % IV SOLN 75 g, 75 g, Intravenous, Once, Ning Tafoya APRN  •  carbidopa-levodopa (SINEMET)  MG per tablet 1 tablet, 1 tablet, Oral, TID, Memo Varela MD, 1 tablet at 05/13/22 0942  •  cefTRIAXone (ROCEPHIN) 1 g in sodium chloride 0.9 % 100 mL IVPB-VTB, 1 g, Intravenous, Q24H, Memo Varela MD, Last Rate: 200 mL/hr at 05/12/22 1721, 1 g at 05/12/22 1721  •  lactulose (CHRONULAC) 10 GM/15ML solution 30 g, 30 g, Oral, TID, Memo Varela MD, 30 g at 05/13/22 0942  •  lidocaine (XYLOCAINE) 1 % injection 10 mL, 10 mL, Injection, Once, Franky Voss MD  •  midodrine (PROAMATINE) tablet 5 mg, 5 mg, Oral, TID Lizzeth JAVIER Andrew James, MD, 5 mg at 05/13/22 0943  •  ondansetron (ZOFRAN) injection 4 mg, 4 mg, Intravenous, Q6H PRN, Memo Varela MD, 4 mg at 05/11/22 0010  •  pantoprazole (PROTONIX) EC tablet 40 mg, 40 mg, Oral, BID Avery JAVIER Aftab, MD, 40 mg at 05/13/22 0942  •  riFAXIMin (XIFAXAN) tablet 550 mg, 550 mg, Oral, Q12H, Memo Varela MD, 550 mg at 05/13/22 0942  •  sodium chloride 0.9 % flush 10 mL, 10 mL, Intravenous, PRN, Jovanni Guerra MD  Review of Systems:               Review of systems could not be obtained due to  patient confusion.    Objective     Vital Signs  Temp:  [97.3 °F (36.3 °C)-98.2 °F (36.8 °C)] 98.2 °F (36.8 °C)  Heart Rate:  [69-79] 69  Resp:  [16-18] 16  BP: (112-145)/(53-82) 112/53  Body mass index is 24.65 kg/m².                  Physical  Exam:              General: patient awake, confused              Eyes: +scleral icterus              Skin: Jaundice, multiple bruises bilateral upper extremities              Abdomen: Distended              Psychiatric: We will to assess given confusion                Results Review:                I reviewed the patient's new clinical results.    Results from last 7 days   Lab Units 05/13/22 0453 05/12/22  0514 05/11/22  0415   WBC 10*3/mm3 5.47 11.07* 12.97*   HEMOGLOBIN g/dL 8.5* 10.0* 9.2*   HEMATOCRIT % 22.7* 27.9* 25.0*   PLATELETS 10*3/mm3 114* 144 129*     Results from last 7 days   Lab Units 05/13/22 0453 05/12/22  0514 05/11/22  0415   SODIUM mmol/L 141 136 137   POTASSIUM mmol/L 3.9 3.7 4.2   CHLORIDE mmol/L 106 104 100   CO2 mmol/L 20.0* 20.8* 20.0*   BUN mg/dL 39* 45* 43*   CREATININE mg/dL 2.13* 2.65* 2.82*   CALCIUM mg/dL 9.2 9.0 9.4   BILIRUBIN mg/dL 17.3* 19.5* 20.9*   ALK PHOS U/L 202* 230* 237*   ALT (SGPT) U/L 21 22 25   AST (SGOT) U/L 50* 56* 56*   GLUCOSE mg/dL 109* 118* 112*     Results from last 7 days   Lab Units 05/13/22 0453 05/11/22 0415 05/09/22  1413   INR  2.17* 1.73* 1.39*     No results found for: LIPASE    Radiology:  US Renal Bilateral   Final Result   There is no evidence of hydronephrosis. Very large volume of   ascites is observed.       This report was finalized on 5/10/2022 7:23 PM by Dr. Cliff Clemens M.D.          CT Head Without Contrast   Final Result   Solitary hyperdense dural based mass in the right   parafalcine region at the vertex consistent with a meningioma as   described. This produces no significant mass effect on the adjacent   frontal lobe and there is no edema within the brain or adjacent to the   lesion. Paranasal Sinus disease as noted. Otherwise unremarkable CT scan   of the head. No acute intracranial abnormality is identified.       This report was finalized on 5/9/2022 5:23 PM by Dr. Gaetano Kim M.D.          XR Chest 1 View   Final Result    Right base atelectasis.       This report was finalized on 5/9/2022 12:43 PM by Dr. Cliff Clemens M.D.          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  Cirrhosis etiology unknown, MELD 33  2.  Ascites secondary to #1 orders for paracentesis this morning  3.  Jaundice  4.  Elevated ammonia resolved  5.  Grade 2 esophageal varices on EGD March 2022  6.  Acute kidney injury      Plan:  · Continue lactulose 3 times daily  · Continue Xifaxan  · Strict low-sodium less than 2 g sodium per day diet  · Continue pantoprazole 40 mg twice daily  · Nephrology following  · Proceed with paracentesis as ordered  · Transfer to [facility] hepatology discussed and has been arranged, patient has been accepted for transfer to [facility] hepatology for liver transplant consideration, and available bed is not available but they will contact us once bed is available to arrange transfer    · If patient is not a candidate for transplant or other intervention at [facility], patient's wife is aware that patient has a very poor prognosis.    I discussed the patients findings and my recommendations with patient, family and nursing staff.           Ning MADRID  [facility]  [address] [place] [address]

## 2022-05-13 NOTE — THERAPY TREATMENT NOTE
Patient Name: Hu Burgess  : 1950    MRN: [id number]                              Today's Date: 2022       Admit Date: 2022    Visit Dx:     ICD-10-CM ICD-9-CM   1. Hepatic encephalopathy (HCC)  K72.90 572.2   2. Liver failure without hepatic coma, unspecified chronicity (HCC)  K72.90 572.8   3. Chronic kidney disease, unspecified CKD stage  N18.9 585.9   4. 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   • Anemia in chronic kidney disease   • Right inguinal hernia   • Cirrhosis of liver with ascites (HCC)   • Gastroesophageal reflux disease   • Colon cancer screening   • Nausea   • Hepatic encephalopathy (HCC)   • Cerebral meningioma (HCC)     Past Medical History:   Diagnosis Date   • Arteriosclerotic cardiovascular disease    • Back pain    • Cirrhosis (HCC)    • Elevated liver enzymes    • GERD (gastroesophageal reflux disease)    • History of transfusion    • Hyperlipidemia    • Hypertension    • Kidney failure    • Rhabdomyolysis     due to crestor     Past Surgical History:   Procedure Laterality Date   • COLONOSCOPY W/ POLYPECTOMY N/A 3/9/2022    Procedure: COLONOSCOPY WITH POLYPECTOMY;  Surgeon: uJstin Underwood MD;  Location: [facility];  Service: Gastroenterology;  Laterality: N/A;  ascending colon polyps x2 (cold snare x1)  transverse colon polyp x1 (cold snare)  sigmoid colon polyp x1 (cold snare)  hemorrhoids   • ENDOSCOPY N/A 2018    Procedure: ESOPHAGOGASTRODUODENOSCOPY;  Surgeon: Marcelino Samuel MD;  Location: [facility];  Service:    • ENDOSCOPY N/A 3/9/2022    Procedure: ESOPHAGOGASTRODUODENOSCOPY;  Surgeon: Justin Underwood MD;  Location: [facility] OR;  Service:  Gastroenterology;  Laterality: N/A;  Esophageal varisces  Portal gastropathy   • INGUINAL HERNIA REPAIR Right 1/10/2022    Procedure: INGUINAL HERNIA REPAIR;  Surgeon: Kishan Puga MD;  Location:  [facility] OR;  Service: General;  Laterality: Right;       • INSERTION HEMODIALYSIS CATHETER Right 11/14/2017    Procedure: PALINDRONE CATHETERE PLACEMENT;  Surgeon: Gareth Sánchez MD;  Location: [facility] MAIN OR;  Service:       General Information     Row Name 05/13/22 1126          Physical Therapy Time and Intention    Document Type therapy note (daily note)  -MS     Mode of Treatment physical therapy;individual therapy  -MS     Row Name 05/13/22 1126          General Information    Patient Profile Reviewed yes  -MS     Existing Precautions/Restrictions fall   Exit alarm  -MS     Barriers to Rehab none identified  -MS     Row Name 05/13/22 1126          Cognition    Orientation Status (Cognition) oriented x 3  -MS     Row Name 05/13/22 1126          Safety Issues, Functional Mobility    Comment, Safety Issues/Impairments (Mobility) Gait belt used for safety.  -MS           User Key  (r) = Recorded By, (t) = Taken By, (c) = Cosigned By    Initials Name Provider Type    MS LuceroGareth, PT Physical Therapist               Mobility     Row Name 05/13/22 1127          Bed Mobility    Bed Mobility supine-sit;sit-supine  -MS     Supine-Sit Rooks (Bed Mobility) minimum assist (75% patient effort)  -MS     Sit-Supine Rooks (Bed Mobility) minimum assist (75% patient effort)  -MS     Row Name 05/13/22 1127          Sit-Stand Transfer    Sit-Stand Rooks (Transfers) contact guard;2 person assist  -MS     Row Name 05/13/22 1127          Gait/Stairs (Locomotion)    Rooks Level (Gait) contact guard;2 person assist  -MS     Distance in Feet (Gait) 80 feet  -MS     Deviations/Abnormal Patterns (Gait) sandhya decreased;base of support, narrow  -MS     Bilateral Gait Deviations forward flexed posture  -MS      Comment, (Gait/Stairs) Verbal/tactile cues for posture correction.  -MS           User Key  (r) = Recorded By, (t) = Taken By, (c) = Cosigned By    Initials Name Provider Type    Gareth De Guzman PT Physical Therapist               Obj/Interventions    No documentation.                Goals/Plan    No documentation.                Clinical Impression     Row Name 05/13/22 1128          Pain    Pretreatment Pain Rating 0/10 - no pain  -MS     Posttreatment Pain Rating 0/10 - no pain  -MS     Row Name 05/13/22 1128          Positioning and Restraints    Pre-Treatment Position in bed  -MS     Post Treatment Position bed  -MS     In Bed notified nsg;supine;call light within reach;encouraged to call for assist;exit alarm on;with family/caregiver  All lines intact. V.S.S.  -MS           User Key  (r) = Recorded By, (t) = Taken By, (c) = Cosigned By    Initials Name Provider Type    Gareth De Guzman PT Physical Therapist               Outcome Measures     Row Name 05/13/22 1129          How much help from another person do you currently need...    Turning from your back to your side while in flat bed without using bedrails? 3  -MS     Moving from lying on back to sitting on the side of a flat bed without bedrails? 3  -MS     Moving to and from a bed to a chair (including a wheelchair)? 3  -MS     Standing up from a chair using your arms (e.g., wheelchair, bedside chair)? 3  -MS     Climbing 3-5 steps with a railing? 2  -MS     To walk in hospital room? 3  -MS     AM-PAC 6 Clicks Score (PT) 17  -MS     Highest level of mobility 5 --> Static standing  -MS     Row Name 05/13/22 1129          Functional Assessment    Outcome Measure Options AM-PAC 6 Clicks Basic Mobility (PT)  -MS           User Key  (r) = Recorded By, (t) = Taken By, (c) = Cosigned By    Initials Name Provider Type    Gareth De Guzman PT Physical Therapist                             Physical Therapy Education                 Title: PT OT  "SLP Therapies (In Progress)     Topic: Physical Therapy (In Progress)     Point: Mobility training (Done)     Learning Progress Summary           Patient Acceptance, E,D, VU,NR by MS at 5/13/2022 1129    Acceptance, E, VU,NR by CF at 5/10/2022 1613                   Point: Home exercise program (Not Started)     Learner Progress:  Not documented in this visit.          Point: Body mechanics (Done)     Learning Progress Summary           Patient Acceptance, E,D, VU,NR by MS at 5/13/2022 1129    Acceptance, E, VU,NR by CF at 5/10/2022 1613                   Point: Precautions (Done)     Learning Progress Summary           Patient Acceptance, E,D, VU,NR by MS at 5/13/2022 1129    Acceptance, E, VU,NR by CF at 5/10/2022 1613                               User Key     Initials Effective Dates Name Provider Type Discipline    MS 06/16/21 -  Gareth Lucero, PT Physical Therapist PT     06/16/21 -  Felipa Alford, PT Physical Therapist PT              PT Recommendation and Plan     Plan of Care Reviewed With: patient  Outcome Evaluation: Upon entering room, pt. supine in bed, awake, with c/o feeling weak and fatigued.  Pt. ultimately agreeable to work with P.T. this date despite feeling \"tired\".  Pt. able to ambulate 80 feet, CGA x 2, with no use of A.D.  Pt. requires Min. assist x 1 for bed mobility and CGA x 2 for sit <-> stand transfers. Verbal/tactile cues given during ambulation for posture correction.  Will continue to progress functional mobility as tolerated.     Time Calculation:    PT Charges     Row Name 05/13/22 1132 05/13/22 1131          Time Calculation    Start Time -- 1000  -MS     Stop Time -- 1015  -MS     Time Calculation (min) -- 15 min  -MS     PT Received On -- 05/13/22  -MS     PT - Next Appointment 05/16/22  -MS 05/14/22  -MS            Time Calculation- PT    Total Timed Code Minutes- PT -- 14 minute(s)  -MS           User Key  (r) = Recorded By, (t) = Taken By, (c) = Cosigned By    " Initials Name Provider Type    MS Gareth Lucero, PT Physical Therapist              Therapy Charges for Today     Code Description Service Date Service Provider Modifiers Qty    86260873653 HC PT THERAPEUTIC ACT EA 15 MIN 5/13/2022 Gareth Lucero, PT GP 1    69747443260 HC PT THER SUPP EA 15 MIN 5/13/2022 Gareth Lucero, PT GP 1          PT G-Codes  Outcome Measure Options: AM-PAC 6 Clicks Basic Mobility (PT)  AM-PAC 6 Clicks Score (PT): 17  AM-PAC 6 Clicks Score (OT): 15  Modified [place] Scale: 4 - Moderately severe disability.  Unable to walk without assistance, and unable to attend to own bodily needs without assistance.    Gareth Lucero, PT  5/13/2022

## 2022-05-13 NOTE — PLAN OF CARE
"Goal Outcome Evaluation:  Plan of Care Reviewed With: patient           Outcome Evaluation: Upon entering room, pt. supine in bed, awake, with c/o feeling weak and fatigued.  Pt. ultimately agreeable to work with P.T. this date despite feeling \"tired\".  Pt. able to ambulate 80 feet, CGA x 2, with no use of A.D.  Pt. requires Min. assist x 1 for bed mobility and CGA x 2 for sit <-> stand transfers. Verbal/tactile cues given during ambulation for posture correction.  Will continue to progress functional mobility as tolerated.    Patient was wearing a face mask during this therapy encounter. Therapist used appropriate personal protective equipment including eye protection, mask, and gloves.  Mask used was standard procedure mask. Appropriate PPE was worn during the entire therapy session. Hand hygiene was completed before and after therapy session. Patient is not in enhanced droplet precautions.     "

## 2022-05-14 LAB
ALBUMIN SERPL-MCNC: 3 G/DL (ref 3.5–5.2)
ALBUMIN/GLOB SERPL: 1.2 G/DL
ALP SERPL-CCNC: 212 U/L (ref 39–117)
ALT SERPL W P-5'-P-CCNC: 23 U/L (ref 1–41)
AMMONIA BLD-SCNC: 45 UMOL/L (ref 16–60)
ANION GAP SERPL CALCULATED.3IONS-SCNC: 13 MMOL/L (ref 5–15)
AST SERPL-CCNC: 49 U/L (ref 1–40)
BASOPHILS # BLD AUTO: 0.06 10*3/MM3 (ref 0–0.2)
BASOPHILS NFR BLD AUTO: 0.6 % (ref 0–1.5)
BILIRUB SERPL-MCNC: 17 MG/DL (ref 0–1.2)
BUN SERPL-MCNC: 32 MG/DL (ref 8–23)
BUN/CREAT SERPL: 15.8 (ref 7–25)
CALCIUM SPEC-SCNC: 8.7 MG/DL (ref 8.6–10.5)
CHLORIDE SERPL-SCNC: 108 MMOL/L (ref 98–107)
CO2 SERPL-SCNC: 19 MMOL/L (ref 22–29)
CREAT SERPL-MCNC: 2.02 MG/DL (ref 0.76–1.27)
DEPRECATED RDW RBC AUTO: 56.3 FL (ref 37–54)
EGFRCR SERPLBLD CKD-EPI 2021: 34.2 ML/MIN/1.73
EOSINOPHIL # BLD AUTO: 0.59 10*3/MM3 (ref 0–0.4)
EOSINOPHIL NFR BLD AUTO: 6.2 % (ref 0.3–6.2)
ERYTHROCYTE [DISTWIDTH] IN BLOOD BY AUTOMATED COUNT: 15.6 % (ref 12.3–15.4)
GLOBULIN UR ELPH-MCNC: 2.6 GM/DL
GLUCOSE SERPL-MCNC: 104 MG/DL (ref 65–99)
HCT VFR BLD AUTO: 27.4 % (ref 37.5–51)
HGB BLD-MCNC: 9.9 G/DL (ref 13–17.7)
IMM GRANULOCYTES # BLD AUTO: 0.1 10*3/MM3 (ref 0–0.05)
IMM GRANULOCYTES NFR BLD AUTO: 1 % (ref 0–0.5)
INR PPP: 2.01 (ref 0.9–1.1)
LYMPHOCYTES # BLD AUTO: 1.14 10*3/MM3 (ref 0.7–3.1)
LYMPHOCYTES NFR BLD AUTO: 11.9 % (ref 19.6–45.3)
MCH RBC QN AUTO: 36.3 PG (ref 26.6–33)
MCHC RBC AUTO-ENTMCNC: 36.1 G/DL (ref 31.5–35.7)
MCV RBC AUTO: 100.4 FL (ref 79–97)
MONOCYTES # BLD AUTO: 1.46 10*3/MM3 (ref 0.1–0.9)
MONOCYTES NFR BLD AUTO: 15.3 % (ref 5–12)
NEUTROPHILS NFR BLD AUTO: 6.19 10*3/MM3 (ref 1.7–7)
NEUTROPHILS NFR BLD AUTO: 65 % (ref 42.7–76)
NRBC BLD AUTO-RTO: 0.1 /100 WBC (ref 0–0.2)
PLAT MORPH BLD: NORMAL
PLATELET # BLD AUTO: 115 10*3/MM3 (ref 140–450)
PMV BLD AUTO: 10.7 FL (ref 6–12)
POTASSIUM SERPL-SCNC: 3.7 MMOL/L (ref 3.5–5.2)
PROT SERPL-MCNC: 5.6 G/DL (ref 6–8.5)
PROTHROMBIN TIME: 22.8 SECONDS (ref 11.7–14.2)
RBC # BLD AUTO: 2.73 10*6/MM3 (ref 4.14–5.8)
RBC MORPH BLD: NORMAL
SODIUM SERPL-SCNC: 140 MMOL/L (ref 136–145)
WBC MORPH BLD: NORMAL
WBC NRBC COR # BLD: 9.54 10*3/MM3 (ref 3.4–10.8)

## 2022-05-14 PROCEDURE — 85610 PROTHROMBIN TIME: CPT | Performed by: HOSPITALIST

## 2022-05-14 PROCEDURE — P9047 ALBUMIN (HUMAN), 25%, 50ML: HCPCS | Performed by: INTERNAL MEDICINE

## 2022-05-14 PROCEDURE — 80053 COMPREHEN METABOLIC PANEL: CPT | Performed by: HOSPITALIST

## 2022-05-14 PROCEDURE — 25010000002 CEFTRIAXONE PER 250 MG: Performed by: HOSPITALIST

## 2022-05-14 PROCEDURE — 85007 BL SMEAR W/DIFF WBC COUNT: CPT | Performed by: HOSPITALIST

## 2022-05-14 PROCEDURE — 85025 COMPLETE CBC W/AUTO DIFF WBC: CPT | Performed by: HOSPITALIST

## 2022-05-14 PROCEDURE — 99232 SBSQ HOSP IP/OBS MODERATE 35: CPT | Performed by: INTERNAL MEDICINE

## 2022-05-14 PROCEDURE — 25010000002 ALBUMIN HUMAN 25% PER 50 ML: Performed by: INTERNAL MEDICINE

## 2022-05-14 PROCEDURE — 82140 ASSAY OF AMMONIA: CPT | Performed by: NURSE PRACTITIONER

## 2022-05-14 PROCEDURE — 25010000002 ONDANSETRON PER 1 MG: Performed by: HOSPITALIST

## 2022-05-14 RX ORDER — ALBUMIN (HUMAN) 12.5 G/50ML
75 SOLUTION INTRAVENOUS ONCE
Status: COMPLETED | OUTPATIENT
Start: 2022-05-14 | End: 2022-05-14

## 2022-05-14 RX ADMIN — ONDANSETRON 4 MG: 2 INJECTION INTRAMUSCULAR; INTRAVENOUS at 20:48

## 2022-05-14 RX ADMIN — MIDODRINE HYDROCHLORIDE 5 MG: 5 TABLET ORAL at 06:21

## 2022-05-14 RX ADMIN — RIFAXIMIN 550 MG: 550 TABLET ORAL at 09:16

## 2022-05-14 RX ADMIN — PANTOPRAZOLE SODIUM 40 MG: 40 TABLET, DELAYED RELEASE ORAL at 17:54

## 2022-05-14 RX ADMIN — CARBIDOPA AND LEVODOPA 1 TABLET: 10; 100 TABLET ORAL at 19:47

## 2022-05-14 RX ADMIN — LACTULOSE 30 G: 10 SOLUTION ORAL at 09:15

## 2022-05-14 RX ADMIN — PANTOPRAZOLE SODIUM 40 MG: 40 TABLET, DELAYED RELEASE ORAL at 06:20

## 2022-05-14 RX ADMIN — LACTULOSE 30 G: 10 SOLUTION ORAL at 17:54

## 2022-05-14 RX ADMIN — RIFAXIMIN 550 MG: 550 TABLET ORAL at 19:47

## 2022-05-14 RX ADMIN — ALBUMIN HUMAN 75 G: 0.25 SOLUTION INTRAVENOUS at 12:49

## 2022-05-14 RX ADMIN — CARBIDOPA AND LEVODOPA 1 TABLET: 10; 100 TABLET ORAL at 17:54

## 2022-05-14 RX ADMIN — MIDODRINE HYDROCHLORIDE 5 MG: 5 TABLET ORAL at 17:54

## 2022-05-14 RX ADMIN — LACTULOSE 30 G: 10 SOLUTION ORAL at 19:48

## 2022-05-14 RX ADMIN — CEFTRIAXONE 1 G: 1 INJECTION, POWDER, FOR SOLUTION INTRAMUSCULAR; INTRAVENOUS at 15:01

## 2022-05-14 RX ADMIN — CARBIDOPA AND LEVODOPA 1 TABLET: 10; 100 TABLET ORAL at 09:15

## 2022-05-14 NOTE — PROGRESS NOTES
[facility]  Inpatient Progress Note    Reason for Follow Up: Cirrhosis    Subjective     Interval History:   Plans for transfer to [facility] hepatology noted.  Paracentesis performed with 8100 cc removed.  Discussed with patient and wife at bedside.  He responds to questions but still has some degree of confusion.  Serum ammonia level is within normal range.  Multiple questions asked regarding the transplant issues and I deferred to that service.  He is going to [facility] hepatology service for transplant consideration when a bed is available.    Current Facility-Administered Medications:   •  acetaminophen (TYLENOL) tablet 650 mg, 650 mg, Oral, Q4H PRN, Memo Varela MD, 650 mg at 05/11/22 0422  •  albumin human 25 % IV SOLN 75 g, 75 g, Intravenous, Once, Ning Tafoya APRN  •  carbidopa-levodopa (SINEMET)  MG per tablet 1 tablet, 1 tablet, Oral, TID, Memo Varela MD, 1 tablet at 05/14/22 0915  •  cefTRIAXone (ROCEPHIN) 1 g in sodium chloride 0.9 % 100 mL IVPB-VTB, 1 g, Intravenous, Q24H, Memo Varela MD, Last Rate: 200 mL/hr at 05/13/22 1537, 1 g at 05/13/22 1537  •  lactulose (CHRONULAC) 10 GM/15ML solution 30 g, 30 g, Oral, TID, Memo Varela MD, 30 g at 05/14/22 0915  •  lidocaine (XYLOCAINE) 1 % injection 10 mL, 10 mL, Injection, Once, Franky Voss MD  •  midodrine (PROAMATINE) tablet 5 mg, 5 mg, Oral, TID Lizzeth JAVIER Andrew James, MD, 5 mg at 05/14/22 0621  •  ondansetron (ZOFRAN) injection 4 mg, 4 mg, Intravenous, Q6H PRN, Memo Varela MD, 4 mg at 05/11/22 0010  •  pantoprazole (PROTONIX) EC tablet 40 mg, 40 mg, Oral, BID Avery JAVIER Aftab, MD, 40 mg at 05/14/22 0620  •  riFAXIMin (XIFAXAN) tablet 550 mg, 550 mg, Oral, Q12H, Ning Tafoya, APRN, 550 mg at 05/14/22 0916  •  sodium chloride 0.9 % flush 10 mL, 10 mL, Intravenous, PRN, Jovanni Guerra MD  Review of Systems:    Review of systems could not be obtained due to   patient confusion.    Objective     Vital Signs  Temp:  [97.7 °F (36.5 °C)-98.9 °F (37.2 °C)] 97.7 °F (36.5 °C)  Heart Rate:  [64-73] 66  Resp:  [16-18] 16  BP: (109-142)/(53-91) 142/85  Body mass index is 22.6 kg/m².    Intake/Output Summary (Last 24 hours) at 5/14/2022 0929  Last data filed at 5/14/2022 0705  Gross per 24 hour   Intake 480 ml   Output 8200 ml   Net -7720 ml     No intake/output data recorded.     Physical Exam:   General: patient awake, alert and cooperative   Eyes: Normal lids and lashes, no scleral icterus   Neck: supple, normal ROM   Skin: warm and dry, not jaundiced   Cardiovascular: regular rhythm and rate, no murmurs auscultated   Pulm: clear to auscultation bilaterally, regular and unlabored   Abdomen: soft, nontender, nondistended; normal bowel sounds   Extremities: no rash or edema   Psychiatric: Normal mood and behavior; memory intact     Results Review:     I reviewed the patient's new clinical results.    Results from last 7 days   Lab Units 05/14/22 0449 05/13/22 0453 05/12/22  0514   WBC 10*3/mm3 9.54 5.47 11.07*   HEMOGLOBIN g/dL 9.9* 8.5* 10.0*   HEMATOCRIT % 27.4* 22.7* 27.9*   PLATELETS 10*3/mm3 115* 114* 144     Results from last 7 days   Lab Units 05/14/22 0449 05/13/22 0453 05/12/22  0514   SODIUM mmol/L 140 141 136   POTASSIUM mmol/L 3.7 3.9 3.7   CHLORIDE mmol/L 108* 106 104   CO2 mmol/L 19.0* 20.0* 20.8*   BUN mg/dL 32* 39* 45*   CREATININE mg/dL 2.02* 2.13* 2.65*   CALCIUM mg/dL 8.7 9.2 9.0   BILIRUBIN mg/dL 17.0* 17.3* 19.5*   ALK PHOS U/L 212* 202* 230*   ALT (SGPT) U/L 23 21 22   AST (SGOT) U/L 49* 50* 56*   GLUCOSE mg/dL 104* 109* 118*     Results from last 7 days   Lab Units 05/14/22  0449 05/13/22  0453 05/11/22  0415   INR  2.01* 2.17* 1.73*     No results found for: LIPASE    Radiology:  US Paracentesis   Final Result   Ultrasound-guided paracentesis as described.       This report was finalized on 5/13/2022 1:48 PM by Dr. Franky Voss M.D.          US  Renal Bilateral   Final Result   There is no evidence of hydronephrosis. Very large volume of   ascites is observed.       This report was finalized on 5/10/2022 7:23 PM by Dr. Cliff Clemens M.D.          CT Head Without Contrast   Final Result   Solitary hyperdense dural based mass in the right   parafalcine region at the vertex consistent with a meningioma as   described. This produces no significant mass effect on the adjacent   frontal lobe and there is no edema within the brain or adjacent to the   lesion. Paranasal Sinus disease as noted. Otherwise unremarkable CT scan   of the head. No acute intracranial abnormality is identified.       This report was finalized on 5/9/2022 5:23 PM by Dr. Gaetano Kim M.D.          XR Chest 1 View   Final Result   Right base atelectasis.       This report was finalized on 5/9/2022 12:43 PM by Dr. Cliff Clemens M.D.              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 Cirrhosis with ascites: Status post paracentesis with 8100 cc removed  2. Encephalopathy: Serum ammonia improved  3. History of grade 2 esophageal varices  4. Acute kidney injury      Plan:  · Continue treatment for encephalopathy with lactulose and Xifaxan  · Low-sodium diet  · Maintain PPI  · Await transfer to [facility] hepatology service  I discussed the patients findings and my recommendations with patient and family.    Tacos Tuttle,  MD

## 2022-05-14 NOTE — PROGRESS NOTES
"   LOS: 5 days     Chief Complaint/ Reason for encounter: Acute kidney injury      Subjective   05/11/22 : Seems to be a bit more alert today but still very ill-appearing, remains confused, very tangential thinking  Having regular bowel movements on the lactulose, denies any shortness of breath  Does complain of increasing abdominal distention    5/12: More awake and alert today, still somewhat confused  Increased abdominal distention, increased edema of his legs  No shortness of breath or chest pain  Reports good appetite with no nausea or vomiting    5/13: Still very confused despite normalizing ammonia levels  No shortness of breath or chest pain, minimal ankle edema  No fevers or chills, intake low, no nausea  He says he is making more urine but it is still not being recorded    5/14: No new complaints, less confused per his wife, eating and drinking a little more  Status post large-volume paracentesis yesterday.  Albumin was ordered but it does not look like he was administered      Medical history reviewed:  Weakness - Generalized        Subjective    History taken from: Patient and chart    Vital Signs  Temp:  [97.7 °F (36.5 °C)-98.9 °F (37.2 °C)] 97.7 °F (36.5 °C)  Heart Rate:  [64-73] 66  Resp:  [16-18] 16  BP: (109-142)/(53-91) 142/85       Wt Readings from Last 1 Encounters:   05/14/22 0531 79.8 kg (176 lb)   05/13/22 0559 87.1 kg (192 lb)   05/12/22 0505 87 kg (191 lb 14.4 oz)   05/11/22 0609 90.3 kg (199 lb)   05/09/22 2020 90.2 kg (198 lb 14.4 oz)       Objective:  Vital signs: (most recent): Blood pressure 142/85, pulse 66, temperature 97.7 °F (36.5 °C), temperature source Oral, resp. rate 16, height 188 cm (74\"), weight 79.8 kg (176 lb), SpO2 96 %.              Objective:  General Appearance:  Comfortable, chronically ill-appearing, in no acute distress and not in pain.  Awake and alert but confused, jaundiced  HEENT: Mucous membranes moist, no injury, oropharynx clear, sclera icteric  Lungs:  Normal " effort and normal respiratory rate.  Breath sounds clear to auscultation.  No  respiratory distress.  No rales, decreased breath sounds or rhonchi.    Heart: Normal rate.  Regular rhythm.  S1 normal.  No murmur.   Abdomen: Abdomen is distended with fluid, bowel sounds are diminished, no abdominal tenderness.  There is no rebound or guarding  Extremities: Normal range of motion.  Decreased, trace ankle edema of bilateral lower extremities, distal pulses intact  Neurological: No focal motor or sensory deficits, pupils reactive  Skin:  Warm and dry.  No rash or cyanosis.       Results Review:    Intake/Output:     Intake/Output Summary (Last 24 hours) at 5/14/2022 1012  Last data filed at 5/14/2022 0915  Gross per 24 hour   Intake 720 ml   Output 8200 ml   Net -7480 ml         DATA:  Radiology and Labs:  The following labs independently reviewed by me. Additional labs ordered for tomorrow a.m.  Interval notes, chart personally reviewed by me.   Old records independently reviewed showing CKD 3  Discussed with patient and his family at bedside    Risk/ complexity of medical care/ medical decision making moderate complexity, ongoing management of his cirrhosis and renal failure, awaiting transfer to rehab rehab [facility] for transplant evaluation and work-up    Labs:   Recent Results (from the past 24 hour(s))   Ammonia    Collection Time: 05/14/22  4:49 AM    Specimen: Blood   Result Value Ref Range    Ammonia 45 16 - 60 umol/L   Comprehensive Metabolic Panel    Collection Time: 05/14/22  4:49 AM    Specimen: Blood   Result Value Ref Range    Glucose 104 (H) 65 - 99 mg/dL    BUN 32 (H) 8 - 23 mg/dL    Creatinine 2.02 (H) 0.76 - 1.27 mg/dL    Sodium 140 136 - 145 mmol/L    Potassium 3.7 3.5 - 5.2 mmol/L    Chloride 108 (H) 98 - 107 mmol/L    CO2 19.0 (L) 22.0 - 29.0 mmol/L    Calcium 8.7 8.6 - 10.5 mg/dL    Total Protein 5.6 (L) 6.0 - 8.5 g/dL    Albumin 3.00 (L) 3.50 - 5.20 g/dL    ALT (SGPT) 23 1 - 41 U/L    AST  (SGOT) 49 (H) 1 - 40 U/L    Alkaline Phosphatase 212 (H) 39 - 117 U/L    Total Bilirubin 17.0 (H) 0.0 - 1.2 mg/dL    Globulin 2.6 gm/dL    A/G Ratio 1.2 g/dL    BUN/Creatinine Ratio 15.8 7.0 - 25.0    Anion Gap 13.0 5.0 - 15.0 mmol/L    eGFR 34.2 (L) >60.0 mL/min/1.73   Protime-INR    Collection Time: 05/14/22  4:49 AM    Specimen: Blood   Result Value Ref Range    Protime 22.8 (H) 11.7 - 14.2 Seconds    INR 2.01 (H) 0.90 - 1.10   CBC Auto Differential    Collection Time: 05/14/22  4:49 AM    Specimen: Blood   Result Value Ref Range    WBC 9.54 3.40 - 10.80 10*3/mm3    RBC 2.73 (L) 4.14 - 5.80 10*6/mm3    Hemoglobin 9.9 (L) 13.0 - 17.7 g/dL    Hematocrit 27.4 (L) 37.5 - 51.0 %    .4 (H) 79.0 - 97.0 fL    MCH 36.3 (H) 26.6 - 33.0 pg    MCHC 36.1 (H) 31.5 - 35.7 g/dL    RDW 15.6 (H) 12.3 - 15.4 %    RDW-SD 56.3 (H) 37.0 - 54.0 fl    MPV 10.7 6.0 - 12.0 fL    Platelets 115 (L) 140 - 450 10*3/mm3    Neutrophil % 65.0 42.7 - 76.0 %    Lymphocyte % 11.9 (L) 19.6 - 45.3 %    Monocyte % 15.3 (H) 5.0 - 12.0 %    Eosinophil % 6.2 0.3 - 6.2 %    Basophil % 0.6 0.0 - 1.5 %    Immature Grans % 1.0 (H) 0.0 - 0.5 %    Neutrophils, Absolute 6.19 1.70 - 7.00 10*3/mm3    Lymphocytes, Absolute 1.14 0.70 - 3.10 10*3/mm3    Monocytes, Absolute 1.46 (H) 0.10 - 0.90 10*3/mm3    Eosinophils, Absolute 0.59 (H) 0.00 - 0.40 10*3/mm3    Basophils, Absolute 0.06 0.00 - 0.20 10*3/mm3    Immature Grans, Absolute 0.10 (H) 0.00 - 0.05 10*3/mm3    nRBC 0.1 0.0 - 0.2 /100 WBC   Scan Slide    Collection Time: 05/14/22  4:49 AM    Specimen: Blood   Result Value Ref Range    RBC Morphology Normal Normal    WBC Morphology Normal Normal    Platelet Morphology Normal Normal       Radiology:  Imaging Results (Last 24 Hours)     Procedure Component Value Units Date/Time    US Paracentesis [[id number]] Collected: 05/13/22 1348    Specimen: Body Fluid Updated: 05/13/22 1352    Narrative:      ULTRASOUND-GUIDED PARACENTESIS     HISTORY: Ascites     After  signed informed consent was obtained, the abdomen was prepped and  draped in the usual sterile fashion with 2% chlorhexidine. Lidocaine was  used for local anesthesia.     A 8 [place] catheter was inserted into the right upper quadrant using  ultrasound guidance. 8100 mL of bright yellow ascites was removed.  Sample was sent to the lab.     Confirmatory images were obtained.     Patient tolerated the procedure well with no complications.       Impression:      Ultrasound-guided paracentesis as described.     This report was finalized on 5/13/2022 1:48 PM by Dr. Franky Voss M.D.                Medications have been reviewed:  Current Facility-Administered Medications   Medication Dose Route Frequency Provider Last Rate Last Admin   • acetaminophen (TYLENOL) tablet 650 mg  650 mg Oral Q4H PRN Memo Varela MD   650 mg at 05/11/22 0422   • albumin human 25 % IV SOLN 75 g  75 g Intravenous Once Ning Tafoya APRN       • carbidopa-levodopa (SINEMET)  MG per tablet 1 tablet  1 tablet Oral TID Memo Varela MD   1 tablet at 05/14/22 0915   • cefTRIAXone (ROCEPHIN) 1 g in sodium chloride 0.9 % 100 mL IVPB-VTB  1 g Intravenous Q24H Memo Varela  mL/hr at 05/13/22 1537 1 g at 05/13/22 1537   • lactulose (CHRONULAC) 10 GM/15ML solution 30 g  30 g Oral TID Memo Varela MD   30 g at 05/14/22 0915   • lidocaine (XYLOCAINE) 1 % injection 10 mL  10 mL Injection Once Franky Voss MD       • midodrine (PROAMATINE) tablet 5 mg  5 mg Oral TID AC Franky Moreau MD   5 mg at 05/14/22 0621   • ondansetron (ZOFRAN) injection 4 mg  4 mg Intravenous Q6H PRN Memo Varela MD   4 mg at 05/11/22 0010   • pantoprazole (PROTONIX) EC tablet 40 mg  40 mg Oral BID AC Memo Varela MD   40 mg at 05/14/22 0620   • riFAXIMin (XIFAXAN) tablet 550 mg  550 mg Oral Q12H Ning Tafoya APRN   550 mg at 05/14/22 0916   • sodium chloride 0.9 % flush 10 mL  10 mL Intravenous PRN Jovanni Guerra MD            ASSESSMENT:   acute renal failure.  Urine studies suggesting ATN, slowly improved with conservative treatment.  Renal ultrasound no obstruction, CK normal (history of rhabdo)  CKD stage IIIa, baseline creatinine 1.4  Cryptogenic cirrhosis, plan for transfer to [facility] for work-up, awaiting bed    Hepatic encephalopathy, improved with lactulose but still confused, 8 L removed today  Ascites requiring paracentesis  Metabolic encephalopathy, likely from elevated ammonia levels  Hyponatremia secondary to cirrhosis/STEVEN  Mild metabolic acidosis  Hypoalbuminemia secondary to poor nutrition and cirrhosis, improved after IV albumin  Anemia of chronic disease, iron studies show no iron deficiency, elevated ferritin        PLAN:   Renal function continues to slowly improve, I's and O's still not accurate, does appear to be making urine however according to family  Volume overload better, off fluids and diuretics at this time  Had 8 L paracentesis yesterday  IV albumin was ordered after the procedure but I do not see that it was administered.  We will make sure that he gets that today  Agree with transfer to [facility] for liver failure/transplant work-up, await bed  Continue midodrine for BP support    Continue to monitor electrolytes and volume closely, avoid IV contrast and nephrotoxic medications.  Hold diuretics, discussed with family    Guarded prognosis.  Await transfer to [facility].  Okay for transfer anytime from renal standpoint, our group will continue to follow him at [facility] once he is transferred.       Franky Moreau MD   [facility]   Office phone number: [phone]  Answering service phone number: [phone]    05/14/22  10:12 EDT    Dictation performed using Dragon dictation software

## 2022-05-14 NOTE — PLAN OF CARE
Goal Outcome Evaluation:    Progress: no change  Outcome Evaluation: Vitals stable. Patient alert and oriented to self and place. Paracentesis done yesterday, abdomen still round and taut. IV albumin given. IV Rocephin continued. Ambulated to bathroom with multiple episodes of loose stool - lactulose continued. Spoke with Dia from [facility], updated on patient's condition, still waiting for an open bed at [facility] for liver transplant workup. Multiple family member at bedside throughout the day. Patient stable and needs met at this time.

## 2022-05-14 NOTE — PROGRESS NOTES
"Daily progress note    Chief complaint  S/p paracentesis 8 L  Doing better  No new complaints  Pleasantly confused  Family at bedside    History of present illness  71-year-old white male who is well-known to our service with history of hypertension hyperlipidemia coronary artery disease chronic kidney disease and cirrhosis with ascites followed by nephrology gastroenterology presented to [facility] emergency room with mental status changes generalized weakness and recurrent falls.  Patient unable to give detailed history most of the history obtained from the wife and daughter who reported that he has been feeling lousy due to weakness and not making sense at times but no fever chills chest pain shortness of breath abdominal pain vomiting diarrhea.  Patient does have nausea fatigue tiredness poor appetite and increased distention.  Patient has been getting paracentesis and the last one was 5 weeks ago.  Patient work-up in ER revealed hepatorenal syndrome with acute hepatic encephalopathy admit for management.  Patient also found to have right brain mass consistent with meningioma with no history of meningioma in the past.     REVIEW OF SYSTEMS  Unable to obtain     PHYSICAL EXAM   Blood pressure 111/64, pulse 61, temperature 97.6 °F (36.4 °C), temperature source Oral, resp. rate 16, height 188 cm (74\"), weight 79.8 kg (176 lb), SpO2 95 %.    Constitutional:  Awake and alert and no respiratory distress  HEENT:  Unremarkable  Neck: Normal range of motion. Neck supple. No JVD present.   Cardiovascular: Normal rate, regular rhythm and normal heart sounds. No murmur heard.  Pulmonary/Chest: Effort normal and decreased breath sounds bilaterally  Abdominal: Soft.  Distended and mild tenderness bowel sounds positive  Musculoskeletal: Normal range of motion. No edema, tenderness or deformity.   Neurological:  Pleasantly confused but follow commands  Skin: Skin is warm and dry. No rash noted. Pt. is not " diaphoretic. No erythema.   Psychiatric: Mood, affect normal.  He is pleasant and cooperative.     LAB RESULTS  Lab Results (last 24 hours)     Procedure Component Value Units Date/Time    Body Fluid Culture - Body Fluid, Peritoneum [[id number]] Collected: 05/13/22 0925    Specimen: Body Fluid from Peritoneum Updated: 05/14/22 0822     Body Fluid Culture No growth     Gram Stain Rare (1+) WBCs per low power field      No organisms seen    Scan Slide [[id number]]  (Normal) Collected: 05/14/22 0449    Specimen: Blood Updated: 05/14/22 0720     RBC Morphology Normal     WBC Morphology Normal     Platelet Morphology Normal    CBC & Differential [[id number]]  (Abnormal) Collected: 05/14/22 0449    Specimen: Blood Updated: 05/14/22 0720    Narrative:      The following orders were created for panel order CBC & Differential.  Procedure                               Abnormality         Status                     ---------                               -----------         ------                     CBC Auto Differential[[id number]]        Abnormal            Final result                 Please view results for these tests on the individual orders.    CBC Auto Differential [[id number]]  (Abnormal) Collected: 05/14/22 0449    Specimen: Blood Updated: 05/14/22 0720     WBC 9.54 10*3/mm3      RBC 2.73 10*6/mm3      Hemoglobin 9.9 g/dL      Hematocrit 27.4 %      .4 fL      MCH 36.3 pg      MCHC 36.1 g/dL      RDW 15.6 %      RDW-SD 56.3 fl      MPV 10.7 fL      Platelets 115 10*3/mm3      Neutrophil % 65.0 %      Lymphocyte % 11.9 %      Monocyte % 15.3 %      Eosinophil % 6.2 %      Basophil % 0.6 %      Immature Grans % 1.0 %      Neutrophils, Absolute 6.19 10*3/mm3      Lymphocytes, Absolute 1.14 10*3/mm3      Monocytes, Absolute 1.46 10*3/mm3      Eosinophils, Absolute 0.59 10*3/mm3      Basophils, Absolute 0.06 10*3/mm3      Immature Grans, Absolute 0.10 10*3/mm3      nRBC 0.1 /100 WBC     Comprehensive Metabolic Panel  [[id number]]  (Abnormal) Collected: 05/14/22 0449    Specimen: Blood Updated: 05/14/22 0606     Glucose 104 mg/dL      BUN 32 mg/dL      Creatinine 2.02 mg/dL      Sodium 140 mmol/L      Potassium 3.7 mmol/L      Chloride 108 mmol/L      CO2 19.0 mmol/L      Calcium 8.7 mg/dL      Total Protein 5.6 g/dL      Albumin 3.00 g/dL      ALT (SGPT) 23 U/L      AST (SGOT) 49 U/L      Alkaline Phosphatase 212 U/L      Total Bilirubin 17.0 mg/dL      Globulin 2.6 gm/dL      A/G Ratio 1.2 g/dL      BUN/Creatinine Ratio 15.8     Anion Gap 13.0 mmol/L      eGFR 34.2 mL/min/1.73      Comment: National Kidney Foundation and American Society of Nephrology (ASN) Task Force recommended calculation based on the Chronic Kidney Disease Epidemiology Collaboration (CKD-EPI) equation refit without adjustment for race.       Narrative:      GFR Normal >60  Chronic Kidney Disease <60  Kidney Failure <15      Ammonia [[id number]]  (Normal) Collected: 05/14/22 0449    Specimen: Blood Updated: 05/14/22 0551     Ammonia 45 umol/L     Protime-INR [[id number]]  (Abnormal) Collected: 05/14/22 0449    Specimen: Blood Updated: 05/14/22 0541     Protime 22.8 Seconds      INR 2.01    Protein, Body Fluid - Body Fluid, Peritoneum [[id number]] Collected: 05/13/22 0925    Specimen: Body Fluid from Peritoneum Updated: 05/13/22 1415     Protein, Total, Fluid 1.2 g/dL     Narrative:      No Reference Ranges Established.    A serous fluid total fluid (TP) greater than 50 percent of the serum TP suggests the fluid is an exudate.      1. Pleural TP/Serum TP >0.5  2. Pleural LD/Serum LD >0.6  3. Pleural LD >2/3 of the upper limit of normal for serum LDH    This test was developed, it performance characteristics determined and judged suitable for clinical purposes by [facility].  It has not been cleared or approved by the FDA.  The laboratory is regulated under CLIA as qualified to perform high-complexity testing.     Albumin, Fluid - Body  Fluid, Peritoneum [[id number]] Collected: 05/13/22 0925    Specimen: Body Fluid from Peritoneum Updated: 05/13/22 1415     Albumin, Fluid 0.60 g/dL     Narrative:      No Reference Ranges Established.    A Serous fluid albumin gradient (serum albumin-fluid) <1.1 g/dL suggests the fluid is an exudate.  Cirrhosis usually results in an ascites fluid albumin gradient >1.1 g/dL.    This test was developed, its performance characteristics determined and judged suitable for clinical purposes by [facility].  It has not been cleared or approved by the FDA.  The laboratory is regulated under CLIA as qualified to perfom high-complexity testing.          Imaging Results (Last 24 Hours)     ** No results found for the last 24 hours. **        ECG 12 Lead  Component   Ref Range & Units 1 d ago    QT Interval   ms 444    Resulting Agency  ECG             HEART RATE= 69  bpm  RR Interval= 892  ms  SC Interval= 164  ms  P Horizontal Axis= 4  deg  P Front Axis= 29  deg  QRSD Interval= 107  ms  QT Interval= 444  ms  QRS Axis= -30  deg  T Wave Axis= 111  deg  - BORDERLINE ECG -  Sinus rhythm  Ventricular premature complex  Left axis deviation  No change from previous             Current Facility-Administered Medications:   •  acetaminophen (TYLENOL) tablet 650 mg, 650 mg, Oral, Q4H PRN, Memo Varela MD, 650 mg at 05/11/22 0422  •  carbidopa-levodopa (SINEMET)  MG per tablet 1 tablet, 1 tablet, Oral, TID, Memo Varela MD, 1 tablet at 05/14/22 0915  •  cefTRIAXone (ROCEPHIN) 1 g in sodium chloride 0.9 % 100 mL IVPB-VTB, 1 g, Intravenous, Q24H, Memo Varela MD, Last Rate: 200 mL/hr at 05/13/22 1537, 1 g at 05/13/22 1537  •  lactulose (CHRONULAC) 10 GM/15ML solution 30 g, 30 g, Oral, TID, Memo Varela MD, 30 g at 05/14/22 0915  •  lidocaine (XYLOCAINE) 1 % injection 10 mL, 10 mL, Injection, Once, Franky Voss MD  •  midodrine (PROAMATINE) tablet 5 mg, 5 mg, Oral, TID AC, Franky Moreau,  MD, 5 mg at 05/14/22 0621  •  ondansetron (ZOFRAN) injection 4 mg, 4 mg, Intravenous, Q6H PRN, Keith Bejarano MD, 4 mg at 05/11/22 0010  •  pantoprazole (PROTONIX) EC tablet 40 mg, 40 mg, Oral, BID AC, Keith Bejarano MD, 40 mg at 05/14/22 0620  •  riFAXIMin (XIFAXAN) tablet 550 mg, 550 mg, Oral, Q12H, Ning Tafoya, APRN, 550 mg at 05/14/22 0916  •  sodium chloride 0.9 % flush 10 mL, 10 mL, Intravenous, PRN, Jovanni Guerra MD    ASSESSMENT  Hepatorenal syndrome  Cirrhosis with ascites s/p paracentesis  Jaundice  Hepatic encephalopathy  Acute kidney injury  Acute UTI  Chronic kidney disease stage III  Brain tumor consistent with meningioma  Hypotension  Hyperlipidemia  Coronary artery disease  Chronic anemia  Parkinson's disease  Esophageal varices and colon polyps  Gastroesophageal reflux disease    PLAN  CPM  Midodrine  Empiric antibiotics  Paracentesis today  Continue to hold diuretics  Continue lactulose Protonix rifaximin Proamatine and Sinemet  Stress ulcer DVT prophylaxis  GI and nephrology to follow patient  Adjust home medications  Supportive care  PT/OT  Patient and family including wife who is POA agreeable for DNR status  Discussed with family including wife daughter nursing staff gastroenterology and nephrology  [facility] available    KEITH BEJARANO MD

## 2022-05-14 NOTE — PLAN OF CARE
Goal Outcome Evaluation:VS stable . Able to sleep much more longer than the other night.Did a paracentesis yesterday and drained 8100 ml of abdominal fluid.To go to [facility] Hepatology in patient if bed available.

## 2022-05-15 ENCOUNTER — APPOINTMENT (OUTPATIENT)
Dept: CT IMAGING | Facility: HOSPITAL | Age: 72
End: 2022-05-15

## 2022-05-15 LAB
ALBUMIN SERPL-MCNC: 3.7 G/DL (ref 3.5–5.2)
ALBUMIN/GLOB SERPL: 1.7 G/DL
ALP SERPL-CCNC: 194 U/L (ref 39–117)
ALT SERPL W P-5'-P-CCNC: 20 U/L (ref 1–41)
ANION GAP SERPL CALCULATED.3IONS-SCNC: 14 MMOL/L (ref 5–15)
AST SERPL-CCNC: 44 U/L (ref 1–40)
BASOPHILS # BLD AUTO: 0.06 10*3/MM3 (ref 0–0.2)
BASOPHILS NFR BLD AUTO: 0.6 % (ref 0–1.5)
BILIRUB SERPL-MCNC: 19.2 MG/DL (ref 0–1.2)
BUN SERPL-MCNC: 29 MG/DL (ref 8–23)
BUN/CREAT SERPL: 16.6 (ref 7–25)
CALCIUM SPEC-SCNC: 9 MG/DL (ref 8.6–10.5)
CHLORIDE SERPL-SCNC: 107 MMOL/L (ref 98–107)
CO2 SERPL-SCNC: 18 MMOL/L (ref 22–29)
CREAT SERPL-MCNC: 1.75 MG/DL (ref 0.76–1.27)
DEPRECATED RDW RBC AUTO: 56.5 FL (ref 37–54)
EGFRCR SERPLBLD CKD-EPI 2021: 40.6 ML/MIN/1.73
EOSINOPHIL # BLD AUTO: 0.12 10*3/MM3 (ref 0–0.4)
EOSINOPHIL NFR BLD AUTO: 1.1 % (ref 0.3–6.2)
ERYTHROCYTE [DISTWIDTH] IN BLOOD BY AUTOMATED COUNT: 14.7 % (ref 12.3–15.4)
GLOBULIN UR ELPH-MCNC: 2.2 GM/DL
GLUCOSE SERPL-MCNC: 128 MG/DL (ref 65–99)
HCT VFR BLD AUTO: 29.8 % (ref 37.5–51)
HGB BLD-MCNC: 10.4 G/DL (ref 13–17.7)
IMM GRANULOCYTES # BLD AUTO: 0.12 10*3/MM3 (ref 0–0.05)
IMM GRANULOCYTES NFR BLD AUTO: 1.1 % (ref 0–0.5)
LYMPHOCYTES # BLD AUTO: 0.92 10*3/MM3 (ref 0.7–3.1)
LYMPHOCYTES NFR BLD AUTO: 8.8 % (ref 19.6–45.3)
MCH RBC QN AUTO: 36.2 PG (ref 26.6–33)
MCHC RBC AUTO-ENTMCNC: 34.9 G/DL (ref 31.5–35.7)
MCV RBC AUTO: 103.8 FL (ref 79–97)
MONOCYTES # BLD AUTO: 1.62 10*3/MM3 (ref 0.1–0.9)
MONOCYTES NFR BLD AUTO: 15.5 % (ref 5–12)
NEUTROPHILS NFR BLD AUTO: 7.64 10*3/MM3 (ref 1.7–7)
NEUTROPHILS NFR BLD AUTO: 72.9 % (ref 42.7–76)
NRBC BLD AUTO-RTO: 0 /100 WBC (ref 0–0.2)
PLATELET # BLD AUTO: 111 10*3/MM3 (ref 140–450)
PMV BLD AUTO: 10.3 FL (ref 6–12)
POTASSIUM SERPL-SCNC: 3.7 MMOL/L (ref 3.5–5.2)
PROT SERPL-MCNC: 5.9 G/DL (ref 6–8.5)
RBC # BLD AUTO: 2.87 10*6/MM3 (ref 4.14–5.8)
SODIUM SERPL-SCNC: 139 MMOL/L (ref 136–145)
WBC NRBC COR # BLD: 10.48 10*3/MM3 (ref 3.4–10.8)

## 2022-05-15 PROCEDURE — 25010000002 ONDANSETRON PER 1 MG: Performed by: HOSPITALIST

## 2022-05-15 PROCEDURE — 85025 COMPLETE CBC W/AUTO DIFF WBC: CPT | Performed by: HOSPITALIST

## 2022-05-15 PROCEDURE — 25010000002 CEFTRIAXONE PER 250 MG: Performed by: HOSPITALIST

## 2022-05-15 PROCEDURE — 99232 SBSQ HOSP IP/OBS MODERATE 35: CPT | Performed by: INTERNAL MEDICINE

## 2022-05-15 PROCEDURE — 80053 COMPREHEN METABOLIC PANEL: CPT | Performed by: HOSPITALIST

## 2022-05-15 PROCEDURE — 25010000002 MORPHINE PER 10 MG: Performed by: HOSPITALIST

## 2022-05-15 RX ORDER — SODIUM BICARBONATE 650 MG/1
650 TABLET ORAL 4 TIMES DAILY
Status: DISCONTINUED | OUTPATIENT
Start: 2022-05-15 | End: 2022-05-17

## 2022-05-15 RX ORDER — LACTULOSE 10 G/15ML
30 SOLUTION ORAL EVERY 6 HOURS
Status: DISCONTINUED | OUTPATIENT
Start: 2022-05-15 | End: 2022-05-17

## 2022-05-15 RX ORDER — MORPHINE SULFATE 2 MG/ML
2 INJECTION, SOLUTION INTRAMUSCULAR; INTRAVENOUS EVERY 4 HOURS PRN
Status: DISCONTINUED | OUTPATIENT
Start: 2022-05-15 | End: 2022-05-17 | Stop reason: HOSPADM

## 2022-05-15 RX ADMIN — LACTULOSE 30 G: 10 SOLUTION ORAL at 12:21

## 2022-05-15 RX ADMIN — ACETAMINOPHEN 650 MG: 325 TABLET ORAL at 00:00

## 2022-05-15 RX ADMIN — ONDANSETRON 4 MG: 2 INJECTION INTRAMUSCULAR; INTRAVENOUS at 04:09

## 2022-05-15 RX ADMIN — MIDODRINE HYDROCHLORIDE 5 MG: 5 TABLET ORAL at 19:40

## 2022-05-15 RX ADMIN — MIDODRINE HYDROCHLORIDE 5 MG: 5 TABLET ORAL at 06:18

## 2022-05-15 RX ADMIN — CEFTRIAXONE 1 G: 1 INJECTION, POWDER, FOR SOLUTION INTRAMUSCULAR; INTRAVENOUS at 16:20

## 2022-05-15 RX ADMIN — RIFAXIMIN 550 MG: 550 TABLET ORAL at 12:22

## 2022-05-15 RX ADMIN — PANTOPRAZOLE SODIUM 40 MG: 40 TABLET, DELAYED RELEASE ORAL at 19:40

## 2022-05-15 RX ADMIN — SODIUM BICARBONATE 650 MG: 650 TABLET ORAL at 19:40

## 2022-05-15 RX ADMIN — LACTULOSE 30 G: 10 SOLUTION ORAL at 19:38

## 2022-05-15 RX ADMIN — CARBIDOPA AND LEVODOPA 1 TABLET: 10; 100 TABLET ORAL at 19:40

## 2022-05-15 RX ADMIN — LACTULOSE 30 G: 10 SOLUTION ORAL at 23:47

## 2022-05-15 RX ADMIN — CARBIDOPA AND LEVODOPA 1 TABLET: 10; 100 TABLET ORAL at 12:21

## 2022-05-15 RX ADMIN — PANTOPRAZOLE SODIUM 40 MG: 40 TABLET, DELAYED RELEASE ORAL at 06:18

## 2022-05-15 RX ADMIN — MORPHINE SULFATE 2 MG: 2 INJECTION, SOLUTION INTRAMUSCULAR; INTRAVENOUS at 04:33

## 2022-05-15 RX ADMIN — ACETAMINOPHEN 650 MG: 325 TABLET ORAL at 04:09

## 2022-05-15 RX ADMIN — SODIUM BICARBONATE 650 MG: 650 TABLET ORAL at 12:22

## 2022-05-15 RX ADMIN — RIFAXIMIN 550 MG: 550 TABLET ORAL at 19:40

## 2022-05-15 NOTE — PLAN OF CARE
Goal Outcome Evaluation:     72 yo male admitted 5/9 with hepatic encephalopathy. Pt complaining of abdominal cramping throughout the night, unable to sleep. PRN zofran, tylenol, morphine administered. Awaiting transfer to [facility] for liver transplant workup. VS stable, room air, disoriented to time and situation.

## 2022-05-15 NOTE — PLAN OF CARE
Goal Outcome Evaluation:     Progress: no change  Outcome Evaluation: Vitals stable. Patient remains confused, more alert as day went on. C/o increased abdominal pain as start of shift, CT abd to be completed. Abdomen round and taut. IV Rocephin continued. Ambulated to bathroom with multiple episodes of loose stool- lactulose increased to Q6 hrs. Dia from [facility] updated on patient's condition, awaiting a bed at [facility], potentially tomorrow. Multiple family members at bedside throughout the day. Patient stable and needs met at this time.

## 2022-05-15 NOTE — PROGRESS NOTES
"   LOS: 6 days     Chief Complaint/ Reason for encounter: Acute kidney injury      Subjective   05/11/22 : Seems to be a bit more alert today but still very ill-appearing, remains confused, very tangential thinking  Having regular bowel movements on the lactulose, denies any shortness of breath  Does complain of increasing abdominal distention    5/12: More awake and alert today, still somewhat confused  Increased abdominal distention, increased edema of his legs  No shortness of breath or chest pain  Reports good appetite with no nausea or vomiting    5/13: Still very confused despite normalizing ammonia levels  No shortness of breath or chest pain, minimal ankle edema  No fevers or chills, intake low, no nausea  He says he is making more urine but it is still not being recorded    5/14: No new complaints, less confused per his wife, eating and drinking a little more  Status post large-volume paracentesis yesterday.  Albumin was ordered but it does not look like he was administered    5/15: Still very confused this morning but his wife states he is eating and drinking well  No nausea vomiting shortness of breath chest pain or edema reported      Medical history reviewed:  Weakness - Generalized        Subjective    History taken from: Patient and chart    Vital Signs  Temp:  [97.6 °F (36.4 °C)-98.9 °F (37.2 °C)] 98.9 °F (37.2 °C)  Heart Rate:  [61-70] 65  Resp:  [16-18] 18  BP: (104-146)/(52-76) 125/52       Wt Readings from Last 1 Encounters:   05/15/22 0548 81.2 kg (179 lb)   05/14/22 0531 79.8 kg (176 lb)   05/13/22 0559 87.1 kg (192 lb)   05/12/22 0505 87 kg (191 lb 14.4 oz)   05/11/22 0609 90.3 kg (199 lb)   05/09/22 2020 90.2 kg (198 lb 14.4 oz)       Objective:  Vital signs: (most recent): Blood pressure 125/52, pulse 65, temperature 98.9 °F (37.2 °C), temperature source Oral, resp. rate 18, height 188 cm (74\"), weight 81.2 kg (179 lb), SpO2 92 %.              Objective:  General Appearance:  Comfortable, " chronically ill-appearing, in no acute distress and not in pain.  Awake and alert but confused, jaundiced  HEENT: Mucous membranes moist, no injury, oropharynx clear, sclera icteric  Lungs:  Normal effort and normal respiratory rate.  Breath sounds clear to auscultation.  No  respiratory distress.  No rales, decreased breath sounds or rhonchi.    Heart: Normal rate.  Regular rhythm.  S1 normal.  No murmur.   Abdomen: Abdomen is distended with fluid, bowel sounds are diminished, no abdominal tenderness.  There is no rebound or guarding  Extremities: Normal range of motion.  Decreased, trace ankle edema of bilateral lower extremities, distal pulses intact  Neurological: No focal motor or sensory deficits, pupils reactive  Skin:  Warm and dry.  No rash or cyanosis.       Results Review:    Intake/Output:     Intake/Output Summary (Last 24 hours) at 5/15/2022 1015  Last data filed at 5/15/2022 0300  Gross per 24 hour   Intake 640 ml   Output --   Net 640 ml         DATA:  Radiology and Labs:  The following labs independently reviewed by me. Additional labs ordered for tomorrow a.m.  Interval notes, chart personally reviewed by me.   Old records independently reviewed showing CKD 3  Discussed with patient and his family at bedside    Moderate complexity, transplant evaluation at [facility]    Labs:   Recent Results (from the past 24 hour(s))   Comprehensive Metabolic Panel    Collection Time: 05/15/22  5:52 AM    Specimen: Blood   Result Value Ref Range    Glucose 128 (H) 65 - 99 mg/dL    BUN 29 (H) 8 - 23 mg/dL    Creatinine 1.75 (H) 0.76 - 1.27 mg/dL    Sodium 139 136 - 145 mmol/L    Potassium 3.7 3.5 - 5.2 mmol/L    Chloride 107 98 - 107 mmol/L    CO2 18.0 (L) 22.0 - 29.0 mmol/L    Calcium 9.0 8.6 - 10.5 mg/dL    Total Protein 5.9 (L) 6.0 - 8.5 g/dL    Albumin 3.70 3.50 - 5.20 g/dL    ALT (SGPT) 20 1 - 41 U/L    AST (SGOT) 44 (H) 1 - 40 U/L    Alkaline Phosphatase 194 (H) 39 - 117 U/L    Total Bilirubin 19.2 (H) 0.0 - 1.2  mg/dL    Globulin 2.2 gm/dL    A/G Ratio 1.7 g/dL    BUN/Creatinine Ratio 16.6 7.0 - 25.0    Anion Gap 14.0 5.0 - 15.0 mmol/L    eGFR 40.6 (L) >60.0 mL/min/1.73   CBC Auto Differential    Collection Time: 05/15/22  5:52 AM    Specimen: Blood   Result Value Ref Range    WBC 10.48 3.40 - 10.80 10*3/mm3    RBC 2.87 (L) 4.14 - 5.80 10*6/mm3    Hemoglobin 10.4 (L) 13.0 - 17.7 g/dL    Hematocrit 29.8 (L) 37.5 - 51.0 %    .8 (H) 79.0 - 97.0 fL    MCH 36.2 (H) 26.6 - 33.0 pg    MCHC 34.9 31.5 - 35.7 g/dL    RDW 14.7 12.3 - 15.4 %    RDW-SD 56.5 (H) 37.0 - 54.0 fl    MPV 10.3 6.0 - 12.0 fL    Platelets 111 (L) 140 - 450 10*3/mm3    Neutrophil % 72.9 42.7 - 76.0 %    Lymphocyte % 8.8 (L) 19.6 - 45.3 %    Monocyte % 15.5 (H) 5.0 - 12.0 %    Eosinophil % 1.1 0.3 - 6.2 %    Basophil % 0.6 0.0 - 1.5 %    Immature Grans % 1.1 (H) 0.0 - 0.5 %    Neutrophils, Absolute 7.64 (H) 1.70 - 7.00 10*3/mm3    Lymphocytes, Absolute 0.92 0.70 - 3.10 10*3/mm3    Monocytes, Absolute 1.62 (H) 0.10 - 0.90 10*3/mm3    Eosinophils, Absolute 0.12 0.00 - 0.40 10*3/mm3    Basophils, Absolute 0.06 0.00 - 0.20 10*3/mm3    Immature Grans, Absolute 0.12 (H) 0.00 - 0.05 10*3/mm3    nRBC 0.0 0.0 - 0.2 /100 WBC       Radiology:  Imaging Results (Last 24 Hours)     ** No results found for the last 24 hours. **             Medications have been reviewed:  Current Facility-Administered Medications   Medication Dose Route Frequency Provider Last Rate Last Admin   • acetaminophen (TYLENOL) tablet 650 mg  650 mg Oral Q4H PRN Memo Varela MD   650 mg at 05/15/22 0409   • carbidopa-levodopa (SINEMET)  MG per tablet 1 tablet  1 tablet Oral TID Memo Varela MD   1 tablet at 05/14/22 1947   • cefTRIAXone (ROCEPHIN) 1 g in sodium chloride 0.9 % 100 mL IVPB-VTB  1 g Intravenous Q24H Memo Varela  mL/hr at 05/14/22 1501 1 g at 05/14/22 1501   • lactulose (CHRONULAC) 10 GM/15ML solution 30 g  30 g Oral TID Memo Varela MD   30 g at 05/1948   •  lidocaine (XYLOCAINE) 1 % injection 10 mL  10 mL Injection Once Franky Voss MD       • midodrine (PROAMATINE) tablet 5 mg  5 mg Oral TID AC Franky Moreau MD   5 mg at 05/15/22 0618   • morphine injection 2 mg  2 mg Intravenous Q4H PRN Memo Varela MD   2 mg at 05/15/22 0433   • ondansetron (ZOFRAN) injection 4 mg  4 mg Intravenous Q6H PRN Memo Varela MD   4 mg at 05/15/22 0409   • pantoprazole (PROTONIX) EC tablet 40 mg  40 mg Oral BID AC Memo Varela MD   40 mg at 05/15/22 0618   • riFAXIMin (XIFAXAN) tablet 550 mg  550 mg Oral Q12H Ning Tafoya, APRN   550 mg at 05/14/22 1947   • sodium chloride 0.9 % flush 10 mL  10 mL Intravenous PRN Jovanni Guerra MD           ASSESSMENT:   acute renal failure.  Urine studies suggesting ATN, slowly improved with conservative treatment.  Renal ultrasound no obstruction, CK normal (history of rhabdo)  CKD stage IIIa, baseline creatinine 1.4  Cryptogenic cirrhosis, plan for transfer to [facility] for work-up, awaiting bed    Hepatic encephalopathy, improved with lactulose but still confused, 8 L removed today  Ascites requiring paracentesis  Metabolic encephalopathy, likely from elevated ammonia levels  Hyponatremia secondary to cirrhosis/STEVEN  Mild metabolic acidosis  Hypoalbuminemia secondary to poor nutrition and cirrhosis, improved after IV albumin  Anemia of chronic disease, iron studies show no iron deficiency, elevated ferritin        PLAN:   Renal function continues to slowly improve, euvolemic on exam today  Continue to hold diuretics  Paracentesis as needed, IV albumin after each procedure  Agree with transfer to [facility] for liver failure/transplant work-up, await bed  Continue midodrine for BP support  Add sodium bicarb    Continue to monitor electrolytes and volume closely      Franky Moreau MD   [facility]   Office phone number: [phone]  Answering service phone number: [phone]    05/15/22  10:15 EDT    Dictation  performed using Dragon dictation software

## 2022-05-15 NOTE — PROGRESS NOTES
[facility]  Inpatient Progress Note    Reason for Follow Up:  Cirrhosis    Subjective     Interval History:   Called by nurse for increasing abdominal pain this morning, awaiting CT scan to be done.  Wife reports a difficult night, more encephalopathic today.    Current Facility-Administered Medications:   •  acetaminophen (TYLENOL) tablet 650 mg, 650 mg, Oral, Q4H PRN, Memo Varela MD, 650 mg at 05/15/22 0409  •  carbidopa-levodopa (SINEMET)  MG per tablet 1 tablet, 1 tablet, Oral, TID, Memo Varela MD, 1 tablet at 05/15/22 1221  •  cefTRIAXone (ROCEPHIN) 1 g in sodium chloride 0.9 % 100 mL IVPB-VTB, 1 g, Intravenous, Q24H, Memo Varela MD, Last Rate: 200 mL/hr at 05/14/22 1501, 1 g at 05/14/22 1501  •  lactulose (CHRONULAC) 10 GM/15ML solution 30 g, 30 g, Oral, TID, Memo Varela MD, 30 g at 05/15/22 1221  •  lidocaine (XYLOCAINE) 1 % injection 10 mL, 10 mL, Injection, Once, Franky Voss MD  •  midodrine (PROAMATINE) tablet 5 mg, 5 mg, Oral, TID YAYA, Franky Moreau MD, 5 mg at 05/15/22 0618  •  morphine injection 2 mg, 2 mg, Intravenous, Q4H PRN, Memo Varela MD, 2 mg at 05/15/22 0433  •  ondansetron (ZOFRAN) injection 4 mg, 4 mg, Intravenous, Q6H PRN, Memo Varela MD, 4 mg at 05/15/22 0409  •  pantoprazole (PROTONIX) EC tablet 40 mg, 40 mg, Oral, BID Avery JAVIER Aftab, MD, 40 mg at 05/15/22 0618  •  riFAXIMin (XIFAXAN) tablet 550 mg, 550 mg, Oral, Q12H, Ning Tafoya APRN, 550 mg at 05/15/22 1222  •  sodium bicarbonate tablet 650 mg, 650 mg, Oral, 4x Daily, Franky Moreau MD, 650 mg at 05/15/22 1222  •  sodium chloride 0.9 % flush 10 mL, 10 mL, Intravenous, PRN, Jovanni Guerra MD  Review of Systems:   Unable to obtain review of systems due to: Patient confusion        Objective     Vital Signs  Temp:  [97.6 °F (36.4 °C)-98.9 °F (37.2 °C)] 98.9 °F (37.2 °C)  Heart Rate:  [61-70] 68  Resp:  [16-18] 18  BP: (104-146)/(52-76) 136/71  Body mass index is  22.98 kg/m².    Intake/Output Summary (Last 24 hours) at 5/15/2022 1252  Last data filed at 5/15/2022 0300  Gross per 24 hour   Intake 340 ml   Output --   Net 340 ml     No intake/output data recorded.     Physical Exam:   General: patient awake, confused, chronically ill-appearing   Eyes: Normal lids and lashes, +scleral icterus   Neck: supple, normal ROM   Skin: warm and dry,  jaundiced   Cardiovascular: regular rhythm and rate, no murmurs auscultated   Pulm: clear to auscultation bilaterally, regular and unlabored   Abdomen: soft, nontender, distended; normal bowel sounds   Rectal: deferred   Extremities: no rash or edema   Psychiatric: encephalopathic     Results Review:     I reviewed the patient's new clinical results.    Results from last 7 days   Lab Units 05/15/22  0552 05/14/22  0449 05/13/22  0453   WBC 10*3/mm3 10.48 9.54 5.47   HEMOGLOBIN g/dL 10.4* 9.9* 8.5*   HEMATOCRIT % 29.8* 27.4* 22.7*   PLATELETS 10*3/mm3 111* 115* 114*     Results from last 7 days   Lab Units 05/15/22  0552 05/14/22  0449 05/13/22  0453   SODIUM mmol/L 139 140 141   POTASSIUM mmol/L 3.7 3.7 3.9   CHLORIDE mmol/L 107 108* 106   CO2 mmol/L 18.0* 19.0* 20.0*   BUN mg/dL 29* 32* 39*   CREATININE mg/dL 1.75* 2.02* 2.13*   CALCIUM mg/dL 9.0 8.7 9.2   BILIRUBIN mg/dL 19.2* 17.0* 17.3*   ALK PHOS U/L 194* 212* 202*   ALT (SGPT) U/L 20 23 21   AST (SGOT) U/L 44* 49* 50*   GLUCOSE mg/dL 128* 104* 109*     Results from last 7 days   Lab Units 05/14/22 0449 05/13/22 0453 05/11/22  0415   INR  2.01* 2.17* 1.73*     No results found for: LIPASE    Radiology:  US Paracentesis   Final Result   Ultrasound-guided paracentesis as described.       This report was finalized on 5/13/2022 1:48 PM by Dr. Franky Voss M.D.          US Renal Bilateral   Final Result   There is no evidence of hydronephrosis. Very large volume of   ascites is observed.       This report was finalized on 5/10/2022 7:23 PM by Dr. Cliff Clemens M.D.          CT  Head Without Contrast   Final Result   Solitary hyperdense dural based mass in the right   parafalcine region at the vertex consistent with a meningioma as   described. This produces no significant mass effect on the adjacent   frontal lobe and there is no edema within the brain or adjacent to the   lesion. Paranasal Sinus disease as noted. Otherwise unremarkable CT scan   of the head. No acute intracranial abnormality is identified.       This report was finalized on 5/9/2022 5:23 PM by Dr. Gaetano Kim M.D.          XR Chest 1 View   Final Result   Right base atelectasis.       This report was finalized on 5/9/2022 12:43 PM by Dr. Cliff Clemens M.D.          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  Decompensated cirrhosis: With ascites and encephalopathy.  He has had an elevated smooth muscle antibody on 2 occasions, in 2017 and this admission.?  Autoimmune hepatitis.    2.  Hepatic encephalopathy    3.  Increased abdominal pain    4.  STEVEN on CKD    5.  Macrocytic anemia    6.  Increased abdominal pain    Plan  Await CT scan results  Low-sodium diet  He is awaiting transfer to [facility] hepatology service; this is pending bed availability  Increase lactulose, continue Xifaxan    I discussed the patients findings and my recommendations with family and nursing staff.    All necessary PPE, including face  mask and eye protection, were worn during this encounter.  Hand sanitization was performed both before and after the patient interaction.    Over 25 minutes was spent reviewing the patient's chart and records, in discussion with the patient, in examination of the patient, and in discussion with members of the patient's medical team.    Brunilda Celestin MD

## 2022-05-16 ENCOUNTER — APPOINTMENT (OUTPATIENT)
Dept: CT IMAGING | Facility: HOSPITAL | Age: 72
End: 2022-05-16

## 2022-05-16 LAB
ALBUMIN SERPL-MCNC: 3.3 G/DL (ref 3.5–5.2)
ALBUMIN/GLOB SERPL: 1.7 G/DL
ALP SERPL-CCNC: 174 U/L (ref 39–117)
ALT SERPL W P-5'-P-CCNC: 18 U/L (ref 1–41)
ANION GAP SERPL CALCULATED.3IONS-SCNC: 12 MMOL/L (ref 5–15)
AST SERPL-CCNC: 44 U/L (ref 1–40)
BASOPHILS # BLD AUTO: 0.04 10*3/MM3 (ref 0–0.2)
BASOPHILS NFR BLD AUTO: 0.4 % (ref 0–1.5)
BILIRUB SERPL-MCNC: 20.9 MG/DL (ref 0–1.2)
BUN SERPL-MCNC: 33 MG/DL (ref 8–23)
BUN/CREAT SERPL: 19 (ref 7–25)
CALCIUM SPEC-SCNC: 8.9 MG/DL (ref 8.6–10.5)
CHLORIDE SERPL-SCNC: 107 MMOL/L (ref 98–107)
CO2 SERPL-SCNC: 20 MMOL/L (ref 22–29)
CREAT SERPL-MCNC: 1.74 MG/DL (ref 0.76–1.27)
DEPRECATED RDW RBC AUTO: 53.3 FL (ref 37–54)
EGFRCR SERPLBLD CKD-EPI 2021: 40.9 ML/MIN/1.73
EOSINOPHIL # BLD AUTO: 0.33 10*3/MM3 (ref 0–0.4)
EOSINOPHIL NFR BLD AUTO: 3.5 % (ref 0.3–6.2)
ERYTHROCYTE [DISTWIDTH] IN BLOOD BY AUTOMATED COUNT: 14.7 % (ref 12.3–15.4)
GLOBULIN UR ELPH-MCNC: 1.9 GM/DL
GLUCOSE BLDC GLUCOMTR-MCNC: 115 MG/DL (ref 70–130)
GLUCOSE SERPL-MCNC: 99 MG/DL (ref 65–99)
HCT VFR BLD AUTO: 25.9 % (ref 37.5–51)
HGB BLD-MCNC: 9.5 G/DL (ref 13–17.7)
IMM GRANULOCYTES # BLD AUTO: 0.08 10*3/MM3 (ref 0–0.05)
IMM GRANULOCYTES NFR BLD AUTO: 0.8 % (ref 0–0.5)
INR PPP: 2.06 (ref 0.9–1.1)
LYMPHOCYTES # BLD AUTO: 0.96 10*3/MM3 (ref 0.7–3.1)
LYMPHOCYTES NFR BLD AUTO: 10.2 % (ref 19.6–45.3)
MCH RBC QN AUTO: 36.5 PG (ref 26.6–33)
MCHC RBC AUTO-ENTMCNC: 36.7 G/DL (ref 31.5–35.7)
MCV RBC AUTO: 99.6 FL (ref 79–97)
MONOCYTES # BLD AUTO: 1.41 10*3/MM3 (ref 0.1–0.9)
MONOCYTES NFR BLD AUTO: 15 % (ref 5–12)
NEUTROPHILS NFR BLD AUTO: 6.61 10*3/MM3 (ref 1.7–7)
NEUTROPHILS NFR BLD AUTO: 70.1 % (ref 42.7–76)
NRBC BLD AUTO-RTO: 0 /100 WBC (ref 0–0.2)
PLATELET # BLD AUTO: 101 10*3/MM3 (ref 140–450)
PMV BLD AUTO: 10.6 FL (ref 6–12)
POTASSIUM SERPL-SCNC: 3.8 MMOL/L (ref 3.5–5.2)
PROT SERPL-MCNC: 5.2 G/DL (ref 6–8.5)
PROTHROMBIN TIME: 23.3 SECONDS (ref 11.7–14.2)
RBC # BLD AUTO: 2.6 10*6/MM3 (ref 4.14–5.8)
SODIUM SERPL-SCNC: 139 MMOL/L (ref 136–145)
WBC NRBC COR # BLD: 9.43 10*3/MM3 (ref 3.4–10.8)

## 2022-05-16 PROCEDURE — 80053 COMPREHEN METABOLIC PANEL: CPT | Performed by: HOSPITALIST

## 2022-05-16 PROCEDURE — 99232 SBSQ HOSP IP/OBS MODERATE 35: CPT | Performed by: INTERNAL MEDICINE

## 2022-05-16 PROCEDURE — 85025 COMPLETE CBC W/AUTO DIFF WBC: CPT | Performed by: HOSPITALIST

## 2022-05-16 PROCEDURE — 74176 CT ABD & PELVIS W/O CONTRAST: CPT

## 2022-05-16 PROCEDURE — 82962 GLUCOSE BLOOD TEST: CPT

## 2022-05-16 PROCEDURE — 85610 PROTHROMBIN TIME: CPT | Performed by: HOSPITALIST

## 2022-05-16 RX ADMIN — LACTULOSE 30 G: 10 SOLUTION ORAL at 17:33

## 2022-05-16 RX ADMIN — PANTOPRAZOLE SODIUM 40 MG: 40 TABLET, DELAYED RELEASE ORAL at 08:01

## 2022-05-16 RX ADMIN — MIDODRINE HYDROCHLORIDE 5 MG: 5 TABLET ORAL at 17:33

## 2022-05-16 RX ADMIN — CARBIDOPA AND LEVODOPA 1 TABLET: 10; 100 TABLET ORAL at 08:01

## 2022-05-16 RX ADMIN — LACTULOSE 30 G: 10 SOLUTION ORAL at 23:59

## 2022-05-16 RX ADMIN — SODIUM BICARBONATE 650 MG: 650 TABLET ORAL at 11:40

## 2022-05-16 RX ADMIN — MIDODRINE HYDROCHLORIDE 5 MG: 5 TABLET ORAL at 11:40

## 2022-05-16 RX ADMIN — SODIUM BICARBONATE 650 MG: 650 TABLET ORAL at 21:14

## 2022-05-16 RX ADMIN — MIDODRINE HYDROCHLORIDE 5 MG: 5 TABLET ORAL at 08:01

## 2022-05-16 RX ADMIN — LACTULOSE 30 G: 10 SOLUTION ORAL at 11:40

## 2022-05-16 RX ADMIN — CARBIDOPA AND LEVODOPA 1 TABLET: 10; 100 TABLET ORAL at 17:33

## 2022-05-16 RX ADMIN — RIFAXIMIN 550 MG: 550 TABLET ORAL at 21:14

## 2022-05-16 RX ADMIN — Medication 10 ML: at 08:01

## 2022-05-16 RX ADMIN — PANTOPRAZOLE SODIUM 40 MG: 40 TABLET, DELAYED RELEASE ORAL at 17:33

## 2022-05-16 RX ADMIN — RIFAXIMIN 550 MG: 550 TABLET ORAL at 08:01

## 2022-05-16 RX ADMIN — CARBIDOPA AND LEVODOPA 1 TABLET: 10; 100 TABLET ORAL at 21:14

## 2022-05-16 RX ADMIN — SODIUM BICARBONATE 650 MG: 650 TABLET ORAL at 17:33

## 2022-05-16 RX ADMIN — LACTULOSE 30 G: 10 SOLUTION ORAL at 05:53

## 2022-05-16 RX ADMIN — SODIUM BICARBONATE 650 MG: 650 TABLET ORAL at 08:01

## 2022-05-16 NOTE — PROGRESS NOTES
LOS: 7 days     Chief Complaint/ Reason for encounter: Acute kidney injury      Subjective   05/11/22 : Seems to be a bit more alert today but still very ill-appearing, remains confused, very tangential thinking  Having regular bowel movements on the lactulose, denies any shortness of breath  Does complain of increasing abdominal distention    5/12: More awake and alert today, still somewhat confused  Increased abdominal distention, increased edema of his legs  No shortness of breath or chest pain  Reports good appetite with no nausea or vomiting    5/13: Still very confused despite normalizing ammonia levels  No shortness of breath or chest pain, minimal ankle edema  No fevers or chills, intake low, no nausea  He says he is making more urine but it is still not being recorded    5/14: No new complaints, less confused per his wife, eating and drinking a little more  Status post large-volume paracentesis yesterday.  Albumin was ordered but it does not look like he was administered    5/15: Still very confused this morning but his wife states he is eating and drinking well  No nausea vomiting shortness of breath chest pain or edema reported    5/16: Looks and feels well today, more awake, alert, conversant  States improved oral intake, complains of abdominal distention but no pain  No edema, voiding well  No nausea or vomiting    Medical history reviewed:  Weakness - Generalized        Subjective    History taken from: Patient and chart    Vital Signs  Temp:  [97.7 °F (36.5 °C)-98.4 °F (36.9 °C)] 98.4 °F (36.9 °C)  Heart Rate:  [62-69] 62  Resp:  [18-20] 18  BP: ()/(61-77) 134/61       Wt Readings from Last 1 Encounters:   05/16/22 0559 80.7 kg (178 lb)   05/15/22 0548 81.2 kg (179 lb)   05/14/22 0531 79.8 kg (176 lb)   05/13/22 0559 87.1 kg (192 lb)   05/12/22 0505 87 kg (191 lb 14.4 oz)   05/11/22 0609 90.3 kg (199 lb)   05/09/22 2020 90.2 kg (198 lb 14.4 oz)       Objective:  Vital signs: (most recent):  "Blood pressure 134/61, pulse 62, temperature 98.4 °F (36.9 °C), temperature source Oral, resp. rate 18, height 188 cm (74\"), weight 80.7 kg (178 lb), SpO2 97 %.              Objective:  General Appearance:  Comfortable, chronically ill-appearing, in no acute distress and not in pain.  Awake and alert but confused, jaundiced  HEENT: Mucous membranes moist, no injury, oropharynx clear, sclera icteric  Lungs:  Normal effort and normal respiratory rate.  Breath sounds clear to auscultation.  No  respiratory distress.  No rales, decreased breath sounds or rhonchi.    Heart: Normal rate.  Regular rhythm.  S1 normal.  No murmur.   Abdomen: Abdomen is distended with fluid, bowel sounds are diminished, no abdominal tenderness.  There is no rebound or guarding  Extremities: Normal range of motion.  Trace ankle edema of bilateral lower extremities, distal pulses intact  Neurological: No focal motor or sensory deficits, pupils reactive  Skin:  Warm and dry.  No rash or cyanosis.       Results Review:    Intake/Output:     Intake/Output Summary (Last 24 hours) at 5/16/2022 1027  Last data filed at 5/16/2022 0900  Gross per 24 hour   Intake 820 ml   Output --   Net 820 ml         DATA:  Radiology and Labs:  The following labs independently reviewed by me. Additional labs ordered for tomorrow a.m.  Interval notes, chart personally reviewed by me.   Old records independently reviewed showing CKD 3  Discussed with patient and his family at bedside    Moderate complexity, transplant evaluation at [facility]    Labs:   Recent Results (from the past 24 hour(s))   Comprehensive Metabolic Panel    Collection Time: 05/16/22  5:08 AM    Specimen: Blood   Result Value Ref Range    Glucose 99 65 - 99 mg/dL    BUN 33 (H) 8 - 23 mg/dL    Creatinine 1.74 (H) 0.76 - 1.27 mg/dL    Sodium 139 136 - 145 mmol/L    Potassium 3.8 3.5 - 5.2 mmol/L    Chloride 107 98 - 107 mmol/L    CO2 20.0 (L) 22.0 - 29.0 mmol/L    Calcium 8.9 8.6 - 10.5 mg/dL    Total " Protein 5.2 (L) 6.0 - 8.5 g/dL    Albumin 3.30 (L) 3.50 - 5.20 g/dL    ALT (SGPT) 18 1 - 41 U/L    AST (SGOT) 44 (H) 1 - 40 U/L    Alkaline Phosphatase 174 (H) 39 - 117 U/L    Total Bilirubin 20.9 (H) 0.0 - 1.2 mg/dL    Globulin 1.9 gm/dL    A/G Ratio 1.7 g/dL    BUN/Creatinine Ratio 19.0 7.0 - 25.0    Anion Gap 12.0 5.0 - 15.0 mmol/L    eGFR 40.9 (L) >60.0 mL/min/1.73   Protime-INR    Collection Time: 05/16/22  5:08 AM    Specimen: Blood   Result Value Ref Range    Protime 23.3 (H) 11.7 - 14.2 Seconds    INR 2.06 (H) 0.90 - 1.10   CBC Auto Differential    Collection Time: 05/16/22  5:08 AM    Specimen: Blood   Result Value Ref Range    WBC 9.43 3.40 - 10.80 10*3/mm3    RBC 2.60 (L) 4.14 - 5.80 10*6/mm3    Hemoglobin 9.5 (L) 13.0 - 17.7 g/dL    Hematocrit 25.9 (L) 37.5 - 51.0 %    MCV 99.6 (H) 79.0 - 97.0 fL    MCH 36.5 (H) 26.6 - 33.0 pg    MCHC 36.7 (H) 31.5 - 35.7 g/dL    RDW 14.7 12.3 - 15.4 %    RDW-SD 53.3 37.0 - 54.0 fl    MPV 10.6 6.0 - 12.0 fL    Platelets 101 (L) 140 - 450 10*3/mm3    Neutrophil % 70.1 42.7 - 76.0 %    Lymphocyte % 10.2 (L) 19.6 - 45.3 %    Monocyte % 15.0 (H) 5.0 - 12.0 %    Eosinophil % 3.5 0.3 - 6.2 %    Basophil % 0.4 0.0 - 1.5 %    Immature Grans % 0.8 (H) 0.0 - 0.5 %    Neutrophils, Absolute 6.61 1.70 - 7.00 10*3/mm3    Lymphocytes, Absolute 0.96 0.70 - 3.10 10*3/mm3    Monocytes, Absolute 1.41 (H) 0.10 - 0.90 10*3/mm3    Eosinophils, Absolute 0.33 0.00 - 0.40 10*3/mm3    Basophils, Absolute 0.04 0.00 - 0.20 10*3/mm3    Immature Grans, Absolute 0.08 (H) 0.00 - 0.05 10*3/mm3    nRBC 0.0 0.0 - 0.2 /100 WBC       Radiology:  Imaging Results (Last 24 Hours)     Procedure Component Value Units Date/Time    CT Abdomen Pelvis Without Contrast [[id number]] Collected: 05/16/22 0808     Updated: 05/16/22 0808    Narrative:      CT ABDOMEN AND PELVIS WITHOUT CONTRAST     HISTORY: Cirrhosis with ascites and worsening abdominal pain.     TECHNIQUE: Axial CT images of the abdomen and pelvis were  obtained  without administration of intravenous contrast. The patient was not  given oral contrast. Coronal and sagittal reformats were obtained.     COMPARISON: CT dated 11/06/2017.     FINDINGS: There is large intra-abdominal ascites present. The liver is  shrunken, consistent with hepatic cirrhosis. The spleen measures 13.2 cm  in craniocaudal dimension. Cholelithiasis is present. The pancreas is  normal. Bilateral adrenal glands and kidneys are normal. Portosystemic  collaterals are seen in the splenic hilum and the retroperitoneum. The  urinary bladder is partially distended and normal. No evidence of bowel  obstruction. No pathological retroperitoneal lymphadenopathy. Marked  calcified atherosclerotic plaque is seen within the abdominal aorta and  its branches. Mild ectasia of the infrarenal abdominal aorta measuring  up to 2.4 cm. No pleural or pericardial effusion.     There are compression deformities of numerous lumbar vertebral bodies  including the superior endplates of L2, L3 and L4 vertebral body and  inferior endplate of L5 vertebral body. These cause approximately 10-20%  height loss. These are age-indeterminate.       Impression:      1. Cirrhosis with sequela of portal hypertension include large  intra-abdominal ascites.  2. Cholelithiasis.     Radiation dose reduction techniques were utilized, including automated  exposure control and exposure modulation based on body size.                   Medications have been reviewed:  Current Facility-Administered Medications   Medication Dose Route Frequency Provider Last Rate Last Admin   • acetaminophen (TYLENOL) tablet 650 mg  650 mg Oral Q4H PRN Memo Varela MD   650 mg at 05/15/22 0409   • carbidopa-levodopa (SINEMET)  MG per tablet 1 tablet  1 tablet Oral TID Memo Varela MD   1 tablet at 05/16/22 0801   • lactulose (CHRONULAC) 10 GM/15ML solution 30 g  30 g Oral Q6H Brunilda Celestin MD   30 g at 05/16/22 0553   • lidocaine (XYLOCAINE) 1 %  injection 10 mL  10 mL Injection Once Franky Voss MD       • midodrine (PROAMATINE) tablet 5 mg  5 mg Oral TID AC Franky Moreau MD   5 mg at 05/16/22 0801   • morphine injection 2 mg  2 mg Intravenous Q4H PRN Memo Varela MD   2 mg at 05/15/22 0433   • ondansetron (ZOFRAN) injection 4 mg  4 mg Intravenous Q6H PRN Memo Varela MD   4 mg at 05/15/22 0409   • pantoprazole (PROTONIX) EC tablet 40 mg  40 mg Oral BID AC Memo Varela MD   40 mg at 05/16/22 0801   • riFAXIMin (XIFAXAN) tablet 550 mg  550 mg Oral Q12H Memo Varela MD   550 mg at 05/16/22 0801   • sodium bicarbonate tablet 650 mg  650 mg Oral 4x Daily Franky Moreau MD   650 mg at 05/16/22 0801   • sodium chloride 0.9 % flush 10 mL  10 mL Intravenous PRN Jovanni Guerra MD   10 mL at 05/16/22 0801       ASSESSMENT:   acute renal failure.  Urine studies suggesting ATN, slowly improved with conservative treatment.  Renal ultrasound no obstruction, CK normal (history of rhabdo), no evidence of hepatorenal syndrome  CKD stage IIIa, baseline creatinine 1.4  Cryptogenic cirrhosis, plan for transfer to [facility] for work-up, awaiting bed    Hepatic encephalopathy, improved with lactulose   Ascites requiring paracentesis  Metabolic encephalopathy, improved  Hyponatremia secondary to cirrhosis/STEVEN, improved  Mild metabolic acidosis, stable  Hypoalbuminemia secondary to liver disease  Anemia of chronic disease, iron studies show no iron deficiency, elevated ferritin        PLAN:   Renal function continues to slowly improve, and function remains fairly stable today and this may be his new baseline, previous baseline creatinine was 1.4  Continue to hold diuretics, looks euvolemic on exam other than ascites  Paracentesis as needed per GI, IV albumin after each procedure  Await bed at [facility] for liver transplant work-up    Continue midodrine for BP support and oral sodium bicarbonate   monitor electrolytes and volume closely      Franky  WENDI Moreau MD   [facility]   Office phone number: [phone]  Answering service phone number: [phone]    05/16/22  10:27 EDT    Dictation performed using Dragon dictation [employer]

## 2022-05-16 NOTE — PROGRESS NOTES
[facility], [place], operated by [facility]  Inpatient Progress Note    Reason for Follow Up: Cirrhosis    Subjective     Interval History:   CT reviewed-large intra-abdominal ascites, cholelithiasis no other abnormalities.  5 recorded bowel movement yesterday.  He is awake and alert today.  He denies abdominal pain.    Current Facility-Administered Medications:   •  acetaminophen (TYLENOL) tablet 650 mg, 650 mg, Oral, Q4H PRN, Memo Varela MD, 650 mg at 05/15/22 0409  •  carbidopa-levodopa (SINEMET)  MG per tablet 1 tablet, 1 tablet, Oral, TID, Memo Varela MD, 1 tablet at 05/16/22 0801  •  lactulose (CHRONULAC) 10 GM/15ML solution 30 g, 30 g, Oral, Q6H, Brunilda Celestin MD, 30 g at 05/16/22 0553  •  lidocaine (XYLOCAINE) 1 % injection 10 mL, 10 mL, Injection, Once, Franky Voss MD  •  midodrine (PROAMATINE) tablet 5 mg, 5 mg, Oral, TID YAYA, Franky Moreau MD, 5 mg at 05/16/22 0801  •  morphine injection 2 mg, 2 mg, Intravenous, Q4H PRN, Memo Varela MD, 2 mg at 05/15/22 0433  •  ondansetron (ZOFRAN) injection 4 mg, 4 mg, Intravenous, Q6H PRN, Memo Varela MD, 4 mg at 05/15/22 0409  •  pantoprazole (PROTONIX) EC tablet 40 mg, 40 mg, Oral, BID Avery JAVIER Aftab, MD, 40 mg at 05/16/22 0801  •  riFAXIMin (XIFAXAN) tablet 550 mg, 550 mg, Oral, Q12H, Memo Varela MD, 550 mg at 05/16/22 0801  •  sodium bicarbonate tablet 650 mg, 650 mg, Oral, 4x Daily, Franky Moreau MD, 650 mg at 05/16/22 0801  •  sodium chloride 0.9 % flush 10 mL, 10 mL, Intravenous, PRN, Jovanni Guerra MD, 10 mL at 05/16/22 0801  Review of Systems:    Positive for abdominal distention, negative for abdominal pain, negative for fever chills    Objective     Vital Signs  Temp:  [97.7 °F (36.5 °C)-98.4 °F (36.9 °C)] 98.4 °F (36.9 °C)  Heart Rate:  [62-69] 62  Resp:  [18-20] 18  BP: ()/(61-77) 134/61  Body mass index is 22.85 kg/m².    Intake/Output Summary (Last 24 hours) at 5/16/2022 0821  Last data filed at 5/16/2022  0717  Gross per 24 hour   Intake 460 ml   Output --   Net 460 ml     No intake/output data recorded.     Physical Exam:   General: patient awake, alert     Eyes: Normal lids and lashes, + scleral icterus   Neck: supple, normal ROM   Skin: warm and dry, + jaundiced   Cardiovascular: regular rhythm and rate    Pulm: clear to auscultation bilaterally, regular and unlabored   Abdomen: soft, nontender, distended; normal bowel sounds   Psychiatric: Normal mood and behavior; memory intact     Results Review:     I reviewed the patient's new clinical results.    Results from last 7 days   Lab Units 05/16/22  0508 05/15/22  0552 05/14/22  0449   WBC 10*3/mm3 9.43 10.48 9.54   HEMOGLOBIN g/dL 9.5* 10.4* 9.9*   HEMATOCRIT % 25.9* 29.8* 27.4*   PLATELETS 10*3/mm3 101* 111* 115*     Results from last 7 days   Lab Units 05/16/22  0508 05/15/22  0552 05/14/22  0449   SODIUM mmol/L 139 139 140   POTASSIUM mmol/L 3.8 3.7 3.7   CHLORIDE mmol/L 107 107 108*   CO2 mmol/L 20.0* 18.0* 19.0*   BUN mg/dL 33* 29* 32*   CREATININE mg/dL 1.74* 1.75* 2.02*   CALCIUM mg/dL 8.9 9.0 8.7   BILIRUBIN mg/dL 20.9* 19.2* 17.0*   ALK PHOS U/L 174* 194* 212*   ALT (SGPT) U/L 18 20 23   AST (SGOT) U/L 44* 44* 49*   GLUCOSE mg/dL 99 128* 104*     Results from last 7 days   Lab Units 05/16/22  0508 05/14/22 0449 05/13/22  0453   INR  2.06* 2.01* 2.17*     No results found for: LIPASE    Radiology:  CT Abdomen Pelvis Without Contrast   Preliminary Result   1. Cirrhosis with sequela of portal hypertension include large   intra-abdominal ascites.   2. Cholelithiasis.       Radiation dose reduction techniques were utilized, including automated   exposure control and exposure modulation based on body size.              US Paracentesis   Final Result   Ultrasound-guided paracentesis as described.       This report was finalized on 5/13/2022 1:48 PM by Dr. Franky Voss M.D.          US Renal Bilateral   Final Result   There is no evidence of hydronephrosis.  Very large volume of   ascites is observed.       This report was finalized on 5/10/2022 7:23 PM by Dr. Cliff Clemens M.D.          CT Head Without Contrast   Final Result   Solitary hyperdense dural based mass in the right   parafalcine region at the vertex consistent with a meningioma as   described. This produces no significant mass effect on the adjacent   frontal lobe and there is no edema within the brain or adjacent to the   lesion. Paranasal Sinus disease as noted. Otherwise unremarkable CT scan   of the head. No acute intracranial abnormality is identified.       This report was finalized on 5/9/2022 5:23 PM by Dr. Gaetano Kim M.D.          XR Chest 1 View   Final Result   Right base atelectasis.       This report was finalized on 5/9/2022 12:43 PM by Dr. Cliff Clemens M.D.              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  Decompensated cirrhosis: With ascites and encephalopathy.  He has had an elevated smooth muscle antibody on 2 occasions, in 2017 and this admission.?  Autoimmune hepatitis.     2.  Hepatic encephalopathy     3.  Increased abdominal pain     4.  STEVEN on CKD     5.  Macrocytic anemia     6.  Increased abdominal pain      Plan:  · CT reviewed-no cause for abdominal pain with the exception of large volume ascites, cholelithiasis.  Pain resolved  · Continue low-sodium diet  · Continue lactulose and Xifaxan at current  dose  · Will need paracentesis soon if not transferred but currently denies discomfort  · Awaiting transfer to the [facility] hepatology service-this is pending bed availability    I discussed the patients findings and my recommendations with patient.    Ailyn Harvey MD

## 2022-05-16 NOTE — PROGRESS NOTES
"Daily progress note    Chief complaint  Doing same  No new complaints   Pleasantly confused  No nausea vomiting diarrhea or abdominal pain  Family at bedside    History of present illness  71-year-old white male who is well-known to our service with history of hypertension hyperlipidemia coronary artery disease chronic kidney disease and cirrhosis with ascites followed by nephrology gastroenterology presented to [facility] emergency room with mental status changes generalized weakness and recurrent falls.  Patient unable to give detailed history most of the history obtained from the wife and daughter who reported that he has been feeling lousy due to weakness and not making sense at times but no fever chills chest pain shortness of breath abdominal pain vomiting diarrhea.  Patient does have nausea fatigue tiredness poor appetite and increased distention.  Patient has been getting paracentesis and the last one was 5 weeks ago.  Patient work-up in ER revealed hepatorenal syndrome with acute hepatic encephalopathy admit for management.  Patient also found to have right brain mass consistent with meningioma with no history of meningioma in the past.     REVIEW OF SYSTEMS  Unable to obtain     PHYSICAL EXAM   Blood pressure 112/95, pulse 64, temperature 98.1 °F (36.7 °C), temperature source Oral, resp. rate 18, height 188 cm (74\"), weight 80.7 kg (178 lb), SpO2 95 %.    Constitutional:  Awake and alert and no respiratory distress  HEENT:  Unremarkable  Neck: Normal range of motion. Neck supple. No JVD present.   Cardiovascular: Normal rate, regular rhythm and normal heart sounds. No murmur heard.  Pulmonary/Chest: Effort normal and decreased breath sounds bilaterally  Abdominal: Soft.  Distended and mild tenderness bowel sounds positive  Musculoskeletal: Normal range of motion. No edema, tenderness or deformity.   Neurological:  Pleasantly confused but follow commands  Skin: Skin is warm and dry. No rash " noted. Pt. is not diaphoretic. No erythema.   Psychiatric: Mood, affect normal.  He is pleasant and cooperative.     LAB RESULTS  Lab Results (last 24 hours)     Procedure Component Value Units Date/Time    Body Fluid Culture - Body Fluid, Peritoneum [[id number]] Collected: 05/13/22 0925    Specimen: Body Fluid from Peritoneum Updated: 05/16/22 1146     Body Fluid Culture No growth at 3 days     Gram Stain Rare (1+) WBCs per low power field      No organisms seen    POC Glucose Once [[id number]]  (Normal) Collected: 05/16/22 1119    Specimen: Blood Updated: 05/16/22 1121     Glucose 115 mg/dL      Comment: Meter: [id number] : [id number] Javanpatricia Patterson CHRIS       Comprehensive Metabolic Panel [[id number]]  (Abnormal) Collected: 05/16/22 0508    Specimen: Blood Updated: 05/16/22 0558     Glucose 99 mg/dL      BUN 33 mg/dL      Creatinine 1.74 mg/dL      Sodium 139 mmol/L      Potassium 3.8 mmol/L      Chloride 107 mmol/L      CO2 20.0 mmol/L      Calcium 8.9 mg/dL      Total Protein 5.2 g/dL      Albumin 3.30 g/dL      ALT (SGPT) 18 U/L      AST (SGOT) 44 U/L      Alkaline Phosphatase 174 U/L      Total Bilirubin 20.9 mg/dL      Globulin 1.9 gm/dL      A/G Ratio 1.7 g/dL      BUN/Creatinine Ratio 19.0     Anion Gap 12.0 mmol/L      eGFR 40.9 mL/min/1.73      Comment: National Kidney Foundation and American Society of Nephrology (ASN) Task Force recommended calculation based on the Chronic Kidney Disease Epidemiology Collaboration (CKD-EPI) equation refit without adjustment for race.       Narrative:      GFR Normal >60  Chronic Kidney Disease <60  Kidney Failure <15      Protime-INR [[id number]]  (Abnormal) Collected: 05/16/22 0508    Specimen: Blood Updated: 05/16/22 0555     Protime 23.3 Seconds      INR 2.06    CBC & Differential [[id number]]  (Abnormal) Collected: 05/16/22 0508    Specimen: Blood Updated: 05/16/22 0547    Narrative:      The following orders were created for panel order CBC & Differential.  Procedure                                Abnormality         Status                     ---------                               -----------         ------                     CBC Auto Differential[[id number]]        Abnormal            Final result                 Please view results for these tests on the individual orders.    CBC Auto Differential [[id number]]  (Abnormal) Collected: 05/16/22 0508    Specimen: Blood Updated: 05/16/22 0547     WBC 9.43 10*3/mm3      RBC 2.60 10*6/mm3      Hemoglobin 9.5 g/dL      Hematocrit 25.9 %      MCV 99.6 fL      MCH 36.5 pg      MCHC 36.7 g/dL      RDW 14.7 %      RDW-SD 53.3 fl      MPV 10.6 fL      Platelets 101 10*3/mm3      Neutrophil % 70.1 %      Lymphocyte % 10.2 %      Monocyte % 15.0 %      Eosinophil % 3.5 %      Basophil % 0.4 %      Immature Grans % 0.8 %      Neutrophils, Absolute 6.61 10*3/mm3      Lymphocytes, Absolute 0.96 10*3/mm3      Monocytes, Absolute 1.41 10*3/mm3      Eosinophils, Absolute 0.33 10*3/mm3      Basophils, Absolute 0.04 10*3/mm3      Immature Grans, Absolute 0.08 10*3/mm3      nRBC 0.0 /100 WBC         Imaging Results (Last 24 Hours)     Procedure Component Value Units Date/Time    CT Abdomen Pelvis Without Contrast [[id number]] Collected: 05/16/22 0808     Updated: 05/16/22 0808    Narrative:      CT ABDOMEN AND PELVIS WITHOUT CONTRAST     HISTORY: Cirrhosis with ascites and worsening abdominal pain.     TECHNIQUE: Axial CT images of the abdomen and pelvis were obtained  without administration of intravenous contrast. The patient was not  given oral contrast. Coronal and sagittal reformats were obtained.     COMPARISON: CT dated 11/06/2017.     FINDINGS: There is large intra-abdominal ascites present. The liver is  shrunken, consistent with hepatic cirrhosis. The spleen measures 13.2 cm  in craniocaudal dimension. Cholelithiasis is present. The pancreas is  normal. Bilateral adrenal glands and kidneys are normal. Portosystemic  collaterals are seen in the  splenic hilum and the retroperitoneum. The  urinary bladder is partially distended and normal. No evidence of bowel  obstruction. No pathological retroperitoneal lymphadenopathy. Marked  calcified atherosclerotic plaque is seen within the abdominal aorta and  its branches. Mild ectasia of the infrarenal abdominal aorta measuring  up to 2.4 cm. No pleural or pericardial effusion.     There are compression deformities of numerous lumbar vertebral bodies  including the superior endplates of L2, L3 and L4 vertebral body and  inferior endplate of L5 vertebral body. These cause approximately 10-20%  height loss. These are age-indeterminate.       Impression:      1. Cirrhosis with sequela of portal hypertension include large  intra-abdominal ascites.  2. Cholelithiasis.     Radiation dose reduction techniques were utilized, including automated  exposure control and exposure modulation based on body size.              ECG 12 Lead  Component   Ref Range & Units 1 d ago    QT Interval   ms 444    Resulting Agency [facility] ECG             HEART RATE= 69  bpm  RR Interval= 892  ms  WY Interval= 164  ms  P Horizontal Axis= 4  deg  P Front Axis= 29  deg  QRSD Interval= 107  ms  QT Interval= 444  ms  QRS Axis= -30  deg  T Wave Axis= 111  deg  - BORDERLINE ECG -  Sinus rhythm  Ventricular premature complex  Left axis deviation  No change from previous             Current Facility-Administered Medications:   •  acetaminophen (TYLENOL) tablet 650 mg, 650 mg, Oral, Q4H PRN, Memo Varela MD, 650 mg at 05/15/22 0409  •  carbidopa-levodopa (SINEMET)  MG per tablet 1 tablet, 1 tablet, Oral, TID, Memo Varela MD, 1 tablet at 05/16/22 0801  •  lactulose (CHRONULAC) 10 GM/15ML solution 30 g, 30 g, Oral, Q6H, Brunilda Celestin MD, 30 g at 05/16/22 1140  •  lidocaine (XYLOCAINE) 1 % injection 10 mL, 10 mL, Injection, Once, Franky Voss MD  •  midodrine (PROAMATINE) tablet 5 mg, 5 mg, Oral, TID AC, Franky Moreau MD, 5 mg at  05/16/22 1140  •  morphine injection 2 mg, 2 mg, Intravenous, Q4H PRN, Keith Bejarano MD, 2 mg at 05/15/22 0433  •  ondansetron (ZOFRAN) injection 4 mg, 4 mg, Intravenous, Q6H PRN, Keith Bejarano MD, 4 mg at 05/15/22 0409  •  pantoprazole (PROTONIX) EC tablet 40 mg, 40 mg, Oral, BID AC, Keith Bejarano MD, 40 mg at 05/16/22 0801  •  riFAXIMin (XIFAXAN) tablet 550 mg, 550 mg, Oral, Q12H, Ketih Bejarano MD, 550 mg at 05/16/22 0801  •  sodium bicarbonate tablet 650 mg, 650 mg, Oral, 4x Daily, Franky Moreau MD, 650 mg at 05/16/22 1140  •  sodium chloride 0.9 % flush 10 mL, 10 mL, Intravenous, PRN, Jovanni Guerra MD, 10 mL at 05/16/22 0801    ASSESSMENT  Hepatorenal syndrome  Cirrhosis with ascites s/p paracentesis  Jaundice  Hepatic encephalopathy  Acute kidney injury  Acute UTI  Chronic kidney disease stage III  Brain tumor consistent with meningioma  Hypotension  Hyperlipidemia  Coronary artery disease  Chronic anemia  Parkinson's disease  Esophageal varices and colon polyps  Gastroesophageal reflux disease    PLAN  CPM  Midodrine  Antibiotics completed  Continue to hold diuretics  Continue lactulose Protonix rifaximin Proamatine and Sinemet  Stress ulcer DVT prophylaxis  GI and nephrology to follow patient  Adjust home medications  Supportive care  PT/OT  DNR and poor prognosis  Discussed with family and nursing staff   Discharge planning    KEITH BEJARANO MD

## 2022-05-16 NOTE — PROGRESS NOTES
Discharge Planning Assessment  [facility]     Patient Name: Hu Burgess  MRN: [id number]  Today's Date: 5/16/2022    Admit Date: 5/9/2022     Discharge Needs Assessment    No documentation.                Discharge Plan     Row Name 05/16/22 1133       Plan    Plan Transfer to [facility] Hepatology inpatient service once bed available Patient is on the waiting list at [facility]    Plan Comments Call placed to [facility] Transfer line [phone] called today at 11:37 Am and spoke with Marita Hardin [facility] does not have any open beds today, CCP will need to call daily and Diana stated [facility] does have the number to the [address] [place] [address] and they will call when they have a bed available. Jennifer LUNA              Continued Care and Services - Admitted Since 5/9/2022     Home Medical Care Coordination complete.    Service Provider Request Status Selected Services Address Phone Fax Patient Preferred    [facility]   Selected Home Health Services [address] [place] [id number] [phone] --              Expected Discharge Date and Time     Expected Discharge Date Expected Discharge Time    May 16, 2022          Demographic Summary    No documentation.                Functional Status    No documentation.                Psychosocial    No documentation.                Abuse/Neglect    No documentation.                Legal    No documentation.                Substance Abuse    No documentation.                Patient Forms    No documentation.                   Cassandra Joy, RN

## 2022-05-16 NOTE — PLAN OF CARE
Goal Outcome Evaluation:  Plan of Care Reviewed With: patient           Outcome Evaluation: VSS; No compaints of pain noted tonight; Pt took all meds tonight;Awaiting bed at [facility]; Pt became more alert towards the end of the night; Will conitnue to monitor

## 2022-05-16 NOTE — PLAN OF CARE
VSS. No c/o pain. Amb to [facility] with standby ast. Pt waiting for Bed at [facility] for w/u for liver transplant. Will continue to monitor.     Problem: Adult Inpatient Plan of Care  Goal: Plan of Care Review  Outcome: Ongoing, Progressing  Goal: Patient-Specific Goal (Individualized)  Outcome: Ongoing, Progressing  Goal: Absence of Hospital-Acquired Illness or Injury  Outcome: Ongoing, Progressing  Intervention: Identify and Manage Fall Risk  Recent Flowsheet Documentation  Taken 5/16/2022 1600 by Lisbet Moran RN  Safety Promotion/Fall Prevention:   activity supervised   clutter free environment maintained   fall prevention program maintained   nonskid shoes/slippers when out of bed   safety round/check completed   room organization consistent  Taken 5/16/2022 1400 by Lisbet Moran RN  Safety Promotion/Fall Prevention:   activity supervised   clutter free environment maintained   fall prevention program maintained   nonskid shoes/slippers when out of bed   safety round/check completed   room organization consistent  Taken 5/16/2022 1200 by Lisbet Moran RN  Safety Promotion/Fall Prevention:   activity supervised   clutter free environment maintained   fall prevention program maintained   nonskid shoes/slippers when out of bed   room organization consistent   safety round/check completed  Taken 5/16/2022 1000 by Lisbet Moran RN  Safety Promotion/Fall Prevention:   activity supervised   clutter free environment maintained   fall prevention program maintained   nonskid shoes/slippers when out of bed   room organization consistent   safety round/check completed  Taken 5/16/2022 0800 by Lisbet Moran, RN  Safety Promotion/Fall Prevention:   activity supervised   clutter free environment maintained   fall prevention program maintained   nonskid shoes/slippers when out of bed   room organization consistent   safety round/check completed  Intervention: Prevent Skin Injury  Recent Flowsheet Documentation  Taken 5/16/2022 1400  by Lisbet Moran RN  Body Position: position changed independently  Taken 5/16/2022 0800 by Lisbet Moran RN  Skin Protection: adhesive use limited  Intervention: Prevent and Manage VTE (Venous Thromboembolism) Risk  Recent Flowsheet Documentation  Taken 5/16/2022 0800 by Lisbet Moran RN  VTE Prevention/Management:   bilateral   dorsiflexion/plantar flexion performed   sequential compression devices on  Range of Motion: active ROM (range of motion) encouraged  Goal: Optimal Comfort and Wellbeing  Outcome: Ongoing, Progressing  Intervention: Monitor Pain and Promote Comfort  Recent Flowsheet Documentation  Taken 5/16/2022 0800 by Lisbet Moran RN  Pain Management Interventions: medication offered but refused  Intervention: Provide Person-Centered Care  Recent Flowsheet Documentation  Taken 5/16/2022 1400 by Lisbet Moran RN  Trust Relationship/Rapport: care explained  Taken 5/16/2022 0800 by Lisbet Moran RN  Trust Relationship/Rapport: care explained  Goal: Readiness for Transition of Care  Outcome: Ongoing, Progressing     Problem: Fall Injury Risk  Goal: Absence of Fall and Fall-Related Injury  Outcome: Ongoing, Progressing  Intervention: Identify and Manage Contributors  Recent Flowsheet Documentation  Taken 5/16/2022 0800 by Lisbet Moran RN  Medication Review/Management: medications reviewed  Intervention: Promote Injury-Free Environment  Recent Flowsheet Documentation  Taken 5/16/2022 1600 by Lisbet Moran RN  Safety Promotion/Fall Prevention:   activity supervised   clutter free environment maintained   fall prevention program maintained   nonskid shoes/slippers when out of bed   safety round/check completed   room organization consistent  Taken 5/16/2022 1400 by Lisbet Moran RN  Safety Promotion/Fall Prevention:   activity supervised   clutter free environment maintained   fall prevention program maintained   nonskid shoes/slippers when out of bed   safety round/check completed   room organization  consistent  Taken 5/16/2022 1200 by Lisbet Moran RN  Safety Promotion/Fall Prevention:   activity supervised   clutter free environment maintained   fall prevention program maintained   nonskid shoes/slippers when out of bed   room organization consistent   safety round/check completed  Taken 5/16/2022 1000 by Lisbet Moran RN  Safety Promotion/Fall Prevention:   activity supervised   clutter free environment maintained   fall prevention program maintained   nonskid shoes/slippers when out of bed   room organization consistent   safety round/check completed  Taken 5/16/2022 0800 by Lisbet Moran RN  Safety Promotion/Fall Prevention:   activity supervised   clutter free environment maintained   fall prevention program maintained   nonskid shoes/slippers when out of bed   room organization consistent   safety round/check completed     Problem: Skin Injury Risk Increased  Goal: Skin Health and Integrity  Outcome: Ongoing, Progressing  Intervention: Optimize Skin Protection  Recent Flowsheet Documentation  Taken 5/16/2022 0800 by Lisbet Moran RN  Pressure Reduction Techniques: frequent weight shift encouraged  Pressure Reduction Devices: alternating pressure pump (ADD)  Skin Protection: adhesive use limited     Problem: Pain Chronic (Persistent) (Comorbidity Management)  Goal: Acceptable Pain Control and Functional Ability  Outcome: Ongoing, Progressing  Intervention: Manage Persistent Pain  Recent Flowsheet Documentation  Taken 5/16/2022 0800 by Lisbet Moran RN  Medication Review/Management: medications reviewed  Intervention: Develop Pain Management Plan  Recent Flowsheet Documentation  Taken 5/16/2022 0800 by Lisbet Moran RN  Pain Management Interventions: medication offered but refused  Intervention: Optimize Psychosocial Wellbeing  Recent Flowsheet Documentation  Taken 5/16/2022 1400 by Lisbet Moran RN  Family/Support System Care: caregiver stress acknowledged  Taken 5/16/2022 0800 by Lisbet Moran  RN  Supportive Measures: active listening utilized  Diversional Activities: television  Family/Support System Care:   caregiver stress acknowledged   support provided   self-care encouraged   Goal Outcome Evaluation:

## 2022-05-17 VITALS
HEART RATE: 64 BPM | DIASTOLIC BLOOD PRESSURE: 64 MMHG | TEMPERATURE: 97.5 F | OXYGEN SATURATION: 95 % | WEIGHT: 178.1 LBS | HEIGHT: 74 IN | BODY MASS INDEX: 22.86 KG/M2 | RESPIRATION RATE: 18 BRPM | SYSTOLIC BLOOD PRESSURE: 121 MMHG

## 2022-05-17 LAB
ALBUMIN SERPL-MCNC: 3.2 G/DL (ref 3.5–5.2)
ALBUMIN/GLOB SERPL: 1.4 G/DL
ALP SERPL-CCNC: 189 U/L (ref 39–117)
ALT SERPL W P-5'-P-CCNC: 17 U/L (ref 1–41)
ANION GAP SERPL CALCULATED.3IONS-SCNC: 13.6 MMOL/L (ref 5–15)
AST SERPL-CCNC: 45 U/L (ref 1–40)
BASOPHILS # BLD AUTO: 0.08 10*3/MM3 (ref 0–0.2)
BASOPHILS NFR BLD AUTO: 0.8 % (ref 0–1.5)
BILIRUB SERPL-MCNC: 20.4 MG/DL (ref 0–1.2)
BUN SERPL-MCNC: 37 MG/DL (ref 8–23)
BUN/CREAT SERPL: 18.3 (ref 7–25)
CALCIUM SPEC-SCNC: 8.8 MG/DL (ref 8.6–10.5)
CHLORIDE SERPL-SCNC: 105 MMOL/L (ref 98–107)
CO2 SERPL-SCNC: 18.4 MMOL/L (ref 22–29)
CREAT SERPL-MCNC: 2.02 MG/DL (ref 0.76–1.27)
DEPRECATED RDW RBC AUTO: 53.2 FL (ref 37–54)
EGFRCR SERPLBLD CKD-EPI 2021: 34.2 ML/MIN/1.73
EOSINOPHIL # BLD AUTO: 0.36 10*3/MM3 (ref 0–0.4)
EOSINOPHIL NFR BLD AUTO: 3.6 % (ref 0.3–6.2)
ERYTHROCYTE [DISTWIDTH] IN BLOOD BY AUTOMATED COUNT: 14.7 % (ref 12.3–15.4)
GLOBULIN UR ELPH-MCNC: 2.3 GM/DL
GLUCOSE SERPL-MCNC: 107 MG/DL (ref 65–99)
HCT VFR BLD AUTO: 25.7 % (ref 37.5–51)
HGB BLD-MCNC: 9.5 G/DL (ref 13–17.7)
INR PPP: 1.79 (ref 0.9–1.1)
LYMPHOCYTES # BLD AUTO: 1.06 10*3/MM3 (ref 0.7–3.1)
LYMPHOCYTES NFR BLD AUTO: 10.5 % (ref 19.6–45.3)
MCH RBC QN AUTO: 36.7 PG (ref 26.6–33)
MCHC RBC AUTO-ENTMCNC: 37 G/DL (ref 31.5–35.7)
MCV RBC AUTO: 99.2 FL (ref 79–97)
MONOCYTES # BLD AUTO: 1.57 10*3/MM3 (ref 0.1–0.9)
MONOCYTES NFR BLD AUTO: 15.5 % (ref 5–12)
NEUTROPHILS NFR BLD AUTO: 6.92 10*3/MM3 (ref 1.7–7)
NEUTROPHILS NFR BLD AUTO: 68.4 % (ref 42.7–76)
PLATELET # BLD AUTO: 115 10*3/MM3 (ref 140–450)
PMV BLD AUTO: 10.6 FL (ref 6–12)
POTASSIUM SERPL-SCNC: 3.7 MMOL/L (ref 3.5–5.2)
PROT SERPL-MCNC: 5.5 G/DL (ref 6–8.5)
PROTHROMBIN TIME: 20.8 SECONDS (ref 11.7–14.2)
RBC # BLD AUTO: 2.59 10*6/MM3 (ref 4.14–5.8)
SODIUM SERPL-SCNC: 137 MMOL/L (ref 136–145)
WBC NRBC COR # BLD: 10.11 10*3/MM3 (ref 3.4–10.8)

## 2022-05-17 PROCEDURE — 85025 COMPLETE CBC W/AUTO DIFF WBC: CPT | Performed by: INTERNAL MEDICINE

## 2022-05-17 PROCEDURE — 25010000002 ALBUMIN HUMAN 25% PER 50 ML: Performed by: INTERNAL MEDICINE

## 2022-05-17 PROCEDURE — 99232 SBSQ HOSP IP/OBS MODERATE 35: CPT | Performed by: PHYSICIAN ASSISTANT

## 2022-05-17 PROCEDURE — P9047 ALBUMIN (HUMAN), 25%, 50ML: HCPCS | Performed by: INTERNAL MEDICINE

## 2022-05-17 PROCEDURE — 80053 COMPREHEN METABOLIC PANEL: CPT | Performed by: INTERNAL MEDICINE

## 2022-05-17 PROCEDURE — 85610 PROTHROMBIN TIME: CPT | Performed by: HOSPITALIST

## 2022-05-17 RX ORDER — LACTULOSE 10 G/15ML
20 SOLUTION ORAL EVERY 6 HOURS
Qty: 3600 ML | Refills: 0 | Status: SHIPPED | OUTPATIENT
Start: 2022-05-17 | End: 2022-06-16

## 2022-05-17 RX ORDER — SODIUM BICARBONATE 650 MG/1
1300 TABLET ORAL 4 TIMES DAILY
Qty: 240 TABLET | Refills: 0 | Status: SHIPPED | OUTPATIENT
Start: 2022-05-17 | End: 2022-06-16

## 2022-05-17 RX ORDER — LACTULOSE 10 G/15ML
20 SOLUTION ORAL EVERY 6 HOURS
Status: DISCONTINUED | OUTPATIENT
Start: 2022-05-17 | End: 2022-05-17 | Stop reason: HOSPADM

## 2022-05-17 RX ORDER — ALBUMIN (HUMAN) 12.5 G/50ML
25 SOLUTION INTRAVENOUS ONCE
Status: COMPLETED | OUTPATIENT
Start: 2022-05-17 | End: 2022-05-17

## 2022-05-17 RX ORDER — SODIUM BICARBONATE 650 MG/1
1300 TABLET ORAL 4 TIMES DAILY
Status: DISCONTINUED | OUTPATIENT
Start: 2022-05-17 | End: 2022-05-17 | Stop reason: HOSPADM

## 2022-05-17 RX ORDER — PANTOPRAZOLE SODIUM 40 MG/1
40 TABLET, DELAYED RELEASE ORAL
Qty: 60 TABLET | Refills: 0 | Status: SHIPPED | OUTPATIENT
Start: 2022-05-18 | End: 2022-06-17

## 2022-05-17 RX ADMIN — CARBIDOPA AND LEVODOPA 1 TABLET: 10; 100 TABLET ORAL at 08:17

## 2022-05-17 RX ADMIN — ALBUMIN HUMAN 25 G: 0.25 SOLUTION INTRAVENOUS at 10:03

## 2022-05-17 RX ADMIN — SODIUM BICARBONATE 1300 MG: 650 TABLET ORAL at 11:54

## 2022-05-17 RX ADMIN — LACTULOSE 30 G: 10 SOLUTION ORAL at 06:17

## 2022-05-17 RX ADMIN — LACTULOSE 20 G: 10 SOLUTION ORAL at 11:47

## 2022-05-17 RX ADMIN — CARBIDOPA AND LEVODOPA 1 TABLET: 10; 100 TABLET ORAL at 16:52

## 2022-05-17 RX ADMIN — PANTOPRAZOLE SODIUM 40 MG: 40 TABLET, DELAYED RELEASE ORAL at 08:17

## 2022-05-17 RX ADMIN — RIFAXIMIN 550 MG: 550 TABLET ORAL at 08:17

## 2022-05-17 RX ADMIN — PANTOPRAZOLE SODIUM 40 MG: 40 TABLET, DELAYED RELEASE ORAL at 16:53

## 2022-05-17 NOTE — CASE MANAGEMENT/SOCIAL WORK
"Physicians Statement of Medical Necessity for  Ambulance Transportation    GENERAL INFORMATION     Name: Hu Burgess  YOB: 1950  Medicare #: [id number]  Transport Date: 5/17/2022 Origin: [address] [place], [place]  To: [facility], [address] [place] [address]  Is the Patient's stay covered under Medicare Part A (PPS/DRG?) Yes  Closest appropriate facility? yes  If this a hosp-hosp transfer? Yes  Is this a hospice patient? No    MEDICAL NECESSITY QUESTIONAIRE    Ambulance Transportation is medically necessary only if other means of transportation are contraindicated or would be potentially harmful to the patient.  To meet this requirement, the patient must be either \"bed confined\" or suffer from a condition such that transport by means other than an ambulance is contraindicated by the patient's condition.  The following questions must be answered by the healthcare professional signing below for this form to be valid:     1) Describe the MEDICAL CONDITION (physical and/or mental) of this patient AT THE TIME OF AMBULANCE TRANSPORT that requires the patient to be transported in an ambulance, and why transport by other means is contraindicated by the patient's condition:   Medical Problems             Hospital Problem List     Hepatic encephalopathy (HCC)    Cerebral meningioma (HCC)                Past Medical History:   Diagnosis Date   • Arteriosclerotic cardiovascular disease    • Back pain    • Cirrhosis (HCC)    • Elevated liver enzymes    • GERD (gastroesophageal reflux disease)    • History of transfusion    • Hyperlipidemia    • Hypertension    • Kidney failure    • Rhabdomyolysis     due to crestor      Past Surgical History:   Procedure Laterality Date   • COLONOSCOPY W/ POLYPECTOMY N/A 3/9/2022    Procedure: COLONOSCOPY WITH POLYPECTOMY;  Surgeon: Justin Underwood MD;  Location: [facility];  Service: Gastroenterology;  Laterality: N/A;  ascending " "colon polyps x2 (cold snare x1)  transverse colon polyp x1 (cold snare)  sigmoid colon polyp x1 (cold snare)  hemorrhoids   • ENDOSCOPY N/A 2/9/2018    Procedure: ESOPHAGOGASTRODUODENOSCOPY;  Surgeon: Marcelino Samuel MD;  [facility];  Service:    • ENDOSCOPY N/A 3/9/2022    Procedure: ESOPHAGOGASTRODUODENOSCOPY;  Surgeon: Justin Underwood MD;  Location:  [facility] OR;  Service: Gastroenterology;  Laterality: N/A;  Esophageal varisces  Portal gastropathy   • INGUINAL HERNIA REPAIR Right 1/10/2022    Procedure: INGUINAL HERNIA REPAIR;  Surgeon: Kishan Puga MD;  Location:  [facility] OR;  Service: General;  Laterality: Right;       • INSERTION HEMODIALYSIS CATHETER Right 11/14/2017    Procedure: PALINDRONE CATHETERE PLACEMENT;  Surgeon: Gareth Sánchez MD;  Location: [facility] OR;  Service:       2) Is this patient \"bed confined\" as defined below? yes   To be \"bed confined\" the patient must satisfy all three of the following criteria:  (1) unable to get up from bed without assistance; AND (2) unable to ambulate;  AND (3) unable to sit in a chair or wheelchair.  3) Can this patient safely be transported by car or wheelchair van (I.e., may safely sit during transport, without an attendant or monitoring?) No  4. In addition to completing questions 1-3 above, please check any of the following conditions that apply*:  Patient is going to [facility] for Transplant Team         *Note: supporting documentation for any boxes checked must be maintained in the patient's medical records       SIGNATURE OF PHYSICIAN OR OTHER AUTHORIZED HEALTHCARE PROFESSIONAL    I certify that the above information is true and correct based on my evaluation of this patient, and represent that the patient requires transport by ambulance and that other forms of transport are contraindicated.  I understand that this information will be used by the Centers for Medicare and Medicaid Services (CMS) to support the " determiniation of medical necessity for ambulance services, and I represent that I have personal knowledge of the patient's condition at the time of transport.       If this box is checked, I also certify that the patient is physically or mentally incapable of signing the ambulance service's claim form and that the institution with which I am affiliated has furnished care, services or assistance to the patient.  My signature below is made on behalf of the patient pursuant to 42 .36(b)(4). In accordance with 42 .37, the specific reason(s) that the patient is physically or mentally incapable of signing the claim for is as follows:    Signature of Physician or Healthcare Professional  Date/Time:   5/17/2022 1759     (For Scheduled repetitive transport, this form is not valid for transports performed more than 60 days after this date).                                                                                                                                            --------------------------------------------------------------------------------------------  Printed Name and Credentials of Physician or Authorized Healthcare Professional     *Form must be signed by patient's attending physician for scheduled, repetitive transports,.  For non-repetitive ambulance transports, if unable to obtain the signature of the attending physician, any of the following may sign (please select below):     Physician  Clinical Nurse Specialist  Registered Nurse XX    Physician Assistant  Discharge Planner  Licensed Practical Nurse     Nurse Practitioner

## 2022-05-17 NOTE — PLAN OF CARE
Problem: Adult Inpatient Plan of Care  Goal: Plan of Care Review  Outcome: Ongoing, Progressing  Flowsheets (Taken 5/17/2022 0417)  Outcome Evaluation: VSS. Patient with son at the few hours in the beginning of the shift, calm and cooperative. All due medications given. Noted BM, patient seem continent. Rounded every hour for prevention of fall. Will continue to monitor. To be endorsed accordingly.

## 2022-05-17 NOTE — PROGRESS NOTES
LOS: 8 days     Chief Complaint/ Reason for encounter: Acute kidney injury      Subjective   05/11/22 : Seems to be a bit more alert today but still very ill-appearing, remains confused, very tangential thinking  Having regular bowel movements on the lactulose, denies any shortness of breath  Does complain of increasing abdominal distention    5/12: More awake and alert today, still somewhat confused  Increased abdominal distention, increased edema of his legs  No shortness of breath or chest pain  Reports good appetite with no nausea or vomiting    5/13: Still very confused despite normalizing ammonia levels  No shortness of breath or chest pain, minimal ankle edema  No fevers or chills, intake low, no nausea  He says he is making more urine but it is still not being recorded    5/14: No new complaints, less confused per his wife, eating and drinking a little more  Status post large-volume paracentesis yesterday.  Albumin was ordered but it does not look like he was administered    5/15: Still very confused this morning but his wife states he is eating and drinking well  No nausea vomiting shortness of breath chest pain or edema reported    5/16: Looks and feels well today, more awake, alert, conversant  States improved oral intake, complains of abdominal distention but no pain  No edema, voiding well  No nausea or vomiting    5/17: Looks and feels well today denies any new complaints  Oral intake adequate, abdomen still distended but no pain, no edema      Medical history reviewed:  Weakness - Generalized        Subjective    History taken from: Patient and chart    Vital Signs  Temp:  [97.7 °F (36.5 °C)-98.1 °F (36.7 °C)] 97.7 °F (36.5 °C)  Heart Rate:  [58-66] 64  Resp:  [18] 18  BP: (103-145)/(54-95) 145/69       Wt Readings from Last 1 Encounters:   05/17/22 0500 80.8 kg (178 lb 1.6 oz)   05/16/22 0559 80.7 kg (178 lb)   05/15/22 0548 81.2 kg (179 lb)   05/14/22 0531 79.8 kg (176 lb)   05/13/22 0559 87.1 kg  "(192 lb)   05/12/22 0505 87 kg (191 lb 14.4 oz)   05/11/22 0609 90.3 kg (199 lb)   05/09/22 2020 90.2 kg (198 lb 14.4 oz)       Objective:  Vital signs: (most recent): Blood pressure 145/69, pulse 64, temperature 97.7 °F (36.5 °C), temperature source Oral, resp. rate 18, height 188 cm (74\"), weight 80.8 kg (178 lb 1.6 oz), SpO2 94 %.              Objective:  General Appearance:  Comfortable, chronically ill-appearing, in no acute distress and not in pain.  Awake and alert but confused, jaundiced  HEENT: Mucous membranes moist, no injury, oropharynx clear, sclera icteric  Lungs:  Normal effort and normal respiratory rate.  Breath sounds clear to auscultation.  No  respiratory distress.  No rales, decreased breath sounds or rhonchi.    Heart: Normal rate.  Regular rhythm.  S1 normal.  No murmur.   Abdomen: Abdomen is distended with fluid, bowel sounds are diminished, no abdominal tenderness.  There is no rebound or guarding  Extremities: Normal range of motion.  Trace ankle edema of bilateral lower extremities, distal pulses intact  Neurological: No focal motor or sensory deficits, pupils reactive  Skin:  Warm and dry.  No rash or cyanosis.       Results Review:    Intake/Output:     Intake/Output Summary (Last 24 hours) at 5/17/2022 0856  Last data filed at 5/17/2022 0600  Gross per 24 hour   Intake 1050 ml   Output --   Net 1050 ml         DATA:  Radiology and Labs:  The following labs independently reviewed by me. Additional labs ordered for tomorrow a.m.  Interval notes, chart personally reviewed by me.   Old records independently reviewed showing CKD 3  Discussed with patient and his family at bedside    Moderate complexity, transplant evaluation at [facility]    Labs:   Recent Results (from the past 24 hour(s))   POC Glucose Once    Collection Time: 05/16/22 11:19 AM    Specimen: Blood   Result Value Ref Range    Glucose 115 70 - 130 mg/dL   Comprehensive Metabolic Panel    Collection Time: 05/17/22  5:14 AM    " Specimen: Blood   Result Value Ref Range    Glucose 107 (H) 65 - 99 mg/dL    BUN 37 (H) 8 - 23 mg/dL    Creatinine 2.02 (H) 0.76 - 1.27 mg/dL    Sodium 137 136 - 145 mmol/L    Potassium 3.7 3.5 - 5.2 mmol/L    Chloride 105 98 - 107 mmol/L    CO2 18.4 (L) 22.0 - 29.0 mmol/L    Calcium 8.8 8.6 - 10.5 mg/dL    Total Protein 5.5 (L) 6.0 - 8.5 g/dL    Albumin 3.20 (L) 3.50 - 5.20 g/dL    ALT (SGPT) 17 1 - 41 U/L    AST (SGOT) 45 (H) 1 - 40 U/L    Alkaline Phosphatase 189 (H) 39 - 117 U/L    Total Bilirubin 20.4 (H) 0.0 - 1.2 mg/dL    Globulin 2.3 gm/dL    A/G Ratio 1.4 g/dL    BUN/Creatinine Ratio 18.3 7.0 - 25.0    Anion Gap 13.6 5.0 - 15.0 mmol/L    eGFR 34.2 (L) >60.0 mL/min/1.73   Protime-INR    Collection Time: 05/17/22  5:14 AM    Specimen: Blood   Result Value Ref Range    Protime 20.8 (H) 11.7 - 14.2 Seconds    INR 1.79 (H) 0.90 - 1.10   CBC Auto Differential    Collection Time: 05/17/22  6:03 AM    Specimen: Blood   Result Value Ref Range    WBC 10.11 3.40 - 10.80 10*3/mm3    RBC 2.59 (L) 4.14 - 5.80 10*6/mm3    Hemoglobin 9.5 (L) 13.0 - 17.7 g/dL    Hematocrit 25.7 (L) 37.5 - 51.0 %    MCV 99.2 (H) 79.0 - 97.0 fL    MCH 36.7 (H) 26.6 - 33.0 pg    MCHC 37.0 (H) 31.5 - 35.7 g/dL    RDW 14.7 12.3 - 15.4 %    RDW-SD 53.2 37.0 - 54.0 fl    MPV 10.6 6.0 - 12.0 fL    Platelets 115 (L) 140 - 450 10*3/mm3    Neutrophil % 68.4 42.7 - 76.0 %    Lymphocyte % 10.5 (L) 19.6 - 45.3 %    Monocyte % 15.5 (H) 5.0 - 12.0 %    Eosinophil % 3.6 0.3 - 6.2 %    Basophil % 0.8 0.0 - 1.5 %    Neutrophils, Absolute 6.92 1.70 - 7.00 10*3/mm3    Lymphocytes, Absolute 1.06 0.70 - 3.10 10*3/mm3    Monocytes, Absolute 1.57 (H) 0.10 - 0.90 10*3/mm3    Eosinophils, Absolute 0.36 0.00 - 0.40 10*3/mm3    Basophils, Absolute 0.08 0.00 - 0.20 10*3/mm3       Radiology:  Imaging Results (Last 24 Hours)     ** No results found for the last 24 hours. **             Medications have been reviewed:  Current Facility-Administered Medications   Medication  Dose Route Frequency Provider Last Rate Last Admin   • acetaminophen (TYLENOL) tablet 650 mg  650 mg Oral Q4H PRN Memo Varela MD   650 mg at 05/15/22 0409   • albumin human 25 % IV SOLN 25 g  25 g Intravenous Once Franky Moreau MD       • carbidopa-levodopa (SINEMET)  MG per tablet 1 tablet  1 tablet Oral TID Memo Varela MD   1 tablet at 05/17/22 0817   • lactulose (CHRONULAC) 10 GM/15ML solution 30 g  30 g Oral Q6H Brunilda Celestin MD   30 g at 05/17/22 0617   • lidocaine (XYLOCAINE) 1 % injection 10 mL  10 mL Injection Once Franky Voss MD       • midodrine (PROAMATINE) tablet 5 mg  5 mg Oral TID AC Franky Moreau MD   5 mg at 05/16/22 1733   • morphine injection 2 mg  2 mg Intravenous Q4H PRN Memo Varela MD   2 mg at 05/15/22 0433   • ondansetron (ZOFRAN) injection 4 mg  4 mg Intravenous Q6H PRN Memo Varela MD   4 mg at 05/15/22 0409   • pantoprazole (PROTONIX) EC tablet 40 mg  40 mg Oral BID AC Memo Varela MD   40 mg at 05/17/22 0817   • riFAXIMin (XIFAXAN) tablet 550 mg  550 mg Oral Q12H Memo Varela MD   550 mg at 05/17/22 0817   • sodium bicarbonate tablet 1,300 mg  1,300 mg Oral 4x Daily Franky Moreau MD       • sodium chloride 0.9 % flush 10 mL  10 mL Intravenous PRN Jovanni Guerra MD   10 mL at 05/16/22 0801       ASSESSMENT:   acute renal failure.  Urine studies suggesting ATN, slowly improved with conservative treatment.  Renal ultrasound no obstruction, CK normal (history of rhabdo), no evidence of hepatorenal syndrome  CKD stage IIIa, baseline creatinine 1.4  Cryptogenic cirrhosis, plan for transfer to [facility] for work-up, awaiting bed    Hepatic encephalopathy, improved with lactulose   Ascites requiring paracentesis  Metabolic encephalopathy, improved  Hyponatremia secondary to cirrhosis/STEVEN, improved  Mild metabolic acidosis, slightly worse today  Hypoalbuminemia secondary to liver disease  Anemia of chronic disease, iron studies show no iron  deficiency, elevated ferritin        PLAN:   Renal function had been slowly improving but is slightly worse today  He looks euvolemic on exam and diuretics have been on hold  Will increase sodium bicarbonate  Give albumin 25 g IV x1  Recheck urine electrolytes to make sure he is not transitioning into HRS but initial urine studies were consistent with ATN    Paracentesis as needed per GI, IV albumin after each procedure  Await bed at [facility] for liver transplant work-up    BP has been persistently elevated so we will stop midodrine  Follow-up labs to monitor electrolytes closely      Franky Moreau MD   [facility]   Office phone number: [phone]  Answering service phone number: [phone]    05/17/22  08:56 EDT    Dictation performed using Dragon dictation software

## 2022-05-17 NOTE — PROGRESS NOTES
[facility]  Inpatient Progress Note    Reason for Follow-up:  Cirrhosis    Subjective     Interval History:   Resting comfortably with wife at bedside.  Appears lucid.  Awaiting transfer to [facility] for transplant evaluation. 12 bowel movements documented in the last 24 hours.     Hemoglobin stable at 9.5 g/dL.  LFTs remain static.  MELD score 31.    Current Facility-Administered Medications:   •  acetaminophen (TYLENOL) tablet 650 mg, 650 mg, Oral, Q4H PRN, Memo Varela MD, 650 mg at 05/15/22 0409  •  albumin human 25 % IV SOLN 25 g, 25 g, Intravenous, Once, Franky Moreau MD  •  carbidopa-levodopa (SINEMET)  MG per tablet 1 tablet, 1 tablet, Oral, TID, Memo Varela MD, 1 tablet at 05/17/22 0817  •  lactulose (CHRONULAC) 10 GM/15ML solution 30 g, 30 g, Oral, Q6H, Brunilda Celestin MD, 30 g at 05/17/22 0617  •  lidocaine (XYLOCAINE) 1 % injection 10 mL, 10 mL, Injection, Once, Franky Voss MD  •  morphine injection 2 mg, 2 mg, Intravenous, Q4H PRN, Memo Varela MD, 2 mg at 05/15/22 0433  •  ondansetron (ZOFRAN) injection 4 mg, 4 mg, Intravenous, Q6H PRN, Memo Varela MD, 4 mg at 05/15/22 0409  •  pantoprazole (PROTONIX) EC tablet 40 mg, 40 mg, Oral, BID AC, Memo Varela MD, 40 mg at 05/17/22 0817  •  riFAXIMin (XIFAXAN) tablet 550 mg, 550 mg, Oral, Q12H, Memo Varela MD, 550 mg at 05/17/22 0817  •  sodium bicarbonate tablet 1,300 mg, 1,300 mg, Oral, 4x Daily, Franky Moreau MD  •  sodium chloride 0.9 % flush 10 mL, 10 mL, Intravenous, PRN, Jovanni Guerra MD, 10 mL at 05/16/22 0801  Review of Systems:    Constitutional: No fevers, chills, sweats   Respiratory: No shortness of breath, cough   Cardiovascular: No Chest pain, palpitations   Gastrointestinal: No nausea, vomiting, diarrhea   Genitourinary: No hematuria, dysuria    Objective     Vital Signs  Temp:  [97.7 °F (36.5 °C)-98.1 °F (36.7 °C)] 97.7 °F (36.5 °C)  Heart Rate:  [58-66] 64  Resp:  [18] 18  BP:  (103-145)/(54-95) 145/69  Body mass index is 22.87 kg/m².    Intake/Output Summary (Last 24 hours) at 5/17/2022 0942  Last data filed at 5/17/2022 0817  Gross per 24 hour   Intake 870 ml   Output --   Net 870 ml     I/O this shift:  In: 180 [P.O.:180]  Out: -      Physical Exam:   General: Awake, alert and conversive. No acute distress.   Eyes: Normal lids and lashes.  + Scleral icterus   Neck: Supple and symmetric. Trachea midline.    Skin: Warm and dry.  + Jaundice   Cardiovascular: Regular rate and rhythm. No murmur.   Pulm: Quiet, even, nonlabored breathing. Clear to auscultation bilaterally.    Abdomen: Soft, distended, nontender. No rebound or guarding. Bowel sounds present in all 4 quadrants.   Extremities: No rashes or edema.   Psychiatric: Appropriate mood and affect. Cooperative.     Results Review:     I reviewed the patient's new clinical results.    Results from last 7 days   Lab Units 05/17/22  0603 05/16/22  0508 05/15/22  0552   WBC 10*3/mm3 10.11 9.43 10.48   HEMOGLOBIN g/dL 9.5* 9.5* 10.4*   HEMATOCRIT % 25.7* 25.9* 29.8*   PLATELETS 10*3/mm3 115* 101* 111*     Results from last 7 days   Lab Units 05/17/22  0514 05/16/22  0508 05/15/22  0552   SODIUM mmol/L 137 139 139   POTASSIUM mmol/L 3.7 3.8 3.7   CHLORIDE mmol/L 105 107 107   CO2 mmol/L 18.4* 20.0* 18.0*   BUN mg/dL 37* 33* 29*   CREATININE mg/dL 2.02* 1.74* 1.75*   CALCIUM mg/dL 8.8 8.9 9.0   BILIRUBIN mg/dL 20.4* 20.9* 19.2*   ALK PHOS U/L 189* 174* 194*   ALT (SGPT) U/L 17 18 20   AST (SGOT) U/L 45* 44* 44*   GLUCOSE mg/dL 107* 99 128*     Results from last 7 days   Lab Units 05/17/22  0514 05/16/22  0508 05/14/22  0449   INR  1.79* 2.06* 2.01*     No results found for: LIPASE    Radiology:  CT Abdomen Pelvis Without Contrast   Preliminary Result   1. Cirrhosis with sequela of portal hypertension include large   intra-abdominal ascites.   2. Cholelithiasis.       Radiation dose reduction techniques were utilized, including automated    exposure control and exposure modulation based on body size.              US Paracentesis   Final Result   Ultrasound-guided paracentesis as described.       This report was finalized on 5/13/2022 1:48 PM by Dr. Franky Voss M.D.          US Renal Bilateral   Final Result   There is no evidence of hydronephrosis. Very large volume of   ascites is observed.       This report was finalized on 5/10/2022 7:23 PM by Dr. Cliff Clemens M.D.          CT Head Without Contrast   Final Result   Solitary hyperdense dural based mass in the right   parafalcine region at the vertex consistent with a meningioma as   described. This produces no significant mass effect on the adjacent   frontal lobe and there is no edema within the brain or adjacent to the   lesion. Paranasal Sinus disease as noted. Otherwise unremarkable CT scan   of the head. No acute intracranial abnormality is identified.       This report was finalized on 5/9/2022 5:23 PM by Dr. Gaetano Kim M.D.          XR Chest 1 View   Final Result   Right base atelectasis.       This report was finalized on 5/9/2022 12:43 PM by Dr. Cliff Clemens M.D.              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 Decompensated cirrhosis with ascites and encephalopathy.  Elevated smooth muscle antibody on 2 separate occasions and this admission.  2. Hepatic encephalopathy: Improving  3. Abdominal pain: Resolved  4. STEVEN on  CKD  5. Macrocytic anemia      Plan:  · Large volume ascites on CT, currently denies any pain. Patient would like to hold off on paracentesis for now as he is not having any abdominal discomfort.  · Continue low-sodium diet  · Continue Xifaxan.   · Decrease lactulose to 20g tid. Continue titration to achieve 3-4 bowel movements per day.   · Awaiting transfer to [facility] hepatology service once bed becomes available.    I discussed the patient's findings and my recommendations with patient, family and care management.    Dragon dictation used throughout this note.            MADELINE Rojas  [facility]  [address] [place] [address]  Office: [phone]

## 2022-05-17 NOTE — PLAN OF CARE
Goal Outcome Evaluation:    Progress: improving  Outcome Evaluation: Vitals stable. Alert and oriented x4,very pleasant today. Ambulated to bathroom, sat up in chair. Lactulose adjusted. No c/o pain or nausea. Awaiting open bed at [facility]. Patient stable and needs met at this time.

## 2022-05-17 NOTE — PROGRESS NOTES
"Daily progress note    Chief complaint  Doing same  No new complaints   More alert today  No nausea vomiting diarrhea or abdominal pain  Family at bedside    History of present illness  71-year-old white male who is well-known to our service with history of hypertension hyperlipidemia coronary artery disease chronic kidney disease and cirrhosis with ascites followed by nephrology gastroenterology presented to [facility] emergency room with mental status changes generalized weakness and recurrent falls.  Patient unable to give detailed history most of the history obtained from the wife and daughter who reported that he has been feeling lousy due to weakness and not making sense at times but no fever chills chest pain shortness of breath abdominal pain vomiting diarrhea.  Patient does have nausea fatigue tiredness poor appetite and increased distention.  Patient has been getting paracentesis and the last one was 5 weeks ago.  Patient work-up in ER revealed hepatorenal syndrome with acute hepatic encephalopathy admit for management.  Patient also found to have right brain mass consistent with meningioma with no history of meningioma in the past.     REVIEW OF SYSTEMS  Unable to obtain     PHYSICAL EXAM   Blood pressure 99/49, pulse 65, temperature 98 °F (36.7 °C), temperature source Oral, resp. rate 18, height 188 cm (74\"), weight 80.8 kg (178 lb 1.6 oz), SpO2 95 %.    Constitutional:  Awake and alert and no respiratory distress  HEENT:  Unremarkable  Neck: Normal range of motion. Neck supple. No JVD present.   Cardiovascular: Normal rate, regular rhythm and normal heart sounds. No murmur heard.  Pulmonary/Chest: Effort normal and decreased breath sounds bilaterally  Abdominal: Soft.  Distended and mild tenderness bowel sounds positive  Musculoskeletal: Normal range of motion. No edema, tenderness or deformity.   Neurological:  Pleasantly confused but follow commands  Skin: Skin is warm and dry. No rash " noted. Pt. is not diaphoretic. No erythema.   Psychiatric: Mood, affect normal.  He is pleasant and cooperative.     LAB RESULTS  Lab Results (last 24 hours)     Procedure Component Value Units Date/Time    Body Fluid Culture - Body Fluid, Peritoneum [[id number]] Collected: 05/13/22 0925    Specimen: Body Fluid from Peritoneum Updated: 05/17/22 1008     Body Fluid Culture No growth at 4 days     Gram Stain Rare (1+) WBCs per low power field      No organisms seen    Comprehensive Metabolic Panel [[id number]]  (Abnormal) Collected: 05/17/22 0514    Specimen: Blood Updated: 05/17/22 0652     Glucose 107 mg/dL      BUN 37 mg/dL      Creatinine 2.02 mg/dL      Sodium 137 mmol/L      Potassium 3.7 mmol/L      Chloride 105 mmol/L      CO2 18.4 mmol/L      Calcium 8.8 mg/dL      Total Protein 5.5 g/dL      Albumin 3.20 g/dL      ALT (SGPT) 17 U/L      AST (SGOT) 45 U/L      Alkaline Phosphatase 189 U/L      Total Bilirubin 20.4 mg/dL      Globulin 2.3 gm/dL      A/G Ratio 1.4 g/dL      BUN/Creatinine Ratio 18.3     Anion Gap 13.6 mmol/L      eGFR 34.2 mL/min/1.73      Comment: National Kidney Foundation and American Society of Nephrology (ASN) Task Force recommended calculation based on the Chronic Kidney Disease Epidemiology Collaboration (CKD-EPI) equation refit without adjustment for race.       Narrative:      GFR Normal >60  Chronic Kidney Disease <60  Kidney Failure <15      Protime-INR [[id number]]  (Abnormal) Collected: 05/17/22 0514    Specimen: Blood Updated: 05/17/22 0621     Protime 20.8 Seconds      INR 1.79    CBC & Differential [[id number]]  (Abnormal) Collected: 05/17/22 0603    Specimen: Blood Updated: 05/17/22 0615    Narrative:      The following orders were created for panel order CBC & Differential.  Procedure                               Abnormality         Status                     ---------                               -----------         ------                     CBC Auto Differential[[id number]]         Abnormal            Final result                 Please view results for these tests on the individual orders.    CBC Auto Differential [[id number]]  (Abnormal) Collected: 05/17/22 0603    Specimen: Blood Updated: 05/17/22 0615     WBC 10.11 10*3/mm3      RBC 2.59 10*6/mm3      Hemoglobin 9.5 g/dL      Hematocrit 25.7 %      MCV 99.2 fL      MCH 36.7 pg      MCHC 37.0 g/dL      RDW 14.7 %      RDW-SD 53.2 fl      MPV 10.6 fL      Platelets 115 10*3/mm3      Neutrophil % 68.4 %      Lymphocyte % 10.5 %      Monocyte % 15.5 %      Eosinophil % 3.6 %      Basophil % 0.8 %      Neutrophils, Absolute 6.92 10*3/mm3      Lymphocytes, Absolute 1.06 10*3/mm3      Monocytes, Absolute 1.57 10*3/mm3      Eosinophils, Absolute 0.36 10*3/mm3      Basophils, Absolute 0.08 10*3/mm3         Imaging Results (Last 24 Hours)     Procedure Component Value Units Date/Time    CT Abdomen Pelvis Without Contrast [[id number]] Collected: 05/16/22 0808     Updated: 05/17/22 1032    Narrative:      CT ABDOMEN AND PELVIS WITHOUT CONTRAST     HISTORY: Cirrhosis with ascites and worsening abdominal pain.     TECHNIQUE: Axial CT images of the abdomen and pelvis were obtained  without administration of intravenous contrast. The patient was not  given oral contrast. Coronal and sagittal reformats were obtained.     COMPARISON: CT dated 11/06/2017.     FINDINGS: There is large intra-abdominal ascites present. The liver is  shrunken, consistent with hepatic cirrhosis. The spleen measures 13.2 cm  in craniocaudal dimension. Cholelithiasis is present. The pancreas is  normal. Bilateral adrenal glands and kidneys are normal. Portosystemic  collaterals are seen in the splenic hilum and the retroperitoneum. The  urinary bladder is partially distended and normal. No evidence of bowel  obstruction. No pathological retroperitoneal lymphadenopathy. Marked  calcified atherosclerotic plaque is seen within the abdominal aorta and  its branches. Mild ectasia of  the infrarenal abdominal aorta measuring  up to 2.4 cm. No pleural or pericardial effusion.     There are compression deformities of numerous lumbar vertebral bodies  including the superior endplates of L2, L3 and L4 vertebral body and  inferior endplate of L5 vertebral body. These cause approximately 10-20%  height loss. These are age-indeterminate.       Impression:      1. Cirrhosis with sequela of portal hypertension include large  intra-abdominal ascites.  2. Cholelithiasis.     Radiation dose reduction techniques were utilized, including automated  exposure control and exposure modulation based on body size.     This report was finalized on 5/17/2022 10:29 AM by Dr. Bam Oleary M.D.           ECG 12 Lead  Component   Ref Range & Units 1 d ago    QT Interval   ms 444    Resulting Agency  ECG             HEART RATE= 69  bpm  RR Interval= 892  ms  UT Interval= 164  ms  P Horizontal Axis= 4  deg  P Front Axis= 29  deg  QRSD Interval= 107  ms  QT Interval= 444  ms  QRS Axis= -30  deg  T Wave Axis= 111  deg  - BORDERLINE ECG -  Sinus rhythm  Ventricular premature complex  Left axis deviation  No change from previous             Current Facility-Administered Medications:   •  acetaminophen (TYLENOL) tablet 650 mg, 650 mg, Oral, Q4H PRN, Memo Varela MD, 650 mg at 05/15/22 0409  •  carbidopa-levodopa (SINEMET)  MG per tablet 1 tablet, 1 tablet, Oral, TID, Memo Varela MD, 1 tablet at 05/17/22 0817  •  lactulose (CHRONULAC) 10 GM/15ML solution 20 g, 20 g, Oral, Q6H, [facility] [place], 20 g at 05/17/22 1147  •  lidocaine (XYLOCAINE) 1 % injection 10 mL, 10 mL, Injection, Once, Franky Voss MD  •  morphine injection 2 mg, 2 mg, Intravenous, Q4H PRN, Memo Varela MD, 2 mg at 05/15/22 0433  •  ondansetron (ZOFRAN) injection 4 mg, 4 mg, Intravenous, Q6H PRN, Memo Varela MD, 4 mg at 05/15/22 0409  •  pantoprazole (PROTONIX) EC tablet 40 mg, 40 mg, Oral, BID AC, Memo Varela MD, 40 mg at  05/17/22 0817  •  riFAXIMin (XIFAXAN) tablet 550 mg, 550 mg, Oral, Q12H, Keith Bejarano MD, 550 mg at 05/17/22 0817  •  sodium bicarbonate tablet 1,300 mg, 1,300 mg, Oral, 4x Daily, Franky Moreau MD, 1,300 mg at 05/17/22 1154  •  sodium chloride 0.9 % flush 10 mL, 10 mL, Intravenous, PRN, Jovanni Guerra MD, 10 mL at 05/16/22 0801    ASSESSMENT  Hepatorenal syndrome  Cirrhosis with ascites s/p paracentesis  Jaundice  Hepatic encephalopathy  Acute kidney injury  Acute UTI  Chronic kidney disease stage III  Brain tumor consistent with meningioma  Hypotension  Hyperlipidemia  Coronary artery disease  Chronic anemia  Parkinson's disease  Esophageal varices and colon polyps  Gastroesophageal reflux disease    PLAN  CPM  Midodrine  Antibiotics completed  Continue to hold diuretics  Continue lactulose Protonix rifaximin Proamatine and Sinemet  Stress ulcer DVT prophylaxis  GI and nephrology to follow patient  Adjust home medications  Supportive care  PT/OT  DNR and poor prognosis  Discussed with family and nursing staff   Transfer to [facility] once bed available    KEITH BEJARANO MD

## 2022-05-17 NOTE — DISCHARGE SUMMARY
Discharge summary    Date of admission 5/9/2022  Date of discharge 5/17/2022    Final diagnosis  Hepatorenal syndrome  Cirrhosis with ascites s/p paracentesis  Hepatic encephalopathy  Acute kidney injury resolving  Acute UTI completed antibiotics  Chronic kidney disease stage III  Brain tumor consistent with meningioma  Hypotension  Hyperlipidemia  Coronary artery disease  Chronic anemia  Parkinson's disease  Esophageal varices and colon polyps  Gastroesophageal reflux disease     Discharge medications    Current Facility-Administered Medications:   •  acetaminophen (TYLENOL) tablet 650 mg, 650 mg, Oral, Q4H PRN, Memo Varela MD, 650 mg at 05/15/22 0409  •  carbidopa-levodopa (SINEMET)  MG per tablet 1 tablet, 1 tablet, Oral, TID, Memo Varela MD, 1 tablet at 05/17/22 1652  •  lactulose (CHRONULAC) 10 GM/15ML solution 20 g, 20 g, Oral, Q6H, Destiny Tipton PA, 20 g at 05/17/22 1147  •  lidocaine (XYLOCAINE) 1 % injection 10 mL, 10 mL, Injection, Once, Franky Voss MD  •  morphine injection 2 mg, 2 mg, Intravenous, Q4H PRN, Memo Varela MD, 2 mg at 05/15/22 0433  •  ondansetron (ZOFRAN) injection 4 mg, 4 mg, Intravenous, Q6H PRN, Memo Varela MD, 4 mg at 05/15/22 0409  •  pantoprazole (PROTONIX) EC tablet 40 mg, 40 mg, Oral, BID AC, Memo Varela MD, 40 mg at 05/17/22 1653  •  riFAXIMin (XIFAXAN) tablet 550 mg, 550 mg, Oral, Q12H, Memo Varela MD, 550 mg at 05/17/22 0817  •  sodium bicarbonate tablet 1,300 mg, 1,300 mg, Oral, 4x Daily, Franky Moreau MD, 1,300 mg at 05/17/22 1154  •  sodium chloride 0.9 % flush 10 mL, 10 mL, Intravenous, PRN, Jovanni Guerra MD, 10 mL at 05/16/22 0801     Consults obtained  Nephrology  Gastroenterology  Neurosurgery    Procedures  Paracentesis    Hospital course  71-year-old white male with very complex past medical history including cirrhosis with ascites chronic kidney disease chronic hypotension hyperlipidemia coronary artery disease admit to  emergency room with altered mental status and recurrent falls.  Patient work-up in ER revealed hepatic encephalopathy and also found to be in acute kidney injury UTI and incidental finding with brain tumor consistent with meningioma.  Patient admitted treated with fluids and his diuretics was held and received empiric antibiotics and underwent paracentesis.  Patient followed by nephrology and gastroenterology.  Patient further evaluated by neurosurgery for brain tumor and recommend no further work-up as this is consistent with meningioma and needs outpatient follow-up.  Patient hepatic encephalopathy resolved and his acute kidney injury improved and UTI treated with empiric antibiotics.  Patient underwent diagnostic and therapeutic paracentesis and his symptoms improved.  Patient family wants him to be taken to [facility] where his gastroenterology work and wants to talk about possible liver transplantation.  Patient has a better bed available today and cleared for discharge.  Patient remained DNR throughout hospital course.    Discharge diet regular    Activity per PT OT    Medication as above    Further care per accepting physician at this hospital and needs to be followed with gastroenterology nephrology and outpatient follow-up with neurosurgery after discharge.    KEITH BEJARANO MD

## 2022-05-17 NOTE — CASE MANAGEMENT/SOCIAL WORK
Continued Stay Note  [facility]     Patient Name: Hu Burgess  MRN: [id number]  Today's Date: 5/17/2022    Admit Date: 5/9/2022     Discharge Plan     Row Name 05/17/22 1133       Plan    Plan Comments I called the [facility] Transfer line and they advised that they did not have a bed available and didn't anticipate one being available today. She did confirm that the patient is on the list for a bed and they will call when a bed is available. CM will follow.               Discharge Codes    No documentation.               Expected Discharge Date and Time     Expected Discharge Date Expected Discharge Time    May 17, 2022             Tanvi Marino RN

## 2022-05-18 LAB
BACTERIA FLD CULT: NORMAL
GRAM STN SPEC: NORMAL
GRAM STN SPEC: NORMAL

## 2022-05-20 ENCOUNTER — HOME HEALTH ADMISSION (OUTPATIENT)
Dept: HOME HEALTH SERVICES | Facility: HOME HEALTHCARE | Age: 72
End: 2022-05-20

## 2022-09-16 ENCOUNTER — APPOINTMENT (OUTPATIENT)
Dept: MRI IMAGING | Facility: HOSPITAL | Age: 72
End: 2022-09-16

## 2023-09-07 LAB — WHOLE BLOOD HOLD COAG: NORMAL

## (undated) DEVICE — Device

## (undated) DEVICE — CVR PROB 96IN LF STRL

## (undated) DEVICE — NDL HYPO PRECISIONGLIDE REG 25G 1 1/2

## (undated) DEVICE — GOWN ,SIRUS,NONREINFORCED 3XL: Brand: MEDLINE

## (undated) DEVICE — SUT VIC 3/0 CTI 36IN J944H

## (undated) DEVICE — SAFELINER SUCTION CANISTER 1000CC: Brand: DEROYAL

## (undated) DEVICE — SUT SILK 2/0 SH 30IN K833H

## (undated) DEVICE — GOWN ISOL W/THUMB UNIV BLU BX/15

## (undated) DEVICE — LOU MINOR PROCEDURE: Brand: MEDLINE INDUSTRIES, INC.

## (undated) DEVICE — KT ORCA ORCAPOD DISP STRL

## (undated) DEVICE — SUT MNCRYL 4/0 PS2 18 IN

## (undated) DEVICE — MEDI-VAC NON-CONDUCTIVE SUCTION TUBING: Brand: CARDINAL HEALTH

## (undated) DEVICE — SYR LUER SLPTP 50ML

## (undated) DEVICE — BW-412T DISP COMBO CLEANING BRUSH: Brand: SINGLE USE COMBINATION CLEANING BRUSH

## (undated) DEVICE — DECANT BG O JET

## (undated) DEVICE — SNAR POLYP CAPTIFLX MICRO OVL 13MM 240CM

## (undated) DEVICE — ADAPT CLN BIOGUARD AIR/H2O DISP

## (undated) DEVICE — THE BITE BLOCK MAXI, LATEX FREE STRAP IS USED TO PROTECT THE ENDOSCOPE INSERTION TUBE FROM BEING BITTEN BY THE PATIENT.

## (undated) DEVICE — PENCL ES MEGADINE EZ/CLEAN BUTN W/HOLSTR 10FT

## (undated) DEVICE — SPNG GZ WOVN 4X4IN 12PLY 10/BX STRL

## (undated) DEVICE — BIOPATCH™ ANTIMICROBIAL DRESSING WITH CHLORHEXIDINE GLUCONATE IS A HYDROPHILLIC POLYURETHANE ABSORPTIVE FOAM WITH CHLORHEXIDINE GLUCONATE (CHG) WHICH INHIBITS BACTERIAL GROWTH UNDER THE DRESSING. THE DRESSING IS INTENDED TO BE USED TO ABSORB EXUDATE, COVER A WOUND CAUSED BY VASCULAR AND NONVASCULAR PERCUTANEOUS MEDICAL DEVICES DURING SURGERY, AS WELL AS REDUCE LOCAL INFECTION AND COLONIZATION OF MICROORGANISMS.: Brand: BIOPATCH

## (undated) DEVICE — LAG MINOR PROCEDURE: Brand: MEDLINE INDUSTRIES, INC.

## (undated) DEVICE — TUBING, SUCTION, 1/4" X 12', STRAIGHT: Brand: MEDLINE

## (undated) DEVICE — GLV SURG NEOLON 2G PF LF 7.5 STRL

## (undated) DEVICE — SUCTION CANISTER, 3000CC,SAFELINER: Brand: DEROYAL

## (undated) DEVICE — ENDOSCOPY PORT CONNECTOR FOR OLYMPUS® SCOPES: Brand: ERBE

## (undated) DEVICE — PATIENT RETURN ELECTRODE, SINGLE-USE, CONTACT QUALITY MONITORING, ADULT, WITH 9FT CORD, FOR PATIENTS WEIGING OVER 33LBS. (15KG): Brand: MEGADYNE

## (undated) DEVICE — MEDI-VAC YANK SUCT HNDL W/TPRD BULBOUS TIP: Brand: CARDINAL HEALTH

## (undated) DEVICE — ADHS SKIN DERMABOND TOP ADVANCED

## (undated) DEVICE — JACKT LAB F/R KNIT CUFF/COLR XLG BLU

## (undated) DEVICE — DRN PENRS SIL 1/4X18IN LF STRL

## (undated) DEVICE — FRCP BX RADJAW4 NDL 2.8 240CM LG OG BX40

## (undated) DEVICE — VIAL FORMALIN CAP 10P 40ML

## (undated) DEVICE — DECANTER: Brand: UNBRANDED

## (undated) DEVICE — GOWN,NON-REINFORCED,SIRUS,SET IN SLV,XXL: Brand: MEDLINE

## (undated) DEVICE — GLV SURG SIGNATURE ESSENTIAL PF LTX SZ8

## (undated) DEVICE — SYR LUERLOK 5CC

## (undated) DEVICE — DRSNG SURESITE WNDW 2.38X2.75

## (undated) DEVICE — Device: Brand: DEFENDO AIR/WATER/SUCTION AND BIOPSY VALVE

## (undated) DEVICE — APPL CHLORAPREP W/TINT 10.5ML PERC STRL

## (undated) DEVICE — TRAP FLD MINIVAC MEGADYNE 100ML

## (undated) DEVICE — Device: Brand: CAUTERY TIP CLEANER

## (undated) DEVICE — SUCTION CANISTER, 1000CC,SAFELINER: Brand: DEROYAL

## (undated) DEVICE — KT CATH TAL PALINDROME SAPPHIRE 14.5F23CM

## (undated) DEVICE — KT PUMP PAIN 2D 2.5IN SGL ANTIB 100ML

## (undated) DEVICE — APPL CHLORAPREP HI/LITE 26ML ORNG

## (undated) DEVICE — PENCL E/S ULTRAVAC TELESCP NOSE HOLSTR 10FT

## (undated) DEVICE — SUT ETHLN 2/0 PS 18IN 585H

## (undated) DEVICE — ELECTRD BLD MEGADYNE 2.75IN SS

## (undated) DEVICE — SYR LUERLOK 20CC

## (undated) DEVICE — SYR LL 3CC

## (undated) DEVICE — MASK,FACE,FLUID RESIST,SHLD,EARLOOP: Brand: MEDLINE

## (undated) DEVICE — HYBRID TUBING/CAP SET FOR OLYMPUS® SCOPES: Brand: ERBE

## (undated) DEVICE — GLV SURG BIOGEL LTX PF 7 1/2

## (undated) DEVICE — ANTIBACTERIAL UNDYED BRAIDED (POLYGLACTIN 910), SYNTHETIC ABSORBABLE SUTURE: Brand: COATED VICRYL